# Patient Record
Sex: FEMALE | Race: WHITE | NOT HISPANIC OR LATINO | Employment: UNEMPLOYED | URBAN - METROPOLITAN AREA
[De-identification: names, ages, dates, MRNs, and addresses within clinical notes are randomized per-mention and may not be internally consistent; named-entity substitution may affect disease eponyms.]

---

## 2017-02-15 ENCOUNTER — HOSPITAL ENCOUNTER (EMERGENCY)
Facility: HOSPITAL | Age: 27
Discharge: HOME | End: 2017-02-15
Attending: EMERGENCY MEDICINE | Admitting: EMERGENCY MEDICINE
Payer: COMMERCIAL

## 2017-02-15 VITALS
TEMPERATURE: 98.5 F | OXYGEN SATURATION: 98 % | HEIGHT: 67 IN | RESPIRATION RATE: 18 BRPM | WEIGHT: 293 LBS | HEART RATE: 91 BPM | SYSTOLIC BLOOD PRESSURE: 130 MMHG | BODY MASS INDEX: 45.99 KG/M2 | DIASTOLIC BLOOD PRESSURE: 80 MMHG

## 2017-02-15 DIAGNOSIS — M54.50 LOW BACK PAIN: Primary | ICD-10-CM

## 2017-02-15 PROCEDURE — 99283 EMERGENCY DEPT VISIT LOW MDM: CPT

## 2017-02-15 PROCEDURE — 96372 THER/PROPH/DIAG INJ SC/IM: CPT

## 2017-02-15 RX ORDER — IBUPROFEN 600 MG/1
600 TABLET ORAL EVERY 8 HOURS PRN
Qty: 30 TABLET | Refills: 0 | Status: SHIPPED | OUTPATIENT
Start: 2017-02-15 | End: 2017-08-15

## 2017-02-15 RX ORDER — CYCLOBENZAPRINE HCL 5 MG
10 TABLET ORAL 3 TIMES DAILY PRN
Qty: 9 TABLET | Refills: 0 | Status: SHIPPED | OUTPATIENT
Start: 2017-02-15 | End: 2017-02-15

## 2017-02-15 RX ORDER — CYCLOBENZAPRINE HCL 10 MG
10 TABLET ORAL 3 TIMES DAILY PRN
Qty: 9 TABLET | Refills: 0 | Status: SHIPPED | OUTPATIENT
Start: 2017-02-15 | End: 2017-08-15

## 2017-02-15 RX ORDER — HYDROCODONE BITARTRATE AND ACETAMINOPHEN 5; 325 MG/1; MG/1
1 TABLET ORAL ONCE
Status: COMPLETED | OUTPATIENT
Start: 2017-02-15 | End: 2017-02-15

## 2017-02-15 RX ORDER — CYCLOBENZAPRINE HCL 10 MG
10 TABLET ORAL ONCE
Status: COMPLETED | OUTPATIENT
Start: 2017-02-15 | End: 2017-02-15

## 2017-02-15 RX ORDER — HYDROCODONE BITARTRATE AND ACETAMINOPHEN 5; 325 MG/1; MG/1
1 TABLET ORAL EVERY 6 HOURS PRN
Qty: 12 TABLET | Refills: 0 | Status: SHIPPED | OUTPATIENT
Start: 2017-02-15 | End: 2017-02-18

## 2017-02-15 RX ORDER — KETOROLAC TROMETHAMINE 30 MG/ML
30 INJECTION, SOLUTION INTRAMUSCULAR; INTRAVENOUS ONCE
Status: COMPLETED | OUTPATIENT
Start: 2017-02-15 | End: 2017-02-15

## 2017-02-15 RX ADMIN — CYCLOBENZAPRINE HYDROCHLORIDE 10 MG: 10 TABLET, FILM COATED ORAL at 20:03

## 2017-02-15 RX ADMIN — HYDROCODONE BITARTRATE AND ACETAMINOPHEN 1 TABLET: 5; 325 TABLET ORAL at 20:03

## 2017-02-15 RX ADMIN — KETOROLAC TROMETHAMINE 30 MG: 30 INJECTION, SOLUTION INTRAMUSCULAR at 20:04

## 2017-02-22 ENCOUNTER — ALLSCRIPTS OFFICE VISIT (OUTPATIENT)
Dept: OTHER | Facility: OTHER | Age: 27
End: 2017-02-22

## 2017-04-16 ENCOUNTER — HOSPITAL ENCOUNTER (EMERGENCY)
Facility: HOSPITAL | Age: 27
Discharge: HOME | End: 2017-04-16
Attending: EMERGENCY MEDICINE
Payer: COMMERCIAL

## 2017-04-16 VITALS
HEART RATE: 98 BPM | SYSTOLIC BLOOD PRESSURE: 166 MMHG | OXYGEN SATURATION: 98 % | TEMPERATURE: 98.6 F | RESPIRATION RATE: 18 BRPM | HEIGHT: 66 IN | WEIGHT: 293 LBS | DIASTOLIC BLOOD PRESSURE: 78 MMHG | BODY MASS INDEX: 47.09 KG/M2

## 2017-04-16 DIAGNOSIS — T22.011A: ICD-10-CM

## 2017-04-16 DIAGNOSIS — T23.001A BURN OF RIGHT HAND: Primary | ICD-10-CM

## 2017-04-16 PROCEDURE — 99283 EMERGENCY DEPT VISIT LOW MDM: CPT

## 2017-04-16 PROCEDURE — 96372 THER/PROPH/DIAG INJ SC/IM: CPT

## 2017-04-16 RX ORDER — KETOROLAC TROMETHAMINE 30 MG/ML
15 INJECTION, SOLUTION INTRAMUSCULAR; INTRAVENOUS ONCE
Status: COMPLETED | OUTPATIENT
Start: 2017-04-16 | End: 2017-04-16

## 2017-04-16 RX ORDER — DIAPER,BRIEF,INFANT-TODD,DISP
1 EACH MISCELLANEOUS 2 TIMES DAILY
Qty: 28 G | Refills: 0 | Status: SHIPPED | OUTPATIENT
Start: 2017-04-16 | End: 2017-08-15

## 2017-04-16 RX ORDER — BACITRACIN, NEOMYCIN, POLYMYXIN B 400; 3.5; 5 [USP'U]/G; MG/G; [USP'U]/G
1 OINTMENT TOPICAL ONCE
Status: COMPLETED | OUTPATIENT
Start: 2017-04-16 | End: 2017-04-16

## 2017-04-16 RX ORDER — ACETAMINOPHEN 325 MG/1
650 TABLET ORAL ONCE
Status: COMPLETED | OUTPATIENT
Start: 2017-04-16 | End: 2017-04-16

## 2017-04-16 RX ADMIN — ACETAMINOPHEN 650 MG: 325 TABLET, FILM COATED ORAL at 17:33

## 2017-04-16 RX ADMIN — BACITRACIN, NEOMYCIN, POLYMYXIN B 1 SMALL APPLICATION: 400; 3.5; 5 OINTMENT TOPICAL at 17:33

## 2017-04-16 RX ADMIN — KETOROLAC TROMETHAMINE 15 MG: 30 INJECTION, SOLUTION INTRAMUSCULAR at 17:33

## 2017-08-09 ENCOUNTER — HOSPITAL ENCOUNTER (EMERGENCY)
Facility: HOSPITAL | Age: 27
Discharge: HOME | End: 2017-08-10
Attending: EMERGENCY MEDICINE | Admitting: EMERGENCY MEDICINE
Payer: COMMERCIAL

## 2017-08-09 VITALS
SYSTOLIC BLOOD PRESSURE: 165 MMHG | BODY MASS INDEX: 51.65 KG/M2 | RESPIRATION RATE: 18 BRPM | HEART RATE: 98 BPM | DIASTOLIC BLOOD PRESSURE: 70 MMHG | OXYGEN SATURATION: 99 % | WEIGHT: 293 LBS | TEMPERATURE: 98.4 F

## 2017-08-09 DIAGNOSIS — L50.8 URTICARIA, ACUTE: Primary | ICD-10-CM

## 2017-08-09 RX ORDER — FAMOTIDINE 20 MG/1
20 TABLET, FILM COATED ORAL ONCE
Status: COMPLETED | OUTPATIENT
Start: 2017-08-09 | End: 2017-08-09

## 2017-08-09 RX ORDER — DIPHENHYDRAMINE HCL 25 MG
50 TABLET ORAL ONCE
Status: COMPLETED | OUTPATIENT
Start: 2017-08-09 | End: 2017-08-09

## 2017-08-09 RX ORDER — PREDNISONE 50 MG/1
50 TABLET ORAL DAILY
Qty: 4 TABLET | Refills: 0 | Status: SHIPPED | OUTPATIENT
Start: 2017-08-09 | End: 2017-08-13

## 2017-08-09 RX ORDER — FAMOTIDINE 20 MG/1
20 TABLET, FILM COATED ORAL 2 TIMES DAILY
Qty: 14 TABLET | Refills: 0 | Status: SHIPPED | OUTPATIENT
Start: 2017-08-09 | End: 2017-08-15

## 2017-08-09 RX ADMIN — DIPHENHYDRAMINE HCL 50 MG: 25 TABLET ORAL at 22:52

## 2017-08-09 RX ADMIN — PREDNISONE 50 MG: 10 TABLET ORAL at 22:53

## 2017-08-09 RX ADMIN — FAMOTIDINE 20 MG: 20 TABLET ORAL at 22:53

## 2017-08-10 PROCEDURE — 99283 EMERGENCY DEPT VISIT LOW MDM: CPT

## 2017-08-15 ENCOUNTER — HOSPITAL ENCOUNTER (EMERGENCY)
Facility: HOSPITAL | Age: 27
Discharge: HOME | End: 2017-08-15
Attending: EMERGENCY MEDICINE | Admitting: EMERGENCY MEDICINE
Payer: COMMERCIAL

## 2017-08-15 VITALS
RESPIRATION RATE: 16 BRPM | WEIGHT: 293 LBS | OXYGEN SATURATION: 98 % | BODY MASS INDEX: 51.65 KG/M2 | DIASTOLIC BLOOD PRESSURE: 77 MMHG | SYSTOLIC BLOOD PRESSURE: 122 MMHG | TEMPERATURE: 97.8 F | HEART RATE: 100 BPM

## 2017-08-15 DIAGNOSIS — J20.9 ACUTE BRONCHITIS: Primary | ICD-10-CM

## 2017-08-15 PROCEDURE — 99283 EMERGENCY DEPT VISIT LOW MDM: CPT

## 2017-08-15 PROCEDURE — 94640 AIRWAY INHALATION TREATMENT: CPT

## 2017-08-15 RX ORDER — PREDNISONE 20 MG/1
60 TABLET ORAL ONCE
Status: COMPLETED | OUTPATIENT
Start: 2017-08-15 | End: 2017-08-15

## 2017-08-15 RX ORDER — ALBUTEROL SULFATE 2.5 MG/3ML
5 SOLUTION RESPIRATORY (INHALATION) ONCE
Status: COMPLETED | OUTPATIENT
Start: 2017-08-15 | End: 2017-08-15

## 2017-08-15 RX ORDER — PREDNISONE 20 MG/1
40 TABLET ORAL DAILY
Qty: 10 TABLET | Refills: 0 | Status: SHIPPED | OUTPATIENT
Start: 2017-08-15 | End: 2017-08-20

## 2017-08-15 RX ORDER — ALBUTEROL SULFATE 90 UG/1
2 AEROSOL, METERED RESPIRATORY (INHALATION) EVERY 4 HOURS PRN
Qty: 1 INHALER | Refills: 0 | Status: SHIPPED | OUTPATIENT
Start: 2017-08-15 | End: 2018-09-11

## 2017-08-15 RX ORDER — BENZONATATE 100 MG/1
100 CAPSULE ORAL EVERY 8 HOURS
Qty: 21 CAPSULE | Refills: 0 | Status: SHIPPED | OUTPATIENT
Start: 2017-08-15 | End: 2017-11-14

## 2017-08-15 RX ADMIN — PREDNISONE 60 MG: 20 TABLET ORAL at 12:02

## 2017-08-15 RX ADMIN — ALBUTEROL SULFATE 5 MG: 2.5 SOLUTION RESPIRATORY (INHALATION) at 12:05

## 2017-11-14 ENCOUNTER — HOSPITAL ENCOUNTER (EMERGENCY)
Facility: HOSPITAL | Age: 27
Discharge: HOME | End: 2017-11-14
Attending: EMERGENCY MEDICINE
Payer: COMMERCIAL

## 2017-11-14 VITALS
RESPIRATION RATE: 18 BRPM | TEMPERATURE: 98.8 F | DIASTOLIC BLOOD PRESSURE: 65 MMHG | OXYGEN SATURATION: 98 % | HEART RATE: 87 BPM | WEIGHT: 293 LBS | SYSTOLIC BLOOD PRESSURE: 131 MMHG | BODY MASS INDEX: 52.46 KG/M2

## 2017-11-14 DIAGNOSIS — G89.29 CHRONIC DENTAL PAIN: Primary | ICD-10-CM

## 2017-11-14 DIAGNOSIS — K08.9 CHRONIC DENTAL PAIN: Primary | ICD-10-CM

## 2017-11-14 DIAGNOSIS — K04.7 DENTAL INFECTION: ICD-10-CM

## 2017-11-14 PROCEDURE — 99283 EMERGENCY DEPT VISIT LOW MDM: CPT

## 2017-11-14 RX ORDER — CLINDAMYCIN HYDROCHLORIDE 300 MG/1
300 CAPSULE ORAL 4 TIMES DAILY
Qty: 40 CAPSULE | Refills: 0 | Status: SHIPPED | OUTPATIENT
Start: 2017-11-14 | End: 2017-11-24

## 2017-11-14 RX ORDER — DEXAMETHASONE 4 MG/1
10 TABLET ORAL ONCE
Status: COMPLETED | OUTPATIENT
Start: 2017-11-14 | End: 2017-11-14

## 2017-11-14 RX ORDER — NAPROXEN 500 MG/1
500 TABLET ORAL 2 TIMES DAILY WITH MEALS
Qty: 20 TABLET | Refills: 0 | Status: SHIPPED | OUTPATIENT
Start: 2017-11-14 | End: 2018-03-11

## 2017-11-14 RX ORDER — ONDANSETRON 4 MG/1
4 TABLET, ORALLY DISINTEGRATING ORAL EVERY 6 HOURS PRN
Qty: 20 TABLET | Refills: 0 | Status: SHIPPED | OUTPATIENT
Start: 2017-11-14 | End: 2018-08-16 | Stop reason: ALTCHOICE

## 2017-11-14 RX ORDER — CLINDAMYCIN HYDROCHLORIDE 150 MG/1
300 CAPSULE ORAL ONCE
Status: COMPLETED | OUTPATIENT
Start: 2017-11-14 | End: 2017-11-14

## 2017-11-14 RX ADMIN — DEXAMETHASONE 10 MG: 4 TABLET ORAL at 09:56

## 2017-11-14 RX ADMIN — CLINDAMYCIN HYDROCHLORIDE 300 MG: 150 CAPSULE ORAL at 09:56

## 2017-11-14 NOTE — DISCHARGE INSTRUCTIONS
Return to the ER if you develop fevers greater than 100 4 after 48 hours of being on the antibiotics, or if your symptoms continue to worsen  Follow up with the dentist as scheduled for definitive treatment  Dental Abscess   WHAT YOU NEED TO KNOW:   A dental abscess is a collection of pus in or around a tooth  A dental abscess is caused by bacteria  The bacteria usually enter the tooth when the enamel (outer part of the tooth) is damaged by tooth decay  Bacteria may also enter the tooth through a break or chip in the tooth, or a cut in the gum  Food particles that are stuck between the teeth for a long time may also lead to an abscess  DISCHARGE INSTRUCTIONS:   Return to the emergency department if:   · You have severe pain  · You have trouble breathing because of pain or swelling  Contact your healthcare provider if:   · Your symptoms get worse, even after treatment  · Your mouth is bleeding  · You cannot eat or drink because of pain or swelling  · Your abscess returns  · You have an injury that causes a crack in your tooth  · You have questions or concerns about your condition or care  Medicines: You may  need any of the following:  · Antibiotics  help treat a bacterial infection  · NSAIDs , such as ibuprofen, help decrease swelling, pain, and fever  This medicine is available with or without a doctor's order  NSAIDs can cause stomach bleeding or kidney problems in certain people  If you take blood thinner medicine, always ask your healthcare provider if NSAIDs are safe for you  Always read the medicine label and follow directions  · Acetaminophen  decreases pain and fever  It is available without a doctor's order  Ask how much to take and how often to take it  Follow directions  Read the labels of all other medicines you are using to see if they also contain acetaminophen, or ask your doctor or pharmacist  Acetaminophen can cause liver damage if not taken correctly   Do not use more than 4 grams (4,000 milligrams) total of acetaminophen in one day  · Prescription pain medicine  may be given  Ask your healthcare provider how to take this medicine safely  Some prescription pain medicines contain acetaminophen  Do not take other medicines that contain acetaminophen without talking to your healthcare provider  Too much acetaminophen may cause liver damage  Prescription pain medicine may cause constipation  Ask your healthcare provider how to prevent or treat constipation  · Take your medicine as directed  Contact your healthcare provider if you think your medicine is not helping or if you have side effects  Tell him of her if you are allergic to any medicine  Keep a list of the medicines, vitamins, and herbs you take  Include the amounts, and when and why you take them  Bring the list or the pill bottles to follow-up visits  Carry your medicine list with you in case of an emergency  Self-care:   · Rinse your mouth every 2 hours with salt water  This will help keep the area clean  · Gently brush your teeth twice a day with a soft tooth brush  This will help keep the area clean  · Eat soft foods as directed  Soft foods may cause less pain  Examples include applesauce, yogurt, and cooked pasta  Ask your healthcare provider how long to follow this instruction  · Apply a warm compress to your tooth or gum  Use a cotton ball or gauze soaked in warm water  Remove the compress in 10 minutes or when it becomes cool  Repeat 3 times a day  Prevent another abscess:   · Brush your teeth at least 2 times a day with fluoride toothpaste  · Use dental floss to clean between your teeth at least once a day  · Rinse your mouth with water or mouthwash after meals and snacks  · Chew sugarless gum after meals and snacks  · Limit foods that are sticky and high in sugar such as raisons  Also limit drinks high in sugar, such as soda       · See your dentist every 6 months for dental cleanings and oral exams  Follow up with your healthcare provider in 24 hours: Your healthcare provider will need to check your teeth and gums  Write down your questions so you remember to ask them during your visits  © 2017 [employer] Information is for End User's use only and may not be sold, redistributed or otherwise used for commercial purposes  All illustrations and images included in CareNotes® are the copyrighted property of [employer]  or [employer]  The above information is an  only  It is not intended as medical advice for individual conditions or treatments  Talk to your doctor, nurse or pharmacist before following any medical regimen to see if it is safe and effective for you

## 2017-11-14 NOTE — ED PROVIDER NOTES
History  Chief Complaint   Patient presents with    Facial Swelling     pt had toothache and was placed on amoxicillin, states was okay and then sx got worse   has dentist appt for noon today but woke up with significant right sided facial swelling  reports can hardly open mouth and weird feeling when swallowing      31 YO female presents with Right sided dental pain and facial swelling for the last 2 days  Pt states she has actually had the discomfort for ~1 month but it was waxing and waning, she was treated for a dental infection 1 month prior with amoxicillin but does not feel this was helpful  Pt states she was told by her dentist to discontinue the amoxicillin prior to finishing the course as it caused her to have a urinary tract infection  Pt states the pain worsened yesterday and has been constant, she has noticed increasing swelling and discomfort in the Right cheek  Pt states the pain and swelling seems to come from the Right maxillary jaw  Pt called her dentist and ha an appointment in 2 hours but was told to come to the ED for Abx  Pt denies CP/SOB/F/C/N/V/D/C, no dysuria, burning on urination or blood in urine  History provided by:  Patient   used: No    Dental Pain   Location:  Upper  Upper teeth location:  2/RU 2nd molar and 3/RU 1st molar  Quality:  Aching  Severity:  Moderate  Onset quality:  Gradual  Duration:  2 days  Timing:  Constant  Progression:  Worsening  Chronicity:  Recurrent  Context: dental caries    Previous work-up:  Dental exam  Relieved by:  Nothing  Worsened by:  Jaw movement  Ineffective treatments:  NSAIDs  Associated symptoms: facial swelling    Associated symptoms: no difficulty swallowing, no drooling, no fever, no gum swelling, no neck swelling, no oral bleeding and no trismus        Prior to Admission Medications   Prescriptions Last Dose Informant Patient Reported? Taking?    albuterol (PROVENTIL HFA,VENTOLIN HFA) 90 mcg/act inhaler   No Yes Sig: Inhale 2 puffs every 4 (four) hours as needed for wheezing      Facility-Administered Medications: None       Past Medical History:   Diagnosis Date    Back pain     MRSA carrier        Past Surgical History:   Procedure Laterality Date    TONSILLECTOMY         History reviewed  No pertinent family history  I have reviewed and agree with the history as documented  Social History   Substance Use Topics    Smoking status: Never Smoker    Smokeless tobacco: Never Used    Alcohol use No        Review of Systems   Constitutional: Negative for chills, fatigue and fever  HENT: Positive for facial swelling  Negative for dental problem and drooling  Eyes: Negative for visual disturbance  Respiratory: Negative for shortness of breath  Cardiovascular: Negative for chest pain  Gastrointestinal: Negative for abdominal pain, diarrhea and vomiting  Genitourinary: Negative for dysuria and frequency  Musculoskeletal: Negative for arthralgias  Skin: Negative for rash  Neurological: Negative for dizziness, weakness and light-headedness  Psychiatric/Behavioral: Negative for agitation, behavioral problems and confusion  All other systems reviewed and are negative  Physical Exam  ED Triage Vitals [11/14/17 0914]   Temperature Pulse Respirations Blood Pressure SpO2   98 8 °F (37 1 °C) 87 18 131/65 98 %      Temp Source Heart Rate Source Patient Position - Orthostatic VS BP Location FiO2 (%)   Oral -- -- -- --      Pain Score       Worst Possible Pain           Orthostatic Vital Signs  Vitals:    11/14/17 0914   BP: 131/65   Pulse: 87       Physical Exam   Constitutional: She is oriented to person, place, and time  She appears well-developed and well-nourished  HENT:   Head: Normocephalic and atraumatic  Poor dentition throughout, no pointing abscess on Right, no dental fractures, multiple fillings, swelling to the Right buccal cheek and mucosa without abscess, induration is present  Eyes: EOM are normal  Pupils are equal, round, and reactive to light  Neck: Normal range of motion  Cardiovascular: Normal rate, regular rhythm and normal heart sounds  Pulmonary/Chest: Effort normal and breath sounds normal    Abdominal: Soft  Musculoskeletal: Normal range of motion  Neurological: She is alert and oriented to person, place, and time  Skin: Skin is warm and dry  Psychiatric: She has a normal mood and affect  Her behavior is normal  Thought content normal    Nursing note and vitals reviewed  ED Medications  Medications   dexamethasone (DECADRON) tablet 10 mg (10 mg Oral Given 11/14/17 0956)   clindamycin (CLEOCIN) capsule 300 mg (300 mg Oral Given 11/14/17 0956)       Diagnostic Studies  Results Reviewed     None                 No orders to display              Procedures  Procedures       Phone Contacts  ED Phone Contact    ED Course  ED Course                                MDM  Number of Diagnoses or Management Options  Chronic dental pain: new and requires workup  Dental infection: new and requires workup  Diagnosis management comments: 1  Dental infection - Pt with swelling in the buccal area on Right, induration without fluctuance, pt is not currently on Abx  Will give dose of steroids and treat with clindamycin, pt is to follow back up with her dentist for definitive Tx         Amount and/or Complexity of Data Reviewed  Review and summarize past medical records: yes    Patient Progress  Patient progress: stable    CritCare Time    Disposition  Final diagnoses:   Chronic dental pain   Dental infection     Time reflects when diagnosis was documented in both MDM as applicable and the Disposition within this note     Time User Action Codes Description Comment    11/14/2017  9:44 AM Romeo De La Fuente [K08 9,  G89 29] Chronic dental pain     11/14/2017  9:44 AM Romeo De La Fuente [K04 7] Dental infection       ED Disposition     ED Disposition Condition Comment    Discharge

## 2018-01-13 VITALS
WEIGHT: 293 LBS | BODY MASS INDEX: 45.99 KG/M2 | HEIGHT: 67 IN | SYSTOLIC BLOOD PRESSURE: 130 MMHG | DIASTOLIC BLOOD PRESSURE: 72 MMHG | TEMPERATURE: 97.7 F

## 2018-03-11 ENCOUNTER — HOSPITAL ENCOUNTER (EMERGENCY)
Facility: HOSPITAL | Age: 28
Discharge: HOME | End: 2018-03-11
Attending: EMERGENCY MEDICINE | Admitting: EMERGENCY MEDICINE
Payer: COMMERCIAL

## 2018-03-11 VITALS
DIASTOLIC BLOOD PRESSURE: 96 MMHG | WEIGHT: 293 LBS | BODY MASS INDEX: 53.26 KG/M2 | HEART RATE: 81 BPM | SYSTOLIC BLOOD PRESSURE: 166 MMHG | RESPIRATION RATE: 20 BRPM | TEMPERATURE: 98.1 F | OXYGEN SATURATION: 99 %

## 2018-03-11 DIAGNOSIS — G89.29 CHRONIC PAIN OF LEFT KNEE: Primary | ICD-10-CM

## 2018-03-11 DIAGNOSIS — M25.562 CHRONIC PAIN OF LEFT KNEE: Primary | ICD-10-CM

## 2018-03-11 PROCEDURE — 99283 EMERGENCY DEPT VISIT LOW MDM: CPT

## 2018-03-11 RX ORDER — NAPROXEN 500 MG/1
500 TABLET ORAL 2 TIMES DAILY WITH MEALS
Qty: 20 TABLET | Refills: 0 | Status: SHIPPED | OUTPATIENT
Start: 2018-03-11 | End: 2018-08-16 | Stop reason: ALTCHOICE

## 2018-03-11 NOTE — ED ATTENDING ATTESTATION
Elsie Chirinos MD, saw and evaluated the patient  I have discussed the patient with the resident/non-physician practitioner and agree with the resident's/non-physician practitioner's findings, Plan of Care, and MDM as documented in the resident's/non-physician practitioner's note, except where noted  All available labs and Radiology studies were reviewed  At this point I agree with the current assessment done in the Emergency Department    I have conducted an independent evaluation of this patient a history and physical is as follows:   chronic left knee pain no new injury patient has been seen by Orthopedics physical therapy and has had an MRI of the knee she states she was told that she may have meniscal tear   no fever   exam: Patient   Has an elevated BMI knee is stable no effusion noted neurovascular status intact   impression knee pain left    Critical Care Time  CritCare Time    Procedures

## 2018-03-11 NOTE — DISCHARGE INSTRUCTIONS
Arthralgia   WHAT YOU NEED TO KNOW:   Arthralgia is pain in one or more joints, with no inflammation  It may be short-term and get better within 6 to 8 weeks  Arthralgia can be an early sign of arthritis  Arthralgia may be caused by a medical condition, such as a hormone disorder or a tumor  It may also be caused by an infection or injury  DISCHARGE INSTRUCTIONS:   Medicines: The following medicines may  be ordered for you:  · Acetaminophen  decreases pain  Ask how much to take and how often to take it  Follow directions  Acetaminophen can cause liver damage if not taken correctly  · NSAIDs  decrease pain and prevent swelling  Ask your healthcare provider which medicine is right for you  Ask how much to take and when to take it  Take as directed  NSAIDs can cause stomach bleeding and kidney problems if not taken correctly  · Pain relief cream  decreases pain  Use this cream as directed  · Take your medicine as directed  Contact your healthcare provider if you think your medicine is not helping or if you have side effects  Tell him of her if you are allergic to any medicine  Keep a list of the medicines, vitamins, and herbs you take  Include the amounts, and when and why you take them  Bring the list or the pill bottles to follow-up visits  Carry your medicine list with you in case of an emergency  Follow up with your healthcare provider or specialist as directed:  Write down your questions so you remember to ask them during your visits  Self-care:   · Apply heat  to help decrease pain  Use a heating pad or heat wrap  Apply heat for 20 to 30 minutes every 2 hours for as many days as directed  · Rest  as much as possible  Avoid activities that cause joint pain  · Apply ice  to help decrease swelling and pain  Ice may also help prevent tissue damage  Use an ice pack, or put crushed ice in a plastic bag   Cover it with a towel and place it on your painful joint for 15 to 20 minutes every hour or as directed  · Support  the joint with a brace or elastic wrap as directed  · Elevate  your joint above the level of your heart as often as you can to help decrease swelling and pain  Prop your painful joint on pillows or blankets to keep it elevated comfortably  · Lose weight  if you are overweight  Extra weight can put pressure on your joints and cause more pain  Ask your healthcare provider how much you should weigh  Ask him to help you create a weight loss plan  · Exercise  regularly to help improve joint movement and to decrease pain  Ask about the best exercise plan for you  Low-impact exercises can help take the pressure off your joints  Examples are walking, swimming, and water aerobics  Physical therapy:  A physical therapist teaches you exercises to help improve movement and strength, and to decrease pain  Ask your healthcare provider if physical therapy is right for you  Contact your healthcare provider or specialist if:   · You have a fever  · You continue to have joint pain that cannot be relieved with heat, ice, or medicine  · You have pain and inflammation around your joint  · You have questions or concerns about your condition or care  Return to the emergency department if:   · You have sudden, severe pain when you move your joint  · You have a fever and shaking chills  · You cannot move your joint  · You lose feeling on the side of your body where you have the painful joint  © 2017 [employer]  Information is for End User's use only and may not be sold, redistributed or otherwise used for commercial purposes  All illustrations and images included in CareNotes® are the copyrighted property of [employer]  or [facility]  The above information is an  only  It is not intended as medical advice for individual conditions or treatments   Talk to your doctor, nurse or pharmacist before following any medical regimen to see if it is safe and effective for you

## 2018-03-11 NOTE — ED PROVIDER NOTES
History  Chief Complaint   Patient presents with    Knee Pain     Pt has c/o b/l knee pain for over a month  Pt states that the L one hurts more than the R  Pt has been to therapy for it in the past     68-year-old w/ history of morbid obesity and chronic knee pain presents for evaluation of bilateral knee pain  Patient reports that she has had persistent pain over a year now that has worsened just over last month  Pain is worse in her left knee and the right  Pain is worse whenever she ambulates  Reports that she is on her feet at work persistently  Denies any recent injury or trauma  Prior to Admission Medications   Prescriptions Last Dose Informant Patient Reported? Taking? albuterol (PROVENTIL HFA,VENTOLIN HFA) 90 mcg/act inhaler   No No   Sig: Inhale 2 puffs every 4 (four) hours as needed for wheezing   naproxen (NAPROSYN) 500 mg tablet   No No   Sig: Take 1 tablet by mouth 2 (two) times a day with meals for 10 days   ondansetron (ZOFRAN-ODT) 4 mg disintegrating tablet   No No   Sig: Take 1 tablet by mouth every 6 (six) hours as needed for nausea or vomiting      Facility-Administered Medications: None       Past Medical History:   Diagnosis Date    Back pain     Knee pain     MRSA carrier        Past Surgical History:   Procedure Laterality Date    TONSILLECTOMY         History reviewed  No pertinent family history  I have reviewed and agree with the history as documented  Social History   Substance Use Topics    Smoking status: Never Smoker    Smokeless tobacco: Never Used    Alcohol use Yes      Comment: social        Review of Systems   Constitutional: Negative for chills and fever  HENT: Negative for congestion and sore throat  Eyes: Negative for pain and redness  Respiratory: Negative for shortness of breath and wheezing  Cardiovascular: Negative for chest pain and palpitations  Gastrointestinal: Negative for abdominal pain, diarrhea and vomiting     Endocrine: Negative for polydipsia and polyphagia  Genitourinary: Negative for dysuria and flank pain  Musculoskeletal: Positive for arthralgias  Negative for back pain  Skin: Negative for rash and wound  Neurological: Negative for seizures and headaches  Psychiatric/Behavioral: Negative for agitation and behavioral problems  All other systems reviewed and are negative  Physical Exam  ED Triage Vitals [03/11/18 1751]   Temperature Pulse Respirations Blood Pressure SpO2   98 1 °F (36 7 °C) 81 20 166/96 99 %      Temp Source Heart Rate Source Patient Position - Orthostatic VS BP Location FiO2 (%)   Tympanic Monitor Sitting Left arm --      Pain Score       Worst Possible Pain           Orthostatic Vital Signs  Vitals:    03/11/18 1751   BP: 166/96   Pulse: 81   Patient Position - Orthostatic VS: Sitting       Physical Exam   Constitutional: She is oriented to person, place, and time  She appears well-developed and well-nourished  HENT:   Head: Normocephalic and atraumatic  Right Ear: External ear normal    Left Ear: External ear normal    Mouth/Throat: Oropharynx is clear and moist    Eyes: EOM are normal  Pupils are equal, round, and reactive to light  Neck: Normal range of motion  Cardiovascular: Normal rate, regular rhythm and normal heart sounds  Exam reveals no friction rub  No murmur heard  Pulmonary/Chest: Effort normal  No respiratory distress  She has no wheezes  Abdominal: Soft  Bowel sounds are normal  She exhibits no distension  There is no tenderness  There is no rebound and no guarding  Musculoskeletal: Normal range of motion  She exhibits no edema  Normal range of motion of bilateral knees  Negative anterior and posterior drawer test   Normal sensation  Normal strength  No crepitus  Neurological: She is alert and oriented to person, place, and time  No cranial nerve deficit  Coordination normal    Skin: Skin is warm  No erythema     Psychiatric: She has a normal mood and affect  Her behavior is normal    Nursing note and vitals reviewed  ED Medications  Medications - No data to display    Diagnostic Studies  Results Reviewed     None                 No orders to display         Procedures  Procedures      Phone Consults  ED Phone Contact    ED Course  ED Course                                MDM  Number of Diagnoses or Management Options  Chronic pain of left knee:   Diagnosis management comments: Impression:  Morbid obesity, chronic knee pain  Plan:  Treatment with NSAIDs, recommended weight loss, follow up with family doctor    CritCare Time    Disposition  Final diagnoses:   Chronic pain of left knee     Time reflects when diagnosis was documented in both MDM as applicable and the Disposition within this note     Time User Action Codes Description Comment    3/11/2018  7:34 PM Samanta Hankins Add [Y87 [id number],  G89 29] Chronic pain of left knee       ED Disposition     ED Disposition Condition Comment    Discharge  Ane Casanova discharge to home/self care      Condition at discharge: Good        Follow-up Information     Follow up With Specialties Details Why Contact Info Additional Information    MAURI Bach Nurse Practitioner Schedule an appointment as soon as possible for a visit  [address] [facility] Emergency Department Emergency Medicine  As needed [address]  [phone]  ED, [address] [facility] [address], [place], [address] [place] [address], [id number]        Discharge Medication List as of 3/11/2018  7:37 PM      CONTINUE these medications which have CHANGED    Details   naproxen (NAPROSYN) 500 mg tablet Take 1 tablet (500 mg total) by mouth 2 (two) times a day with meals for 10 days, Starting Sun 3/11/2018, Until Wed 3/21/2018, Print         CONTINUE these medications which have NOT CHANGED    Details   albuterol (PROVENTIL HFA,VENTOLIN HFA) 90 mcg/act inhaler Inhale 2 puffs every 4 (four) hours as needed for wheezing, Starting Tue 8/15/2017, Print      ondansetron (ZOFRAN-ODT) 4 mg disintegrating tablet Take 1 tablet by mouth every 6 (six) hours as needed for nausea or vomiting, Starting Tue 11/14/2017, Print           No discharge procedures on file  ED Provider  Attending physically available and evaluated Twin Hairston I managed the patient along with the ED Attending      Electronically Signed by         Tawana Jones MD  03/11/18 9743

## 2018-05-01 ENCOUNTER — TELEPHONE (OUTPATIENT)
Dept: OBGYN CLINIC | Facility: HOSPITAL | Age: 28
End: 2018-05-01

## 2018-08-16 ENCOUNTER — HOSPITAL ENCOUNTER (EMERGENCY)
Facility: HOSPITAL | Age: 28
Discharge: HOME | End: 2018-08-16
Attending: EMERGENCY MEDICINE
Payer: COMMERCIAL

## 2018-08-16 VITALS
RESPIRATION RATE: 20 BRPM | BODY MASS INDEX: 51.65 KG/M2 | DIASTOLIC BLOOD PRESSURE: 72 MMHG | HEART RATE: 116 BPM | TEMPERATURE: 99.5 F | SYSTOLIC BLOOD PRESSURE: 132 MMHG | OXYGEN SATURATION: 95 % | WEIGHT: 293 LBS

## 2018-08-16 DIAGNOSIS — L02.91 ABSCESS: Primary | ICD-10-CM

## 2018-08-16 LAB
ALBUMIN SERPL BCP-MCNC: 4.1 G/DL (ref 3–5.2)
ALP SERPL-CCNC: 50 U/L (ref 43–122)
ALT SERPL W P-5'-P-CCNC: 33 U/L (ref 9–52)
ANION GAP SERPL CALCULATED.3IONS-SCNC: 9 MMOL/L (ref 5–14)
AST SERPL W P-5'-P-CCNC: 28 U/L (ref 14–36)
BASOPHILS # BLD AUTO: 0.1 THOUSANDS/ΜL (ref 0–0.1)
BASOPHILS NFR BLD AUTO: 1 % (ref 0–1)
BILIRUB SERPL-MCNC: 0.6 MG/DL
BUN SERPL-MCNC: 15 MG/DL (ref 5–25)
CALCIUM SERPL-MCNC: 8.8 MG/DL (ref 8.4–10.2)
CHLORIDE SERPL-SCNC: 106 MMOL/L (ref 97–108)
CO2 SERPL-SCNC: 24 MMOL/L (ref 22–30)
CREAT SERPL-MCNC: 0.67 MG/DL (ref 0.6–1.2)
EOSINOPHIL # BLD AUTO: 0.2 THOUSAND/ΜL (ref 0–0.4)
EOSINOPHIL NFR BLD AUTO: 2 % (ref 0–6)
ERYTHROCYTE [DISTWIDTH] IN BLOOD BY AUTOMATED COUNT: 15 %
GFR SERPL CREATININE-BSD FRML MDRD: 118 ML/MIN/1.73SQ M
GLUCOSE SERPL-MCNC: 108 MG/DL (ref 70–99)
HCT VFR BLD AUTO: 37.5 % (ref 36–46)
HGB BLD-MCNC: 12.7 G/DL (ref 12–16)
LYMPHOCYTES # BLD AUTO: 3.2 THOUSANDS/ΜL (ref 0.5–4)
LYMPHOCYTES NFR BLD AUTO: 23 % (ref 20–50)
MCH RBC QN AUTO: 28 PG (ref 26–34)
MCHC RBC AUTO-ENTMCNC: 33.8 G/DL (ref 31–36)
MCV RBC AUTO: 83 FL (ref 80–100)
MONOCYTES # BLD AUTO: 1.1 THOUSAND/ΜL (ref 0.2–0.9)
MONOCYTES NFR BLD AUTO: 8 % (ref 1–10)
NEUTROPHILS # BLD AUTO: 9.6 THOUSANDS/ΜL (ref 1.8–7.8)
NEUTS SEG NFR BLD AUTO: 68 % (ref 45–65)
PLATELET # BLD AUTO: 258 THOUSANDS/UL (ref 150–450)
PMV BLD AUTO: 8.9 FL (ref 8.9–12.7)
POTASSIUM SERPL-SCNC: 4.3 MMOL/L (ref 3.6–5)
PROT SERPL-MCNC: 7.6 G/DL (ref 5.9–8.4)
RBC # BLD AUTO: 4.53 MILLION/UL (ref 4–5.2)
SODIUM SERPL-SCNC: 139 MMOL/L (ref 137–147)
WBC # BLD AUTO: 14.2 THOUSAND/UL (ref 4.5–11)

## 2018-08-16 PROCEDURE — 96365 THER/PROPH/DIAG IV INF INIT: CPT

## 2018-08-16 PROCEDURE — 85025 COMPLETE CBC W/AUTO DIFF WBC: CPT | Performed by: EMERGENCY MEDICINE

## 2018-08-16 PROCEDURE — 36415 COLL VENOUS BLD VENIPUNCTURE: CPT | Performed by: EMERGENCY MEDICINE

## 2018-08-16 PROCEDURE — 99283 EMERGENCY DEPT VISIT LOW MDM: CPT

## 2018-08-16 PROCEDURE — 80053 COMPREHEN METABOLIC PANEL: CPT | Performed by: EMERGENCY MEDICINE

## 2018-08-16 PROCEDURE — 96375 TX/PRO/DX INJ NEW DRUG ADDON: CPT

## 2018-08-16 PROCEDURE — 87040 BLOOD CULTURE FOR BACTERIA: CPT | Performed by: EMERGENCY MEDICINE

## 2018-08-16 RX ORDER — IBUPROFEN 600 MG/1
600 TABLET ORAL EVERY 6 HOURS PRN
Qty: 30 TABLET | Refills: 0 | Status: SHIPPED | OUTPATIENT
Start: 2018-08-16 | End: 2018-09-11

## 2018-08-16 RX ORDER — CLINDAMYCIN PHOSPHATE 600 MG/50ML
600 INJECTION INTRAVENOUS ONCE
Status: COMPLETED | OUTPATIENT
Start: 2018-08-16 | End: 2018-08-16

## 2018-08-16 RX ORDER — KETOROLAC TROMETHAMINE 30 MG/ML
INJECTION, SOLUTION INTRAMUSCULAR; INTRAVENOUS
Status: COMPLETED
Start: 2018-08-16 | End: 2018-08-16

## 2018-08-16 RX ORDER — KETOROLAC TROMETHAMINE 30 MG/ML
30 INJECTION, SOLUTION INTRAMUSCULAR; INTRAVENOUS ONCE
Status: COMPLETED | OUTPATIENT
Start: 2018-08-16 | End: 2018-08-16

## 2018-08-16 RX ORDER — CLINDAMYCIN PHOSPHATE 600 MG/50ML
INJECTION INTRAVENOUS
Status: COMPLETED
Start: 2018-08-16 | End: 2018-08-16

## 2018-08-16 RX ORDER — LIDOCAINE HYDROCHLORIDE 10 MG/ML
INJECTION, SOLUTION EPIDURAL; INFILTRATION; INTRACAUDAL; PERINEURAL
Status: DISCONTINUED
Start: 2018-08-16 | End: 2018-08-16 | Stop reason: HOSPADM

## 2018-08-16 RX ORDER — SULFAMETHOXAZOLE AND TRIMETHOPRIM 800; 160 MG/1; MG/1
1 TABLET ORAL 2 TIMES DAILY
Qty: 14 TABLET | Refills: 0 | Status: SHIPPED | OUTPATIENT
Start: 2018-08-16 | End: 2018-08-23

## 2018-08-16 RX ORDER — CEPHALEXIN 500 MG/1
500 CAPSULE ORAL EVERY 8 HOURS SCHEDULED
Qty: 30 CAPSULE | Refills: 0 | Status: SHIPPED | OUTPATIENT
Start: 2018-08-16 | End: 2018-08-26

## 2018-08-16 RX ADMIN — KETOROLAC TROMETHAMINE 30 MG: 30 INJECTION, SOLUTION INTRAMUSCULAR; INTRAVENOUS at 20:33

## 2018-08-16 RX ADMIN — CLINDAMYCIN PHOSPHATE 600 MG: 600 INJECTION INTRAVENOUS at 20:33

## 2018-08-16 RX ADMIN — CLINDAMYCIN PHOSPHATE 600 MG: 600 INJECTION, SOLUTION INTRAVENOUS at 20:33

## 2018-08-16 NOTE — ED TRIAGE NOTES
Pt states that 3 days ago on her right side she has 2 lumps  Pt states that she is unsure how it started  Pt states that she has had them cover and has been putting antibiotic cream OTC  Pt states that there is a small amount of drainage   Pt states that she did Have MRSA in the past in a wound

## 2018-08-17 NOTE — DISCHARGE INSTRUCTIONS
Abscess   WHAT YOU NEED TO KNOW:   A warm compress may help your abscess drain  Your healthcare provider may make a cut in the abscess so it can drain  You may need surgery to remove an abscess that is on your hands or buttocks  DISCHARGE INSTRUCTIONS:   Return to the emergency department if:   · The area around your abscess becomes very painful, warm, or has red streaks  · You have a fever and chills  · Your heart is beating faster than usual      · You feel faint or confused  Contact your healthcare provider if:   · Your abscess gets bigger or does not get better  · Your abscess returns  · You have questions or concerns about your condition or care  Medicines: You may  need any of the following:  · Antibiotics  help treat a bacterial infection  · Acetaminophen  decreases pain and fever  It is available without a doctor's order  Ask how much to take and how often to take it  Follow directions  Acetaminophen can cause liver damage if not taken correctly  · NSAIDs , such as ibuprofen, help decrease swelling, pain, and fever  This medicine is available with or without a doctor's order  NSAIDs can cause stomach bleeding or kidney problems in certain people  If you take blood thinner medicine, always ask your healthcare provider if NSAIDs are safe for you  Always read the medicine label and follow directions  · Take your medicine as directed  Contact your healthcare provider if you think your medicine is not helping or if you have side effects  Tell him or her if you are allergic to any medicine  Keep a list of the medicines, vitamins, and herbs you take  Include the amounts, and when and why you take them  Bring the list or the pill bottles to follow-up visits  Carry your medicine list with you in case of an emergency  Self-care:   · Apply a warm compress to your abscess  This will help it open and drain  Wet a washcloth in warm, but not hot, water  Apply the compress for 10 minutes  Repeat this 4 times each day  Do not  press on an abscess or try to open it with a needle  You may push the bacteria deeper or into your blood  · Do not share your clothes, towels, or sheets with anyone  This can spread the infection to others  · Wash your hands often  This can help prevent the spread of germs  Use soap and water or an alcohol-based hand rub  Care for your wound after it is drained:   · Care for your wound as directed  If your healthcare provider says it is okay, carefully remove the bandage and gauze packing  You may need to soak the gauze to get it out of your wound  Clean your wound and the area around it as directed  Dry the area and put on new, clean bandages  Change your bandages when they get wet or dirty  · Ask your healthcare provider how to change the gauze in your wound  Keep track of how many pieces of gauze are placed inside the wound  Do not put too much packing in the wound  Do not pack the gauze too tightly in your wound  Follow up with your healthcare provider in 1 to 3 days: You may need to have your packing removed or your bandage changed  Write down your questions so you remember to ask them during your visits  © 2017 [address]  Information is for End User's use only and may not be sold, redistributed or otherwise used for commercial purposes  All illustrations and images included in CareNotes® are the copyrighted property of [employer] , [employer]  or [employer] La [employer]  The above information is an  only  It is not intended as medical advice for individual conditions or treatments  Talk to your doctor, nurse or pharmacist before following any medical regimen to see if it is safe and effective for you

## 2018-08-17 NOTE — ED PROVIDER NOTES
History  Chief Complaint   Patient presents with    Abscess       History provided by:  Patient   used: No    Medical Problem   Location:  Pt with abscess to right abdomen x 3 days   Severity:  Moderate  Onset quality:  Gradual  Duration:  3 days  Timing:  Constant  Progression:  Unchanged  Chronicity:  New  Associated symptoms: no abdominal pain, no chest pain, no congestion, no cough, no diarrhea, no ear pain, no fatigue, no fever, no headaches, no loss of consciousness, no myalgias, no nausea, no rash, no rhinorrhea, no shortness of breath, no sore throat, no vomiting and no wheezing        Prior to Admission Medications   Prescriptions Last Dose Informant Patient Reported? Taking? albuterol (PROVENTIL HFA,VENTOLIN HFA) 90 mcg/act inhaler Unknown at Unknown time  No No   Sig: Inhale 2 puffs every 4 (four) hours as needed for wheezing      Facility-Administered Medications: None       Past Medical History:   Diagnosis Date    Back pain     Knee pain     MRSA carrier        Past Surgical History:   Procedure Laterality Date    TONSILLECTOMY         History reviewed  No pertinent family history  I have reviewed and agree with the history as documented  Social History   Substance Use Topics    Smoking status: Never Smoker    Smokeless tobacco: Never Used    Alcohol use Yes      Comment: social        Review of Systems   Constitutional: Negative  Negative for fatigue and fever  HENT: Negative  Negative for congestion, ear pain, rhinorrhea and sore throat  Eyes: Negative  Respiratory: Negative  Negative for cough, shortness of breath and wheezing  Cardiovascular: Negative  Negative for chest pain  Gastrointestinal: Negative  Negative for abdominal pain, diarrhea, nausea and vomiting  Endocrine: Negative  Genitourinary: Negative  Musculoskeletal: Negative  Negative for myalgias  Skin: Negative for rash          Right abdomen abscess x 2 Allergic/Immunologic: Negative  Neurological: Negative  Negative for loss of consciousness and headaches  Hematological: Negative  Psychiatric/Behavioral: Negative  All other systems reviewed and are negative  Physical Exam  Physical Exam   Constitutional: She appears well-developed and well-nourished  HENT:   Head: Normocephalic and atraumatic  Right Ear: External ear normal    Left Ear: External ear normal    Nose: Nose normal    Mouth/Throat: Oropharynx is clear and moist    Eyes: Conjunctivae and EOM are normal  Pupils are equal, round, and reactive to light  Neck: Normal range of motion  Neck supple  Cardiovascular: Normal rate, regular rhythm and normal heart sounds  Pulmonary/Chest: Effort normal and breath sounds normal    Abdominal: Soft  Bowel sounds are normal    Skin:   Right uq abdomen  2cm fluctuant   and rlq abdomen abscess  3cm fluctuant   Psychiatric: She has a normal mood and affect  Her behavior is normal  Judgment and thought content normal    Nursing note and vitals reviewed        Vital Signs  ED Triage Vitals [08/16/18 1909]   Temperature Pulse Respirations Blood Pressure SpO2   99 5 °F (37 5 °C) (!) 116 20 132/72 95 %      Temp Source Heart Rate Source Patient Position - Orthostatic VS BP Location FiO2 (%)   Temporal Monitor Sitting Left arm --      Pain Score       --           Vitals:    08/16/18 1909   BP: 132/72   Pulse: (!) 116   Patient Position - Orthostatic VS: Sitting       Visual Acuity      ED Medications  Medications   clindamycin (CLEOCIN) IVPB (premix) 600 mg (0 mg Intravenous Stopped 8/16/18 2110)   ketorolac (TORADOL) injection 30 mg (30 mg Intravenous Given 8/16/18 2033)       Diagnostic Studies  Results Reviewed     Procedure Component Value Units Date/Time    Comprehensive metabolic panel [[id number]]  (Abnormal) Collected:  08/16/18 1930    Lab Status:  Final result Specimen:  Blood from Hand, Left Updated:  08/16/18 2002     Sodium 139 mmol/L Potassium 4 3 mmol/L      Chloride 106 mmol/L      CO2 24 mmol/L      Anion Gap 9 mmol/L      BUN 15 mg/dL      Creatinine 0 67 mg/dL      Glucose 108 (H) mg/dL      Calcium 8 8 mg/dL      AST 28 U/L      ALT 33 U/L      Alkaline Phosphatase 50 U/L      Total Protein 7 6 g/dL      Albumin 4 1 g/dL      Total Bilirubin 0 60 mg/dL      eGFR 118 ml/min/1 73sq m     Narrative:       Hemolysis  National Kidney Disease Education Program recommendations are as follows:  GFR calculation is accurate only with a steady state creatinine  Chronic Kidney disease less than 60 ml/min/1 73 sq  meters  Kidney failure less than 15 ml/min/1 73 sq  meters  CBC and differential [[id number]]  (Abnormal) Collected:  08/16/18 1930    Lab Status:  Final result Specimen:  Blood from Hand, Left Updated:  08/16/18 1953     WBC 14 20 (H) Thousand/uL      RBC 4 53 Million/uL      Hemoglobin 12 7 g/dL      Hematocrit 37 5 %      MCV 83 fL      MCH 28 0 pg      MCHC 33 8 g/dL      RDW 15 0 %      MPV 8 9 fL      Platelets 848 Thousands/uL      Neutrophils Relative 68 (H) %      Lymphocytes Relative 23 %      Monocytes Relative 8 %      Eosinophils Relative 2 %      Basophils Relative 1 %      Neutrophils Absolute 9 60 (H) Thousands/µL      Lymphocytes Absolute 3 20 Thousands/µL      Monocytes Absolute 1 10 (H) Thousand/µL      Eosinophils Absolute 0 20 Thousand/µL      Basophils Absolute 0 10 Thousands/µL     Blood culture #1 [[id number]] Collected:  08/16/18 1930    Lab Status: In process Specimen:  Blood from Hand, Left Updated:  08/16/18 1941    Blood culture #2 [[id number]] Collected:  08/16/18 1934    Lab Status:   In process Specimen:  Blood from Hand, Right Updated:  08/16/18 1941                 No orders to display              Procedures  Incision/Drainage  Date/Time: 8/16/2018 8:39 PM  Performed by: NADEGE Hill  Authorized by: NADEGE Hill     Patient location:  ED  Other Assisting Provider: No    Consent:     Consent obtained:  Verbal    Consent given by:  Patient    Risks discussed:  Bleeding, incomplete drainage and pain    Alternatives discussed:  No treatment  Universal protocol:     Procedure explained and questions answered to patient or proxy's satisfaction: yes      Site/side marked: yes      Immediately prior to procedure a time out was called: yes      Patient identity confirmed:  Verbally with patient and arm band  Location:     Location:  Trunk    Trunk location:  Abdomen  Pre-procedure details:     Skin preparation:  Betadine  Anesthesia (see MAR for exact dosages): Anesthesia method:  Local infiltration    Local anesthetic:  Lidocaine 1% w/o epi  Procedure details:     Complexity:  Simple    Needle aspiration: no      Incision types:  Stab incision    Scalpel blade:  11    Approach:  Open    Incision depth:  Subcutaneous    Wound management:  Probed and deloculated and irrigated with saline    Drainage:  Purulent    Drainage amount: Moderate    Wound treatment:  Packing placed    Packing materials:  1/4 in iodoform gauze  Post-procedure details:     Patient tolerance of procedure: Tolerated well, no immediate complications  Comments: Both abscess opened            Phone Contacts  ED Phone Contact    ED Course                               MDM  CritCare Time    Disposition  Final diagnoses:   Abscess     Time reflects when diagnosis was documented in both MDM as applicable and the Disposition within this note     Time User Action Codes Description Comment    8/16/2018  9:02 PM [address] [L[address] 91] Abscess       ED Disposition     ED Disposition Condition Comment    Discharge  [place] discharge to home/self care      Condition at discharge: Good        Follow-up Information     Follow up With Specialties Details Why 9 [facility] Emergency Department Emergency Medicine In 2 days saturday morning  [address] [address]  [phone]          Discharge Medication List as of 8/16/2018  9:03 PM      START taking these medications    Details   cephalexin (KEFLEX) 500 mg capsule Take 1 capsule (500 mg total) by mouth every 8 (eight) hours for 10 days, Starting Thu 8/16/2018, Until Sun 8/26/2018, Print      ibuprofen (MOTRIN) 600 mg tablet Take 1 tablet (600 mg total) by mouth every 6 (six) hours as needed (pain), Starting Thu 8/16/2018, Print      sulfamethoxazole-trimethoprim (BACTRIM DS) 800-160 mg per tablet Take 1 tablet by mouth 2 (two) times a day for 7 days smx-tmp DS (BACTRIM) 800-160 mg tabs (1tab q12 D10), Starting Thu 8/16/2018, Until Thu 8/23/2018, Print         CONTINUE these medications which have NOT CHANGED    Details   albuterol (PROVENTIL HFA,VENTOLIN HFA) 90 mcg/act inhaler Inhale 2 puffs every 4 (four) hours as needed for wheezing, Starting Tue 8/15/2017, Print      naproxen (NAPROSYN) 500 mg tablet Take 1 tablet (500 mg total) by mouth 2 (two) times a day with meals for 10 days, Starting Sun 3/11/2018, Until Wed 3/21/2018, Print      ondansetron (ZOFRAN-ODT) 4 mg disintegrating tablet Take 1 tablet by mouth every 6 (six) hours as needed for nausea or vomiting, Starting Tue 11/14/2017, Print           No discharge procedures on file      ED Provider  Electronically Signed by           Preeti Valerio PA-C  08/17/18 9082

## 2018-08-17 NOTE — ED NOTES
Abscess I&D by PA, wound packed by PA, wound dressed w/ dry dressing by this RN     Dayron Gonzalez RN  08/16/18 0546

## 2018-08-18 ENCOUNTER — HOSPITAL ENCOUNTER (EMERGENCY)
Facility: HOSPITAL | Age: 28
Discharge: HOME | End: 2018-08-18
Attending: EMERGENCY MEDICINE
Payer: COMMERCIAL

## 2018-08-18 VITALS
SYSTOLIC BLOOD PRESSURE: 124 MMHG | DIASTOLIC BLOOD PRESSURE: 74 MMHG | TEMPERATURE: 97.5 F | WEIGHT: 293 LBS | RESPIRATION RATE: 16 BRPM | BODY MASS INDEX: 53.26 KG/M2 | HEART RATE: 85 BPM | OXYGEN SATURATION: 99 %

## 2018-08-18 DIAGNOSIS — Z51.89 WOUND CHECK, ABSCESS: Primary | ICD-10-CM

## 2018-08-18 PROCEDURE — 99282 EMERGENCY DEPT VISIT SF MDM: CPT

## 2018-08-18 NOTE — DISCHARGE INSTRUCTIONS
Abscess   AMBULATORY CARE:   An abscess  is an area under the skin where pus (infected fluid) collects  An abscess is often caused by bacteria, fungi or other germs that get into an open wound  You can get an abscess anywhere on your body  Common signs and symptoms of an abscess: You may have a swollen mass that is red and painful  Pus may leak out of the mass  The pus will be white or yellow and may smell bad  You may have redness and pain days before the mass appears  You may have a fever and chills if the infection spreads  Seek immediate care if:   · The area around your abscess becomes very painful, warm, or has red streaks  · You have a fever and chills  · Your heart is beating faster than usual      · You feel faint or confused  Contact your healthcare provider if:   · Your abscess gets bigger or does not get better  · Your abscess returns  · You have questions or concerns about your condition or care  Treatment for an abscess: Your healthcare provider may need to make a cut in the abscess to allow the pus to drain  You may need surgery to remove your abscess  You may  need any of the following:  · Antibiotics  help treat a bacterial infection  · Acetaminophen  decreases pain and fever  It is available without a doctor's order  Ask how much to take and how often to take it  Follow directions  Acetaminophen can cause liver damage if not taken correctly  · NSAIDs , such as ibuprofen, help decrease swelling, pain, and fever  This medicine is available with or without a doctor's order  NSAIDs can cause stomach bleeding or kidney problems in certain people  If you take blood thinner medicine, always ask your healthcare provider if NSAIDs are safe for you  Always read the medicine label and follow directions  · Take your medicine as directed  Contact your healthcare provider if you think your medicine is not helping or if you have side effects   Tell him or her if you are allergic to any medicine  Keep a list of the medicines, vitamins, and herbs you take  Include the amounts, and when and why you take them  Bring the list or the pill bottles to follow-up visits  Carry your medicine list with you in case of an emergency  Self-care:   · Apply a warm compress to your abscess  This will help it open and drain  Wet a washcloth in warm, but not hot, water  Apply the compress for 10 minutes  Repeat this 4 times each day  Do not  press on an abscess or try to open it with a needle  You may push the bacteria deeper or into your blood  · Do not share your clothes, towels, or sheets with anyone  This can spread the infection to others  · Wash your hands often  This can help prevent the spread of germs  Use soap and water or an alcohol-based hand rub  Care for your wound after it is drained:   · Care for your wound as directed  If your healthcare provider says it is okay, carefully remove the bandage and gauze packing  You may need to soak the gauze to get it out of your wound  Clean your wound and the area around it as directed  Dry the area and put on new, clean bandages  Change your bandages when they get wet or dirty  · Ask your healthcare provider how to change the gauze in your wound  Keep track of how many pieces of gauze are placed inside the wound  Do not put too much packing in the wound  Do not pack the gauze too tightly in your wound  Follow up with your healthcare provider in 1 to 3 days: You may need to have your packing removed or your bandage changed  Write down your questions so you remember to ask them during your visits  © 2017 [employer] Information is for End User's use only and may not be sold, redistributed or otherwise used for commercial purposes  All illustrations and images included in CareNotes® are the copyrighted property of [employer]  or [employer]  The above information is an  only   It is not intended as medical advice for individual conditions or treatments  Talk to your doctor, nurse or pharmacist before following any medical regimen to see if it is safe and effective for you

## 2018-08-21 LAB
BACTERIA BLD CULT: NORMAL
BACTERIA BLD CULT: NORMAL

## 2018-09-11 ENCOUNTER — OFFICE VISIT (OUTPATIENT)
Dept: URGENT CARE | Facility: MEDICAL CENTER | Age: 28
End: 2018-09-11
Payer: COMMERCIAL

## 2018-09-11 VITALS
TEMPERATURE: 98.2 F | RESPIRATION RATE: 18 BRPM | HEART RATE: 84 BPM | BODY MASS INDEX: 45.99 KG/M2 | WEIGHT: 293 LBS | HEIGHT: 67 IN | SYSTOLIC BLOOD PRESSURE: 142 MMHG | DIASTOLIC BLOOD PRESSURE: 71 MMHG | OXYGEN SATURATION: 99 %

## 2018-09-11 DIAGNOSIS — Z02.1 PHYSICAL EXAM, PRE-EMPLOYMENT: Primary | ICD-10-CM

## 2018-09-11 PROCEDURE — 86580 TB INTRADERMAL TEST: CPT | Performed by: PHYSICIAN ASSISTANT

## 2018-09-11 NOTE — PROGRESS NOTES
[facility]      NAME: Yulia Michaels is a 25 y o  female  : 1990    MRN: [id number]  DATE: 2018  TIME: 3:20 PM    Assessment and Plan   Physical exam, pre-employment [Z02 1]  1  Physical exam, pre-employment         Patient Instructions     Follow up with PCP in 3-5 days  Proceed to  ER if symptoms worsen  Paperwork signed, copied, original given back to patient    Chief Complaint     Chief Complaint   Patient presents with    Annual Exam     Patient is here for pre-employment physical to work at   History of Present Illness   Yulia Michaels presents to the clinic c/o      27-year-old female presents for pre-employment physical   No acute complaints or concerns at this time  Review of Systems   Review of Systems   Constitutional: Negative for chills and fever  HENT: Negative for rhinorrhea and sore throat  Eyes: Negative for visual disturbance  Respiratory: Negative for cough and shortness of breath  Cardiovascular: Negative for chest pain and leg swelling  Gastrointestinal: Negative for abdominal pain, diarrhea, nausea and vomiting  Genitourinary: Negative for dysuria  Musculoskeletal: Negative for back pain and myalgias  Skin: Negative for rash  Neurological: Negative for dizziness and headaches  Psychiatric/Behavioral: Negative for confusion  All other systems reviewed and are negative  Current Medications     No long-term prescriptions on file         Current Allergies     Allergies as of 2018 - Reviewed 2018   Allergen Reaction Noted    Cat hair extract  2018    Zyrtec [cetirizine] Hives 02/15/2017            The following portions of the patient's history were reviewed and updated as appropriate: allergies, current medications, past family history, past medical history, past social history, past surgical history and problem list     HISTORICAL INFO:  Past Medical History:   Diagnosis Date    Back pain  Knee pain     MRSA carrier      Past Surgical History:   Procedure Laterality Date    TONSILLECTOMY      TOOTH EXTRACTION  10/01/2014       Objective   /71   Pulse 84   Temp 98 2 °F (36 8 °C) (Tympanic)   Resp 18   Ht 5' 7" (1 702 m)   Wt (!) 156 kg (345 lb)   LMP 08/28/2018   SpO2 99%   BMI 54 03 kg/m²        Physical Exam     Physical Exam   Constitutional: She is oriented to person, place, and time  She appears well-developed and well-nourished  HENT:   Head: Normocephalic and atraumatic  Right Ear: External ear normal    Left Ear: External ear normal    Nose: Nose normal    Mouth/Throat: Oropharynx is clear and moist    Eyes: Conjunctivae and EOM are normal  Pupils are equal, round, and reactive to light  Right eye exhibits no discharge  Left eye exhibits no discharge  Neck: Normal range of motion  Neck supple  No tracheal deviation present  Cardiovascular: Normal rate, regular rhythm and normal heart sounds  Exam reveals no gallop and no friction rub  No murmur heard  Pulmonary/Chest: Effort normal and breath sounds normal  No stridor  No respiratory distress  She has no wheezes  She has no rales  She exhibits no tenderness  Abdominal: Soft  Bowel sounds are normal  There is no rebound and no guarding  Musculoskeletal: Normal range of motion  Lymphadenopathy:     She has no cervical adenopathy  Neurological: She is alert and oriented to person, place, and time  Skin: Skin is warm and dry  Psychiatric: She has a normal mood and affect  Her behavior is normal    Nursing note and vitals reviewed  M*Modal software was used to dictate this note  It may contain errors with dictating incorrect words/spelling  Please contact provider directly for any questions

## 2018-12-12 ENCOUNTER — OFFICE VISIT (OUTPATIENT)
Dept: FAMILY MEDICINE CLINIC | Facility: CLINIC | Age: 28
End: 2018-12-12
Payer: COMMERCIAL

## 2018-12-12 VITALS
TEMPERATURE: 98.8 F | HEIGHT: 67 IN | WEIGHT: 293 LBS | BODY MASS INDEX: 45.99 KG/M2 | SYSTOLIC BLOOD PRESSURE: 130 MMHG | RESPIRATION RATE: 16 BRPM | HEART RATE: 85 BPM | DIASTOLIC BLOOD PRESSURE: 90 MMHG | OXYGEN SATURATION: 99 %

## 2018-12-12 DIAGNOSIS — B37.3 VAGINAL CANDIDA: ICD-10-CM

## 2018-12-12 DIAGNOSIS — J01.00 ACUTE NON-RECURRENT MAXILLARY SINUSITIS: Primary | ICD-10-CM

## 2018-12-12 PROBLEM — B37.31 VAGINAL CANDIDA: Status: ACTIVE | Noted: 2018-12-12

## 2018-12-12 PROCEDURE — 99213 OFFICE O/P EST LOW 20 MIN: CPT | Performed by: NURSE PRACTITIONER

## 2018-12-12 PROCEDURE — 3008F BODY MASS INDEX DOCD: CPT | Performed by: NURSE PRACTITIONER

## 2018-12-12 PROCEDURE — 1036F TOBACCO NON-USER: CPT | Performed by: NURSE PRACTITIONER

## 2018-12-12 RX ORDER — FLUCONAZOLE 150 MG/1
150 TABLET ORAL ONCE
Qty: 1 TABLET | Refills: 0 | Status: SHIPPED | OUTPATIENT
Start: 2018-12-12 | End: 2018-12-12

## 2018-12-12 RX ORDER — AMOXICILLIN 500 MG/1
500 CAPSULE ORAL EVERY 12 HOURS SCHEDULED
Qty: 20 CAPSULE | Refills: 0 | Status: SHIPPED | OUTPATIENT
Start: 2018-12-12 | End: 2018-12-22

## 2018-12-12 RX ORDER — NEOMYCIN SULFATE, POLYMYXIN B SULFATE, HYDROCORTISONE 3.5; 10000; 1 MG/ML; [USP'U]/ML; MG/ML
SOLUTION/ DROPS AURICULAR (OTIC)
Refills: 0 | COMMUNITY
Start: 2018-11-09 | End: 2018-12-12

## 2018-12-12 NOTE — LETTER
December 12, 2018     Patient: Carlos Hannah   YOB: 1990   Date of Visit: 12/12/2018       To Whom it May Concern:    Carlos Hannah is under my professional care  She was seen in my office on 12/12/2018  She may return to work on 12/13/2018  If you have any questions or concerns, please don't hesitate to call           Sincerely,          MAURI Arshad

## 2018-12-12 NOTE — PROGRESS NOTES
Assessment/Plan:      Diagnoses and all orders for this visit:    Acute non-recurrent maxillary sinusitis  Comments:  Severe congestion noted with lymph adenopathy  Will treat with amoxicillin for 10 days  Instructed to use Afrin nasal spray for 5 days twice a day  Return prn  Orders:  -     amoxicillin (AMOXIL) 500 mg capsule; Take 1 capsule (500 mg total) by mouth every 12 (twelve) hours for 10 days    Vaginal candida  Comments:  Fluconazole 150 mg given prophylactically for vaginal infection  Orders:  -     fluconazole (DIFLUCAN) 150 mg tablet; Take 1 tablet (150 mg total) by mouth once for 1 dose    Other orders  -     Discontinue: neomycin-polymyxin-hydrocortisone (CORTISPORIN) 1 % SOLN; ADMINISTER 3 DROPS TO THE RIGHT EAR 3 (THREE) TIMES A DAY FOR 10 DAYS  Subjective:     Patient ID: Adam Pillai is a 25 y o  female here for congestion    HPI  Congestion, chills and achy, poor appetite, headache, fatigue  Review of Systems   Constitutional: Positive for appetite change, chills, fatigue and fever  HENT: Positive for congestion, postnasal drip, rhinorrhea (yellow/green drainage), sinus pain, sinus pressure and sore throat  Respiratory: Negative  Cardiovascular: Negative  Gastrointestinal: Positive for nausea  Musculoskeletal: Positive for myalgias  Skin: Negative  Allergic/Immunologic: Positive for environmental allergies  Neurological: Positive for headaches  Hematological: Positive for adenopathy  Psychiatric/Behavioral: Negative  Objective:     Physical Exam   Constitutional: She is oriented to person, place, and time  She appears well-developed and well-nourished  HENT:   Head: Normocephalic  Right Ear: Tympanic membrane is bulging  Left Ear: Tympanic membrane is bulging  Nose: Mucosal edema, rhinorrhea and sinus tenderness present  Right sinus exhibits maxillary sinus tenderness and frontal sinus tenderness   Left sinus exhibits maxillary sinus tenderness and frontal sinus tenderness  Mouth/Throat: Oropharyngeal exudate and posterior oropharyngeal erythema present  Eyes: Conjunctivae and EOM are normal    Neck: Normal range of motion  Neck supple  Cardiovascular: Normal rate, regular rhythm and normal heart sounds  Pulmonary/Chest: Effort normal and breath sounds normal    Lymphadenopathy:        Head (right side): Submental and submandibular adenopathy present  Head (left side): Submental and submandibular adenopathy present  Neurological: She is alert and oriented to person, place, and time  Skin: Skin is warm and dry  Psychiatric: She has a normal mood and affect   Her behavior is normal

## 2018-12-24 ENCOUNTER — OFFICE VISIT (OUTPATIENT)
Dept: FAMILY MEDICINE CLINIC | Facility: CLINIC | Age: 28
End: 2018-12-24
Payer: COMMERCIAL

## 2018-12-24 VITALS
WEIGHT: 222.6 LBS | OXYGEN SATURATION: 98 % | BODY MASS INDEX: 34.94 KG/M2 | SYSTOLIC BLOOD PRESSURE: 126 MMHG | HEART RATE: 85 BPM | DIASTOLIC BLOOD PRESSURE: 86 MMHG | RESPIRATION RATE: 16 BRPM | TEMPERATURE: 97.9 F | HEIGHT: 67 IN

## 2018-12-24 DIAGNOSIS — J11.1 INFLUENZA: Primary | ICD-10-CM

## 2018-12-24 LAB
SL AMB POCT RAPID FLU A: ABNORMAL
SL AMB POCT RAPID FLU B: ABNORMAL

## 2018-12-24 PROCEDURE — 99213 OFFICE O/P EST LOW 20 MIN: CPT | Performed by: FAMILY MEDICINE

## 2018-12-24 PROCEDURE — 1036F TOBACCO NON-USER: CPT | Performed by: FAMILY MEDICINE

## 2018-12-24 PROCEDURE — 87804 INFLUENZA ASSAY W/OPTIC: CPT | Performed by: FAMILY MEDICINE

## 2018-12-24 RX ORDER — AMOXICILLIN 500 MG/1
500 CAPSULE ORAL EVERY 8 HOURS SCHEDULED
COMMUNITY
End: 2019-03-15

## 2018-12-24 RX ORDER — OSELTAMIVIR PHOSPHATE 75 MG/1
75 CAPSULE ORAL EVERY 12 HOURS SCHEDULED
Qty: 10 CAPSULE | Refills: 0 | Status: SHIPPED | OUTPATIENT
Start: 2018-12-24 | End: 2018-12-29

## 2018-12-24 NOTE — PROGRESS NOTES
Assessment/Plan:     Diagnoses and all orders for this visit:    Influenza  Comments:  Rapid flu came back positive  I am going to start her on Tamiflu twice a day for 5 days  She was told to take Tylenol or ibuprofen and encourage oral hydratio  Orders:  -     oseltamivir (TAMIFLU) 75 mg capsule; Take 1 capsule (75 mg total) by mouth every 12 (twelve) hours for 5 days  -     POCT rapid flu A and B    Other orders  -     amoxicillin (AMOXIL) 500 mg capsule; Take 500 mg by mouth every 8 (eight) hours          There are no Patient Instructions on file for this visit  No Follow-up on file  Subjective:      Patient ID: Carlos Hannah is a 25 y o  female  Chief Complaint   Patient presents with    Generalized Body Aches       She is here today with complaint of upper respiratory symptoms  She complains of sore throat, postnasal drip and sore throat  She complains of body aches and fever  Her daughter was at the emergency room was diagnosed with influenza  The following portions of the patient's history were reviewed and updated as appropriate: allergies, current medications, past family history, past medical history, past social history, past surgical history and problem list     Review of Systems   Constitutional: Positive for chills and fever  HENT: Positive for congestion, postnasal drip and sore throat  Negative for trouble swallowing  Eyes: Negative for visual disturbance  Respiratory: Positive for cough  Negative for shortness of breath  Cardiovascular: Negative for chest pain, palpitations and leg swelling  Gastrointestinal: Negative for abdominal pain, constipation and diarrhea  Endocrine: Negative for cold intolerance and heat intolerance  Genitourinary: Negative for difficulty urinating and dysuria  Musculoskeletal: Negative for gait problem  Skin: Negative for rash  Neurological: Negative for dizziness, tremors, seizures and headaches     Hematological: Negative for adenopathy  Psychiatric/Behavioral: Negative for behavioral problems  Current Outpatient Prescriptions   Medication Sig Dispense Refill    amoxicillin (AMOXIL) 500 mg capsule Take 500 mg by mouth every 8 (eight) hours      oseltamivir (TAMIFLU) 75 mg capsule Take 1 capsule (75 mg total) by mouth every 12 (twelve) hours for 5 days 10 capsule 0     No current facility-administered medications for this visit  Objective:    /86   Pulse 85   Temp 97 9 °F (36 6 °C) (Tympanic)   Resp 16   Ht 5' 7" (1 702 m)   Wt 101 kg (222 lb 9 6 oz)   SpO2 98%   BMI 34 86 kg/m²        Physical Exam   Constitutional: She is oriented to person, place, and time  She appears well-developed and well-nourished  HENT:   Head: Normocephalic and atraumatic  Right Ear: A middle ear effusion is present  Left Ear: A middle ear effusion is present  Mouth/Throat: Posterior oropharyngeal erythema present  Eyes: Pupils are equal, round, and reactive to light  EOM are normal    Neck: Normal range of motion  Neck supple  Cardiovascular: Normal rate, regular rhythm and normal heart sounds  Pulmonary/Chest: Effort normal and breath sounds normal    Abdominal: Soft  Bowel sounds are normal    Musculoskeletal: Normal range of motion  She exhibits no edema  Lymphadenopathy:     She has no cervical adenopathy  Neurological: She is alert and oriented to person, place, and time  No cranial nerve deficit  Skin: Skin is warm  Psychiatric: She has a normal mood and affect  Nursing note and vitals reviewed               Leandro Ortez MD

## 2019-03-15 ENCOUNTER — OFFICE VISIT (OUTPATIENT)
Dept: FAMILY MEDICINE CLINIC | Facility: CLINIC | Age: 29
End: 2019-03-15
Payer: COMMERCIAL

## 2019-03-15 ENCOUNTER — TELEPHONE (OUTPATIENT)
Dept: OTHER | Facility: OTHER | Age: 29
End: 2019-03-15

## 2019-03-15 VITALS
HEIGHT: 67 IN | HEART RATE: 104 BPM | WEIGHT: 293 LBS | TEMPERATURE: 96.7 F | BODY MASS INDEX: 45.99 KG/M2 | SYSTOLIC BLOOD PRESSURE: 128 MMHG | OXYGEN SATURATION: 98 % | DIASTOLIC BLOOD PRESSURE: 90 MMHG | RESPIRATION RATE: 16 BRPM

## 2019-03-15 DIAGNOSIS — R03.0 ELEVATED BLOOD PRESSURE READING: ICD-10-CM

## 2019-03-15 DIAGNOSIS — J06.9 ACUTE UPPER RESPIRATORY INFECTION: ICD-10-CM

## 2019-03-15 DIAGNOSIS — Z23 IMMUNIZATION DUE: Primary | ICD-10-CM

## 2019-03-15 PROBLEM — J11.1 INFLUENZA: Status: RESOLVED | Noted: 2018-12-24 | Resolved: 2019-03-15

## 2019-03-15 PROBLEM — J01.00 ACUTE NON-RECURRENT MAXILLARY SINUSITIS: Status: RESOLVED | Noted: 2018-12-12 | Resolved: 2019-03-15

## 2019-03-15 PROBLEM — M25.569 KNEE PAIN: Status: ACTIVE | Noted: 2019-03-15

## 2019-03-15 PROBLEM — M94.20 CHONDROMALACIA: Status: ACTIVE | Noted: 2019-03-15

## 2019-03-15 PROBLEM — M54.16 LUMBAR RADICULOPATHY: Status: ACTIVE | Noted: 2017-02-22

## 2019-03-15 PROCEDURE — 90471 IMMUNIZATION ADMIN: CPT

## 2019-03-15 PROCEDURE — 3725F SCREEN DEPRESSION PERFORMED: CPT

## 2019-03-15 PROCEDURE — 1036F TOBACCO NON-USER: CPT | Performed by: PHYSICIAN ASSISTANT

## 2019-03-15 PROCEDURE — 99214 OFFICE O/P EST MOD 30 MIN: CPT | Performed by: PHYSICIAN ASSISTANT

## 2019-03-15 PROCEDURE — 90686 IIV4 VACC NO PRSV 0.5 ML IM: CPT

## 2019-03-15 PROCEDURE — 3008F BODY MASS INDEX DOCD: CPT | Performed by: PHYSICIAN ASSISTANT

## 2019-03-15 NOTE — PROGRESS NOTES
Assessment/Plan:    Patient Instructions   Utilize over-the-counter Aleve D as needed as package directs  Nasal decongestant spray for 3-5 days maximum  Increase clear liquids  Flu vaccine today  Advised to follow-up if any symptoms increase or there is no improvement over the next 3-5 days  Also advised to follow up with Maye Wray when she is feeling better to reassess blood pressure which has been elevated on 3 separate occasions    M*Modal software was used to dictate this note  It may contain errors with dictating incorrect words/spelling  Please contact provider directly for any questions  Diagnoses and all orders for this visit:    Acute upper respiratory infection    Elevated blood pressure reading          Subjective:      Patient ID: Mya Alicia is a 25 y o  female  Patient presents today for evaluation of nasal congestion, cough, loss of voice over the last 3 days  Denies any fever or chills  She has been utilizing a nasal decongestant spray over-the-counter as needed  Denies increased cough at bedtime  She has had yellow and green intermittent nasal discharge  She did have a flu several months ago  Her daughter also had the flu at that time  Her daughter recently got the flu vaccine at the pediatrician office and was wondering if would still be recommended for herself  The following portions of the patient's history were reviewed and updated as appropriate:   She  has a past medical history of Back pain, Knee pain, and MRSA carrier  She   Patient Active Problem List    Diagnosis Date Noted    Chondromalacia 03/15/2019    Knee pain 03/15/2019    Acute upper respiratory infection 03/15/2019    Elevated blood pressure reading 03/15/2019    Vaginal candida 12/12/2018    Lumbar radiculopathy 02/22/2017     She  has a past surgical history that includes Tonsillectomy and Tooth extraction (10/01/2014)    Her family history includes Brain cancer in her mother; Cancer in her mother; Diabetes type II in her family; Hypertension in her father; Stroke in her family  She  reports that she has never smoked  She has never used smokeless tobacco  She reports that she drinks alcohol  She reports that she does not use drugs  No current outpatient medications on file  No current facility-administered medications for this visit  Current Outpatient Medications on File Prior to Visit   Medication Sig    [DISCONTINUED] amoxicillin (AMOXIL) 500 mg capsule Take 500 mg by mouth every 8 (eight) hours     No current facility-administered medications on file prior to visit  She is allergic to cat hair extract and zyrtec [cetirizine]       Review of Systems   Constitutional: Negative for chills and fever  HENT: Positive for congestion and postnasal drip  Negative for ear pain and sore throat  Respiratory: Positive for cough  Objective:      /90 (BP Location: Left arm, Patient Position: Sitting, Cuff Size: Large)   Pulse 104   Temp (!) 96 7 °F (35 9 °C) (Tympanic)   Resp 16   Ht 5' 7" (1 702 m)   Wt (!) 153 kg (338 lb 3 2 oz)   SpO2 98%   BMI 52 97 kg/m²          Physical Exam   Constitutional: She appears well-developed and well-nourished  No distress  Patient's voice is hoarse   HENT:   Head: Normocephalic and atraumatic  Mouth/Throat: No oropharyngeal exudate  Neck: Neck supple  Cardiovascular: Normal rate, regular rhythm and normal heart sounds  No murmur heard  Pulmonary/Chest: Effort normal and breath sounds normal  No respiratory distress  She has no wheezes  She has no rales  Lymphadenopathy:     She has no cervical adenopathy

## 2019-03-15 NOTE — PATIENT INSTRUCTIONS
Utilize over-the-counter Aleve D as needed as package directs  Nasal decongestant spray for 3-5 days maximum  Increase clear liquids  Flu vaccine today  Advised to follow-up if any symptoms increase or there is no improvement over the next 3-5 days  Also advised to follow up with Maureen Locke when she is feeling better to reassess blood pressure which has been elevated on 3 separate occasions

## 2019-07-08 ENCOUNTER — TELEPHONE (OUTPATIENT)
Dept: OTHER | Facility: OTHER | Age: 29
End: 2019-07-08

## 2019-07-08 ENCOUNTER — OFFICE VISIT (OUTPATIENT)
Dept: FAMILY MEDICINE CLINIC | Facility: CLINIC | Age: 29
End: 2019-07-08
Payer: COMMERCIAL

## 2019-07-08 VITALS
WEIGHT: 293 LBS | BODY MASS INDEX: 45.99 KG/M2 | SYSTOLIC BLOOD PRESSURE: 126 MMHG | HEIGHT: 67 IN | TEMPERATURE: 98.7 F | OXYGEN SATURATION: 98 % | DIASTOLIC BLOOD PRESSURE: 94 MMHG | RESPIRATION RATE: 16 BRPM | HEART RATE: 87 BPM

## 2019-07-08 DIAGNOSIS — E66.01 CLASS 3 SEVERE OBESITY DUE TO EXCESS CALORIES WITHOUT SERIOUS COMORBIDITY WITH BODY MASS INDEX (BMI) OF 50.0 TO 59.9 IN ADULT (HCC): Primary | ICD-10-CM

## 2019-07-08 DIAGNOSIS — F41.9 ANXIETY: ICD-10-CM

## 2019-07-08 PROBLEM — E66.813 CLASS 3 SEVERE OBESITY DUE TO EXCESS CALORIES WITHOUT SERIOUS COMORBIDITY WITH BODY MASS INDEX (BMI) OF 50.0 TO 59.9 IN ADULT (HCC): Status: ACTIVE | Noted: 2019-07-08

## 2019-07-08 PROCEDURE — 99214 OFFICE O/P EST MOD 30 MIN: CPT | Performed by: NURSE PRACTITIONER

## 2019-07-08 PROCEDURE — 3725F SCREEN DEPRESSION PERFORMED: CPT | Performed by: NURSE PRACTITIONER

## 2019-07-08 PROCEDURE — 3008F BODY MASS INDEX DOCD: CPT | Performed by: NURSE PRACTITIONER

## 2019-07-08 PROCEDURE — 1036F TOBACCO NON-USER: CPT | Performed by: NURSE PRACTITIONER

## 2019-07-08 NOTE — TELEPHONE ENCOUNTER
Deja Roa 1990  CONFIDENTIALTY NOTICE: This fax transmission is intended only for the addressee  It contains information that is legally privileged,  confidential or otherwise protected from use or disclosure  If you are not the intended recipient, you are strictly prohibited from reviewing,  disclosing, copying using or disseminating any of this information or taking any action in reliance on or regarding this information  If you have  received this fax in error, please notify us immediately by telephone so that we can arrange for its return to us  Page:   Call Id: [id number]  Health Call  Standard Call Report  Health Call  Patient Name: Deja Roa  Gender: Female  : 1990  Age: 30 Y 3 M 16 D  Return Phone  Number: [phone] (Cell)  Address: [address] [place] [address]/[facility]/Zip: [address]  [facility]  Practice Charged:  Physician:  [facility] Name:  Relationship To  Patient:  Return Phone Number: [phone] (Cell)  Presenting Problem: "I just woke up and my heart is racing  and I can't sleep "  Service Type: Triage  Charged Service 1: Joanne Zimmer U  38  Name and  Number:  Nurse Assessment  Nurse: Carol Ann Osborne RN, Jyoti Griggs Date/Time: 2019 1:21:37 AM  Type of assessment required:  ---General (Adult or Child)  Duration of Current S/S  ---Currently  Location/Radiation  ---Head  Temperature (F) and route:  ---N/A  Symptom Specific Meds (Dose/Time):  ---None  Other S/S  ---Heart feels like it's racing HR is 72 and regular, feels light headed  Pain Scale on scale of 1-10, 10 being the worst:  ---Denies  Symptom progression:  ---better  Intake and Output  ---WNL/WNL  LMP/ Pregnancy:  Deja Roa 1990  CONFIDENTIALTY NOTICE: This fax transmission is intended only for the addressee  It contains information that is legally privileged,  confidential or otherwise protected from use or disclosure   If you are not the intended recipient, you are strictly prohibited from reviewing,  disclosing, copying using or disseminating any of this information or taking any action in reliance on or regarding this information  If you have  received this fax in error, please notify us immediately by telephone so that we can arrange for its return to us  Page: 2 of 2  Call Id: [id number]  Nurse Assessment  ---N/A  Breastfeeding  ---No  Last Exam/Treatment:  ---March Protocols  Protocol Title Nurse Date/Time  Dizziness - Lightheadedness BILL Verdugo, Daniella Gay 7/8/2019 1:24:00 AM  Question Caller Affirmed  Disp  Time Disposition Final User  7/8/2019 1:24:44 AM [facility] RN, Daniella Gay  7/8/2019 1:24:50 AM RN Triaged BILL Verdugo, Daniella Gay  7/8/2019 1:28:49 AM RN Triaged Yes BILL Verdugo, [facility] Advice Given Per Protocol  SEE PHYSICIAN WITHIN 24 HOURS: * IF OFFICE WILL BE OPEN: You need to be seen within the next 24 hours  Call your doctor  when the office opens, and make an appointment  FLUIDS: Drink several glasses of fruit juice, other clear fluids or water  This will  improve hydration and blood glucose  If the weather is hot, make sure the fluids are cold  REST: Lie down with feet elevated for 1 hour  This will improve circulation and increase blood flow to the brain  CALL BACK IF: * Passes out (faints) * You become worse  CARE  ADVICE given per Dizziness (Adult) guideline    Caller Understands: Yes  Caller Disagree/Comply: Comply  PreDisposition: Unsure

## 2019-07-08 NOTE — PROGRESS NOTES
Assessment/Plan:     Class 3 severe obesity due to excess calories without serious comorbidity with body mass index (BMI) of 50 0 to 59 9 in adult [facility])  Will do labs and she is to return at completion  Discussed diet and exercise, treatment options including wt management program     Anxiety  Will do labs for sudden onset of anxiety  She is to f/u after they are completed  Diagnoses and all orders for this visit:    Class 3 severe obesity due to excess calories without serious comorbidity with body mass index (BMI) of 50 0 to 59 9 in adult (HCC)  -     TSH, 3rd generation with Free T4 reflex; Future  -     Comprehensive metabolic panel; Future  -     CBC and differential; Future  -     US thyroid; Future    Anxiety  -     TSH, 3rd generation with Free T4 reflex; Future  -     US thyroid; Future          Subjective:     Patient ID: Claude Wood is a 30 y o  female  HPI  Presents today for symptoms for dizziness, hot, anxiety, heart racing in the middle of the night  It only happened once  She wants to also discuss her wt, TSH, etc   Had a baby 4 yrs ago and can't get the wt off and has gained more  Review of Systems   Constitutional: Positive for fatigue  Respiratory: Negative  Cardiovascular: Negative  Gastrointestinal: Negative  Genitourinary: Negative  Musculoskeletal: Negative  Skin: Negative  Neurological: Positive for dizziness  Hematological: Negative  Psychiatric/Behavioral: The patient is nervous/anxious  Objective:     Physical Exam   Constitutional: She is oriented to person, place, and time  She appears well-developed and well-nourished  HENT:   Head: Normocephalic and atraumatic  Right Ear: External ear normal    Left Ear: External ear normal    Nose: Nose normal    Mouth/Throat: Oropharynx is clear and moist    Eyes: Conjunctivae and EOM are normal    Neck: Normal range of motion  Neck supple     Cardiovascular: Normal rate, regular rhythm, normal heart sounds and intact distal pulses  Pulmonary/Chest: Effort normal and breath sounds normal    Abdominal: Soft  Bowel sounds are normal    Musculoskeletal: Normal range of motion  Neurological: She is alert and oriented to person, place, and time  She has normal reflexes  Skin: Skin is warm and dry  Psychiatric: Her speech is normal and behavior is normal  Judgment and thought content normal  Her mood appears anxious  Her affect is labile  Cognition and memory are normal        BMI Counseling: Body mass index is 54 27 kg/m²  Discussed the patient's BMI with her  The BMI is above average  BMI counseling and education was provided to the patient  Nutrition recommendations include reducing portion sizes, decreasing overall calorie intake, 3-5 servings of fruits/vegetables daily, reducing fast food intake, consuming healthier snacks, decreasing soda and/or juice intake, moderation in carbohydrate intake, increasing intake of lean protein, reducing intake of saturated fat and trans fat and reducing intake of cholesterol  Exercise recommendations include moderate aerobic physical activity for 150 minutes/week, vigorous aerobic physical activity for 75 minutes/week and exercising 3-5 times per week  Pharmacotherapy was recommended as ordered  Referral to weight management was provided to the patient

## 2019-07-08 NOTE — TELEPHONE ENCOUNTER
Bethel Burden 1990  CONFIDENTIALTY NOTICE: This fax transmission is intended only for the addressee  It contains information that is legally privileged,  confidential or otherwise protected from use or disclosure  If you are not the intended recipient, you are strictly prohibited from reviewing,  disclosing, copying using or disseminating any of this information or taking any action in reliance on or regarding this information  If you have  received this fax in error, please notify us immediately by telephone so that we can arrange for its return to us  Page:   Call Id: [id number]  Health Call  Standard Call Report  Health Call  Patient Name: Bethel Burden  Gender: Female  : 1990  Age: 30 Y 3 M 16 D  Return Phone  Number: [phone] (Cell)  Address: [address] [place] [address]/[facility]: [place] [address]  Practice Name: [address] [place] [address]  Practice Charged:  Physician:  Julissa Pang Name:  Relationship To  Patient:  Return Phone Number: [phone] (Cell)  Presenting Problem: "I just woke up and my heart is racing  and I can't sleep "  Service Type: Triage  Charged Service 1: Joanne Zimmer U  38  Name and  Number:  Nurse Assessment  Protocols  Protocol Title Nurse Date/Time  Dizziness - Lightheadedness BILL Verdugo, Vladimir Corey 2019 1:24:00 AM  Question Caller Affirmed  Disp  Time Disposition Final User  2019 1:24:44 AM See Physician within [address] [facility], Vladimir KHAN  2019 1:24:50 AM RN Triaged Yes BILL Verdugo, [facility] Advice Given Per Protocol  SEE PHYSICIAN WITHIN 24 HOURS: * IF OFFICE WILL BE OPEN: You need to be seen within the next 24 hours  Call your doctor  when the office opens, and make an appointment  FLUIDS: Drink several glasses of fruit juice, other clear fluids or water  This will  improve hydration and blood glucose  If the weather is hot, make sure the fluids are cold  REST: Lie down with feet elevated for 1 hour  This will improve circulation and increase blood flow to the brain  CALL BACK IF: * Passes out (faints) * You become worse  CARE  ADVICE given per Dizziness (Adult) guideline    Caller Understands: Yes  Caller Disagree/Comply: Comply  PreDisposition: Unsure

## 2019-07-08 NOTE — ASSESSMENT & PLAN NOTE
Will do labs and she is to return at completion    Discussed diet and exercise, treatment options including wt management program

## 2019-07-08 NOTE — PATIENT INSTRUCTIONS
Obesity   AMBULATORY CARE:   Obesity  is when your body mass index (BMI) is greater than 30  Your healthcare provider will use your height and weight to measure your BMI  The risks of obesity include  many health problems, such as injuries or physical disability  You may need tests to check for the following:  · Diabetes     · High blood pressure or high cholesterol     · Heart disease     · Gallbladder or liver disease     · Cancer of the colon, breast, prostate, liver, or kidney     · Sleep apnea     · Arthritis or gout  Seek care immediately if:   · You have a severe headache, confusion, or difficulty speaking  · You have weakness on one side of your body  · You have chest pain, sweating, or shortness of breath  Contact your healthcare provider if:   · You have symptoms of gallbladder or liver disease, such as pain in your upper abdomen  · You have knee or hip pain and discomfort while walking  · You have symptoms of diabetes, such as intense hunger and thirst, and frequent urination  · You have symptoms of sleep apnea, such as snoring or daytime sleepiness  · You have questions or concerns about your condition or care  Treatment for obesity  focuses on helping you lose weight to improve your health  Even a small decrease in BMI can reduce the risk for many health problems  Your healthcare provider will help you set a weight-loss goal   · Lifestyle changes  are the first step in treating obesity  These include making healthy food choices and getting regular physical activity  Your healthcare provider may suggest a weight-loss program that involves coaching, education, and therapy  · Medicine  may help you lose weight when it is used with a healthy diet and physical activity  · Surgery  can help you lose weight if you are very obese and have other health problems  There are several types of weight-loss surgery  Ask your healthcare provider for more information    Be successful losing weight:   · Set small, realistic goals  An example of a small goal is to walk for 20 minutes 5 days a week  Anther goal is to lose 5% of your body weight  · Tell friends, family members, and coworkers about your goals  and ask for their support  Ask a friend to lose weight with you, or join a weight-loss support group  · Identify foods or triggers that may cause you to overeat , and find ways to avoid them  Remove tempting high-calorie foods from your home and workplace  Place a bowl of fresh fruit on your kitchen counter  If stress causes you to eat, then find other ways to cope with stress  · Keep a diary to track what you eat and drink  Also write down how many minutes of physical activity you do each day  Weigh yourself once a week and record it in your diary  Eating changes: You will need to eat 500 to 1,000 fewer calories each day than you currently eat to lose 1 to 2 pounds a week  The following changes will help you cut calories:  · Eat smaller portions  Use small plates, no larger than 9 inches in diameter  Fill your plate half full of fruits and vegetables  Measure your food using measuring cups until you know what a serving size looks like  · Eat 3 meals and 1 or 2 snacks each day  Plan your meals in advance  Patrizia Kovacs and eat at home most of the time  Eat slowly  · Eat fruits and vegetables at every meal   They are low in calories and high in fiber, which makes you feel full  Do not add butter, margarine, or cream sauce to vegetables  Use herbs to season steamed vegetables  · Eat less fat and fewer fried foods  Eat more baked or grilled chicken and fish  These protein sources are lower in calories and fat than red meat  Limit fast food  Dress your salads with olive oil and vinegar instead of bottled dressing  · Limit the amount of sugar you eat  Do not drink sugary beverages  Limit alcohol  Activity changes:  Physical activity is good for your body in many ways   It helps you burn calories and build strong muscles  It decreases stress and depression, and improves your mood  It can also help you sleep better  Talk to your healthcare provider before you begin an exercise program   · Exercise for at least 30 minutes 5 days a week  Start slowly  Set aside time each day for physical activity that you enjoy and that is convenient for you  It is best to do both weight training and an activity that increases your heart rate, such as walking, bicycling, or swimming  · Find ways to be more active  Do yard work and housecleaning  Walk up the stairs instead of using elevators  Spend your leisure time going to events that require walking, such as outdoor festivals or fairs  This extra physical activity can help you lose weight and keep it off  Follow up with your healthcare provider as directed: You may need to meet with a dietitian  Write down your questions so you remember to ask them during your visits  © 2017 [employer] Information is for End User's use only and may not be sold, redistributed or otherwise used for commercial purposes  All illustrations and images included in CareNotes® are the copyrighted property of A [employer] M [employer] Inc  or [employer]  The above information is an  only  It is not intended as medical advice for individual conditions or treatments  Talk to your doctor, nurse or pharmacist before following any medical regimen to see if it is safe and effective for you

## 2019-07-09 ENCOUNTER — APPOINTMENT (OUTPATIENT)
Dept: LAB | Facility: IMAGING CENTER | Age: 29
End: 2019-07-09
Payer: COMMERCIAL

## 2019-07-09 ENCOUNTER — TRANSCRIBE ORDERS (OUTPATIENT)
Dept: ADMINISTRATIVE | Facility: HOSPITAL | Age: 29
End: 2019-07-09

## 2019-07-09 DIAGNOSIS — F41.9 ANXIETY: ICD-10-CM

## 2019-07-09 DIAGNOSIS — E66.01 CLASS 3 SEVERE OBESITY DUE TO EXCESS CALORIES WITHOUT SERIOUS COMORBIDITY WITH BODY MASS INDEX (BMI) OF 50.0 TO 59.9 IN ADULT (HCC): ICD-10-CM

## 2019-07-09 LAB
ALBUMIN SERPL BCP-MCNC: 3.6 G/DL (ref 3.5–5)
ALP SERPL-CCNC: 68 U/L (ref 46–116)
ALT SERPL W P-5'-P-CCNC: 31 U/L (ref 12–78)
ANION GAP SERPL CALCULATED.3IONS-SCNC: 7 MMOL/L (ref 4–13)
AST SERPL W P-5'-P-CCNC: 16 U/L (ref 5–45)
BASOPHILS # BLD AUTO: 0.09 THOUSANDS/ΜL (ref 0–0.1)
BASOPHILS NFR BLD AUTO: 1 % (ref 0–1)
BILIRUB SERPL-MCNC: 0.37 MG/DL (ref 0.2–1)
BUN SERPL-MCNC: 13 MG/DL (ref 5–25)
CALCIUM SERPL-MCNC: 9.2 MG/DL (ref 8.3–10.1)
CHLORIDE SERPL-SCNC: 107 MMOL/L (ref 100–108)
CO2 SERPL-SCNC: 25 MMOL/L (ref 21–32)
CREAT SERPL-MCNC: 0.86 MG/DL (ref 0.6–1.3)
EOSINOPHIL # BLD AUTO: 0.27 THOUSAND/ΜL (ref 0–0.61)
EOSINOPHIL NFR BLD AUTO: 3 % (ref 0–6)
ERYTHROCYTE [DISTWIDTH] IN BLOOD BY AUTOMATED COUNT: 14.4 % (ref 11.6–15.1)
GFR SERPL CREATININE-BSD FRML MDRD: 90 ML/MIN/1.73SQ M
GLUCOSE P FAST SERPL-MCNC: 82 MG/DL (ref 65–99)
HCT VFR BLD AUTO: 42.3 % (ref 34.8–46.1)
HGB BLD-MCNC: 13.3 G/DL (ref 11.5–15.4)
IMM GRANULOCYTES # BLD AUTO: 0.03 THOUSAND/UL (ref 0–0.2)
IMM GRANULOCYTES NFR BLD AUTO: 0 % (ref 0–2)
LYMPHOCYTES # BLD AUTO: 3.95 THOUSANDS/ΜL (ref 0.6–4.47)
LYMPHOCYTES NFR BLD AUTO: 40 % (ref 14–44)
MCH RBC QN AUTO: 26.8 PG (ref 26.8–34.3)
MCHC RBC AUTO-ENTMCNC: 31.4 G/DL (ref 31.4–37.4)
MCV RBC AUTO: 85 FL (ref 82–98)
MONOCYTES # BLD AUTO: 0.7 THOUSAND/ΜL (ref 0.17–1.22)
MONOCYTES NFR BLD AUTO: 7 % (ref 4–12)
NEUTROPHILS # BLD AUTO: 4.88 THOUSANDS/ΜL (ref 1.85–7.62)
NEUTS SEG NFR BLD AUTO: 49 % (ref 43–75)
NRBC BLD AUTO-RTO: 0 /100 WBCS
PLATELET # BLD AUTO: 288 THOUSANDS/UL (ref 149–390)
PMV BLD AUTO: 11.3 FL (ref 8.9–12.7)
POTASSIUM SERPL-SCNC: 4.2 MMOL/L (ref 3.5–5.3)
PROT SERPL-MCNC: 7.4 G/DL (ref 6.4–8.2)
RBC # BLD AUTO: 4.97 MILLION/UL (ref 3.81–5.12)
SODIUM SERPL-SCNC: 139 MMOL/L (ref 136–145)
TSH SERPL DL<=0.05 MIU/L-ACNC: 2.37 UIU/ML (ref 0.36–3.74)
WBC # BLD AUTO: 9.92 THOUSAND/UL (ref 4.31–10.16)

## 2019-07-09 PROCEDURE — 80053 COMPREHEN METABOLIC PANEL: CPT

## 2019-07-09 PROCEDURE — 84443 ASSAY THYROID STIM HORMONE: CPT

## 2019-07-09 PROCEDURE — 85025 COMPLETE CBC W/AUTO DIFF WBC: CPT

## 2019-07-09 PROCEDURE — 36415 COLL VENOUS BLD VENIPUNCTURE: CPT

## 2019-07-10 ENCOUNTER — TELEPHONE (OUTPATIENT)
Dept: FAMILY MEDICINE CLINIC | Facility: CLINIC | Age: 29
End: 2019-07-10

## 2019-07-10 NOTE — TELEPHONE ENCOUNTER
Pt aware of lab results but is unsure why the u/s of thyroid was cancelled when she has a h/o thyroid nodules if it blood work was okay

## 2019-07-11 ENCOUNTER — HOSPITAL ENCOUNTER (OUTPATIENT)
Dept: RADIOLOGY | Facility: IMAGING CENTER | Age: 29
Discharge: HOME | End: 2019-07-11
Payer: COMMERCIAL

## 2019-07-11 DIAGNOSIS — E66.01 CLASS 3 SEVERE OBESITY DUE TO EXCESS CALORIES WITHOUT SERIOUS COMORBIDITY WITH BODY MASS INDEX (BMI) OF 50.0 TO 59.9 IN ADULT (HCC): ICD-10-CM

## 2019-07-11 DIAGNOSIS — F41.9 ANXIETY: ICD-10-CM

## 2019-07-11 PROCEDURE — 76536 US EXAM OF HEAD AND NECK: CPT

## 2019-07-12 DIAGNOSIS — E66.01 CLASS 3 SEVERE OBESITY DUE TO EXCESS CALORIES WITH SERIOUS COMORBIDITY IN ADULT, UNSPECIFIED BMI (HCC): ICD-10-CM

## 2019-07-12 DIAGNOSIS — E66.9 OBESITY (BMI 30-39.9): Primary | ICD-10-CM

## 2019-10-17 ENCOUNTER — TELEPHONE (OUTPATIENT)
Dept: OBGYN CLINIC | Facility: MEDICAL CENTER | Age: 29
End: 2019-10-17

## 2019-10-17 NOTE — TELEPHONE ENCOUNTER
Called and left patient v/isaias to confirm that she has never had prior knee surgery, specifically, a total knee replacement  This is doubtful given age, but I asked that she call me back directly to confirm       # [phone]

## 2019-10-21 ENCOUNTER — APPOINTMENT (OUTPATIENT)
Dept: RADIOLOGY | Facility: MEDICAL CENTER | Age: 29
End: 2019-10-21
Payer: COMMERCIAL

## 2019-10-21 ENCOUNTER — OFFICE VISIT (OUTPATIENT)
Dept: OBGYN CLINIC | Facility: MEDICAL CENTER | Age: 29
End: 2019-10-21
Payer: OTHER MISCELLANEOUS

## 2019-10-21 VITALS
SYSTOLIC BLOOD PRESSURE: 142 MMHG | HEIGHT: 67 IN | DIASTOLIC BLOOD PRESSURE: 82 MMHG | HEART RATE: 84 BPM | BODY MASS INDEX: 45.99 KG/M2 | WEIGHT: 293 LBS

## 2019-10-21 DIAGNOSIS — S83.242A OTHER TEAR OF MEDIAL MENISCUS, CURRENT INJURY, LEFT KNEE, INITIAL ENCOUNTER: Primary | ICD-10-CM

## 2019-10-21 DIAGNOSIS — Z01.89 ENCOUNTER FOR LOWER EXTREMITY COMPARISON IMAGING STUDY: ICD-10-CM

## 2019-10-21 DIAGNOSIS — M25.562 LEFT KNEE PAIN, UNSPECIFIED CHRONICITY: ICD-10-CM

## 2019-10-21 DIAGNOSIS — M17.12 PRIMARY OSTEOARTHRITIS OF LEFT KNEE: ICD-10-CM

## 2019-10-21 DIAGNOSIS — S83.412A SPRAIN OF MEDIAL COLLATERAL LIGAMENT OF LEFT KNEE, INITIAL ENCOUNTER: ICD-10-CM

## 2019-10-21 DIAGNOSIS — M17.12 PATELLOFEMORAL ARTHRITIS OF LEFT KNEE: ICD-10-CM

## 2019-10-21 PROCEDURE — 73560 X-RAY EXAM OF KNEE 1 OR 2: CPT

## 2019-10-21 PROCEDURE — 73564 X-RAY EXAM KNEE 4 OR MORE: CPT

## 2019-10-21 PROCEDURE — 20610 DRAIN/INJ JOINT/BURSA W/O US: CPT | Performed by: ORTHOPAEDIC SURGERY

## 2019-10-21 PROCEDURE — 99204 OFFICE O/P NEW MOD 45 MIN: CPT | Performed by: ORTHOPAEDIC SURGERY

## 2019-10-21 RX ORDER — DICLOFENAC SODIUM 75 MG/1
75 TABLET, DELAYED RELEASE ORAL 2 TIMES DAILY
Qty: 60 TABLET | Refills: 1 | Status: SHIPPED | OUTPATIENT
Start: 2019-10-21 | End: 2019-11-08

## 2019-10-21 RX ORDER — METHYLPREDNISOLONE ACETATE 40 MG/ML
2 INJECTION, SUSPENSION INTRA-ARTICULAR; INTRALESIONAL; INTRAMUSCULAR; SOFT TISSUE
Status: COMPLETED | OUTPATIENT
Start: 2019-10-21 | End: 2019-10-21

## 2019-10-21 RX ORDER — LIDOCAINE HYDROCHLORIDE 10 MG/ML
3 INJECTION, SOLUTION INFILTRATION; PERINEURAL
Status: COMPLETED | OUTPATIENT
Start: 2019-10-21 | End: 2019-10-21

## 2019-10-21 RX ADMIN — LIDOCAINE HYDROCHLORIDE 3 ML: 10 INJECTION, SOLUTION INFILTRATION; PERINEURAL at 09:58

## 2019-10-21 RX ADMIN — METHYLPREDNISOLONE ACETATE 2 ML: 40 INJECTION, SUSPENSION INTRA-ARTICULAR; INTRALESIONAL; INTRAMUSCULAR; SOFT TISSUE at 09:58

## 2019-10-21 NOTE — LETTER
October 21, 2019     Patient: Roque Cottrell   YOB: 1990   Date of Visit: 10/21/2019       To Whom it May Concern:    Susy Ware is under my professional care  She was seen in my office on 10/21/2019  She is to continue with full duty   If you have any questions or concerns, please don't hesitate to call           Sincerely,          Gera Hendrickson, DO        CC: No Recipients

## 2019-10-21 NOTE — PROGRESS NOTES
Assessment/Plan     1  Other tear of medial meniscus, current injury, left knee, initial encounter    2  Left knee pain, unspecified chronicity    3  Encounter for lower extremity comparison imaging study    4  Primary osteoarthritis of left knee    5  Sprain of medial collateral ligament of left knee, initial encounter      Orders Placed This Encounter   Procedures    Large joint arthrocentesis: L knee    XR knee 4+ vw left injury    XR knee 1 or 2 vw right     · Discussed with patient today conservative treatment:  Steroid injection, physical therapy, bracing, and maintaining a healthy weight  · Received Left knee  steroid injection today  Patient knows to ice and avoid strenuous activity for 1-2 days if needed  · Continue with physical therapy- 2 times a week and with home exercises   · Continue  working on weight loss  She is currently enrolled at [facility] for weight  Loss management   · Will be ordering short hinged knee brace through [facility]   · Diclofenac 75 prn pain, medications warnings were reviewed with patient  · May take Tylenol 1000 mg prn for pain     Return in about 4 weeks (around 11/18/2019) for Recheck Left knee   I answered all of the patient's questions during the visit and provided education of the patient's condition during the visit  The patient verbalized understanding of the information given and agrees with the plan  This note was dictated using [employer] software  It may contain errors including improperly dictated words  Please contact physician directly for any questions  History of Present Illness   Chief complaint:   Chief Complaint   Patient presents with    Left Knee - Pain       HPI: Jennie Lopez is a 30 y o  female that c/o left knee pain  24  Patient states she had a fall at work on 6/24/19  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  Her  is present in the room today    Patient states she was walking down the stairs a work and fell down the last couple of steps and fell on her  left side  Patient notes she had trouble getting up from the ground  She notes when she stood up she felt increase pain in the left knee  She went to [facility] and had x-rays of the left knee  no fracture noted  ,provided  compression brace and prescribed anti inflammatories  She also had MRI left knee done  Patient states she started physical therapy at [facility] 3 weeks ago  She notes she is working full duty  She notes she is having constant achy sharp pain over   the anterolateral and anteromedial left knee  She notes radiating pain down to the shin  She  feels her left knee hyperextends and pops when walking  Pain is worse weight bearing and increase pain  She has been using knee compression sleeve with relief  She is taking Tylenol Extra Strengthening  She has tired icing and elevation  and taking Aleve OTC with some relief  Patient has no history of having injections, or surgeries on the left knee  ROS:    See HPI for musculoskeletal review  All other systems reviewed are negative     Historical Information   Past Medical History:   Diagnosis Date    Back pain     Knee pain     MRSA carrier      Past Surgical History:   Procedure Laterality Date    TONSILLECTOMY      TOOTH EXTRACTION  10/01/2014     Social History   Social History     Substance and Sexual Activity   Alcohol Use Yes    Comment: social     Social History     Substance and Sexual Activity   Drug Use No     Social History     Tobacco Use   Smoking Status Never Smoker   Smokeless Tobacco Never Used   Tobacco Comment    no passive smoke exposure     Family History:   Family History   Problem Relation Age of Onset    Brain cancer Mother     Cancer Mother         glioblastoma    Hypertension Father     Stroke Family     Diabetes type II Family        No current outpatient medications on file prior to visit       No current facility-administered medications on file prior to visit  Allergies   Allergen Reactions    Zyrtec [Cetirizine] Hives       No current outpatient medications on file prior to visit  No current facility-administered medications on file prior to visit  Objective   Vitals: Blood pressure 142/82, pulse 84, height 5' 7" (1 702 m), weight (!) 154 kg (340 lb)  ,Body mass index is 53 25 kg/m²  PE:  AAOx 3  WDWN  Hearing intact, no drainage from eyes  Regular rate  no audible wheezing  no abdominal distension  LE compartments soft, skin intact    leftknee:    Appearance:  no swelling   No ecchymosis  no obvious joint deformity   No effusion  Palpation/Tenderness:  +TTP over medial joint line  + TTP over lateral joint line   No TTP over patella  No TTP over patellar tendon  +  TTP over pes anserine bursa  Active Range of Motion:  AROM: 5-95/ Passive ROM 0-105  Special Tests:  Medial Alberto's Test:  Positive  Lateral Alberto's Test:  Positive   Apley's compression test:  Positive   Lachman's Test:  negative  Anterior and Posterior  Drawer Test:  Negative  Patellar grind:  Positive   Valgus Stress Test:  negative  Varus Stress Test:  Negative    Exam limited due to pain    No ipsilateral hip pain with ROM    leftLE:    Sensation grossly intact L4, L5, S1   Palpable  Pedal  pulse  AT/GS/EHL intact    Imaging Studies: I have personally reviewed pertinent films in PACS  leftknee:  Moderate DJD - medial compartment and PFJ  MRI Left knee: questionable medial meniscus tear, MCL strain    Large joint arthrocentesis: L knee  Date/Time: 10/21/2019 9:58 AM  Consent given by: patient  Site marked: site marked  Timeout: Immediately prior to procedure a time out was called to verify the correct patient, procedure, equipment, support staff and site/side marked as required   Supporting Documentation  Indications: pain   Procedure Details  Location: knee - L knee  Preparation: Patient was prepped and draped in the usual sterile fashion  Needle size: 22 G  Ultrasound guidance: no  Approach: anterolateral  Medications administered: 3 mL lidocaine 1 %; 2 mL methylPREDNISolone acetate 40 mg/mL    Patient tolerance: patient tolerated the procedure well with no immediate complications  Dressing:  Sterile dressing applied          Scribe Attestation    I,:   Franca Larsen am acting as a scribe while in the presence of the attending physician :        I,:   Monet Falcon DO personally performed the services described in this documentation    as scribed in my presence :

## 2019-11-05 ENCOUNTER — TELEPHONE (OUTPATIENT)
Dept: OBGYN CLINIC | Facility: CLINIC | Age: 29
End: 2019-11-05

## 2019-11-05 NOTE — TELEPHONE ENCOUNTER
Office note 10/21/19 faxed to [facility] [phone] 11/2/19    No response required

## 2019-11-08 ENCOUNTER — OFFICE VISIT (OUTPATIENT)
Dept: FAMILY MEDICINE CLINIC | Facility: CLINIC | Age: 29
End: 2019-11-08
Payer: COMMERCIAL

## 2019-11-08 VITALS
HEART RATE: 72 BPM | DIASTOLIC BLOOD PRESSURE: 80 MMHG | BODY MASS INDEX: 45.99 KG/M2 | RESPIRATION RATE: 16 BRPM | SYSTOLIC BLOOD PRESSURE: 128 MMHG | TEMPERATURE: 97.9 F | HEIGHT: 67 IN | OXYGEN SATURATION: 98 % | WEIGHT: 293 LBS

## 2019-11-08 DIAGNOSIS — H69.83 EUSTACHIAN TUBE DYSFUNCTION, BILATERAL: ICD-10-CM

## 2019-11-08 DIAGNOSIS — J06.9 ACUTE UPPER RESPIRATORY INFECTION: Primary | ICD-10-CM

## 2019-11-08 PROBLEM — H69.93 EUSTACHIAN TUBE DYSFUNCTION, BILATERAL: Status: ACTIVE | Noted: 2019-11-08

## 2019-11-08 PROCEDURE — 99213 OFFICE O/P EST LOW 20 MIN: CPT | Performed by: PHYSICIAN ASSISTANT

## 2019-11-08 RX ORDER — CEPHALEXIN 500 MG/1
CAPSULE ORAL
Refills: 0 | COMMUNITY
Start: 2019-11-02 | End: 2020-10-12

## 2019-11-08 RX ORDER — METHOCARBAMOL 750 MG/1
750 TABLET, FILM COATED ORAL 3 TIMES DAILY PRN
COMMUNITY
Start: 2019-11-06 | End: 2020-10-12

## 2019-11-08 RX ORDER — NABUMETONE 750 MG/1
TABLET, FILM COATED ORAL
Refills: 0 | COMMUNITY
Start: 2019-10-21 | End: 2019-11-08

## 2019-11-08 RX ORDER — MELOXICAM 15 MG/1
TABLET ORAL
Refills: 0 | COMMUNITY
Start: 2019-11-02 | End: 2020-10-12
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
## 2019-11-22 ENCOUNTER — OFFICE VISIT (OUTPATIENT)
Dept: OBGYN CLINIC | Facility: MEDICAL CENTER | Age: 29
End: 2019-11-22
Payer: OTHER MISCELLANEOUS

## 2019-11-22 VITALS
SYSTOLIC BLOOD PRESSURE: 124 MMHG | BODY MASS INDEX: 45.99 KG/M2 | WEIGHT: 293 LBS | HEART RATE: 90 BPM | HEIGHT: 67 IN | DIASTOLIC BLOOD PRESSURE: 82 MMHG

## 2019-11-22 DIAGNOSIS — S83.242A OTHER TEAR OF MEDIAL MENISCUS, CURRENT INJURY, LEFT KNEE, INITIAL ENCOUNTER: ICD-10-CM

## 2019-11-22 DIAGNOSIS — M17.12 PATELLOFEMORAL ARTHRITIS OF LEFT KNEE: ICD-10-CM

## 2019-11-22 DIAGNOSIS — S83.412A SPRAIN OF MEDIAL COLLATERAL LIGAMENT OF LEFT KNEE, INITIAL ENCOUNTER: ICD-10-CM

## 2019-11-22 DIAGNOSIS — M25.562 LEFT KNEE PAIN, UNSPECIFIED CHRONICITY: ICD-10-CM

## 2019-11-22 DIAGNOSIS — M17.12 PRIMARY OSTEOARTHRITIS OF LEFT KNEE: Primary | ICD-10-CM

## 2019-11-22 PROCEDURE — 99213 OFFICE O/P EST LOW 20 MIN: CPT | Performed by: ORTHOPAEDIC SURGERY
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
## 2019-11-22 NOTE — LETTER
November 22, 2019     Patient: Valentine Matos   YOB: 1990   Date of Visit: 11/22/2019       To Whom it May Concern:    Elma Lucero is under my professional care  She was seen in my office on 11/22/2019  She may return to work full duty  If you have any questions or concerns, please don't hesitate to call           Sincerely,          Krys Reed DO        CC: No Recipients

## 2019-12-10 ENCOUNTER — TELEPHONE (OUTPATIENT)
Dept: OBGYN CLINIC | Facility: HOSPITAL | Age: 29
End: 2019-12-10

## 2019-12-10 NOTE — TELEPHONE ENCOUNTER
Rodolfo Boeck  from [facility] called for office notes from 11/22 to be faxed to [phone]

## 2019-12-11 DIAGNOSIS — M25.562 LEFT KNEE PAIN, UNSPECIFIED CHRONICITY: ICD-10-CM

## 2019-12-11 DIAGNOSIS — S83.412A SPRAIN OF MEDIAL COLLATERAL LIGAMENT OF LEFT KNEE, INITIAL ENCOUNTER: ICD-10-CM

## 2019-12-11 DIAGNOSIS — S83.242A OTHER TEAR OF MEDIAL MENISCUS, CURRENT INJURY, LEFT KNEE, INITIAL ENCOUNTER: ICD-10-CM

## 2019-12-11 DIAGNOSIS — M17.12 PRIMARY OSTEOARTHRITIS OF LEFT KNEE: ICD-10-CM

## 2019-12-11 RX ORDER — DICLOFENAC SODIUM 75 MG/1
TABLET, DELAYED RELEASE ORAL
Qty: 60 TABLET | Refills: 1 | Status: SHIPPED | OUTPATIENT
Start: 2019-12-11 | End: 2020-02-17

## 2019-12-13 ENCOUNTER — TELEPHONE (OUTPATIENT)
Dept: OBGYN CLINIC | Facility: HOSPITAL | Age: 29
End: 2019-12-13

## 2019-12-13 DIAGNOSIS — S83.412A SPRAIN OF MEDIAL COLLATERAL LIGAMENT OF LEFT KNEE, INITIAL ENCOUNTER: ICD-10-CM

## 2019-12-13 DIAGNOSIS — S83.242A OTHER TEAR OF MEDIAL MENISCUS, CURRENT INJURY, LEFT KNEE, INITIAL ENCOUNTER: Primary | ICD-10-CM

## 2019-12-13 DIAGNOSIS — M17.12 PATELLOFEMORAL ARTHRITIS OF LEFT KNEE: ICD-10-CM

## 2019-12-13 NOTE — TELEPHONE ENCOUNTER
Rob Harman from [facility] called asking that we fax over an updated script for the patient to continue physical therapy  She would like a call back at #[phone] if you have any questions  The fax# is [phone]      Thank you

## 2019-12-20 ENCOUNTER — OFFICE VISIT (OUTPATIENT)
Dept: OBGYN CLINIC | Facility: MEDICAL CENTER | Age: 29
End: 2019-12-20
Payer: OTHER MISCELLANEOUS

## 2019-12-20 VITALS
DIASTOLIC BLOOD PRESSURE: 83 MMHG | BODY MASS INDEX: 52.63 KG/M2 | HEART RATE: 79 BPM | SYSTOLIC BLOOD PRESSURE: 128 MMHG | WEIGHT: 293 LBS

## 2019-12-20 DIAGNOSIS — M25.562 LEFT KNEE PAIN, UNSPECIFIED CHRONICITY: Primary | ICD-10-CM

## 2019-12-20 DIAGNOSIS — M17.12 PATELLOFEMORAL ARTHRITIS OF LEFT KNEE: ICD-10-CM

## 2019-12-20 DIAGNOSIS — S83.412A SPRAIN OF MEDIAL COLLATERAL LIGAMENT OF LEFT KNEE, INITIAL ENCOUNTER: ICD-10-CM

## 2019-12-20 PROCEDURE — 99213 OFFICE O/P EST LOW 20 MIN: CPT | Performed by: ORTHOPAEDIC SURGERY

## 2019-12-20 NOTE — LETTER
December 20, 2019     Patient: Rosa Kapadia   YOB: 1990   Date of Visit: 12/20/2019       To Whom it May Concern:    Rosalinda Stevens is under my professional care  She was seen in my office on 12/20/2019  She may return to work on 12/20/19 full duty       If you have any questions or concerns, please don't hesitate to call           Sincerely,          eBe Gilmore DO        CC: No Recipients
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
## 2020-01-31 ENCOUNTER — OFFICE VISIT (OUTPATIENT)
Dept: OBGYN CLINIC | Facility: MEDICAL CENTER | Age: 30
End: 2020-01-31
Payer: OTHER MISCELLANEOUS

## 2020-01-31 VITALS
DIASTOLIC BLOOD PRESSURE: 80 MMHG | SYSTOLIC BLOOD PRESSURE: 113 MMHG | BODY MASS INDEX: 45.99 KG/M2 | HEART RATE: 87 BPM | HEIGHT: 67 IN | WEIGHT: 293 LBS

## 2020-01-31 DIAGNOSIS — M17.12 PATELLOFEMORAL ARTHRITIS OF LEFT KNEE: Primary | ICD-10-CM

## 2020-01-31 DIAGNOSIS — S83.412A SPRAIN OF MEDIAL COLLATERAL LIGAMENT OF LEFT KNEE, INITIAL ENCOUNTER: ICD-10-CM

## 2020-01-31 DIAGNOSIS — M25.562 LEFT KNEE PAIN, UNSPECIFIED CHRONICITY: ICD-10-CM

## 2020-01-31 PROCEDURE — 99213 OFFICE O/P EST LOW 20 MIN: CPT | Performed by: ORTHOPAEDIC SURGERY
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
## 2020-02-07 ENCOUNTER — OFFICE VISIT (OUTPATIENT)
Dept: URGENT CARE | Age: 30
End: 2020-02-07
Payer: COMMERCIAL

## 2020-02-07 VITALS
TEMPERATURE: 97.9 F | SYSTOLIC BLOOD PRESSURE: 130 MMHG | RESPIRATION RATE: 20 BRPM | HEIGHT: 67 IN | DIASTOLIC BLOOD PRESSURE: 79 MMHG | HEART RATE: 92 BPM | WEIGHT: 293 LBS | OXYGEN SATURATION: 98 % | BODY MASS INDEX: 45.99 KG/M2

## 2020-02-07 DIAGNOSIS — J00 ACUTE NASOPHARYNGITIS: Primary | ICD-10-CM

## 2020-02-07 LAB — S PYO AG THROAT QL: NEGATIVE

## 2020-02-07 PROCEDURE — 99213 OFFICE O/P EST LOW 20 MIN: CPT | Performed by: PHYSICIAN ASSISTANT

## 2020-02-07 PROCEDURE — 87880 STREP A ASSAY W/OPTIC: CPT | Performed by: PHYSICIAN ASSISTANT

## 2020-02-07 PROCEDURE — 87070 CULTURE OTHR SPECIMN AEROBIC: CPT | Performed by: PHYSICIAN ASSISTANT

## 2020-02-07 RX ORDER — BENZONATATE 100 MG/1
100 CAPSULE ORAL 3 TIMES DAILY PRN
Qty: 15 CAPSULE | Refills: 0 | Status: SHIPPED | OUTPATIENT
Start: 2020-02-07 | End: 2020-10-13 | Stop reason: ALTCHOICE

## 2020-02-07 RX ORDER — PREDNISONE 50 MG/1
50 TABLET ORAL DAILY
Qty: 5 TABLET | Refills: 0 | Status: SHIPPED | OUTPATIENT
Start: 2020-02-07 | End: 2020-02-12

## 2020-02-07 RX ORDER — ALBUTEROL SULFATE 90 UG/1
2 AEROSOL, METERED RESPIRATORY (INHALATION) EVERY 6 HOURS PRN
Qty: 1 INHALER | Refills: 0 | Status: SHIPPED | OUTPATIENT
Start: 2020-02-07 | End: 2020-10-13 | Stop reason: ALTCHOICE

## 2020-02-07 RX ORDER — FLUTICASONE PROPIONATE 50 MCG
2 SPRAY, SUSPENSION (ML) NASAL DAILY
Qty: 16 G | Refills: 0 | Status: SHIPPED | OUTPATIENT
Start: 2020-02-07 | End: 2020-10-13 | Stop reason: ALTCHOICE

## 2020-02-07 NOTE — PROGRESS NOTES
[facility]        NAME: Jennie Lopez is a 30 y o  female  : 1990    MRN: [id number]  DATE: 2020  TIME: 10:13 AM    Assessment and Plan   Acute nasopharyngitis [J00]  1  Acute nasopharyngitis  POCT rapid strepA    benzonatate (TESSALON PERLES) 100 mg capsule    fluticasone (FLONASE) 50 mcg/act nasal spray    albuterol (PROVENTIL HFA,VENTOLIN HFA) 90 mcg/act inhaler    predniSONE 50 mg tablet    Throat culture         Patient Instructions     Use medications as directed for symptomatic relief as needed  Take prednisone as directed until completed  Tylenol as needed for any fevers aches and pains  Continue using home medications as directed  Drink plenty of fluids and stay well hydrated  Follow up with PCP in 3-5 days  Proceed to  ER if symptoms worsen  Chief Complaint     Chief Complaint   Patient presents with    Cold Like Symptoms     congestion, runny nose and sore throat that started 2 days ago  History of Present Illness       47-year-old female presents with runny nose sinus congestion sore throat cough  Denies any fevers or chills  No abdominal pain nausea vomiting diarrhea  Denies any ear pain  Has been using some Mucinex DM with minimal relief  Is also having little bit of a headache  URI    This is a new problem  The current episode started in the past 7 days  The problem has been waxing and waning  There has been no fever  Associated symptoms include congestion, coughing, headaches, rhinorrhea and a sore throat  Pertinent negatives include no abdominal pain, chest pain, ear pain, nausea, neck pain, sinus pain, vomiting or wheezing  She has tried nothing for the symptoms  The treatment provided no relief  Review of Systems   Review of Systems   Constitutional: Negative for chills and fever  HENT: Positive for congestion, rhinorrhea and sore throat  Negative for ear pain and sinus pain  Eyes: Negative  Respiratory: Positive for cough  Negative for wheezing  Cardiovascular: Negative  Negative for chest pain  Gastrointestinal: Negative  Negative for abdominal pain, nausea and vomiting  Musculoskeletal: Negative  Negative for neck pain  Skin: Negative  Neurological: Positive for headaches           Current Medications       Current Outpatient Medications:     diclofenac (VOLTAREN) 75 mg EC tablet, TAKE 1 TABLET BY MOUTH TWICE A DAY AS NEEDED FOR PAIN, Disp: 60 tablet, Rfl: 1    albuterol (PROVENTIL HFA,VENTOLIN HFA) 90 mcg/act inhaler, Inhale 2 puffs every 6 (six) hours as needed for wheezing, Disp: 1 Inhaler, Rfl: 0    benzonatate (TESSALON PERLES) 100 mg capsule, Take 1 capsule (100 mg total) by mouth 3 (three) times a day as needed for cough, Disp: 15 capsule, Rfl: 0    cephalexin (KEFLEX) 500 mg capsule, TAKE 1 CAPSULE BY MOUTH TWICE A DAY FOR 7 DAYS, Disp: , Rfl: 0    fluticasone (FLONASE) 50 mcg/act nasal spray, 2 sprays into each nostril daily, Disp: 16 g, Rfl: 0    meloxicam (MOBIC) 15 mg tablet, TAKE 1 TABLET DAILY FOR 7 DAYS, Disp: , Rfl: 0    methocarbamol (ROBAXIN) 750 mg tablet, Take 750 mg by mouth Three times daily as needed, Disp: , Rfl:     predniSONE 50 mg tablet, Take 1 tablet (50 mg total) by mouth daily for 5 days, Disp: 5 tablet, Rfl: 0    Current Allergies     Allergies as of 02/07/2020 - Reviewed 02/07/2020   Allergen Reaction Noted    Zyrtec [cetirizine] Hives 02/15/2017            The following portions of the patient's history were reviewed and updated as appropriate: allergies, current medications, past family history, past medical history, past social history, past surgical history and problem list      Past Medical History:   Diagnosis Date    Back pain     Knee pain     MRSA carrier        Past Surgical History:   Procedure Laterality Date    TONSILLECTOMY      TOOTH EXTRACTION  10/01/2014       Family History   Problem Relation Age of Onset    Brain cancer Mother     Cancer Mother glioblastoma    Hypertension Father     Stroke Family     Diabetes type II Family          Medications have been verified  Objective   /79   Pulse 92   Temp 97 9 °F (36 6 °C)   Resp 20   Ht 5' 7" (1 702 m)   Wt (!) 160 kg (352 lb)   LMP 02/03/2020 (Exact Date)   SpO2 98%   BMI 55 13 kg/m²        Physical Exam     Physical Exam   Constitutional: She is oriented to person, place, and time  She appears well-developed and well-nourished  No distress  HENT:   Head: Normocephalic and atraumatic  Right Ear: Hearing, tympanic membrane, external ear and ear canal normal    Left Ear: Hearing, tympanic membrane, external ear and ear canal normal    Nose: Nose normal    Mouth/Throat: Uvula is midline and mucous membranes are normal  Posterior oropharyngeal erythema (Mild) present  No oropharyngeal exudate  Eyes: Conjunctivae and EOM are normal  Right eye exhibits no discharge  Left eye exhibits no discharge  Neck: Normal range of motion  Neck supple  Cardiovascular: Normal rate, regular rhythm, normal heart sounds and intact distal pulses  No murmur heard  Pulmonary/Chest: Effort normal and breath sounds normal  No respiratory distress  She has no wheezes  She has no rales  Abdominal: Soft  Bowel sounds are normal  There is no tenderness  Musculoskeletal: Normal range of motion  Lymphadenopathy:     She has no cervical adenopathy  Neurological: She is alert and oriented to person, place, and time  Skin: Skin is warm and dry  Psychiatric: She has a normal mood and affect  Nursing note and vitals reviewed

## 2020-02-07 NOTE — PATIENT INSTRUCTIONS
Use medications as directed for symptomatic relief as needed  Take prednisone as directed until completed  Tylenol as needed for any fevers aches and pains  Continue using home medications as directed  Drink plenty of fluids and stay well hydrated  Follow up with PCP in 3-5 days  Proceed to  ER if symptoms worsen  Cold Symptoms   AMBULATORY CARE:   Cold symptoms  include sneezing, dry throat, a stuffy nose, headache, watery eyes, and a cough  Your cough may be dry, or you may cough up mucus  You may also have muscle aches, joint pain, and tiredness  Rarely, you may have a fever  Cold symptoms occur from inflammation in your upper respiratory system caused by a virus  Most colds go away without treatment  Seek care immediately if:   · You have increased tiredness and weakness  · You are unable to eat  · Your heart is beating much faster than usual for you  · You see white spots in the back of your throat and your neck is swollen and sore to the touch  · You see pinpoint or larger reddish-purple dots on your skin  Contact your healthcare provider if:   · You have a fever higher than 102°F (38 9°C)  · You have new or worsening shortness of breath  · You have thick nasal drainage for more than 2 days  · Your symptoms do not improve or get worse within 5 days  · You have questions or concerns about your condition or care  Treatment for cold symptoms  may include NSAIDS to decrease muscle aches and fever  Cold medicines may also be given to decrease coughing, nasal stuffiness, sneezing, and a runny nose  Manage your cold symptoms: The following may help relieve cold symptoms, such as a dry throat and congestion:  · Gargle with mouthwash or warm salt water as directed  · Suck on throat lozenges or hard candy  · Use a cold or warm vaporizer or humidifier to ease your breathing  · Rest for at least 2 days and then as needed to decrease tiredness and weakness       · Use petroleum based jelly around your nostrils to decrease irritation from blowing your nose  · Drink plenty of liquids  Liquids will help thin and loosen thick mucus so you can cough it up  Liquids will also keep you hydrated  Ask your healthcare provider which liquids are best for you and how much to drink each day  Prevent the spread of germs  by washing your hands often  You can spread your cold germs to others for at least 3 days after your symptoms start  Do not share items, such as eating utensils  Cover your nose and mouth when you cough or sneeze using the crook of your elbow instead of your hands  Throw used tissues in the garbage  Do not smoke:  Smoking may worsen your symptoms and increase the length of time you feel sick  Talk with your healthcare provider if you need help to stop smoking  Follow up with your healthcare provider as directed:  Write down your questions so you remember to ask them during your visits  © 2017 [facility] Information is for End User's use only and may not be sold, redistributed or otherwise used for commercial purposes  All illustrations and images included in CareNotes® are the copyrighted property of A D A M [employer] Inc  or [employer]  The above information is an  only  It is not intended as medical advice for individual conditions or treatments  Talk to your doctor, nurse or pharmacist before following any medical regimen to see if it is safe and effective for you

## 2020-02-09 LAB — BACTERIA THROAT CULT: NORMAL

## 2020-02-11 ENCOUNTER — HOSPITAL ENCOUNTER (OUTPATIENT)
Dept: RADIOLOGY | Facility: HOSPITAL | Age: 30
Discharge: HOME | End: 2020-02-11
Attending: ORTHOPAEDIC SURGERY
Payer: COMMERCIAL

## 2020-02-11 DIAGNOSIS — M17.12 PATELLOFEMORAL ARTHRITIS OF LEFT KNEE: ICD-10-CM

## 2020-02-11 DIAGNOSIS — M25.562 LEFT KNEE PAIN, UNSPECIFIED CHRONICITY: ICD-10-CM

## 2020-02-11 DIAGNOSIS — S83.412A SPRAIN OF MEDIAL COLLATERAL LIGAMENT OF LEFT KNEE, INITIAL ENCOUNTER: ICD-10-CM

## 2020-02-11 PROCEDURE — 73721 MRI JNT OF LWR EXTRE W/O DYE: CPT

## 2020-02-14 ENCOUNTER — TELEPHONE (OUTPATIENT)
Dept: OBGYN CLINIC | Facility: HOSPITAL | Age: 30
End: 2020-02-14

## 2020-02-14 NOTE — TELEPHONE ENCOUNTER
Candy Carmona  from [place] is calling for the date of patients next follow up        Advised 2/17

## 2020-02-17 DIAGNOSIS — M25.562 LEFT KNEE PAIN, UNSPECIFIED CHRONICITY: ICD-10-CM

## 2020-02-17 DIAGNOSIS — M17.12 PRIMARY OSTEOARTHRITIS OF LEFT KNEE: ICD-10-CM

## 2020-02-17 DIAGNOSIS — S83.412A SPRAIN OF MEDIAL COLLATERAL LIGAMENT OF LEFT KNEE, INITIAL ENCOUNTER: ICD-10-CM

## 2020-02-17 DIAGNOSIS — S83.242A OTHER TEAR OF MEDIAL MENISCUS, CURRENT INJURY, LEFT KNEE, INITIAL ENCOUNTER: ICD-10-CM

## 2020-02-17 RX ORDER — DICLOFENAC SODIUM 75 MG/1
TABLET, DELAYED RELEASE ORAL
Qty: 60 TABLET | Refills: 1 | Status: SHIPPED | OUTPATIENT
Start: 2020-02-17 | End: 2020-10-12

## 2020-02-18 ENCOUNTER — TELEPHONE (OUTPATIENT)
Dept: FAMILY MEDICINE CLINIC | Facility: CLINIC | Age: 30
End: 2020-02-18

## 2020-02-27 ENCOUNTER — OFFICE VISIT (OUTPATIENT)
Dept: OBGYN CLINIC | Facility: MEDICAL CENTER | Age: 30
End: 2020-02-27
Payer: OTHER MISCELLANEOUS

## 2020-02-27 VITALS
SYSTOLIC BLOOD PRESSURE: 114 MMHG | HEART RATE: 90 BPM | WEIGHT: 293 LBS | HEIGHT: 67 IN | DIASTOLIC BLOOD PRESSURE: 79 MMHG | BODY MASS INDEX: 45.99 KG/M2

## 2020-02-27 DIAGNOSIS — M17.12 PATELLOFEMORAL ARTHRITIS OF LEFT KNEE: Primary | ICD-10-CM

## 2020-02-27 DIAGNOSIS — M25.562 LEFT KNEE PAIN, UNSPECIFIED CHRONICITY: ICD-10-CM

## 2020-02-27 PROCEDURE — 20610 DRAIN/INJ JOINT/BURSA W/O US: CPT | Performed by: ORTHOPAEDIC SURGERY

## 2020-02-27 PROCEDURE — 1036F TOBACCO NON-USER: CPT | Performed by: ORTHOPAEDIC SURGERY

## 2020-02-27 PROCEDURE — 3008F BODY MASS INDEX DOCD: CPT | Performed by: ORTHOPAEDIC SURGERY

## 2020-02-27 PROCEDURE — 99213 OFFICE O/P EST LOW 20 MIN: CPT | Performed by: ORTHOPAEDIC SURGERY

## 2020-02-27 RX ORDER — LIDOCAINE HYDROCHLORIDE 10 MG/ML
3 INJECTION, SOLUTION INFILTRATION; PERINEURAL
Status: COMPLETED | OUTPATIENT
Start: 2020-02-27 | End: 2020-02-27

## 2020-02-27 RX ORDER — METHYLPREDNISOLONE ACETATE 40 MG/ML
2 INJECTION, SUSPENSION INTRA-ARTICULAR; INTRALESIONAL; INTRAMUSCULAR; SOFT TISSUE
Status: COMPLETED | OUTPATIENT
Start: 2020-02-27 | End: 2020-02-27

## 2020-02-27 RX ADMIN — METHYLPREDNISOLONE ACETATE 2 ML: 40 INJECTION, SUSPENSION INTRA-ARTICULAR; INTRALESIONAL; INTRAMUSCULAR; SOFT TISSUE at 17:04

## 2020-02-27 RX ADMIN — LIDOCAINE HYDROCHLORIDE 3 ML: 10 INJECTION, SOLUTION INFILTRATION; PERINEURAL at 17:04

## 2020-02-27 NOTE — LETTER
February 27, 2020     Patient: Debra Daivs   YOB: 1990   Date of Visit: 2/27/2020       To Whom it May Concern:    Melita Corwinaleena is under my professional care  She was seen in my office on 2/27/2020  If you have any questions or concerns, please don't hesitate to call           Sincerely,          Chris Curiel DO        CC: Debra Davis

## 2020-02-27 NOTE — PROGRESS NOTES
Assessment/Plan:  1  Patellofemoral arthritis of left knee    2  Left knee pain, unspecified chronicity      Orders Placed This Encounter   Procedures    Ambulatory referral to Physical Therapy     · MRI left knee reviewed with patient demonstrating possible tear of anterior aspect of lateral meniscus  Explained that this is not an operative injury  Will continue with conservative management  · Patient received left knee steroid injection the office today  Tolerated the procedure well  Advised to apply ice and avoid strenuous activity for 1-2 days as needed  · Provided script for physical therapy  · Work note provided  Continue regular duty  · Patient may like to follow up for injection in the future if her pain returns  Return in about 3 months (around 5/27/2020) for left knee follow up  I answered all of the patient's questions during the visit and provided education of the patient's condition during the visit  The patient verbalized understanding of the information given and agrees with the plan  This note was dictated using Soonr software  It may contain errors including improperly dictated words  Please contact physician directly for any questions  Subjective   Chief Complaint:   Chief Complaint   Patient presents with    Left Knee - Follow-up       HPI  Tsering García is a 30 y o  female who presents for follow up for review of left knee MRI for left knee pain  She presents as  worker's comp injury  She feels her pain is mildly improved but she still has pain on the anterior aspect of the left knee  She taking diclofenac and Tylenol for pain with relief  She has not been doing physical therapy for her left knee while waiting for her MRI results  Denies new trauma or injury  Review of Systems  ROS:    See HPI for musculoskeletal review     All other systems reviewed are negative     History:  Past Medical History:   Diagnosis Date    Back pain     Knee pain     MRSA carrier      Past Surgical History:   Procedure Laterality Date    TONSILLECTOMY      TOOTH EXTRACTION  10/01/2014     Social History   Social History     Substance and Sexual Activity   Alcohol Use Yes    Comment: social     Social History     Substance and Sexual Activity   Drug Use No     Social History     Tobacco Use   Smoking Status Never Smoker   Smokeless Tobacco Never Used   Tobacco Comment    no passive smoke exposure     Family History:   Family History   Problem Relation Age of Onset    Brain cancer Mother    Michael Spinner Cancer Mother         glioblastoma    Hypertension Father     Stroke Family     Diabetes type II Family        Current Outpatient Medications on File Prior to Visit   Medication Sig Dispense Refill    albuterol (PROVENTIL HFA,VENTOLIN HFA) 90 mcg/act inhaler Inhale 2 puffs every 6 (six) hours as needed for wheezing 1 Inhaler 0    benzonatate (TESSALON PERLES) 100 mg capsule Take 1 capsule (100 mg total) by mouth 3 (three) times a day as needed for cough 15 capsule 0    cephalexin (KEFLEX) 500 mg capsule TAKE 1 CAPSULE BY MOUTH TWICE A DAY FOR 7 DAYS  0    diclofenac (VOLTAREN) 75 mg EC tablet TAKE 1 TABLET BY MOUTH TWICE A DAY AS NEEDED FOR PAIN 60 tablet 1    fluticasone (FLONASE) 50 mcg/act nasal spray 2 sprays into each nostril daily 16 g 0    meloxicam (MOBIC) 15 mg tablet TAKE 1 TABLET DAILY FOR 7 DAYS  0    methocarbamol (ROBAXIN) 750 mg tablet Take 750 mg by mouth Three times daily as needed       No current facility-administered medications on file prior to visit        Allergies   Allergen Reactions    Zyrtec [Cetirizine] Hives        Objective     /79   Pulse 90   Ht 5' 7" (1 702 m)   Wt (!) 154 kg (340 lb)   LMP 02/03/2020 (Exact Date)   BMI 53 25 kg/m²      PE:  AAOx 3  WDWN  Hearing intact, no drainage from eyes  no audible wheezing  no abdominal distension  LE compartments soft, skin intact    Ortho Exam:  left Knee:   No erythema  no swelling  no effusion  no warmth  AROM: 0- 130  Stable to varus/valgus stress    Imaging Studies: I have personally reviewed pertinent films in PACS  MRI left knee:   Possible tear the anterior aspect of the lateral meniscus, mild degenerative changes of the ACL    Large joint arthrocentesis: L knee  Date/Time: 2/27/2020 5:04 PM  Consent given by: patient  Site marked: site marked  Timeout: Immediately prior to procedure a time out was called to verify the correct patient, procedure, equipment, support staff and site/side marked as required   Supporting Documentation  Indications: pain   Procedure Details  Location: knee - L knee  Preparation: Patient was prepped and draped in the usual sterile fashion  Needle size: 22 G  Ultrasound guidance: no  Approach: anterolateral  Medications administered: 3 mL lidocaine 1 %; 2 mL methylPREDNISolone acetate 40 mg/mL    Patient tolerance: patient tolerated the procedure well with no immediate complications  Dressing:  Sterile dressing applied

## 2020-02-28 ENCOUNTER — TELEPHONE (OUTPATIENT)
Dept: OBGYN CLINIC | Facility: HOSPITAL | Age: 30
End: 2020-02-28

## 2020-03-02 NOTE — TELEPHONE ENCOUNTER
Please return this phone call and let him know that he would need an independent medical exam to determine that  Thank you

## 2020-03-04 NOTE — TELEPHONE ENCOUNTER
I did speak briefly with Jimmy Boucher and conveyed the message that the patient would need an TORRES to determine whether not this was a Work Comp case  He stated that he scheduled the appointment with the patient to come in on Monday 03/09/2020 to further discuss the topic

## 2020-03-09 ENCOUNTER — OFFICE VISIT (OUTPATIENT)
Dept: OBGYN CLINIC | Facility: MEDICAL CENTER | Age: 30
End: 2020-03-09
Payer: OTHER MISCELLANEOUS

## 2020-03-09 VITALS
WEIGHT: 293 LBS | SYSTOLIC BLOOD PRESSURE: 120 MMHG | DIASTOLIC BLOOD PRESSURE: 81 MMHG | BODY MASS INDEX: 45.99 KG/M2 | HEIGHT: 67 IN | HEART RATE: 90 BPM

## 2020-03-09 DIAGNOSIS — M25.562 LEFT KNEE PAIN, UNSPECIFIED CHRONICITY: Primary | ICD-10-CM

## 2020-03-09 PROCEDURE — 99212 OFFICE O/P EST SF 10 MIN: CPT | Performed by: ORTHOPAEDIC SURGERY

## 2020-03-09 NOTE — LETTER
March 9, 2020     Patient: Tiera Kam   YOB: 1990   Date of Visit: 3/9/2020       To Whom it May Concern:    Yisel Dennis is under my professional care  She was seen in my office on 3/9/2020  I believe her to be at maximum improvement for her left knee  She is discharged from care from a worker's compensation standpoint  She can work without restrictions in regards to her left knee  If you have any questions or concerns, please don't hesitate to call           Sincerely,          Lg Matias DO        CC: No Recipients

## 2020-03-09 NOTE — PROGRESS NOTES
Assessment/Plan:  1  Left knee pain, unspecified chronicity      No orders of the defined types were placed in this encounter  Follow up in 3 months if needed as previously scheduled  Continue with PT and transition to home exercises when ready  Recommended weight loss    I believe patient is at maximum recovery  She is discharged from a worker's compensation standpoint and can follow up on her own if needed  She is able to work without restrictions in regards to her left knee  Return for appt in 3 months if needed  I answered all of the patient's questions during the visit and provided education of the patient's condition during the visit  The patient verbalized understanding of the information given and agrees with the plan  This note was dictated using [employer] software  It may contain errors including improperly dictated words  Please contact physician directly for any questions  Subjective   Chief Complaint:   Chief Complaint   Patient presents with    Left Knee - Follow-up       HPI  oJy Montenegro is a 30 y o  female who presents for follow up for left knee pain  She received a left knee steroid injection during her last appointment was helpful  She has been going to physical therapy and doing home exercises  Most the pain is over the lateral aspect of her knee  Her knee pain is intermittent and depends on her activity level at day  Review of Systems  ROS:    See HPI for musculoskeletal review     All other systems reviewed are negative     History:  Past Medical History:   Diagnosis Date    Back pain     Knee pain     MRSA carrier      Past Surgical History:   Procedure Laterality Date    TONSILLECTOMY      TOOTH EXTRACTION  10/01/2014     Social History   Social History     Substance and Sexual Activity   Alcohol Use Yes    Comment: social     Social History     Substance and Sexual Activity   Drug Use No     Social History     Tobacco Use   Smoking Status Never Smoker Smokeless Tobacco Never Used   Tobacco Comment    no passive smoke exposure     Family History:   Family History   Problem Relation Age of Onset    Brain cancer Mother     Cancer Mother         glioblastoma    Hypertension Father     Stroke Family     Diabetes type II Family        Current Outpatient Medications on File Prior to Visit   Medication Sig Dispense Refill    albuterol (PROVENTIL HFA,VENTOLIN HFA) 90 mcg/act inhaler Inhale 2 puffs every 6 (six) hours as needed for wheezing 1 Inhaler 0    benzonatate (TESSALON PERLES) 100 mg capsule Take 1 capsule (100 mg total) by mouth 3 (three) times a day as needed for cough 15 capsule 0    cephalexin (KEFLEX) 500 mg capsule TAKE 1 CAPSULE BY MOUTH TWICE A DAY FOR 7 DAYS  0    diclofenac (VOLTAREN) 75 mg EC tablet TAKE 1 TABLET BY MOUTH TWICE A DAY AS NEEDED FOR PAIN 60 tablet 1    fluticasone (FLONASE) 50 mcg/act nasal spray 2 sprays into each nostril daily 16 g 0    meloxicam (MOBIC) 15 mg tablet TAKE 1 TABLET DAILY FOR 7 DAYS  0    methocarbamol (ROBAXIN) 750 mg tablet Take 750 mg by mouth Three times daily as needed       No current facility-administered medications on file prior to visit        Allergies   Allergen Reactions    Zyrtec [Cetirizine] Hives        Objective     /81   Pulse 90   Ht 5' 7" (1 702 m)   Wt (!) 154 kg (340 lb)   BMI 53 25 kg/m²      PE:  AAOx 3  WDWN  Hearing intact, no drainage from eyes  no audible wheezing  no abdominal distension  LE compartments soft, skin intact    Ortho Exam:  left Knee:   Ambulation without difficulty    Imaging Studies: reviewed during last appt

## 2020-07-06 ENCOUNTER — OFFICE VISIT (OUTPATIENT)
Dept: OBGYN CLINIC | Facility: MEDICAL CENTER | Age: 30
End: 2020-07-06
Payer: OTHER MISCELLANEOUS

## 2020-07-06 VITALS
DIASTOLIC BLOOD PRESSURE: 86 MMHG | TEMPERATURE: 98.7 F | HEIGHT: 67 IN | WEIGHT: 293 LBS | SYSTOLIC BLOOD PRESSURE: 129 MMHG | HEART RATE: 97 BPM | BODY MASS INDEX: 45.99 KG/M2

## 2020-07-06 DIAGNOSIS — M17.12 PATELLOFEMORAL ARTHRITIS OF LEFT KNEE: Primary | ICD-10-CM

## 2020-07-06 DIAGNOSIS — M17.12 PRIMARY OSTEOARTHRITIS OF LEFT KNEE: ICD-10-CM

## 2020-07-06 PROCEDURE — 99213 OFFICE O/P EST LOW 20 MIN: CPT | Performed by: ORTHOPAEDIC SURGERY

## 2020-07-06 PROCEDURE — 20610 DRAIN/INJ JOINT/BURSA W/O US: CPT | Performed by: ORTHOPAEDIC SURGERY

## 2020-07-06 PROCEDURE — 3008F BODY MASS INDEX DOCD: CPT | Performed by: ORTHOPAEDIC SURGERY

## 2020-07-06 PROCEDURE — 1036F TOBACCO NON-USER: CPT | Performed by: ORTHOPAEDIC SURGERY

## 2020-07-06 RX ORDER — LIDOCAINE HYDROCHLORIDE 10 MG/ML
3 INJECTION, SOLUTION INFILTRATION; PERINEURAL
Status: COMPLETED | OUTPATIENT
Start: 2020-07-06 | End: 2020-07-06

## 2020-07-06 RX ORDER — METHYLPREDNISOLONE ACETATE 40 MG/ML
2 INJECTION, SUSPENSION INTRA-ARTICULAR; INTRALESIONAL; INTRAMUSCULAR; SOFT TISSUE
Status: COMPLETED | OUTPATIENT
Start: 2020-07-06 | End: 2020-07-06

## 2020-07-06 RX ADMIN — LIDOCAINE HYDROCHLORIDE 3 ML: 10 INJECTION, SOLUTION INFILTRATION; PERINEURAL at 09:58

## 2020-07-06 RX ADMIN — METHYLPREDNISOLONE ACETATE 2 ML: 40 INJECTION, SUSPENSION INTRA-ARTICULAR; INTRALESIONAL; INTRAMUSCULAR; SOFT TISSUE at 09:58

## 2020-07-06 NOTE — PROGRESS NOTES
Assessment/Plan:  1  Patellofemoral arthritis of left knee    2  Primary osteoarthritis of left knee      Orders Placed This Encounter   Procedures    Large joint arthrocentesis: L knee     · Patient has moderate left knee OA and patellofemoral arthritis   · Received Left knee steroid injection today  Patient knows to ice and avoid strenuous activity for 1-2 days if needed  · Continue working on weight loss   · Continue taking Tylenol ES and Naproxen   · May repeat CSI every 3 months   Return if symptoms worsen or fail to improve  I answered all of the patient's questions during the visit and provided education of the patient's condition during the visit  The patient verbalized understanding of the information given and agrees with the plan  This note was dictated using SwitchNote software  It may contain errors including improperly dictated words  Please contact physician directly for any questions  Subjective   Chief Complaint:   Chief Complaint   Patient presents with    Left Knee - Follow-up       HPI  Valeria Tan is a 26 y o  female who presents for follow up for moderate left knee osteoarthritis  Patient had a left knee steroid injection on 02/27/2020 and states she had relief until 1-2 weeks ago  She states she is having achy and shooting pain that comes and goes over anterolateral left knee  She denies any instability, locking, or popping  Pain is worse with increased activity and going up hills  She is working on weight loss and has lost two pounds since the last office visit  She is taking extra-strength Tylenol and naproxen p r n  for pain  She is doing her home exercises from physical therapy daily  Denies any numbness or tingling  Review of Systems  ROS:    See HPI for musculoskeletal review     All other systems reviewed are negative     History:  Past Medical History:   Diagnosis Date    Back pain     Knee pain     MRSA carrier      Past Surgical History:   Procedure Laterality Date    TONSILLECTOMY      TOOTH EXTRACTION  10/01/2014     Social History   Social History     Substance and Sexual Activity   Alcohol Use Yes    Comment: social     Social History     Substance and Sexual Activity   Drug Use No     Social History     Tobacco Use   Smoking Status Never Smoker   Smokeless Tobacco Never Used   Tobacco Comment    no passive smoke exposure     Family History:   Family History   Problem Relation Age of Onset    Brain cancer Mother     Cancer Mother         glioblastoma    Hypertension Father     Stroke Family     Diabetes type II Family        Current Outpatient Medications on File Prior to Visit   Medication Sig Dispense Refill    albuterol (PROVENTIL HFA,VENTOLIN HFA) 90 mcg/act inhaler Inhale 2 puffs every 6 (six) hours as needed for wheezing 1 Inhaler 0    benzonatate (TESSALON PERLES) 100 mg capsule Take 1 capsule (100 mg total) by mouth 3 (three) times a day as needed for cough 15 capsule 0    cephalexin (KEFLEX) 500 mg capsule TAKE 1 CAPSULE BY MOUTH TWICE A DAY FOR 7 DAYS  0    diclofenac (VOLTAREN) 75 mg EC tablet TAKE 1 TABLET BY MOUTH TWICE A DAY AS NEEDED FOR PAIN 60 tablet 1    fluticasone (FLONASE) 50 mcg/act nasal spray 2 sprays into each nostril daily 16 g 0    meloxicam (MOBIC) 15 mg tablet TAKE 1 TABLET DAILY FOR 7 DAYS  0    methocarbamol (ROBAXIN) 750 mg tablet Take 750 mg by mouth Three times daily as needed       No current facility-administered medications on file prior to visit        Allergies   Allergen Reactions    Zyrtec [Cetirizine] Hives        Objective     /86   Pulse 97   Temp 98 7 °F (37 1 °C)   Ht 5' 7" (1 702 m)   Wt (!) 153 kg (338 lb)   BMI 52 94 kg/m²      PE:  AAOx 3  WDWN  Hearing intact, no drainage from eyes  no audible wheezing  no abdominal distension  LE compartments soft, skin intact    Ortho Exam:  left Knee:   No erythema  no swelling  no effusion  no warmth  AROM: 0-120       Large joint arthrocentesis: L knee  Date/Time: 7/6/2020 9:58 AM  Consent given by: patient  Site marked: site marked  Timeout: Immediately prior to procedure a time out was called to verify the correct patient, procedure, equipment, support staff and site/side marked as required   Supporting Documentation  Indications: pain   Procedure Details  Location: knee - L knee  Preparation: Patient was prepped and draped in the usual sterile fashion  Needle size: 22 G  Ultrasound guidance: no  Approach: anterolateral  Medications administered: 3 mL lidocaine 1 %; 2 mL methylPREDNISolone acetate 40 mg/mL          Scribe Attestation    I,:   Zaid Larsen am acting as a scribe while in the presence of the attending physician :        I,:   Tala Meyers, DO personally performed the services described in this documentation    as scribed in my presence :

## 2020-07-23 ENCOUNTER — NURSE TRIAGE (OUTPATIENT)
Dept: OTHER | Facility: OTHER | Age: 30
End: 2020-07-23

## 2020-07-23 DIAGNOSIS — Z20.822 ENCOUNTER FOR LABORATORY TESTING FOR COVID-19 VIRUS: Primary | ICD-10-CM

## 2020-07-23 NOTE — TELEPHONE ENCOUNTER
Reason for Disposition   [1] COVID-19 EXPOSURE (Close Contact) within last 14 days AND [2] needs COVID-19 lab test to return to work AND [3] NO symptoms    Answer Assessment - Initial Assessment Questions  1  CLOSE CONTACT: "Who is the person with the confirmed or suspected COVID-19 infection that you were exposed to?"      No exposure but daughter is getting tested for Covid  2  PLACE of CONTACT: "Where were you when you were exposed to COVID-19?" (e g , home, school, medical waiting room; which city?)     Lives in the same household  3  TYPE of CONTACT: "How much contact was there?" (e g , sitting next to, live in same house, work in same office, same building)      Close contact  4  DURATION of CONTACT: "How long were you in contact with the COVID-19 patient?" (e g , a few seconds, passed by person, a few minutes, live with the patient)     Lives in the same house  5  DATE of CONTACT: "When did you have contact with a COVID-19 patient?" (e g , how many days ago)     They were at a birthday party over the weekend  6  TRAVEL: "Have you traveled out of the country recently?" If so, "When and where?"      * Also ask about out-of-state travel, since the CDC has identified some high-risk cities for community spread in the [address] [place] [address] [address]  * Note: Travel becomes less relevant if there is widespread community transmission where the patient lives  No recent travel  7  COMMUNITY SPREAD: "Are there lots of cases of COVID-19 (community spread) where you live?" (See public health department website, if unsure)        [place]  8  SYMPTOMS: "Do you have any symptoms?" (e g , fever, cough, breathing difficulty)      Denies any symtoms  9  PREGNANCY OR POSTPARTUM: "Is there any chance you are pregnant?" "When was your last menstrual period?" "Did you deliver in the last 2 weeks?"     Denies PG   LMP "beginning of the month"  10  HIGH RISK: "Do you have any heart or lung problems?  Do you have a weak immune system?" (e g , CHF, COPD, asthma, HIV positive, chemotherapy, renal failure, diabetes mellitus, sickle cell anemia)       Denies any medical problems    Protocols used: CORONAVIRUS (COVID-19) EXPOSURE-ADULT-AH

## 2020-07-23 NOTE — TELEPHONE ENCOUNTER
Regarding: Coronavirus  ----- Message from Juarez Larry sent at 7/23/2020  4:44 PM EDT -----  "My daughter just got an order for a COVID test at her pediatrician's office   I would like one for myself "

## 2020-07-24 DIAGNOSIS — Z20.822 ENCOUNTER FOR LABORATORY TESTING FOR COVID-19 VIRUS: ICD-10-CM

## 2020-07-24 PROCEDURE — U0003 INFECTIOUS AGENT DETECTION BY NUCLEIC ACID (DNA OR RNA); SEVERE ACUTE RESPIRATORY SYNDROME CORONAVIRUS 2 (SARS-COV-2) (CORONAVIRUS DISEASE [COVID-19]), AMPLIFIED PROBE TECHNIQUE, MAKING USE OF HIGH THROUGHPUT TECHNOLOGIES AS DESCRIBED BY CMS-2020-01-R: HCPCS

## 2020-07-26 LAB — SARS-COV-2 RNA SPEC QL NAA+PROBE: NOT DETECTED

## 2020-07-31 ENCOUNTER — TELEPHONE (OUTPATIENT)
Dept: FAMILY MEDICINE CLINIC | Facility: CLINIC | Age: 30
End: 2020-07-31

## 2020-07-31 ENCOUNTER — TELEPHONE (OUTPATIENT)
Dept: OTHER | Facility: OTHER | Age: 30
End: 2020-07-31

## 2020-07-31 NOTE — TELEPHONE ENCOUNTER
PT had Covid 19 test and it was negative  She is requesting a note stating this be faxed to her employer  Letter faxed to [phone]  I also asked her to please schedule a physical asap

## 2020-07-31 NOTE — LETTER
August 20, 2020    Patient:  Aiden Puentes  YOB: 1990  Date of Last Encounter:  2/18/2020    To whom it may concern:    Aiden Puentes has tested negative for COVID-19 (Coronavirus) on 7/24/20  If you have any questions please contact our office at above number          Sincerely,      Christos SAEEDC

## 2020-07-31 NOTE — TELEPHONE ENCOUNTER
Your test for COVID-19, also known as novel coronavirus, came back negative  You do not have COVID-19  If you have any additional questions, we can schedule a virtual visit for you with a provider or call the [facility] [phone] Option [phone] for care advice  For additional information , please visit the Coronavirus FAQ on the [id number]  ([facility]  org)

## 2020-08-20 NOTE — TELEPHONE ENCOUNTER
I see she had testing done on 7/27/20 so I faxed a letter with negative results from that note to requested number

## 2020-08-21 ENCOUNTER — TELEPHONE (OUTPATIENT)
Dept: FAMILY MEDICINE CLINIC | Facility: CLINIC | Age: 30
End: 2020-08-21

## 2020-08-21 NOTE — TELEPHONE ENCOUNTER
----- Message from Naeem Reese sent at 8/11/2020  4:05 PM EDT -----  Regarding: schedule appt  Please call pt and schedule PE

## 2020-09-23 ENCOUNTER — TELEPHONE (OUTPATIENT)
Dept: FAMILY MEDICINE CLINIC | Facility: CLINIC | Age: 30
End: 2020-09-23

## 2020-10-09 ENCOUNTER — TELEPHONE (OUTPATIENT)
Dept: FAMILY MEDICINE CLINIC | Facility: CLINIC | Age: 30
End: 2020-10-09

## 2020-10-12 ENCOUNTER — OFFICE VISIT (OUTPATIENT)
Dept: FAMILY MEDICINE CLINIC | Facility: CLINIC | Age: 30
End: 2020-10-12
Payer: COMMERCIAL

## 2020-10-12 VITALS
HEART RATE: 96 BPM | BODY MASS INDEX: 45.99 KG/M2 | WEIGHT: 293 LBS | TEMPERATURE: 98.1 F | RESPIRATION RATE: 16 BRPM | HEIGHT: 67 IN | SYSTOLIC BLOOD PRESSURE: 130 MMHG | DIASTOLIC BLOOD PRESSURE: 84 MMHG | OXYGEN SATURATION: 98 %

## 2020-10-12 DIAGNOSIS — E04.1 THYROID NODULE: ICD-10-CM

## 2020-10-12 DIAGNOSIS — Z23 NEED FOR INFLUENZA VACCINATION: ICD-10-CM

## 2020-10-12 DIAGNOSIS — Z12.4 CERVICAL CANCER SCREENING: ICD-10-CM

## 2020-10-12 DIAGNOSIS — R63.5 WEIGHT GAIN: ICD-10-CM

## 2020-10-12 DIAGNOSIS — Z13.220 LIPID SCREENING: ICD-10-CM

## 2020-10-12 DIAGNOSIS — D49.7 PARATHYROID NEOPLASM: ICD-10-CM

## 2020-10-12 DIAGNOSIS — E66.01 CLASS 3 SEVERE OBESITY DUE TO EXCESS CALORIES WITHOUT SERIOUS COMORBIDITY WITH BODY MASS INDEX (BMI) OF 50.0 TO 59.9 IN ADULT (HCC): ICD-10-CM

## 2020-10-12 DIAGNOSIS — Z00.00 WELL ADULT EXAM: Primary | ICD-10-CM

## 2020-10-12 DIAGNOSIS — Z11.4 ENCOUNTER FOR SCREENING FOR HIV: ICD-10-CM

## 2020-10-12 PROBLEM — M23.232 DERANG OF MEDIAL MENISCUS DUE TO OLD TEAR/INJ, LEFT KNEE: Status: ACTIVE | Noted: 2020-10-12

## 2020-10-12 PROBLEM — B37.3 VAGINAL CANDIDA: Status: RESOLVED | Noted: 2018-12-12 | Resolved: 2020-10-12

## 2020-10-12 PROBLEM — B37.31 VAGINAL CANDIDA: Status: RESOLVED | Noted: 2018-12-12 | Resolved: 2020-10-12

## 2020-10-12 PROBLEM — H69.83 EUSTACHIAN TUBE DYSFUNCTION, BILATERAL: Status: RESOLVED | Noted: 2019-11-08 | Resolved: 2020-10-12

## 2020-10-12 PROBLEM — J06.9 ACUTE UPPER RESPIRATORY INFECTION: Status: RESOLVED | Noted: 2019-03-15 | Resolved: 2020-10-12

## 2020-10-12 PROBLEM — R03.0 ELEVATED BLOOD PRESSURE READING: Status: RESOLVED | Noted: 2019-03-15 | Resolved: 2020-10-12

## 2020-10-12 PROBLEM — H69.93 EUSTACHIAN TUBE DYSFUNCTION, BILATERAL: Status: RESOLVED | Noted: 2019-11-08 | Resolved: 2020-10-12

## 2020-10-12 PROCEDURE — 99395 PREV VISIT EST AGE 18-39: CPT | Performed by: PHYSICIAN ASSISTANT

## 2020-10-12 PROCEDURE — 90686 IIV4 VACC NO PRSV 0.5 ML IM: CPT

## 2020-10-12 PROCEDURE — 3725F SCREEN DEPRESSION PERFORMED: CPT | Performed by: PHYSICIAN ASSISTANT

## 2020-10-12 PROCEDURE — 90471 IMMUNIZATION ADMIN: CPT

## 2020-10-12 RX ORDER — ACETAMINOPHEN 500 MG
500 TABLET ORAL EVERY 6 HOURS PRN
COMMUNITY
End: 2021-10-06 | Stop reason: HOSPADM

## 2020-10-12 RX ORDER — ERGOCALCIFEROL 1.25 MG/1
CAPSULE ORAL
COMMUNITY
End: 2020-10-13 | Stop reason: ALTCHOICE

## 2020-10-12 RX ORDER — CYCLOBENZAPRINE HCL 10 MG
TABLET ORAL
COMMUNITY
Start: 2020-07-07 | End: 2021-10-08

## 2020-10-12 RX ORDER — NAPROXEN 500 MG/1
TABLET ORAL
COMMUNITY
Start: 2020-10-08 | End: 2021-10-06 | Stop reason: HOSPADM

## 2020-10-13 ENCOUNTER — LAB (OUTPATIENT)
Dept: LAB | Facility: IMAGING CENTER | Age: 30
End: 2020-10-13
Payer: COMMERCIAL

## 2020-10-13 ENCOUNTER — CONSULT (OUTPATIENT)
Dept: BARIATRICS | Facility: CLINIC | Age: 30
End: 2020-10-13
Payer: COMMERCIAL

## 2020-10-13 ENCOUNTER — HOSPITAL ENCOUNTER (OUTPATIENT)
Dept: RADIOLOGY | Facility: IMAGING CENTER | Age: 30
Discharge: HOME | End: 2020-10-13
Payer: COMMERCIAL

## 2020-10-13 VITALS
WEIGHT: 293 LBS | HEIGHT: 67 IN | SYSTOLIC BLOOD PRESSURE: 122 MMHG | TEMPERATURE: 97.8 F | RESPIRATION RATE: 18 BRPM | BODY MASS INDEX: 45.99 KG/M2 | DIASTOLIC BLOOD PRESSURE: 74 MMHG | HEART RATE: 91 BPM

## 2020-10-13 DIAGNOSIS — Z11.4 ENCOUNTER FOR SCREENING FOR HIV: ICD-10-CM

## 2020-10-13 DIAGNOSIS — E66.01 CLASS 3 SEVERE OBESITY DUE TO EXCESS CALORIES WITHOUT SERIOUS COMORBIDITY WITH BODY MASS INDEX (BMI) OF 50.0 TO 59.9 IN ADULT (HCC): ICD-10-CM

## 2020-10-13 DIAGNOSIS — E04.1 THYROID NODULE: ICD-10-CM

## 2020-10-13 LAB
ALBUMIN SERPL BCP-MCNC: 3.9 G/DL (ref 3.5–5)
ALP SERPL-CCNC: 69 U/L (ref 46–116)
ALT SERPL W P-5'-P-CCNC: 45 U/L (ref 12–78)
ANION GAP SERPL CALCULATED.3IONS-SCNC: 5 MMOL/L (ref 4–13)
AST SERPL W P-5'-P-CCNC: 20 U/L (ref 5–45)
BILIRUB SERPL-MCNC: 0.34 MG/DL (ref 0.2–1)
BUN SERPL-MCNC: 12 MG/DL (ref 5–25)
CALCIUM SERPL-MCNC: 8.7 MG/DL (ref 8.3–10.1)
CHLORIDE SERPL-SCNC: 108 MMOL/L (ref 100–108)
CHOLEST SERPL-MCNC: 196 MG/DL (ref 50–200)
CO2 SERPL-SCNC: 28 MMOL/L (ref 21–32)
CREAT SERPL-MCNC: 0.89 MG/DL (ref 0.6–1.3)
GFR SERPL CREATININE-BSD FRML MDRD: 86 ML/MIN/1.73SQ M
GLUCOSE P FAST SERPL-MCNC: 82 MG/DL (ref 65–99)
HDLC SERPL-MCNC: 36 MG/DL
LDLC SERPL CALC-MCNC: 107 MG/DL (ref 0–100)
NONHDLC SERPL-MCNC: 160 MG/DL
POTASSIUM SERPL-SCNC: 3.9 MMOL/L (ref 3.5–5.3)
PROT SERPL-MCNC: 7.7 G/DL (ref 6.4–8.2)
SODIUM SERPL-SCNC: 141 MMOL/L (ref 136–145)
TRIGL SERPL-MCNC: 263 MG/DL
TSH SERPL DL<=0.05 MIU/L-ACNC: 1.12 UIU/ML (ref 0.36–3.74)

## 2020-10-13 PROCEDURE — 99243 OFF/OP CNSLTJ NEW/EST LOW 30: CPT | Performed by: PHYSICIAN ASSISTANT

## 2020-10-13 PROCEDURE — 36415 COLL VENOUS BLD VENIPUNCTURE: CPT | Performed by: PHYSICIAN ASSISTANT

## 2020-10-13 PROCEDURE — 80061 LIPID PANEL: CPT | Performed by: PHYSICIAN ASSISTANT

## 2020-10-13 PROCEDURE — 84443 ASSAY THYROID STIM HORMONE: CPT | Performed by: PHYSICIAN ASSISTANT

## 2020-10-13 PROCEDURE — 76536 US EXAM OF HEAD AND NECK: CPT

## 2020-10-13 PROCEDURE — 80053 COMPREHEN METABOLIC PANEL: CPT | Performed by: PHYSICIAN ASSISTANT

## 2020-10-13 PROCEDURE — 87389 HIV-1 AG W/HIV-1&-2 AB AG IA: CPT

## 2020-10-13 PROCEDURE — 83970 ASSAY OF PARATHORMONE: CPT | Performed by: PHYSICIAN ASSISTANT

## 2020-10-14 ENCOUNTER — TELEPHONE (OUTPATIENT)
Dept: FAMILY MEDICINE CLINIC | Facility: CLINIC | Age: 30
End: 2020-10-14

## 2020-10-14 LAB — HIV 1+2 AB+HIV1 P24 AG SERPL QL IA: NORMAL

## 2020-10-15 ENCOUNTER — TELEPHONE (OUTPATIENT)
Dept: FAMILY MEDICINE CLINIC | Facility: CLINIC | Age: 30
End: 2020-10-15

## 2020-10-15 DIAGNOSIS — D49.7 PARATHYROID NEOPLASM: Primary | ICD-10-CM

## 2020-10-15 LAB — PTH-INTACT SERPL-MCNC: 72.6 PG/ML (ref 18.4–80.1)

## 2020-10-16 ENCOUNTER — TELEPHONE (OUTPATIENT)
Dept: FAMILY MEDICINE CLINIC | Facility: CLINIC | Age: 30
End: 2020-10-16

## 2020-10-16 DIAGNOSIS — D49.7 PARATHYROID NEOPLASM: Primary | ICD-10-CM

## 2020-10-20 ENCOUNTER — OFFICE VISIT (OUTPATIENT)
Dept: OBGYN CLINIC | Facility: MEDICAL CENTER | Age: 30
End: 2020-10-20
Payer: COMMERCIAL

## 2020-10-20 VITALS
WEIGHT: 293 LBS | HEIGHT: 67 IN | DIASTOLIC BLOOD PRESSURE: 84 MMHG | HEART RATE: 91 BPM | TEMPERATURE: 98.3 F | BODY MASS INDEX: 45.99 KG/M2 | SYSTOLIC BLOOD PRESSURE: 125 MMHG

## 2020-10-20 DIAGNOSIS — M17.12 PRIMARY OSTEOARTHRITIS OF LEFT KNEE: Primary | ICD-10-CM

## 2020-10-20 DIAGNOSIS — M25.562 CHRONIC PAIN OF LEFT KNEE: ICD-10-CM

## 2020-10-20 DIAGNOSIS — G89.29 CHRONIC PAIN OF LEFT KNEE: ICD-10-CM

## 2020-10-20 PROCEDURE — 20610 DRAIN/INJ JOINT/BURSA W/O US: CPT | Performed by: ORTHOPAEDIC SURGERY

## 2020-10-20 PROCEDURE — 99213 OFFICE O/P EST LOW 20 MIN: CPT | Performed by: ORTHOPAEDIC SURGERY

## 2020-10-20 PROCEDURE — 1036F TOBACCO NON-USER: CPT | Performed by: ORTHOPAEDIC SURGERY

## 2020-10-20 RX ORDER — LIDOCAINE HYDROCHLORIDE 10 MG/ML
3 INJECTION, SOLUTION INFILTRATION; PERINEURAL
Status: COMPLETED | OUTPATIENT
Start: 2020-10-20 | End: 2020-10-20

## 2020-10-20 RX ORDER — BUPIVACAINE HYDROCHLORIDE 2.5 MG/ML
4 INJECTION, SOLUTION INFILTRATION; PERINEURAL
Status: COMPLETED | OUTPATIENT
Start: 2020-10-20 | End: 2020-10-20

## 2020-10-20 RX ORDER — METHYLPREDNISOLONE ACETATE 40 MG/ML
1 INJECTION, SUSPENSION INTRA-ARTICULAR; INTRALESIONAL; INTRAMUSCULAR; SOFT TISSUE
Status: COMPLETED | OUTPATIENT
Start: 2020-10-20 | End: 2020-10-20

## 2020-10-20 RX ADMIN — LIDOCAINE HYDROCHLORIDE 3 ML: 10 INJECTION, SOLUTION INFILTRATION; PERINEURAL at 12:57

## 2020-10-20 RX ADMIN — BUPIVACAINE HYDROCHLORIDE 4 ML: 2.5 INJECTION, SOLUTION INFILTRATION; PERINEURAL at 12:57

## 2020-10-20 RX ADMIN — METHYLPREDNISOLONE ACETATE 1 ML: 40 INJECTION, SUSPENSION INTRA-ARTICULAR; INTRALESIONAL; INTRAMUSCULAR; SOFT TISSUE at 12:57

## 2020-10-26 ENCOUNTER — CONSULT (OUTPATIENT)
Dept: SURGERY | Facility: CLINIC | Age: 30
End: 2020-10-26
Payer: COMMERCIAL

## 2020-10-26 VITALS
WEIGHT: 293 LBS | SYSTOLIC BLOOD PRESSURE: 118 MMHG | TEMPERATURE: 97.2 F | HEIGHT: 67 IN | HEART RATE: 103 BPM | BODY MASS INDEX: 45.99 KG/M2 | DIASTOLIC BLOOD PRESSURE: 72 MMHG

## 2020-10-26 DIAGNOSIS — D49.7 PARATHYROID NEOPLASM: ICD-10-CM

## 2020-10-26 DIAGNOSIS — E04.1 THYROID NODULE: Primary | ICD-10-CM

## 2020-10-26 PROCEDURE — 3008F BODY MASS INDEX DOCD: CPT | Performed by: SURGERY

## 2020-10-26 PROCEDURE — 99241 PR OFFICE CONSULTATION NEW/ESTAB PATIENT 15 MIN: CPT | Performed by: SURGERY

## 2020-11-13 ENCOUNTER — TELEPHONE (OUTPATIENT)
Dept: FAMILY MEDICINE CLINIC | Facility: CLINIC | Age: 30
End: 2020-11-13

## 2020-12-14 ENCOUNTER — CLINICAL SUPPORT (OUTPATIENT)
Dept: BARIATRICS | Facility: CLINIC | Age: 30
End: 2020-12-14

## 2020-12-14 VITALS
BODY MASS INDEX: 45.99 KG/M2 | TEMPERATURE: 97.1 F | HEART RATE: 90 BPM | WEIGHT: 293 LBS | DIASTOLIC BLOOD PRESSURE: 90 MMHG | SYSTOLIC BLOOD PRESSURE: 122 MMHG | HEIGHT: 67 IN

## 2020-12-14 DIAGNOSIS — E66.01 CLASS 3 SEVERE OBESITY DUE TO EXCESS CALORIES WITHOUT SERIOUS COMORBIDITY WITH BODY MASS INDEX (BMI) OF 50.0 TO 59.9 IN ADULT (HCC): Primary | ICD-10-CM

## 2020-12-14 DIAGNOSIS — E66.01 MORBID OBESITY (HCC): Primary | ICD-10-CM

## 2020-12-14 PROCEDURE — 3008F BODY MASS INDEX DOCD: CPT | Performed by: SURGERY

## 2020-12-14 PROCEDURE — RECHECK: Performed by: DIETITIAN, REGISTERED

## 2021-01-08 ENCOUNTER — OFFICE VISIT (OUTPATIENT)
Dept: BARIATRICS | Facility: CLINIC | Age: 31
End: 2021-01-08
Payer: COMMERCIAL

## 2021-01-08 VITALS
HEIGHT: 67 IN | WEIGHT: 293 LBS | HEART RATE: 83 BPM | RESPIRATION RATE: 20 BRPM | TEMPERATURE: 97.9 F | SYSTOLIC BLOOD PRESSURE: 132 MMHG | DIASTOLIC BLOOD PRESSURE: 98 MMHG | BODY MASS INDEX: 45.99 KG/M2

## 2021-01-08 DIAGNOSIS — F41.9 ANXIETY: ICD-10-CM

## 2021-01-08 DIAGNOSIS — E66.01 CLASS 3 SEVERE OBESITY DUE TO EXCESS CALORIES WITHOUT SERIOUS COMORBIDITY WITH BODY MASS INDEX (BMI) OF 50.0 TO 59.9 IN ADULT (HCC): Primary | ICD-10-CM

## 2021-01-08 DIAGNOSIS — M54.16 RIGHT LUMBAR RADICULOPATHY: ICD-10-CM

## 2021-01-08 PROCEDURE — 99214 OFFICE O/P EST MOD 30 MIN: CPT | Performed by: SURGERY

## 2021-01-08 NOTE — PROGRESS NOTES
BARIATRIC INITIAL CONSULT - BARIATRIC SURGERY    Shruthi Yap 26 y o  female MRN: [id number]  Unit/Bed#:  Encounter: [id number]      HPI:  Shruthi Yap is a 26 y o  female who presents with a longstanding history of morbid obesity and inability to sustain a meaningful weight loss  Here today to discuss bariatric options  Visit type: initial visit    Symptoms: excess weight and inability to loss weight    Associated Symptoms: depressed mood and anxiety    Associated Conditions: abdominal obesity  Disease Complications: Back pain  Weight Loss Interest: high  Previous Diet Trials: low calorie     Exercise Frequency:infrequency  Types of Exercise: walking        Review of Systems   All other systems reviewed and are negative  Historical Information   Past Medical History:   Diagnosis Date    Back pain     Knee pain     MRSA carrier     Thyroid disease     benign nodule     Past Surgical History:   Procedure Laterality Date    TONSILLECTOMY      TOOTH EXTRACTION  10/01/2014     Social History   Social History     Substance and Sexual Activity   Alcohol Use Yes    Comment: social     Social History     Substance and Sexual Activity   Drug Use No     Social History     Tobacco Use   Smoking Status Never Smoker   Smokeless Tobacco Never Used   Tobacco Comment    no passive smoke exposure     Family History: non-contributory    Meds/Allergies   all medications and allergies reviewed  Allergies   Allergen Reactions    Zyrtec [Cetirizine] Hives       Objective       Current Vitals:   Blood Pressure: 132/98 (01/08/21 1122)  Pulse: 83 (01/08/21 1122)  Temperature: 97 9 °F (36 6 °C) (01/08/21 1122)  Temp Source: Tympanic (01/08/21 1122)  Respirations: 20 (01/08/21 1122)  Height: 5' 6 5" (168 9 cm) (01/08/21 1122)  Weight - Scale: (!) 163 kg (359 lb) (01/08/21 1122)        Invasive Devices     None                 Physical Exam  Vitals signs reviewed     Constitutional:       General: She is not in acute distress  Appearance: She is well-developed  She is not diaphoretic  HENT:      Head: Normocephalic and atraumatic  Right Ear: External ear normal       Left Ear: External ear normal       Nose: Nose normal    Eyes:      General: No scleral icterus  Right eye: No discharge  Left eye: No discharge  Conjunctiva/sclera: Conjunctivae normal    Neurological:      Mental Status: She is alert and oriented to person, place, and time  Psychiatric:         Behavior: Behavior normal          Thought Content: Thought content normal          Judgment: Judgment normal          Lab Results: I have personally reviewed pertinent lab results  Imaging: I have personally reviewed pertinent reports  EKG, Pathology, and Other Studies: I have personally reviewed pertinent reports  Assessment/PLAN:    26 y o  yo female with a long standing h/o of obesity and inability to sustain any meaningful weight loss on her own despite several attempts  She is interested in the Laparoscopic Fani-en-Y gastric bypass possible sleeve gastrectomy  As a part of her pre op evaluation, she will be referred to a cardiologist and for a sleep evaluation and consult  She needs an EGD to evaluate the anatomy of her GI tract prior to the operation  I have spent over 45 minutes with her face to face in the office today discussing her options and details of the surgery  We have seen an animation of the surgery on the computer that illustrates how the operation is done and how the anatomy will be altered with the procedure  Over 50% of this was coordinating care  She was given the opportunity to ask questions and I have answered all of them  I have discussed and educated the patient with regards to the components of our multidisciplinary program and the importance of compliance and follow up in the post operative period   The patient was also instructed with regards to the importance of behavior modification, nutritional counseling, support meeting attendance and lifestyle changes that are important to ensure success  Although there is a great statistical chance of improvement or even resolution of most of her associated comorbidities, the results vary from patient to patient and they largely depend on her commitment and compliance  She needs to lose 36 lbs prior to the operation        Antonia Swan MD  1/8/2021  11:34 AM

## 2021-01-11 PROBLEM — M25.531 RIGHT WRIST PAIN: Status: ACTIVE | Noted: 2020-08-28

## 2021-01-11 PROBLEM — M65.4 RADIAL STYLOID TENOSYNOVITIS OF RIGHT HAND: Status: ACTIVE | Noted: 2020-08-28

## 2021-01-14 ENCOUNTER — OFFICE VISIT (OUTPATIENT)
Dept: FAMILY MEDICINE CLINIC | Facility: CLINIC | Age: 31
End: 2021-01-14
Payer: COMMERCIAL

## 2021-01-14 VITALS
OXYGEN SATURATION: 97 % | HEIGHT: 67 IN | TEMPERATURE: 97.3 F | WEIGHT: 293 LBS | DIASTOLIC BLOOD PRESSURE: 62 MMHG | BODY MASS INDEX: 45.99 KG/M2 | RESPIRATION RATE: 16 BRPM | HEART RATE: 107 BPM | SYSTOLIC BLOOD PRESSURE: 120 MMHG

## 2021-01-14 DIAGNOSIS — E66.01 CLASS 3 SEVERE OBESITY DUE TO EXCESS CALORIES WITHOUT SERIOUS COMORBIDITY WITH BODY MASS INDEX (BMI) OF 50.0 TO 59.9 IN ADULT (HCC): Primary | ICD-10-CM

## 2021-01-14 DIAGNOSIS — R51.9 INTRACTABLE HEADACHE, UNSPECIFIED CHRONICITY PATTERN, UNSPECIFIED HEADACHE TYPE: ICD-10-CM

## 2021-01-14 PROCEDURE — 3008F BODY MASS INDEX DOCD: CPT | Performed by: PHYSICIAN ASSISTANT

## 2021-01-14 PROCEDURE — 99213 OFFICE O/P EST LOW 20 MIN: CPT | Performed by: PHYSICIAN ASSISTANT

## 2021-01-14 PROCEDURE — 1036F TOBACCO NON-USER: CPT | Performed by: PHYSICIAN ASSISTANT

## 2021-01-14 RX ORDER — MULTIVITAMIN
1 TABLET ORAL DAILY
COMMUNITY
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
## 2021-01-19 ENCOUNTER — EVALUATION (OUTPATIENT)
Dept: PHYSICAL THERAPY | Facility: REHABILITATION | Age: 31
End: 2021-01-19
Payer: COMMERCIAL

## 2021-01-19 DIAGNOSIS — R51.9 INTRACTABLE HEADACHE, UNSPECIFIED CHRONICITY PATTERN, UNSPECIFIED HEADACHE TYPE: Primary | ICD-10-CM

## 2021-01-19 PROCEDURE — 97162 PT EVAL MOD COMPLEX 30 MIN: CPT | Performed by: PHYSICAL THERAPIST
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
## 2021-01-26 ENCOUNTER — OFFICE VISIT (OUTPATIENT)
Dept: PHYSICAL THERAPY | Facility: REHABILITATION | Age: 31
End: 2021-01-26
Payer: COMMERCIAL

## 2021-01-26 DIAGNOSIS — R51.9 INTRACTABLE HEADACHE, UNSPECIFIED CHRONICITY PATTERN, UNSPECIFIED HEADACHE TYPE: Primary | ICD-10-CM

## 2021-01-26 PROCEDURE — 97112 NEUROMUSCULAR REEDUCATION: CPT | Performed by: PHYSICAL THERAPIST

## 2021-01-26 PROCEDURE — 97110 THERAPEUTIC EXERCISES: CPT | Performed by: PHYSICAL THERAPIST

## 2021-01-26 PROCEDURE — 97140 MANUAL THERAPY 1/> REGIONS: CPT | Performed by: PHYSICAL THERAPIST

## 2021-01-26 NOTE — PROGRESS NOTES
Daily Note     Today's date: 2021  Patient name: Rm Chan  : 1990  MRN: [id number]  Referring provider: Anival Neal PA-C  Dx:   Encounter Diagnosis     ICD-10-CM    1  Intractable headache, unspecified chronicity pattern, unspecified headache type  R51 9                   Subjective: Pt comes to therapy reporting she finds the home exercises to be helpful  Objective: See treatment diary below      Assessment: Tolerated treatment well  Cues for set up and dosage  Also educated patient in relaxation techniques, including breathing techniques, for when she finds her self becoming anxious or stressed  Patient exhibited good technique with therapeutic exercises and would benefit from continued PT      Plan: Progress treatment as tolerated         Precautions: n/a    Daily Treatment Diary    Date            FOTO IE            Re-Eval IE               Manuals    SOR  KEVIN           SCM str  KEVIN           Headache SNAGS  nv                        Neuro Re-Ed     CS retract  nv           Supine chin nod  5"x15                                                               Ther Ex    UT stretch - R  30"x3           Levator str - R  30"x3           SCM stretch - R  30"x3           TS chair ext  5"x10                                                               Ther Activity    Pulleys  5'                        Gait Training                              Modalities

## 2021-02-02 ENCOUNTER — APPOINTMENT (OUTPATIENT)
Dept: PHYSICAL THERAPY | Facility: REHABILITATION | Age: 31
End: 2021-02-02
Payer: COMMERCIAL

## 2021-02-03 ENCOUNTER — OFFICE VISIT (OUTPATIENT)
Dept: PHYSICAL THERAPY | Facility: REHABILITATION | Age: 31
End: 2021-02-03
Payer: COMMERCIAL

## 2021-02-03 DIAGNOSIS — R51.9 INTRACTABLE HEADACHE, UNSPECIFIED CHRONICITY PATTERN, UNSPECIFIED HEADACHE TYPE: Primary | ICD-10-CM

## 2021-02-03 PROCEDURE — 97110 THERAPEUTIC EXERCISES: CPT | Performed by: PHYSICAL THERAPIST

## 2021-02-03 PROCEDURE — 97140 MANUAL THERAPY 1/> REGIONS: CPT | Performed by: PHYSICAL THERAPIST

## 2021-02-03 NOTE — PROGRESS NOTES
Daily Note     Today's date: 2/3/2021  Patient name: Lexie Teague  : 1990  MRN: [id number]  Referring provider: Ramez Cruz PA-C  Dx:   Encounter Diagnosis     ICD-10-CM    1  Intractable headache, unspecified chronicity pattern, unspecified headache type  R51 9                   Subjective: Pt reports she has been compliant with home exercises and has been noticing improvements in headaches  States she feels her neck pain and headaches are related to her stress levels  Objective: See treatment diary below      Assessment: Tolerated treatment well  Patient demonstrated fatigue post treatment, exhibited good technique with therapeutic exercises and would benefit from continued PT      Plan: Progress treatment as tolerated         Precautions: n/a    Daily Treatment Diary    Date 1/19 1/26 2/3          FOTO IE            Re-Eval IE               Manuals    SOR  Harjinder Held          SCM str  KEVIN KEVIN          Headache SNAGS  nv                        Neuro Re-Ed     CS retract  nv 5"x10          Supine chin nod  5"x15 5"x15                                                              Ther Ex    UT stretch - R  30"x3 30"x3          Levator str - R  30"x3           SCM stretch - R  30"x3 30"x3          TS chair ext  5"x10 5"x10          Upper CS 3-finger rot   5"x5 ea                                                 Ther Activity    Pulleys  5' 5'                       Gait Training                              Modalities

## 2021-02-04 ENCOUNTER — OFFICE VISIT (OUTPATIENT)
Dept: PHYSICAL THERAPY | Facility: REHABILITATION | Age: 31
End: 2021-02-04
Payer: COMMERCIAL

## 2021-02-04 DIAGNOSIS — R51.9 INTRACTABLE HEADACHE, UNSPECIFIED CHRONICITY PATTERN, UNSPECIFIED HEADACHE TYPE: Primary | ICD-10-CM

## 2021-02-04 PROCEDURE — 97140 MANUAL THERAPY 1/> REGIONS: CPT | Performed by: PHYSICAL THERAPIST

## 2021-02-04 PROCEDURE — 97110 THERAPEUTIC EXERCISES: CPT | Performed by: PHYSICAL THERAPIST

## 2021-02-04 NOTE — PROGRESS NOTES
Daily Note     Today's date: 2021  Patient name: Roque Cottrell  : 1990  MRN: [id number]  Referring provider: Nazario Tony PA-C  Dx:   Encounter Diagnosis     ICD-10-CM    1  Intractable headache, unspecified chronicity pattern, unspecified headache type  R51 9                   Subjective: Pt comes to therapy denying changes since session yesterday  States she had two therapy appointments already today, for her back and hand earlier today  Objective: See treatment diary below      Assessment: Tolerated treatment well  States she feels she had soreness in suboccipital region last session with SOR, therefore, held this today, which patient seemed to respond to better  Patient exhibited good technique with therapeutic exercises and would benefit from continued PT      Plan: Progress treatment as tolerated         Precautions: n/a    Daily Treatment Diary    Date 1/19 1/26 2/3 2/4         FOTO IE            Re-Eval IE               Manuals    SOR  [id number]         SCM str  KEVIN KEVIN          Headache SNAGS  nv                        Neuro Re-Ed     CS retract  nv 5"x10 5"x10         Supine chin nod  5"x15 5"x15 5"x20                                                             Ther Ex    UT stretch - R  30"x3 30"x3 30"x3         Levator str - R  30"x3           SCM stretch - R  30"x3 30"x3 30"x3         TS chair ext  5"x10 5"x10 5"x10         Upper CS 3-finger rot   5"x5 ea  5"x10 ea                                                Ther Activity    Pulleys  5' 5' 5'                      Gait Training                              Modalities

## 2021-02-09 ENCOUNTER — OFFICE VISIT (OUTPATIENT)
Dept: PHYSICAL THERAPY | Facility: REHABILITATION | Age: 31
End: 2021-02-09
Payer: COMMERCIAL

## 2021-02-09 DIAGNOSIS — R51.9 INTRACTABLE HEADACHE, UNSPECIFIED CHRONICITY PATTERN, UNSPECIFIED HEADACHE TYPE: Primary | ICD-10-CM

## 2021-02-09 PROCEDURE — 97110 THERAPEUTIC EXERCISES: CPT | Performed by: PHYSICAL THERAPIST

## 2021-02-09 PROCEDURE — 97140 MANUAL THERAPY 1/> REGIONS: CPT | Performed by: PHYSICAL THERAPIST

## 2021-02-09 NOTE — PROGRESS NOTES
Daily Note     Today's date: 2021  Patient name: Marcie Estrella  : 1990  MRN: [id number]  Referring provider: David Griggs PA-C  Dx:   Encounter Diagnosis     ICD-10-CM    1  Intractable headache, unspecified chronicity pattern, unspecified headache type  R51 9                   Subjective: Pt comes to therapy stating she has not had much cervical spine pain lately due to lack of activities which would exacerbate symptoms  Reports she continues to have intermittent headaches  Objective: See treatment diary below      Assessment: Tolerated treatment well  Patient demonstrated fatigue post treatment, exhibited good technique with therapeutic exercises and would benefit from continued PT      Plan: Progress treatment as tolerated         Precautions: n/a    Daily Treatment Diary    Date 1/19 1/26 2/3 2/4 2/9        FOTO IE    nv        Re-Eval IE               Manuals    SOR  KEVIN KEVIN KEVIN         SCM str  KEVIN KEVIN  KEVIN        Headache SNAGS  nv   KEVIN Gr IV                     Neuro Re-Ed     CS retract  nv 5"x10 5"x10 5"x20        Supine chin nod  5"x15 5"x15 5"x20 np                                                            Ther Ex    UT stretch - R  30"x3 30"x3 30"x3 30"x5        Levator str - R  30"x3           SCM stretch - R  30"x3 30"x3 30"x3 30"x5        TS chair ext  5"x10 5"x10 5"x10 5"x10        Upper CS 3-finger rot   5"x5 ea  5"x10 ea 5"x10 ea        Doorway pec str     30"x3                                  Ther Activity    Pulleys  5' 5' 5' 5'                     Gait Training                              Modalities

## 2021-02-10 ENCOUNTER — OFFICE VISIT (OUTPATIENT)
Dept: BARIATRICS | Facility: CLINIC | Age: 31
End: 2021-02-10

## 2021-02-10 VITALS — WEIGHT: 293 LBS | BODY MASS INDEX: 57.3 KG/M2

## 2021-02-10 DIAGNOSIS — E66.01 MORBID (SEVERE) OBESITY DUE TO EXCESS CALORIES (HCC): Primary | ICD-10-CM

## 2021-02-10 PROCEDURE — RECHECK

## 2021-02-10 NOTE — PROGRESS NOTES
2/6 WT CHK  (Seeing patient for Reggie Sierra today )  Patient maintained her weight this month  Patient expressed frustration as she feels she doing "everything" and not making progress  Patient not eating past 7 pm, drinking more water, 2-3 bottles, eating protein first and making making healthier choices  Patient did not know her recommended daily calories and protein  Provided information to patient emphasizing importance of label reading, and meal planning using these guidelines  Patient reports eating salads for lunch; suggested she add protein to it, chicken, fish  Patient provided 3 protein drinks that can be used as a meal supplement or snack  Reviewed with patient in manual, lists of protein sources, measuring and serving sizes  Patient encouraged to start food tracking using the [employer]  Patient provided card to download [employer]inton with code  Explained patient may track food, activity, water, and information provided along with recipes  Patient also got an [employer] and using that more to prepare meals  Reviewed activity recommendations of 30-60 minutes a day  Patient states she is active all day long, running errands; household chores and medical/PT appts  Reviewed with patient healthier choices for fluids; water, diet ice tea, encouraged to stay away from soda and juice  Emphasized importance of label reading for beverages for sugar content  Answeerd patients questions about healthy fats and showed her information in manual  Patient enccouraged to bring manual for every appt  Patient to focus specifically on these goals  Reviewed small changes plus consistency gets weight loss results  Goals for this month: 1  Us protein drink for meal supplement or snack  2  Start food tracking using [employer]  3  30-60 minutes of activity a day  4  Drink 4 bottles of water a day  Patient scheduled for next two months

## 2021-02-11 ENCOUNTER — APPOINTMENT (OUTPATIENT)
Dept: PHYSICAL THERAPY | Facility: REHABILITATION | Age: 31
End: 2021-02-11
Payer: COMMERCIAL

## 2021-02-12 ENCOUNTER — APPOINTMENT (OUTPATIENT)
Dept: PHYSICAL THERAPY | Facility: REHABILITATION | Age: 31
End: 2021-02-12
Payer: COMMERCIAL

## 2021-02-12 ENCOUNTER — OFFICE VISIT (OUTPATIENT)
Dept: PHYSICAL THERAPY | Facility: REHABILITATION | Age: 31
End: 2021-02-12
Payer: COMMERCIAL

## 2021-02-12 DIAGNOSIS — R51.9 INTRACTABLE HEADACHE, UNSPECIFIED CHRONICITY PATTERN, UNSPECIFIED HEADACHE TYPE: Primary | ICD-10-CM

## 2021-02-12 PROCEDURE — 97112 NEUROMUSCULAR REEDUCATION: CPT | Performed by: PHYSICAL THERAPIST

## 2021-02-12 PROCEDURE — 97140 MANUAL THERAPY 1/> REGIONS: CPT | Performed by: PHYSICAL THERAPIST

## 2021-02-12 PROCEDURE — 97110 THERAPEUTIC EXERCISES: CPT | Performed by: PHYSICAL THERAPIST

## 2021-02-12 NOTE — PROGRESS NOTES
Daily Note     Today's date: 2021  Patient name: Chris Whitten  : 1990  MRN: [id number]  Referring provider: Silver Joel PA-C  Dx:   Encounter Diagnosis     ICD-10-CM    1  Intractable headache, unspecified chronicity pattern, unspecified headache type  R51 9                   Subjective: Pt comes to therapy denying notable pain today in cervical spine or head  Objective: See treatment diary below      Assessment: Tolerated treatment well  Responded favorably to manuals/IASTM  Consider MFD nv  Patient exhibited good technique with therapeutic exercises and would benefit from continued PT      Plan: Progress treatment as tolerated         Precautions: n/a    Daily Treatment Diary    Date  2/3 2/4 2/9 2/11       FOTO IE    nv nv       Re-Eval IE               Manuals    SOR  KEVIN KEVIN KEVIN         SCM str  KEVIN KEVIN  KEVIN        Headache SNAGS  nv   KEVIN Gr IV        IASTM b/l UT      KEVIN                    Neuro Re-Ed     CS retract  nv 5"x10 5"x10 5"x20 5"x20       Supine chin nod  5"x15 5"x15 5"x20 np                                                            Ther Ex    UT stretch - R  30"x3 30"x3 30"x3 30"x5 30"x5       Levator str - R  30"x3           SCM stretch - R  30"x3 30"x3 30"x3 30"x5 30"x5       TS chair ext  5"x10 5"x10 5"x10 5"x10 5"x10       Upper CS 3-finger rot   5"x5 ea  5"x10 ea 5"x10 ea 5"x10 ea       Doorway pec str     30"x3 30"x3                                 Ther Activity    Pulleys  5' 5' 5' 5' 5'                    Gait Training                              Modalities

## 2021-02-17 ENCOUNTER — OFFICE VISIT (OUTPATIENT)
Dept: PHYSICAL THERAPY | Facility: REHABILITATION | Age: 31
End: 2021-02-17
Payer: COMMERCIAL

## 2021-02-17 DIAGNOSIS — R51.9 INTRACTABLE HEADACHE, UNSPECIFIED CHRONICITY PATTERN, UNSPECIFIED HEADACHE TYPE: Primary | ICD-10-CM

## 2021-02-17 PROCEDURE — 97112 NEUROMUSCULAR REEDUCATION: CPT | Performed by: PHYSICAL THERAPIST

## 2021-02-17 PROCEDURE — 97140 MANUAL THERAPY 1/> REGIONS: CPT | Performed by: PHYSICAL THERAPIST

## 2021-02-17 PROCEDURE — 97110 THERAPEUTIC EXERCISES: CPT | Performed by: PHYSICAL THERAPIST

## 2021-02-17 NOTE — PROGRESS NOTES
Daily Note     Today's date: 2021  Patient name: Krystal Jacobs  : 1990  MRN: [id number]  Referring provider: Isaias Manriquez PA-C  Dx:   Encounter Diagnosis     ICD-10-CM    1  Intractable headache, unspecified chronicity pattern, unspecified headache type  R51 9                   Subjective: Pt comes to therapy denying pain or headaches, however, notes she had a headache yesterday  Objective: See treatment diary below      Assessment: Tolerated treatment well  Responded favorably to IASTM to R UT  Restrictions palpable in mid-muscle belly  Patient exhibited good technique with therapeutic exercises and would benefit from continued PT      Plan: Progress treatment as tolerated         Precautions: n/a    Daily Treatment Diary    Date 1/19 1/26 2/3 2/4 2/9 2/11 2/17      FOTO IE    nv nv perf      Re-Eval IE               Manuals    SOR  KEVIN KEVIN KEVIN         SCM str  KEVIN KEVIN  KEVIN        Headache SNAGS  nv   KEVIN Gr IV        IASTM b/l UT      KEVIN KEVIN                   Neuro Re-Ed     CS retract  nv 5"x10 5"x10 5"x20 5"x20       Supine chin nod  5"x15 5"x15 5"x20 np                                                            Ther Ex    UT stretch - R  30"x3 30"x3 30"x3 30"x5 30"x5 30"x5      Levator str - R  30"x3           SCM stretch - R  30"x3 30"x3 30"x3 30"x5 30"x5 30"x5      TS chair ext  5"x10 5"x10 5"x10 5"x10 5"x10 5"x10      Upper CS 3-finger rot   5"x5 ea  5"x10 ea 5"x10 ea 5"x10 ea 5"x10 ea      Doorway pec str     30"x3 30"x3 30"x3                                Ther Activity    Pulleys  5' 5' 5' 5' 5' 5'                   Gait Training                              Modalities

## 2021-02-19 ENCOUNTER — OFFICE VISIT (OUTPATIENT)
Dept: PHYSICAL THERAPY | Facility: REHABILITATION | Age: 31
End: 2021-02-19
Payer: COMMERCIAL

## 2021-02-19 DIAGNOSIS — R51.9 INTRACTABLE HEADACHE, UNSPECIFIED CHRONICITY PATTERN, UNSPECIFIED HEADACHE TYPE: Primary | ICD-10-CM

## 2021-02-19 PROCEDURE — 97110 THERAPEUTIC EXERCISES: CPT | Performed by: PHYSICAL THERAPIST

## 2021-02-19 PROCEDURE — 97140 MANUAL THERAPY 1/> REGIONS: CPT | Performed by: PHYSICAL THERAPIST

## 2021-02-19 PROCEDURE — 97112 NEUROMUSCULAR REEDUCATION: CPT | Performed by: PHYSICAL THERAPIST

## 2021-02-19 NOTE — PROGRESS NOTES
Daily Note     Today's date: 2021  Patient name: Disha Ortiz  : 1990  MRN: [id number]  Referring provider: Lamont Espino PA-C  Dx:   Encounter Diagnosis     ICD-10-CM    1  Intractable headache, unspecified chronicity pattern, unspecified headache type  R51 9                   Subjective: Pt comes to therapy stating she has been having tenderness in R & L UT regions  Objective: See treatment diary below      Assessment: Tolerated treatment well  Focused on desensitization and relaxation techniques today  Patient exhibited good technique with therapeutic exercises and would benefit from continued PT      Plan: Progress treatment as tolerated         Precautions: n/a    Daily Treatment Diary    Date  2/3 2     FOTO IE    nv nv perf      Re-Eval IE               Manuals    SOR  KEVIN KEVIN KEVIN         SCM str  Reji Kane  KEVIN        Headache SNAGS  nv   KEVIN Gr IV        IASTM b/l UT      Cristal Varma for desens                  Neuro Re-Ed     CS retract  nv 5"x10 5"x10 5"x20 5"x20       Supine chin nod  5"x15 5"x15 5"x20 np   5"x20                                                         Ther Ex    UT stretch - R  30"x3 30"x3 30"x3 30"x5 30"x5 30"x5 30"x5     Levator str - R  30"x3           SCM stretch - R  30"x3 30"x3 30"x3 30"x5 30"x5 30"x5 30"x5     TS chair ext  5"x10 5"x10 5"x10 5"x10 5"x10 5"x10      Upper CS 3-finger rot   5"x5 ea  5"x10 ea 5"x10 ea 5"x10 ea 5"x10 ea 5"x10 ea     Doorway pec str     30"x3 30"x3 30"x3 30"x3                               Ther Activity    Pulleys  5' 5' 5' 5' 5' 5' 5'                  Gait Training                              Modalities

## 2021-02-23 ENCOUNTER — OFFICE VISIT (OUTPATIENT)
Dept: PHYSICAL THERAPY | Facility: REHABILITATION | Age: 31
End: 2021-02-23
Payer: COMMERCIAL

## 2021-02-23 DIAGNOSIS — R51.9 INTRACTABLE HEADACHE, UNSPECIFIED CHRONICITY PATTERN, UNSPECIFIED HEADACHE TYPE: Primary | ICD-10-CM

## 2021-02-23 PROCEDURE — 97140 MANUAL THERAPY 1/> REGIONS: CPT

## 2021-02-23 PROCEDURE — 97110 THERAPEUTIC EXERCISES: CPT

## 2021-02-23 NOTE — PROGRESS NOTES
Daily Note     Today's date: 2021  Patient name: Clint Neville  : 1990  MRN: [id number]  Referring provider: Annamaria Flores PA-C  Dx:   Encounter Diagnosis     ICD-10-CM    1  Intractable headache, unspecified chronicity pattern, unspecified headache type  R51 9                   Subjective: Pt reports no headache pre-tx  She noted experiencing headache over the weekend which she attributes to stress  She noted sx's consistenlty on right side of her head  She noted feeling looser following each therapy session  Objective: See treatment diary below      Assessment: Tolerated treatment well  Moderate soft tissue restrictions palpated with IASTM, but reported relief doing so  Patient demonstrated fatigue post treatment, exhibited good technique with therapeutic exercises and would benefit from continued PT      Plan: Continue per plan of care  Progress treatment as tolerated         Precautions: n/a    Daily Treatment Diary    Date  2/3 2    FOTO IE    nv nv perf      Re-Eval IE               Manuals    SOR  KEVIN KEVIN KEVIN         SCM str  KEVIN KEVIN  KEVIN        Headache SNAGS  nv   KEVIN Gr IV        IASTM b/l UT      KEVIN KEVIN KEVIN for desens TE                 Neuro Re-Ed     CS retract  nv 5"x10 5"x10 5"x20 5"x20       Supine chin nod  5"x15 5"x15 5"x20 np   5"x20 5"x10                                                        Ther Ex    UT stretch - R  30"x3 30"x3 30"x3 30"x5 30"x5 30"x5 30"x5 30"x5    Levator str - R  30"x3       30"x5    SCM stretch - R  30"x3 30"x3 30"x3 30"x5 30"x5 30"x5 30"x5 30"x5    TS chair ext  5"x10 5"x10 5"x10 5"x10 5"x10 5"x10      Upper CS 3-finger rot   5"x5 ea  5"x10 ea 5"x10 ea 5"x10 ea 5"x10 ea 5"x10 ea 5"x10 ea    Doorway pec str     30"x3 30"x3 30"x3 30"x3 nv                              Ther Activity    Pulleys  5' 5' 5' 5' 5' 5' 5' 5'                 Gait Training                              Modalities

## 2021-02-25 ENCOUNTER — OFFICE VISIT (OUTPATIENT)
Dept: PHYSICAL THERAPY | Facility: REHABILITATION | Age: 31
End: 2021-02-25
Payer: COMMERCIAL

## 2021-02-25 DIAGNOSIS — R51.9 INTRACTABLE HEADACHE, UNSPECIFIED CHRONICITY PATTERN, UNSPECIFIED HEADACHE TYPE: Primary | ICD-10-CM

## 2021-02-25 PROCEDURE — 97140 MANUAL THERAPY 1/> REGIONS: CPT | Performed by: PHYSICAL THERAPIST

## 2021-02-25 PROCEDURE — 97112 NEUROMUSCULAR REEDUCATION: CPT | Performed by: PHYSICAL THERAPIST

## 2021-02-25 PROCEDURE — 97110 THERAPEUTIC EXERCISES: CPT | Performed by: PHYSICAL THERAPIST

## 2021-02-25 NOTE — PROGRESS NOTES
Daily Note     Today's date: 2021  Patient name: Seun Miguel  : 1990  MRN: [id number]  Referring provider: Daniella Sams PA-C  Dx:   Encounter Diagnosis     ICD-10-CM    1  Intractable headache, unspecified chronicity pattern, unspecified headache type  R51 9                   Subjective: Pt comes to therapy reporting minimal discomfort/soreness in shoulder/neck  States she has not had many headaches lately  Objective: See treatment diary below      Assessment: Tolerated treatment well  Patient exhibited good technique with therapeutic exercises and would benefit from continued PT      Plan: Progress treatment as tolerated         Precautions: n/a    Daily Treatment Diary    Date  2/3 2/4 2/9 2/11 2/17 2/19 2/23 2/25   FOTO IE    nv nv perf   nv   Re-Eval IE         nv      Manuals    SOR  KEVIN KEVIN KEVIN         SCM str  KEVIN KEVIN  KEVIN        Headache SNAGS  nv   KEVIN Gr IV        IASTM b/l UT      Alvino Salter for desens TE KEVIN   MFD b/l UT          KEVIN                Neuro Re-Ed     CS retract  nv 5"x10 5"x10 5"x20 5"x20       Supine chin nod  5"x15 5"x15 5"x20 np   5"x20 5"x10    scap depress           5"x10   B/l shoulder ER          otb 5"x10                             Ther Ex    UT stretch - R  30"x3 30"x3 30"x3 30"x5 30"x5 30"x5 30"x5 30"x5 30"x5   Levator str - R  30"x3       30"x5 30"x5   SCM stretch - R  30"x3 30"x3 30"x3 30"x5 30"x5 30"x5 30"x5 30"x5 30"x5   TS chair ext  5"x10 5"x10 5"x10 5"x10 5"x10 5"x10      Upper CS 3-finger rot   5"x5 ea  5"x10 ea 5"x10 ea 5"x10 ea 5"x10 ea 5"x10 ea 5"x10 ea    Doorway pec str     30"x3 30"x3 30"x3 30"x3 nv 30"x3                             Ther Activity    Pulleys  5' 5' 5' 5' 5' 5' 5' 5'    UBE          3' ea                Gait Training                              Modalities

## 2021-03-02 ENCOUNTER — OFFICE VISIT (OUTPATIENT)
Dept: PHYSICAL THERAPY | Facility: REHABILITATION | Age: 31
End: 2021-03-02
Payer: COMMERCIAL

## 2021-03-02 DIAGNOSIS — R51.9 INTRACTABLE HEADACHE, UNSPECIFIED CHRONICITY PATTERN, UNSPECIFIED HEADACHE TYPE: Primary | ICD-10-CM

## 2021-03-02 PROCEDURE — 97530 THERAPEUTIC ACTIVITIES: CPT | Performed by: PHYSICAL THERAPIST

## 2021-03-02 PROCEDURE — 97112 NEUROMUSCULAR REEDUCATION: CPT | Performed by: PHYSICAL THERAPIST

## 2021-03-02 PROCEDURE — 97110 THERAPEUTIC EXERCISES: CPT | Performed by: PHYSICAL THERAPIST

## 2021-03-02 PROCEDURE — 97140 MANUAL THERAPY 1/> REGIONS: CPT | Performed by: PHYSICAL THERAPIST
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
## 2021-03-04 ENCOUNTER — TELEPHONE (OUTPATIENT)
Dept: FAMILY MEDICINE CLINIC | Facility: CLINIC | Age: 31
End: 2021-03-04

## 2021-03-04 ENCOUNTER — OFFICE VISIT (OUTPATIENT)
Dept: PHYSICAL THERAPY | Facility: REHABILITATION | Age: 31
End: 2021-03-04
Payer: COMMERCIAL

## 2021-03-04 DIAGNOSIS — R51.9 INTRACTABLE HEADACHE, UNSPECIFIED CHRONICITY PATTERN, UNSPECIFIED HEADACHE TYPE: Primary | ICD-10-CM

## 2021-03-04 PROCEDURE — 97112 NEUROMUSCULAR REEDUCATION: CPT

## 2021-03-04 PROCEDURE — 97530 THERAPEUTIC ACTIVITIES: CPT

## 2021-03-04 PROCEDURE — 97110 THERAPEUTIC EXERCISES: CPT

## 2021-03-04 PROCEDURE — 97140 MANUAL THERAPY 1/> REGIONS: CPT

## 2021-03-04 NOTE — PROGRESS NOTES
Daily Note     Today's date: 3/4/2021  Patient name: Nahomi Magdaleno  : 1990  MRN: [id number]  Referring provider: Cornelius Perera PA-C  Dx:   Encounter Diagnosis     ICD-10-CM    1  Intractable headache, unspecified chronicity pattern, unspecified headache type  R51 9                   Subjective: Pt reports that she pulled a muscle in her neck earlier today and is pretty sore  Notes pain currently 7/10 on the R side of her neck  Pt has been using ice at home  Objective: See treatment diary below      Assessment: Tolerated treatment well  Added back in MFD this session with relief felt  Increased tightness and tenderness present in R levator today, therefore gentle IASTM was performed to tolerance  Patient demonstrated fatigue post treatment, exhibited good technique with therapeutic exercises and would benefit from continued PT  Ended with heat today to end  Plan: Continue per plan of care        Precautions: n/a    Daily Treatment Diary    Date 3/2 3/4  2/4 2/9 2/11 2/17 2/19 2/23 2/25   FOTO perf    nv nv perf   nv   Re-Eval KEVIN         nv      Manuals    SOR    KEVIN         SCM str     KEVIN        Headache SNAGS     KEVIN Gr IV        IASTM b/l UT KEVIN MM    Terrial Colace for desens TE KEVIN   MFD b/l UT  MM        KEVIN                Neuro Re-Ed     CS retract    5"x10 5"x20 5"x20       Supine chin nod    5"x20 np   5"x20 5"x10    scap depress  nv 5"x10        5"x10   B/l shoulder ER nv otb 5" 2x10        otb 5"x10                             Ther Ex    UT stretch - R 30"x5 30"x5  30"x3 30"x5 30"x5 30"x5 30"x5 30"x5 30"x5   Levator str - R 30"x5 30"x5       30"x5 30"x5   SCM stretch - R 30"x5 30"x5  30"x3 30"x5 30"x5 30"x5 30"x5 30"x5 30"x5   TS chair ext    5"x10 5"x10 5"x10 5"x10      Upper CS 3-finger rot     5"x10 ea 5"x10 ea 5"x10 ea 5"x10 ea 5"x10 ea 5"x10 ea    Doorway pec str np np   30"x3 30"x3 30"x3 30"x3 nv 30"x3   TS ext c towel 10"x10 10"x10           Thread needle 5"x5 ea 5"x5 ea           Open books 10"x5 ea 10"x5 ea                                     Ther Activity    Pulleys    5' 5' 5' 5' 5' 5'    UBE 3' ea 3' ea        3' ea                Gait Training                              Modalities    [facility]  10'

## 2021-03-05 NOTE — TELEPHONE ENCOUNTER
Dannie Koch,   thanks for putting the form on my desk this morning  I have completed my portion  Please complete the portion on the bottom and notify the patient at the number on the sticky note [phone]    Thanks

## 2021-03-09 ENCOUNTER — TELEPHONE (OUTPATIENT)
Dept: BARIATRICS | Facility: CLINIC | Age: 31
End: 2021-03-09

## 2021-03-09 ENCOUNTER — OFFICE VISIT (OUTPATIENT)
Dept: PHYSICAL THERAPY | Facility: REHABILITATION | Age: 31
End: 2021-03-09
Payer: COMMERCIAL

## 2021-03-09 DIAGNOSIS — R51.9 INTRACTABLE HEADACHE, UNSPECIFIED CHRONICITY PATTERN, UNSPECIFIED HEADACHE TYPE: Primary | ICD-10-CM

## 2021-03-09 PROCEDURE — 97530 THERAPEUTIC ACTIVITIES: CPT

## 2021-03-09 PROCEDURE — 97140 MANUAL THERAPY 1/> REGIONS: CPT

## 2021-03-09 PROCEDURE — 97110 THERAPEUTIC EXERCISES: CPT

## 2021-03-09 PROCEDURE — 97112 NEUROMUSCULAR REEDUCATION: CPT

## 2021-03-09 NOTE — PROGRESS NOTES
Daily Note     Today's date: 3/9/2021  Patient name: Gideon Hutchins  : 1990  MRN: [id number]  Referring provider: Mia Carey PA-C  Dx:   Encounter Diagnosis     ICD-10-CM    1  Intractable headache, unspecified chronicity pattern, unspecified headache type  R51 9                   Subjective: patient stated that her neck is still a bit stiff on the R side, but she doesn't have a HA  Objective: See treatment diary below      Assessment: Tolerated treatment well  Patient continues to respond well to IASTM vs MFD  B/L UT tenderness still present  She is experiencing the most relief with self stretch  Patient demonstrated improved mobility post session  Patient demonstrated fatigue post session and would benefit from continued PT  Plan: Continue per plan of care         Precautions: n/a    Daily Treatment Diary    Date 3/2 3/4 3/9 2/4 2/9 2/11 2/17 2/19 2/23 2/25   FOTO perf    nv nv perf   nv   Re-Eval KEVIN         nv      Manuals    SOR    KEVIN         SCM str     KEVIN        Headache SNAGS     KEVIN Gr IV        IASTM b/l UT KEVIN MM MB   KEVIN KEVNI KEVIN for desens TE KEVIN   MFD b/l UT  MM Defered       KEVIN                Neuro Re-Ed     CS retract    5"x10 5"x20 5"x20       Supine chin nod    5"x20 np   5"x20 5"x10    scap depress  nv 5"x10 5"x10       5"x10   B/l shoulder ER nv otb 5" 2x10 otb 5" 2x10       otb 5"x10                             Ther Ex    UT stretch - R 30"x5 30"x5 30"x5 30"x3 30"x5 30"x5 30"x5 30"x5 30"x5 30"x5   Levator str - R 30"x5 30"x5 30"x5      30"x5 30"x5   SCM stretch - R 30"x5 30"x5 30"x5 30"x3 30"x5 30"x5 30"x5 30"x5 30"x5 30"x5   TS chair ext    5"x10 5"x10 5"x10 5"x10      Upper CS 3-finger rot     5"x10 ea 5"x10 ea 5"x10 ea 5"x10 ea 5"x10 ea 5"x10 ea    Doorway pec str np np   30"x3 30"x3 30"x3 30"x3 nv 30"x3   TS ext c towel 10"x10 10"x10 10x10"          Thread needle 5"x5 ea 5"x5 ea 5x5" ea          Open books 10"x5 ea 10"x5 ea 10"x5 ea                                    Ther Activity    Pulleys    5' 5' 5' 5' 5' 5'    UBE 3' ea 3' ea 3'/3'       3' ea                Gait Training                              Modalities    P  10' 10' post subine

## 2021-03-11 ENCOUNTER — EVALUATION (OUTPATIENT)
Dept: PHYSICAL THERAPY | Facility: REHABILITATION | Age: 31
End: 2021-03-11
Payer: COMMERCIAL

## 2021-03-11 DIAGNOSIS — R51.9 INTRACTABLE HEADACHE, UNSPECIFIED CHRONICITY PATTERN, UNSPECIFIED HEADACHE TYPE: Primary | ICD-10-CM

## 2021-03-11 PROCEDURE — 97112 NEUROMUSCULAR REEDUCATION: CPT | Performed by: PHYSICAL THERAPIST

## 2021-03-11 PROCEDURE — 97140 MANUAL THERAPY 1/> REGIONS: CPT | Performed by: PHYSICAL THERAPIST

## 2021-03-11 PROCEDURE — 97110 THERAPEUTIC EXERCISES: CPT | Performed by: PHYSICAL THERAPIST

## 2021-03-11 PROCEDURE — 97010 HOT OR COLD PACKS THERAPY: CPT | Performed by: PHYSICAL THERAPIST

## 2021-03-11 NOTE — PROGRESS NOTES
Daily Note     Today's date: 3/11/2021  Patient name: Tiera Kam  : 1990  MRN: [id number]  Referring provider: Thalia Pallas, PA-C  Dx:   Encounter Diagnosis     ICD-10-CM    1  Intractable headache, unspecified chronicity pattern, unspecified headache type  R51 9                   Subjective: Pt comes to therapy denying headaches or discomfort, however, reports a persistent aching in area of proximal R upper trap  Objective: See treatment diary below      Assessment: Tolerated treatment well  Patient demonstrated fatigue post treatment, exhibited good technique with therapeutic exercises and would benefit from continued PT      Plan: Progress treatment as tolerated         Precautions: n/a    Daily Treatment Diary    Date 3/2 3/4 3/9 3/11 2/9 2/11 2/17 2/19 2/23 2/25   FOTO perf    nv nv perf   nv   Re-Eval KEVIN         nv      Manuals    SOR             SCM str     KEVIN        Headache SNAGS     KEVIN Gr IV        IASTM b/l UT KEVIN MM MB KEVIN  KEVIN KEVIN KEVIN for desens TE KEVIN   MFD b/l UT  MM Defered       KEVIN   K-tape - R UT    KEVIN perf                      Neuro Re-Ed     CS retract     5"x20 5"x20       Supine chin nod     np   5"x20 5"x10    scap depress  nv 5"x10 5"x10 5"x20      5"x10   B/l shoulder ER nv otb 5" 2x10 otb 5" 2x10 otb 5" 2x10      otb 5"x10                             Ther Ex    UT stretch - R 30"x5 30"x5 30"x5 30"x3 30"x5 30"x5 30"x5 30"x5 30"x5 30"x5   Levator str - R 30"x5 30"x5 30"x5 D/C     30"x5 30"x5   SCM stretch - R 30"x5 30"x5 30"x5 D/C 30"x5 30"x5 30"x5 30"x5 30"x5 30"x5   TS chair ext     5"x10 5"x10 5"x10      Upper CS 3-finger rot     5"x10 ea 5"x10 ea 5"x10 ea 5"x10 ea 5"x10 ea    Doorway pec str np np   30"x3 30"x3 30"x3 30"x3 nv 30"x3   TS ext c towel 10"x10 10"x10 10x10" 10"x10         Thread needle 5"x5 ea 5"x5 ea 5x5" ea          Open books 10"x5 ea 10"x5 ea 10"x5 ea 10"x5 ea                                   Ther Activity    Pulleys     5' 5' 5' 5' 5'    UBE 3' ea 3' ea 3'/3' 3'/3'      3' ea                Gait Training                              Modalities    MHP  10' 10' post supine 10' post supine

## 2021-03-15 NOTE — TELEPHONE ENCOUNTER
Pt was told to drop off her [employer]ician Approval Form, please call when completed for pickup   Placed on clipboard Patient seen at bedside. Discussed at length the severity of the condition and need for incision and drainage of infection and bone.  Patient states he wants to try antibiotics.  Discussed with patient he is putting more of his foot/limb/life at risk by delaying the surgery. Explained that if the infection spreads from the toe, it can infect more of the foot and well as get into his blood stream.  Patient politely states that he understands the risks and wishes to continue with antibiotics.  Patient verbalizes his understanding that antibiotics alone may not be enough to cure current state.  He appears in the right mind and capable of making his own decisions.      Returned to patient after 15 minutes to give him time to think and he still has the same mindset to delay and current surgical intervention. Will closely follow. Seen at bedside. Surgical site flushed and packed. MRI pending to assess for 4th toe OM.  Pod plan based on MRI, may need further amputation. If MRI is grossly negative, may be able to apply wound vac rather than return to OR.

## 2021-03-16 ENCOUNTER — OFFICE VISIT (OUTPATIENT)
Dept: PHYSICAL THERAPY | Facility: REHABILITATION | Age: 31
End: 2021-03-16
Payer: COMMERCIAL

## 2021-03-16 DIAGNOSIS — R51.9 INTRACTABLE HEADACHE, UNSPECIFIED CHRONICITY PATTERN, UNSPECIFIED HEADACHE TYPE: Primary | ICD-10-CM

## 2021-03-16 PROCEDURE — 97140 MANUAL THERAPY 1/> REGIONS: CPT | Performed by: PHYSICAL THERAPIST
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
## 2021-03-18 ENCOUNTER — APPOINTMENT (OUTPATIENT)
Dept: PHYSICAL THERAPY | Facility: REHABILITATION | Age: 31
End: 2021-03-18
Payer: COMMERCIAL

## 2021-03-18 ENCOUNTER — OFFICE VISIT (OUTPATIENT)
Dept: BARIATRICS | Facility: CLINIC | Age: 31
End: 2021-03-18

## 2021-03-18 VITALS — BODY MASS INDEX: 56.71 KG/M2 | WEIGHT: 293 LBS

## 2021-03-18 DIAGNOSIS — R63.5 WEIGHT GAIN: ICD-10-CM

## 2021-03-18 DIAGNOSIS — Z01.812 BLOOD TESTS PRIOR TO TREATMENT OR PROCEDURE: ICD-10-CM

## 2021-03-18 DIAGNOSIS — E66.01 CLASS 3 SEVERE OBESITY DUE TO EXCESS CALORIES WITHOUT SERIOUS COMORBIDITY WITH BODY MASS INDEX (BMI) OF 50.0 TO 59.9 IN ADULT (HCC): Primary | ICD-10-CM

## 2021-03-18 PROCEDURE — RECHECK: Performed by: DIETITIAN, REGISTERED
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
## 2021-03-23 ENCOUNTER — APPOINTMENT (OUTPATIENT)
Dept: PHYSICAL THERAPY | Facility: REHABILITATION | Age: 31
End: 2021-03-23
Payer: COMMERCIAL

## 2021-03-25 ENCOUNTER — APPOINTMENT (OUTPATIENT)
Dept: PHYSICAL THERAPY | Facility: REHABILITATION | Age: 31
End: 2021-03-25
Payer: COMMERCIAL

## 2021-03-30 ENCOUNTER — APPOINTMENT (OUTPATIENT)
Dept: PHYSICAL THERAPY | Facility: REHABILITATION | Age: 31
End: 2021-03-30
Payer: COMMERCIAL

## 2021-04-15 ENCOUNTER — OFFICE VISIT (OUTPATIENT)
Dept: BARIATRICS | Facility: CLINIC | Age: 31
End: 2021-04-15

## 2021-04-15 VITALS — WEIGHT: 293 LBS | BODY MASS INDEX: 55.65 KG/M2

## 2021-04-15 DIAGNOSIS — E66.01 CLASS 3 SEVERE OBESITY DUE TO EXCESS CALORIES WITHOUT SERIOUS COMORBIDITY WITH BODY MASS INDEX (BMI) OF 50.0 TO 59.9 IN ADULT (HCC): Primary | ICD-10-CM

## 2021-04-15 PROCEDURE — RECHECK

## 2021-04-15 NOTE — PROGRESS NOTES
Behavioral Health Follow Up Note:    Weight check:  4/6     Activity level continues  Did stop going to the pool, but now does three days at the gym instead of two  While at the gym she has a set work out schedule for cardio and muscle training  Going for walks and staying active  Drinking water for her hydration  Is drinking protein shakes daily  One or two a day as a meal replacement or as a small snack between meals  Eating three meals a day (most of the time)  Trying to practice mindfulness around her food choices  Trying to track her food, but not really liking the baritastic ap: Told her to try to find one that she likes and will use  She may start using pen and paper  Discussed the importance of tracking her protein post surgery  Working on 30/60  May 25th is scheduled to have surgery on her wrist   Sixth month in bariatric program is month of Adriana  Had her manual and folder of paperwork with her at appointment  Very organized  Has all her appointments scheduled and organized  Asked appropriate questions  Reviewed work flow and her weight loss goals leading to surgery

## 2021-04-16 ENCOUNTER — APPOINTMENT (OUTPATIENT)
Dept: LAB | Facility: IMAGING CENTER | Age: 31
End: 2021-04-16
Payer: COMMERCIAL

## 2021-04-16 DIAGNOSIS — Z01.812 BLOOD TESTS PRIOR TO TREATMENT OR PROCEDURE: ICD-10-CM

## 2021-04-16 DIAGNOSIS — R63.5 WEIGHT GAIN: ICD-10-CM

## 2021-04-16 DIAGNOSIS — E66.01 CLASS 3 SEVERE OBESITY DUE TO EXCESS CALORIES WITHOUT SERIOUS COMORBIDITY WITH BODY MASS INDEX (BMI) OF 50.0 TO 59.9 IN ADULT (HCC): ICD-10-CM

## 2021-04-16 LAB
BASOPHILS # BLD AUTO: 0.08 THOUSANDS/ΜL (ref 0–0.1)
BASOPHILS NFR BLD AUTO: 1 % (ref 0–1)
EOSINOPHIL # BLD AUTO: 0.18 THOUSAND/ΜL (ref 0–0.61)
EOSINOPHIL NFR BLD AUTO: 2 % (ref 0–6)
ERYTHROCYTE [DISTWIDTH] IN BLOOD BY AUTOMATED COUNT: 14.3 % (ref 11.6–15.1)
HCT VFR BLD AUTO: 41.2 % (ref 34.8–46.1)
HGB BLD-MCNC: 12.9 G/DL (ref 11.5–15.4)
IMM GRANULOCYTES # BLD AUTO: 0.02 THOUSAND/UL (ref 0–0.2)
IMM GRANULOCYTES NFR BLD AUTO: 0 % (ref 0–2)
LYMPHOCYTES # BLD AUTO: 2.77 THOUSANDS/ΜL (ref 0.6–4.47)
LYMPHOCYTES NFR BLD AUTO: 34 % (ref 14–44)
MCH RBC QN AUTO: 27.2 PG (ref 26.8–34.3)
MCHC RBC AUTO-ENTMCNC: 31.3 G/DL (ref 31.4–37.4)
MCV RBC AUTO: 87 FL (ref 82–98)
MONOCYTES # BLD AUTO: 0.51 THOUSAND/ΜL (ref 0.17–1.22)
MONOCYTES NFR BLD AUTO: 6 % (ref 4–12)
NEUTROPHILS # BLD AUTO: 4.51 THOUSANDS/ΜL (ref 1.85–7.62)
NEUTS SEG NFR BLD AUTO: 57 % (ref 43–75)
NRBC BLD AUTO-RTO: 0 /100 WBCS
PLATELET # BLD AUTO: 310 THOUSANDS/UL (ref 149–390)
PMV BLD AUTO: 11.3 FL (ref 8.9–12.7)
RBC # BLD AUTO: 4.74 MILLION/UL (ref 3.81–5.12)
WBC # BLD AUTO: 8.07 THOUSAND/UL (ref 4.31–10.16)

## 2021-04-16 PROCEDURE — 36415 COLL VENOUS BLD VENIPUNCTURE: CPT

## 2021-04-16 PROCEDURE — 85025 COMPLETE CBC W/AUTO DIFF WBC: CPT

## 2021-04-19 DIAGNOSIS — E66.01 CLASS 3 SEVERE OBESITY DUE TO EXCESS CALORIES WITHOUT SERIOUS COMORBIDITY WITH BODY MASS INDEX (BMI) OF 50.0 TO 59.9 IN ADULT (HCC): ICD-10-CM

## 2021-04-19 DIAGNOSIS — R63.5 WEIGHT GAIN: ICD-10-CM

## 2021-04-19 DIAGNOSIS — F41.9 ANXIETY: ICD-10-CM

## 2021-04-19 DIAGNOSIS — D49.7 PARATHYROID NEOPLASM: ICD-10-CM

## 2021-04-19 DIAGNOSIS — Z01.812 BLOOD TESTS PRIOR TO TREATMENT OR PROCEDURE: ICD-10-CM

## 2021-04-19 DIAGNOSIS — E04.1 THYROID NODULE: ICD-10-CM

## 2021-04-19 DIAGNOSIS — Z01.818 PRE-OPERATIVE CLEARANCE: Primary | ICD-10-CM

## 2021-04-22 ENCOUNTER — CONSULT (OUTPATIENT)
Dept: CARDIOLOGY CLINIC | Facility: CLINIC | Age: 31
End: 2021-04-22
Payer: COMMERCIAL

## 2021-04-22 VITALS
DIASTOLIC BLOOD PRESSURE: 60 MMHG | HEART RATE: 80 BPM | BODY MASS INDEX: 55.96 KG/M2 | WEIGHT: 293 LBS | SYSTOLIC BLOOD PRESSURE: 120 MMHG

## 2021-04-22 DIAGNOSIS — Z01.810 PREOP CARDIOVASCULAR EXAM: ICD-10-CM

## 2021-04-22 DIAGNOSIS — M54.16 RIGHT LUMBAR RADICULOPATHY: ICD-10-CM

## 2021-04-22 DIAGNOSIS — E66.01 MORBID OBESITY (HCC): ICD-10-CM

## 2021-04-22 DIAGNOSIS — E66.01 CLASS 3 SEVERE OBESITY DUE TO EXCESS CALORIES WITHOUT SERIOUS COMORBIDITY WITH BODY MASS INDEX (BMI) OF 50.0 TO 59.9 IN ADULT (HCC): Primary | ICD-10-CM

## 2021-04-22 PROCEDURE — 99244 OFF/OP CNSLTJ NEW/EST MOD 40: CPT | Performed by: INTERNAL MEDICINE

## 2021-04-22 PROCEDURE — 93000 ELECTROCARDIOGRAM COMPLETE: CPT | Performed by: INTERNAL MEDICINE

## 2021-04-22 NOTE — PROGRESS NOTES
Cardiology Office Visit Note  Denia Baeza 33 y o  female MRN: [id number]  [facility] at [address]    HPI  30yo obese woman (BMI 55 6) woman who presents for pre-op risk stratification prior to bariatric surgery  She has no prior diagnoses of HTN or HLD  Has no known FH of heart disease  Non smoker  Exercises a few days per week  Doesn't have date of surgery planned and plans to discuss types of surgeries with bariatric surgery  Otherwise no concerning cardiac symptoms of chest pain, SOB, palpitations, or SOB  A&P  1  Bariatric surgery pre-op risk stratification  - Patient is  risk for a low risk surgery and does not require further cardiac interventions prior to proceeding with surgery  - As always encouraged compliance with post-op supplements and vitamins    2  Obesity    3  Prior radiculopathy    Lola Garland MD  - PGY-5 Cardiology Fellow  - Tiger text enabled    ======================================================  Physical exam  Objective   Vitals: /60 (BP Location: Left arm, Patient Position: Sitting, Cuff Size: Adult) Comment (BP Location): lower arm  Pulse 80   Wt (!) 160 kg (352 lb)   BMI 55 96 kg/m²   Gen: well appearing, obese woman  Psych: AOx3  Skin: intact  Cardiac: S1, S2, regular rate, no S3 or S4 appreciated  No murmurs  +2 PT, radial pulses  No peripheral edema No carotid bruits  Resp: CTABL  No crackles  MSK: 5/5 strength throughout muscle groups  Neuro: CN grossly intact   Sensory to light touch, pain, proprioception intact BL LE, UE  LN: no cervical LAD  Rheum: no joint deformities in UE or LE  ======================================================  Review of Systems - Negative  acetaminophen  cyclobenzaprine  multivitamin  naproxen   Active Ambulatory Problems     Diagnosis Date Noted    Right lumbar radiculopathy 02/22/2017    Chondromalacia 03/15/2019    Knee pain 03/15/2019    Class 3 severe obesity due to excess calories without serious comorbidity with body mass index (BMI) of 50 0 to 59 9 in adult [facility]) 07/08/2019    Anxiety 07/08/2019    Derang of medial meniscus due to old tear/inj, left knee 10/12/2020    Well adult exam 10/12/2020    Thyroid nodule 10/12/2020    Weight gain 10/12/2020    Cervical cancer screening 10/12/2020    Lipid screening 10/12/2020    Encounter for screening for HIV 10/12/2020    Parathyroid neoplasm 10/15/2020    Radial styloid tenosynovitis of right hand 08/28/2020    Right wrist pain 08/28/2020    Headache 01/14/2021     Resolved Ambulatory Problems     Diagnosis Date Noted    Acute non-recurrent maxillary sinusitis 12/12/2018    Vaginal candida 12/12/2018    Influenza 12/24/2018    Acute upper respiratory infection 03/15/2019    Elevated blood pressure reading 03/15/2019    Eustachian tube dysfunction, bilateral 11/08/2019     Past Medical History:   Diagnosis Date    Back pain     MRSA carrier     Thyroid disease       Review of Systems  ROS as noted above, otherwise 12 point review of systems was performed and is negative       Historical Information   Past Medical History:   Diagnosis Date    Back pain     Knee pain     MRSA carrier     Thyroid disease     benign nodule     Past Surgical History:   Procedure Laterality Date    TONSILLECTOMY      TOOTH EXTRACTION  10/01/2014     Social History     Substance and Sexual Activity   Alcohol Use Yes    Comment: social     Social History     Substance and Sexual Activity   Drug Use No     Social History     Tobacco Use   Smoking Status Never Smoker   Smokeless Tobacco Never Used   Tobacco Comment    no passive smoke exposure     Family History   Problem Relation Age of Onset    Brain cancer Mother    Clifm Carrillo Cancer Mother         glioblastoma    Hypertension Father     Stroke Family     Diabetes type II Family     Diabetes Paternal Grandfather     Stroke Paternal Grandfather     Heart disease Neg Hx     Thyroid disease Neg Hx      Meds/Allergies    Allergies Allergen Reactions    Zyrtec [Cetirizine] Hives

## 2021-05-13 ENCOUNTER — OFFICE VISIT (OUTPATIENT)
Dept: BARIATRICS | Facility: CLINIC | Age: 31
End: 2021-05-13

## 2021-05-13 ENCOUNTER — APPOINTMENT (OUTPATIENT)
Dept: LAB | Facility: HOSPITAL | Age: 31
End: 2021-05-13
Attending: SURGERY
Payer: COMMERCIAL

## 2021-05-13 VITALS — BODY MASS INDEX: 54.53 KG/M2 | WEIGHT: 293 LBS

## 2021-05-13 DIAGNOSIS — F41.9 ANXIETY: ICD-10-CM

## 2021-05-13 DIAGNOSIS — D49.7 PARATHYROID NEOPLASM: ICD-10-CM

## 2021-05-13 DIAGNOSIS — R63.5 WEIGHT GAIN: ICD-10-CM

## 2021-05-13 DIAGNOSIS — Z01.818 PRE-OPERATIVE CLEARANCE: ICD-10-CM

## 2021-05-13 DIAGNOSIS — E04.1 THYROID NODULE: ICD-10-CM

## 2021-05-13 DIAGNOSIS — E66.01 CLASS 3 SEVERE OBESITY DUE TO EXCESS CALORIES WITHOUT SERIOUS COMORBIDITY WITH BODY MASS INDEX (BMI) OF 50.0 TO 59.9 IN ADULT (HCC): ICD-10-CM

## 2021-05-13 DIAGNOSIS — Z01.812 BLOOD TESTS PRIOR TO TREATMENT OR PROCEDURE: ICD-10-CM

## 2021-05-13 DIAGNOSIS — E66.01 CLASS 3 SEVERE OBESITY DUE TO EXCESS CALORIES WITHOUT SERIOUS COMORBIDITY WITH BODY MASS INDEX (BMI) OF 50.0 TO 59.9 IN ADULT (HCC): Primary | ICD-10-CM

## 2021-05-13 LAB
ALBUMIN SERPL BCP-MCNC: 4.3 G/DL (ref 3–5.2)
ALP SERPL-CCNC: 63 U/L (ref 43–122)
ALT SERPL W P-5'-P-CCNC: 32 U/L
ANION GAP SERPL CALCULATED.3IONS-SCNC: 10 MMOL/L (ref 5–14)
AST SERPL W P-5'-P-CCNC: 26 U/L (ref 14–36)
BILIRUB SERPL-MCNC: 0.26 MG/DL
BUN SERPL-MCNC: 14 MG/DL (ref 5–25)
CALCIUM SERPL-MCNC: 9.4 MG/DL (ref 8.4–10.2)
CHLORIDE SERPL-SCNC: 106 MMOL/L (ref 97–108)
CO2 SERPL-SCNC: 26 MMOL/L (ref 22–30)
CREAT SERPL-MCNC: 0.75 MG/DL (ref 0.6–1.2)
GFR SERPL CREATININE-BSD FRML MDRD: 105 ML/MIN/1.73SQ M
GLUCOSE P FAST SERPL-MCNC: 91 MG/DL (ref 70–99)
POTASSIUM SERPL-SCNC: 4.4 MMOL/L (ref 3.6–5)
PROT SERPL-MCNC: 7.8 G/DL (ref 5.9–8.4)
SODIUM SERPL-SCNC: 142 MMOL/L (ref 137–147)

## 2021-05-13 PROCEDURE — 36415 COLL VENOUS BLD VENIPUNCTURE: CPT

## 2021-05-13 PROCEDURE — 80053 COMPREHEN METABOLIC PANEL: CPT

## 2021-05-13 PROCEDURE — RECHECK: Performed by: DIETITIAN, REGISTERED
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
## 2021-05-17 ENCOUNTER — ANESTHESIA EVENT (OUTPATIENT)
Dept: GASTROENTEROLOGY | Facility: HOSPITAL | Age: 31
End: 2021-05-17

## 2021-05-19 ENCOUNTER — ANESTHESIA (OUTPATIENT)
Dept: GASTROENTEROLOGY | Facility: HOSPITAL | Age: 31
End: 2021-05-19

## 2021-05-19 ENCOUNTER — HOSPITAL ENCOUNTER (OUTPATIENT)
Dept: GASTROENTEROLOGY | Facility: HOSPITAL | Age: 31
Setting detail: OUTPATIENT SURGERY
Discharge: HOME | End: 2021-05-19
Attending: SURGERY
Payer: COMMERCIAL

## 2021-05-19 VITALS
OXYGEN SATURATION: 100 % | DIASTOLIC BLOOD PRESSURE: 69 MMHG | WEIGHT: 293 LBS | RESPIRATION RATE: 24 BRPM | TEMPERATURE: 97.5 F | HEIGHT: 67 IN | BODY MASS INDEX: 45.99 KG/M2 | SYSTOLIC BLOOD PRESSURE: 122 MMHG | HEART RATE: 82 BPM

## 2021-05-19 DIAGNOSIS — E66.01 MORBID OBESITY (HCC): ICD-10-CM

## 2021-05-19 LAB
EXT PREGNANCY TEST URINE: NEGATIVE
EXT. CONTROL: NORMAL

## 2021-05-19 PROCEDURE — 88342 IMHCHEM/IMCYTCHM 1ST ANTB: CPT | Performed by: PATHOLOGY

## 2021-05-19 PROCEDURE — 43239 EGD BIOPSY SINGLE/MULTIPLE: CPT | Performed by: SURGERY

## 2021-05-19 PROCEDURE — 81025 URINE PREGNANCY TEST: CPT | Performed by: ANESTHESIOLOGY

## 2021-05-19 PROCEDURE — 88305 TISSUE EXAM BY PATHOLOGIST: CPT | Performed by: PATHOLOGY

## 2021-05-19 PROCEDURE — 88313 SPECIAL STAINS GROUP 2: CPT | Performed by: PATHOLOGY

## 2021-05-19 RX ORDER — SODIUM CHLORIDE 9 MG/ML
125 INJECTION, SOLUTION INTRAVENOUS CONTINUOUS
Status: DISCONTINUED | OUTPATIENT
Start: 2021-05-19 | End: 2021-05-23 | Stop reason: HOSPADM

## 2021-05-19 RX ORDER — PROPOFOL 10 MG/ML
INJECTION, EMULSION INTRAVENOUS AS NEEDED
Status: DISCONTINUED | OUTPATIENT
Start: 2021-05-19 | End: 2021-05-19

## 2021-05-19 RX ADMIN — PROPOFOL 150 MG: 10 INJECTION, EMULSION INTRAVENOUS at 08:23

## 2021-05-19 RX ADMIN — PROPOFOL 50 MG: 10 INJECTION, EMULSION INTRAVENOUS at 08:24

## 2021-05-19 RX ADMIN — SODIUM CHLORIDE 125 ML/HR: 0.9 INJECTION, SOLUTION INTRAVENOUS at 07:21

## 2021-05-19 NOTE — ANESTHESIA POSTPROCEDURE EVALUATION
Post-Op Assessment Note    CV Status:  Stable  Pain Score: 1    Pain management: adequate     Mental Status:  Alert and awake   Hydration Status:  Euvolemic   PONV Controlled:  Controlled   Airway Patency:  Patent      Post Op Vitals Reviewed: Yes      Staff: Anesthesiologist         No complications documented      /69 (05/19/21 0832)    Temp      Pulse 83 (05/19/21 0832)   Resp 12 (05/19/21 0832)    SpO2 100 % (05/19/21 4745)
No

## 2021-05-19 NOTE — H&P
This is a 33 y o  female with a history of morbid obesity and Body mass index is 54 06 kg/m²  Here for an EGD to evaluate the anatomy of the GI tract  Physical Exam    /65   Pulse 101   Temp 97 5 °F (36 4 °C) (Temporal)   Resp 16   Ht 5' 6 5" (1 689 m)   Wt (!) 154 kg (340 lb)   SpO2 98%   BMI 54 06 kg/m²    AAOx3  RRR  CTA B  Abdomen obese  Benign  A/P:    This is a 33 y o  female with a history of morbid obesity and Body mass index is 54 06 kg/m²       Will proceed with the EGD and biopsies        Ankita Huntley MD  05/19/21  8:20 AM

## 2021-05-19 NOTE — ANESTHESIA PREPROCEDURE EVALUATION
Procedure:  EGD    Relevant Problems   ENDO   (+) Parathyroid neoplasm      MUSCULOSKELETAL   (+) Chondromalacia      NEURO/PSYCH   (+) Anxiety   (+) Headache      Other   (+) Class 3 severe obesity due to excess calories without serious comorbidity with body mass index (BMI) of 50 0 to 59 9 in adult [facility])        Physical Exam    Airway    Mallampati score: III  TM Distance: >3 FB  Neck ROM: full     Dental   No notable dental hx     Cardiovascular  Rhythm: regular, Rate: normal, Cardiovascular exam normal    Pulmonary  Pulmonary exam normal Breath sounds clear to auscultation,     Other Findings        Anesthesia Plan  ASA Score- 3     Anesthesia Type- general and IV sedation with anesthesia with ASA Monitors  Additional Monitors:   Airway Plan:           Plan Factors-    Chart reviewed  Patient summary reviewed  Induction-     Postoperative Plan-     Informed Consent- Anesthetic plan and risks discussed with patient

## 2021-05-19 NOTE — DISCHARGE INSTRUCTIONS
Upper Endoscopy   WHAT YOU NEED TO KNOW:   An upper endoscopy is also called an upper gastrointestinal (GI) endoscopy, or an esophagogastroduodenoscopy (EGD)  You may feel bloated, gassy, or have some abdominal discomfort after your procedure  Your throat may be sore for 24 to 36 hours  You may burp or pass gas from air that is still inside your body  DISCHARGE INSTRUCTIONS:   Call 911 if:   · You have sudden chest pain or trouble breathing  Seek care immediately if:   · You feel dizzy or faint  · You have trouble swallowing  · You have severe throat pain  · Your bowel movements are very dark or black  · Your abdomen is hard and firm and you have severe pain  · You vomit blood  Contact your healthcare provider if:   · You feel full or bloated and cannot burp or pass gas  · You have not had a bowel movement for 3 days after your procedure  · You have neck pain  · You have a fever or chills  · You have nausea or are vomiting  · You have a rash or hives  · You have questions or concerns about your endoscopy  Relieve a sore throat:  Suck on throat lozenges or crushed ice  Gargle with a small amount of warm salt water  Mix 1 teaspoon of salt and 1 cup of warm water to make salt water  Relieve gas and discomfort from bloating:  Lie on your right side with a heating pad on your abdomen  Take short walks to help pass gas  Eat small meals until bloating is relieved  Rest after your procedure:  Do not drive or make important decisions until the day after your procedure  Return to your normal activity as directed  You can usually return to work the day after your procedure  Follow up with your healthcare provider as directed:  Write down your questions so you remember to ask them during your visits  © Copyright [address] Information is for End User's use only and may not be sold, redistributed or otherwise used for commercial purposes   All illustrations and images included in CareNotes® are the copyrighted property of A D A M , Inc  or Aranza Stanton   The above information is an  only  It is not intended as medical advice for individual conditions or treatments  Talk to your doctor, nurse or pharmacist before following any medical regimen to see if it is safe and effective for you

## 2021-05-20 PROBLEM — K29.70 GASTRITIS WITHOUT BLEEDING: Status: ACTIVE | Noted: 2021-05-20

## 2021-06-16 ENCOUNTER — OFFICE VISIT (OUTPATIENT)
Dept: BARIATRICS | Facility: CLINIC | Age: 31
End: 2021-06-16

## 2021-06-16 VITALS — WEIGHT: 293 LBS | BODY MASS INDEX: 54.36 KG/M2

## 2021-06-16 DIAGNOSIS — E66.01 CLASS 3 SEVERE OBESITY DUE TO EXCESS CALORIES WITHOUT SERIOUS COMORBIDITY WITH BODY MASS INDEX (BMI) OF 50.0 TO 59.9 IN ADULT (HCC): Primary | ICD-10-CM

## 2021-06-16 PROCEDURE — RECHECK
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
## 2021-06-22 ENCOUNTER — HOSPITAL ENCOUNTER (OUTPATIENT)
Facility: HOSPITAL | Age: 31
Setting detail: SURGERY ADMIT
End: 2021-06-22
Attending: SURGERY | Admitting: SURGERY
Payer: COMMERCIAL

## 2021-07-01 ENCOUNTER — OFFICE VISIT (OUTPATIENT)
Dept: BARIATRICS | Facility: CLINIC | Age: 31
End: 2021-07-01
Payer: COMMERCIAL

## 2021-07-01 ENCOUNTER — CLINICAL SUPPORT (OUTPATIENT)
Dept: BARIATRICS | Facility: CLINIC | Age: 31
End: 2021-07-01

## 2021-07-01 VITALS
TEMPERATURE: 97.9 F | BODY MASS INDEX: 45.99 KG/M2 | DIASTOLIC BLOOD PRESSURE: 74 MMHG | HEIGHT: 67 IN | WEIGHT: 293 LBS | RESPIRATION RATE: 18 BRPM | HEART RATE: 88 BPM | SYSTOLIC BLOOD PRESSURE: 109 MMHG

## 2021-07-01 DIAGNOSIS — M25.562 CHRONIC PAIN OF LEFT KNEE: ICD-10-CM

## 2021-07-01 DIAGNOSIS — G89.29 CHRONIC PAIN OF LEFT KNEE: ICD-10-CM

## 2021-07-01 DIAGNOSIS — E66.01 CLASS 3 SEVERE OBESITY DUE TO EXCESS CALORIES WITHOUT SERIOUS COMORBIDITY WITH BODY MASS INDEX (BMI) OF 50.0 TO 59.9 IN ADULT (HCC): Primary | ICD-10-CM

## 2021-07-01 PROCEDURE — 1036F TOBACCO NON-USER: CPT | Performed by: SURGERY

## 2021-07-01 PROCEDURE — 99213 OFFICE O/P EST LOW 20 MIN: CPT | Performed by: SURGERY

## 2021-07-01 PROCEDURE — 3008F BODY MASS INDEX DOCD: CPT | Performed by: SURGERY

## 2021-07-01 PROCEDURE — RECHECK: Performed by: DIETITIAN, REGISTERED

## 2021-07-01 RX ORDER — GABAPENTIN 100 MG/1
100 CAPSULE ORAL 3 TIMES DAILY
COMMUNITY
Start: 2021-06-17 | End: 2022-04-13 | Stop reason: SDUPTHER

## 2021-07-01 RX ORDER — HEPARIN SODIUM 5000 [USP'U]/ML
5000 INJECTION, SOLUTION INTRAVENOUS; SUBCUTANEOUS
Status: CANCELLED | OUTPATIENT
Start: 2021-07-02 | End: 2021-07-03

## 2021-07-01 RX ORDER — CELECOXIB 200 MG/1
200 CAPSULE ORAL ONCE
Status: CANCELLED | OUTPATIENT
Start: 2021-07-01 | End: 2021-07-01

## 2021-07-01 RX ORDER — ACETAMINOPHEN 325 MG/1
975 TABLET ORAL ONCE
Status: CANCELLED | OUTPATIENT
Start: 2021-07-01 | End: 2021-07-01

## 2021-07-01 RX ORDER — GABAPENTIN 300 MG/1
600 CAPSULE ORAL ONCE
Status: CANCELLED | OUTPATIENT
Start: 2021-07-01 | End: 2021-07-01

## 2021-07-01 RX ORDER — MELOXICAM 15 MG/1
15 TABLET ORAL DAILY
COMMUNITY
Start: 2021-06-17 | End: 2021-09-15

## 2021-07-01 RX ORDER — SCOLOPAMINE TRANSDERMAL SYSTEM 1 MG/1
1 PATCH, EXTENDED RELEASE TRANSDERMAL ONCE
Status: CANCELLED | OUTPATIENT
Start: 2021-07-01 | End: 2021-07-01

## 2021-07-01 NOTE — PROGRESS NOTES
Pt attended VIRTUAL pre-op education session  Standardized packet of information for bariatric surgery was sent via email and was reviewed with pt  Importance of lifestyle change and development of regular exercise routine stressed  Pt given the opportunity to ask questions  Ensure pre-surgery drink instructions were given  Questions were answered  Pt verbalized understanding of all information provided  Pt appeared prepared for upcoming surgery  Pt  educated on two-week pre operative liver shrinking diet  Pt understands that the diet needs to be followed for 2 weeks prior to surgery  Handout reviewed  Emphasized the need to drink 80 ounces of fluid per day while on the diet  Reviewed pre-op ERAS drink, post-operative clear liquid, full liquid, and pureed diet, post-operative nutrition rules and facts, and post-operative bariatric multivitamin/mineral recommendations and brand comparison  Contact information provided for any questions/concerns

## 2021-07-01 NOTE — H&P
BARIATRIC H&P - BARIATRIC SURGERY  Antonino Mtz 33 y o  female MRN: [id number]  Unit/Bed#:  Encounter: [id number]      HPI:  Antonino Mtz is a 33 y o  female who presents with a long-standing history of morbid obesity  She was found to be a good candidate to undergo a bariatric operation upon being [facility]  She is here today to discuss details of her surgery  Review of Systems   All other systems reviewed and are negative  Historical Information   Past Medical History:   Diagnosis Date    Back pain     Disease of thyroid gland     nodule    Knee pain     MRSA carrier     Thyroid disease     benign nodule     Past Surgical History:   Procedure Laterality Date    TONSILLECTOMY      TOOTH EXTRACTION  10/01/2014    WRIST SURGERY  05/25/2021     Social History   Social History     Substance and Sexual Activity   Alcohol Use Yes    Comment: social     Social History     Substance and Sexual Activity   Drug Use No     Social History     Tobacco Use   Smoking Status Never Smoker   Smokeless Tobacco Never Used   Tobacco Comment    no passive smoke exposure     Family History: non-contributory    Meds/Allergies   all medications and allergies reviewed  Allergies   Allergen Reactions    Zyrtec [Cetirizine] Hives       Objective     Current Vitals:   Blood Pressure: 109/74 (07/01/21 1331)  Pulse: 88 (07/01/21 1331)  Temperature: 97 9 °F (36 6 °C) (07/01/21 1331)  Temp Source: Tympanic (07/01/21 1331)  Respirations: 18 (07/01/21 1331)  Height: 5' 6 5" (168 9 cm) (07/01/21 1331)  Weight - Scale: (!) 155 kg (342 lb 8 oz) (07/01/21 1331)      Invasive Devices     None                 Physical Exam  Vitals and nursing note reviewed  Constitutional:       General: She is not in acute distress  Appearance: Normal appearance  She is well-developed  She is not diaphoretic  HENT:      Head: Normocephalic and atraumatic        Nose: Nose normal    Eyes: General: No scleral icterus  Right eye: No discharge  Left eye: No discharge  Conjunctiva/sclera: Conjunctivae normal    Cardiovascular:      Rate and Rhythm: Normal rate and regular rhythm  Heart sounds: Normal heart sounds  Pulmonary:      Effort: Pulmonary effort is normal  No respiratory distress  Breath sounds: Normal breath sounds  No stridor  No wheezing or rales  Chest:      Chest wall: No tenderness  Abdominal:      General: Bowel sounds are normal       Palpations: Abdomen is soft  Tenderness: There is no abdominal tenderness  There is no guarding or rebound  Comments: Abdomen is obese, soft and benign  Musculoskeletal:         General: No deformity  Normal range of motion  Cervical back: Normal range of motion and neck supple  Lymphadenopathy:      Cervical: No cervical adenopathy  Skin:     General: Skin is warm and dry  Findings: No erythema or rash  Neurological:      Mental Status: She is alert and oriented to person, place, and time  Psychiatric:         Behavior: Behavior normal          Thought Content: Thought content normal          Judgment: Judgment normal          Lab Results: I have personally reviewed pertinent lab results  Imaging: I have personally reviewed pertinent reports  EKG, Pathology, and Other Studies: I have personally reviewed pertinent reports  The endoscopy showed  gastritis  The biopsies revealed  Final Diagnosis  A  Stomach (biopsy):  - Chronic inactive antral and oxyntic gastritis  - No H pylori identified (H&E)  - No intestinal metaplasia            Assessment/PLAN:    33 y o  female morbidly obese found to be a good candidate to undergo a weight loss operation upon being enrolled here at the [facility]     Patient has a long history of morbid obesity and is presenting to discuss the surgical weight loss options   Despite the patient best efforts patient was unable to lose any meaningful or sustainable weight using nonsurgical means  We had a long discussion regarding all the surgical weight-loss options at our disposal at this point and reviewed the risks and benefits of each procedure in details as it relates to her age, BMI and medical conditions  She has been pre certified to undergo a Laparoscopic  Fani-en-Y gastric bypass  possible sleeve gastrectomy  Here today to review her pre op test results  Has been medically cleared for the procedure  I have discussed with her at length the risks and benefits of the operation and reiterated the components of our multidisciplinary program and the importance of compliance and follow up in the post operative period  Although there is a great statistical chance of improvement or even resolution of most of her associated comorbidities, the results vary from patient to patient and they largely depend on her commitment  The patient was also instructed with regards to the importance of behavior modification, nutritional counseling, support meeting attendance and lifestyle changes that are important to ensure success  She was given the opportunity to ask questions and I have answered all of them  I have addressed with the patient the level of CODE STATUS for this hospital stay and after explaining the different options currently she wishes to be a Level I  She understands and wishes to proceed  Still needs to lose 15 lbs prior to surgery  Will return next week for a weight check and if the weight loss is at least half the way there, the surgery date will not be re scheduled  Patient understands and agrees            Brandi Murphy MD  7/1/2021  1:49 PM

## 2021-07-02 DIAGNOSIS — E66.9 OBESITY, UNSPECIFIED: Primary | ICD-10-CM

## 2021-07-02 RX ORDER — OXYCODONE HYDROCHLORIDE 5 MG/1
5 TABLET ORAL EVERY 4 HOURS PRN
Qty: 10 TABLET | Refills: 0 | Status: SHIPPED | OUTPATIENT
Start: 2021-07-02 | End: 2021-09-24

## 2021-07-02 RX ORDER — OMEPRAZOLE 20 MG/1
20 CAPSULE, DELAYED RELEASE ORAL DAILY
Qty: 30 CAPSULE | Refills: 3 | Status: SHIPPED | OUTPATIENT
Start: 2021-07-02 | End: 2021-09-24

## 2021-07-06 PROCEDURE — U0003 INFECTIOUS AGENT DETECTION BY NUCLEIC ACID (DNA OR RNA); SEVERE ACUTE RESPIRATORY SYNDROME CORONAVIRUS 2 (SARS-COV-2) (CORONAVIRUS DISEASE [COVID-19]), AMPLIFIED PROBE TECHNIQUE, MAKING USE OF HIGH THROUGHPUT TECHNOLOGIES AS DESCRIBED BY CMS-2020-01-R: HCPCS | Performed by: SURGERY

## 2021-07-06 PROCEDURE — U0005 INFEC AGEN DETEC AMPLI PROBE: HCPCS | Performed by: SURGERY

## 2021-07-08 ENCOUNTER — TELEPHONE (OUTPATIENT)
Dept: BARIATRICS | Facility: CLINIC | Age: 31
End: 2021-07-08

## 2021-07-09 ENCOUNTER — OFFICE VISIT (OUTPATIENT)
Dept: URGENT CARE | Age: 31
End: 2021-07-09
Payer: COMMERCIAL

## 2021-07-09 ENCOUNTER — OFFICE VISIT (OUTPATIENT)
Dept: BARIATRICS | Facility: CLINIC | Age: 31
End: 2021-07-09

## 2021-07-09 VITALS
WEIGHT: 293 LBS | HEIGHT: 66 IN | BODY MASS INDEX: 47.09 KG/M2 | SYSTOLIC BLOOD PRESSURE: 130 MMHG | HEART RATE: 86 BPM | DIASTOLIC BLOOD PRESSURE: 77 MMHG | RESPIRATION RATE: 18 BRPM | TEMPERATURE: 98.2 F | OXYGEN SATURATION: 96 %

## 2021-07-09 DIAGNOSIS — R30.0 DYSURIA: Primary | ICD-10-CM

## 2021-07-09 DIAGNOSIS — E66.01 CLASS 3 SEVERE OBESITY DUE TO EXCESS CALORIES WITHOUT SERIOUS COMORBIDITY WITH BODY MASS INDEX (BMI) OF 50.0 TO 59.9 IN ADULT (HCC): Primary | ICD-10-CM

## 2021-07-09 DIAGNOSIS — N89.8 VAGINAL DISCHARGE: ICD-10-CM

## 2021-07-09 LAB
SL AMB  POCT GLUCOSE, UA: NEGATIVE
SL AMB LEUKOCYTE ESTERASE,UA: ABNORMAL
SL AMB POCT BILIRUBIN,UA: NEGATIVE
SL AMB POCT BLOOD,UA: ABNORMAL
SL AMB POCT CLARITY,UA: ABNORMAL
SL AMB POCT COLOR,UA: YELLOW
SL AMB POCT KETONES,UA: 15
SL AMB POCT NITRITE,UA: NEGATIVE
SL AMB POCT PH,UA: 5
SL AMB POCT SPECIFIC GRAVITY,UA: 1.02
SL AMB POCT URINE HCG: NEGATIVE
SL AMB POCT URINE PROTEIN: 30
SL AMB POCT UROBILINOGEN: 0.2

## 2021-07-09 PROCEDURE — 99213 OFFICE O/P EST LOW 20 MIN: CPT | Performed by: PHYSICIAN ASSISTANT

## 2021-07-09 PROCEDURE — 87086 URINE CULTURE/COLONY COUNT: CPT | Performed by: PHYSICIAN ASSISTANT

## 2021-07-09 PROCEDURE — 87591 N.GONORRHOEAE DNA AMP PROB: CPT | Performed by: PHYSICIAN ASSISTANT

## 2021-07-09 PROCEDURE — 81025 URINE PREGNANCY TEST: CPT | Performed by: PHYSICIAN ASSISTANT

## 2021-07-09 PROCEDURE — RECHECK

## 2021-07-09 PROCEDURE — 87147 CULTURE TYPE IMMUNOLOGIC: CPT | Performed by: PHYSICIAN ASSISTANT

## 2021-07-09 PROCEDURE — 87491 CHLMYD TRACH DNA AMP PROBE: CPT | Performed by: PHYSICIAN ASSISTANT

## 2021-07-09 PROCEDURE — 81002 URINALYSIS NONAUTO W/O SCOPE: CPT | Performed by: PHYSICIAN ASSISTANT

## 2021-07-09 NOTE — PATIENT INSTRUCTIONS
Vaginal discharge and pelvic pressure: Advised patient her urine dip did show leukocytes and red blood cells  I advised her it is possible that she may have a urinary infection as well  Urine culture and sensitivity will be ordered  Also urine gonorrhea and chlamydia testing will be performed as well  If any of your testing comes back positive for infection it will require to return for antibiotic treatment  Patient states she prefers to hold on any antibiotic treatment as it does not feel like a urinary infection until results are available  Patient will call back her PCPs office to see if they are able to perform a full pelvic exam, and I also gave her the number for STD clinic care and [place]  I advised her for symptoms worsen and she is unable to get an appointment for STD testing she may present to the emergency room

## 2021-07-09 NOTE — PROGRESS NOTES
[facility] Now        NAME: Priscilla Figueredo is a 33 y o  female  : 1990    MRN: [id number]  DATE: 2021  TIME: 3:54 PM    Assessment and Plan   Dysuria [R30 0]  1  Dysuria  POCT urine dip    POCT urine HCG    Urine culture   2  Vaginal discharge  Chlamydia/GC amplified DNA by PCR         Patient Instructions     Follow up with PCP in 3-5 days  Proceed to  ER if symptoms worsen  Chief Complaint     Chief Complaint   Patient presents with    Vaginal Discharge     pt stteas "I feel weird down there and I have a yellow discharge"          History of Present Illness         40-year-old female presents for vaginal discharge  Patient states for last several days she has had a runny, yellow odorous vaginal discharge  Patient states she also has some lower pelvic pressure  She denies any fever, chills, sweats, significant pelvic pain, urinary symptoms  Patient states she is currently sexually active with 1 partner  She believes she is in a committed exclusive relationship  Review of Systems   Review of Systems   Constitutional: Negative  Genitourinary: Positive for pelvic pain and vaginal discharge  Negative for decreased urine volume, difficulty urinating, dysuria, enuresis, flank pain, frequency, genital sores, hematuria, menstrual problem, urgency and vaginal pain  Skin: Negative  Current Medications       Current Outpatient Medications:     acetaminophen (TYLENOL) 500 mg tablet, Take 500 mg by mouth every 6 (six) hours as needed, Disp: , Rfl:     gabapentin (NEURONTIN) 100 mg capsule, Take 100 mg by mouth Three times a day, Disp: , Rfl:     Multiple Vitamin (multivitamin) tablet, Take 1 tablet by mouth daily, Disp: , Rfl:     cyclobenzaprine (FLEXERIL) 10 mg tablet, TAKE 1/2 1 TABLET AT BEDTIME AS NEEDED   (Patient not taking: Reported on 2021), Disp: , Rfl:     meloxicam (MOBIC) 15 mg tablet, Take 15 mg by mouth daily (Patient not taking: Reported on 7/9/2021), Disp: , Rfl:     naproxen (NAPROSYN) 500 mg tablet, , Disp: , Rfl:     omeprazole (PriLOSEC) 20 mg delayed release capsule, Take 1 capsule (20 mg total) by mouth daily (Patient not taking: Reported on 7/9/2021), Disp: 30 capsule, Rfl: 3    oxyCODONE (ROXICODONE) 5 mg immediate release tablet, Take 1 tablet (5 mg total) by mouth every 4 (four) hours as needed for moderate painMax Daily Amount: 30 mg (Patient not taking: Reported on 7/9/2021), Disp: 10 tablet, Rfl: 0    Current Allergies     Allergies as of 07/09/2021 - Reviewed 07/09/2021   Allergen Reaction Noted    Zyrtec [cetirizine] Hives 02/15/2017            The following portions of the patient's history were reviewed and updated as appropriate: allergies, current medications, past family history, past medical history, past social history, past surgical history and problem list     Objective   /77   Pulse 86   Temp 98 2 °F (36 8 °C)   Resp 18   Ht 5' 6" (1 676 m)   Wt (!) 150 kg (330 lb)   LMP 07/02/2021 (Exact Date)   SpO2 96%   BMI 53 26 kg/m²        Physical Exam     Physical Exam  Vitals and nursing note reviewed  Cardiovascular:      Rate and Rhythm: Normal rate and regular rhythm  Pulmonary:      Effort: Pulmonary effort is normal       Breath sounds: Normal breath sounds  Abdominal:      Tenderness: There is no abdominal tenderness

## 2021-07-09 NOTE — PROGRESS NOTES
Behavioral Health Follow Up Note:        Starting weight 341 9  #  Today's weight 328 9   #    Had HPI on July 1st and weighed 342#  Surgeon wanted her to lose 15 more pounds prior to surgery  Presented today for a weight check leading into surgery that is scheduled for Tuesday, July 13th  She lost 13 1# since last appointment  This writer will inform the  and the surgeon of her weight loss  Answered some questions leading into surgery

## 2021-07-11 LAB
BACTERIA UR CULT: ABNORMAL
BACTERIA UR CULT: ABNORMAL
C TRACH DNA SPEC QL NAA+PROBE: POSITIVE
N GONORRHOEA DNA SPEC QL NAA+PROBE: NEGATIVE

## 2021-07-12 ENCOUNTER — TELEPHONE (OUTPATIENT)
Dept: BARIATRICS | Facility: CLINIC | Age: 31
End: 2021-07-12

## 2021-07-12 RX ORDER — HYDROMORPHONE HCL/PF 1 MG/ML
0.5 SYRINGE (ML) INJECTION
Status: CANCELLED | OUTPATIENT
Start: 2021-07-12

## 2021-07-12 RX ORDER — SODIUM CHLORIDE 9 MG/ML
125 INJECTION, SOLUTION INTRAVENOUS CONTINUOUS
Status: CANCELLED | OUTPATIENT
Start: 2021-07-12

## 2021-07-12 RX ORDER — FENTANYL CITRATE 50 UG/ML
50 INJECTION, SOLUTION INTRAMUSCULAR; INTRAVENOUS
Status: CANCELLED | OUTPATIENT
Start: 2021-07-12

## 2021-07-12 RX ORDER — ONDANSETRON 2 MG/ML
4 INJECTION INTRAMUSCULAR; INTRAVENOUS EVERY 6 HOURS PRN
Status: CANCELLED | OUTPATIENT
Start: 2021-07-12

## 2021-07-12 RX ORDER — PROMETHAZINE HYDROCHLORIDE 25 MG/ML
12.5 INJECTION, SOLUTION INTRAMUSCULAR; INTRAVENOUS EVERY 4 HOURS PRN
Status: CANCELLED | OUTPATIENT
Start: 2021-07-12

## 2021-07-20 ENCOUNTER — OFFICE VISIT (OUTPATIENT)
Dept: FAMILY MEDICINE CLINIC | Facility: CLINIC | Age: 31
End: 2021-07-20
Payer: COMMERCIAL

## 2021-07-20 VITALS
WEIGHT: 293 LBS | TEMPERATURE: 98.3 F | DIASTOLIC BLOOD PRESSURE: 84 MMHG | HEART RATE: 82 BPM | BODY MASS INDEX: 47.09 KG/M2 | RESPIRATION RATE: 16 BRPM | SYSTOLIC BLOOD PRESSURE: 128 MMHG | HEIGHT: 66 IN | OXYGEN SATURATION: 98 %

## 2021-07-20 DIAGNOSIS — A74.9 CHLAMYDIA INFECTION: ICD-10-CM

## 2021-07-20 DIAGNOSIS — R82.71 GROUP B STREPTOCOCCAL BACTERIURIA: Primary | ICD-10-CM

## 2021-07-20 PROCEDURE — 3725F SCREEN DEPRESSION PERFORMED: CPT | Performed by: PHYSICIAN ASSISTANT

## 2021-07-20 PROCEDURE — 1036F TOBACCO NON-USER: CPT | Performed by: PHYSICIAN ASSISTANT

## 2021-07-20 PROCEDURE — 3008F BODY MASS INDEX DOCD: CPT | Performed by: PHYSICIAN ASSISTANT

## 2021-07-20 PROCEDURE — 99213 OFFICE O/P EST LOW 20 MIN: CPT | Performed by: PHYSICIAN ASSISTANT

## 2021-07-20 RX ORDER — CEPHALEXIN 500 MG/1
CAPSULE ORAL
Status: ON HOLD | COMMUNITY
Start: 2021-07-11 | End: 2021-10-04

## 2021-07-20 RX ORDER — METRONIDAZOLE 500 MG/1
TABLET ORAL
COMMUNITY
Start: 2021-07-11 | End: 2021-08-25

## 2021-07-20 NOTE — PROGRESS NOTES
Assessment/Plan:     lab results from July 9th have been reviewed in the culture was positive for chlamydia in group B strep  I do not see any culture results indicating Trichomonas     could not collect a urine sample at this time since she has urinated within the last hour which could affect the chlamydia culture  - she has been given a urine specimen cup along with the lab requisitions to check a urine culture and chlamydia culture and has been advised not to urinate within an hour of giving the sample  - further recommendations pending results    [employer] software was used to dictate this note  It may contain errors with dictating incorrect words/spelling  Please contact provider directly for any questions  Diagnoses and all orders for this visit:    Group B streptococcal bacteriuria  -     Urine culture; Future    Chlamydia infection  -     Chlamydia/GC amplified DNA by PCR; Future    Other orders  -     metroNIDAZOLE (FLAGYL) 500 mg tablet; TAKE 1 TABLET BY MOUTH TWICE A DAY FOR 7 DAYS  -     cephalexin (KEFLEX) 500 mg capsule; TAKE 1 CAPSULE BY MOUTH TWICE A DAY FOR 7 DAYS          Subjective:      Patient ID: Cain Montano is a 33 y o  female  On July 9th patient presented to emergency room for vaginal discharge, urinary symptoms  She was treated at that time with an injection  For chlamydia, 2 pills given in the emergency room for gonorrhea along with oral Flagyl for possible Trichomonas and oral cephalexin for UTI  She states the vaginal discharge and urinary symptoms have resolved  She does need an another urine sample to show that her infections have resolved for her bariatric surgery  The following portions of the patient's history were reviewed and updated as appropriate:   She  has a past medical history of Back pain, Disease of thyroid gland, Knee pain, MRSA carrier, and Thyroid disease    She   Patient Active Problem List    Diagnosis Date Noted    Group B streptococcal bacteriuria 07/20/2021    Chlamydia infection 07/20/2021    Gastritis without bleeding 05/20/2021    Headache 01/14/2021    Parathyroid neoplasm 10/15/2020    Derang of medial meniscus due to old tear/inj, left knee 10/12/2020    Well adult exam 10/12/2020    Thyroid nodule 10/12/2020    Weight gain 10/12/2020    Cervical cancer screening 10/12/2020    Lipid screening 10/12/2020    Encounter for screening for HIV 10/12/2020    Radial styloid tenosynovitis of right hand 08/28/2020    Right wrist pain 08/28/2020    Class 3 severe obesity due to excess calories without serious comorbidity with body mass index (BMI) of 50 0 to 59 9 in adult [facility]) 07/08/2019    Anxiety 07/08/2019    Chondromalacia 03/15/2019    Knee pain 03/15/2019    Right lumbar radiculopathy 02/22/2017     She  has a past surgical history that includes Tonsillectomy; Tooth extraction (10/01/2014); and Wrist surgery (05/25/2021)  Her family history includes Brain cancer in her mother; Cancer in her mother; Diabetes in her paternal grandfather; Diabetes type II in her family; Hypertension in her father; Stroke in her family and paternal grandfather  She  reports that she has never smoked  She has never used smokeless tobacco  She reports current alcohol use  She reports that she does not use drugs  Current Outpatient Medications   Medication Sig Dispense Refill    acetaminophen (TYLENOL) 500 mg tablet Take 500 mg by mouth every 6 (six) hours as needed      cephalexin (KEFLEX) 500 mg capsule TAKE 1 CAPSULE BY MOUTH TWICE A DAY FOR 7 DAYS      gabapentin (NEURONTIN) 100 mg capsule Take 100 mg by mouth Three times a day      metroNIDAZOLE (FLAGYL) 500 mg tablet TAKE 1 TABLET BY MOUTH TWICE A DAY FOR 7 DAYS      Multiple Vitamin (multivitamin) tablet Take 1 tablet by mouth daily      cyclobenzaprine (FLEXERIL) 10 mg tablet TAKE 1/2 1 TABLET AT BEDTIME AS NEEDED   (Patient not taking: Reported on 7/1/2021)      meloxicam (MOBIC) 15 mg tablet Take 15 mg by mouth daily (Patient not taking: Reported on 7/9/2021)      naproxen (NAPROSYN) 500 mg tablet  (Patient not taking: Reported on 7/1/2021)      omeprazole (PriLOSEC) 20 mg delayed release capsule Take 1 capsule (20 mg total) by mouth daily (Patient not taking: Reported on 7/9/2021) 30 capsule 3    oxyCODONE (ROXICODONE) 5 mg immediate release tablet Take 1 tablet (5 mg total) by mouth every 4 (four) hours as needed for moderate painMax Daily Amount: 30 mg (Patient not taking: Reported on 7/9/2021) 10 tablet 0     No current facility-administered medications for this visit  Current Outpatient Medications on File Prior to Visit   Medication Sig    acetaminophen (TYLENOL) 500 mg tablet Take 500 mg by mouth every 6 (six) hours as needed    cephalexin (KEFLEX) 500 mg capsule TAKE 1 CAPSULE BY MOUTH TWICE A DAY FOR 7 DAYS    gabapentin (NEURONTIN) 100 mg capsule Take 100 mg by mouth Three times a day    metroNIDAZOLE (FLAGYL) 500 mg tablet TAKE 1 TABLET BY MOUTH TWICE A DAY FOR 7 DAYS    Multiple Vitamin (multivitamin) tablet Take 1 tablet by mouth daily    cyclobenzaprine (FLEXERIL) 10 mg tablet TAKE 1/2 1 TABLET AT BEDTIME AS NEEDED  (Patient not taking: Reported on 7/1/2021)    meloxicam (MOBIC) 15 mg tablet Take 15 mg by mouth daily (Patient not taking: Reported on 7/9/2021)    naproxen (NAPROSYN) 500 mg tablet  (Patient not taking: Reported on 7/1/2021)    omeprazole (PriLOSEC) 20 mg delayed release capsule Take 1 capsule (20 mg total) by mouth daily (Patient not taking: Reported on 7/9/2021)    oxyCODONE (ROXICODONE) 5 mg immediate release tablet Take 1 tablet (5 mg total) by mouth every 4 (four) hours as needed for moderate painMax Daily Amount: 30 mg (Patient not taking: Reported on 7/9/2021)     No current facility-administered medications on file prior to visit  She is allergic to zyrtec [cetirizine]       Review of Systems   Constitutional: Negative for chills and fever    Genitourinary:          As stated in HPI         Objective:      /84 (BP Location: Left arm, Patient Position: Sitting, Cuff Size: Large)   Pulse 82   Temp 98 3 °F (36 8 °C) (Tympanic)   Resp 16   Ht 5' 6" (1 676 m)   Wt (!) 150 kg (331 lb)   LMP 07/02/2021 (Exact Date)   SpO2 98%   BMI 53 42 kg/m²          Physical Exam  Constitutional:       General: She is not in acute distress  Appearance: Normal appearance  She is not ill-appearing, toxic-appearing or diaphoretic  HENT:      Head: Normocephalic and atraumatic  Cardiovascular:      Rate and Rhythm: Normal rate and regular rhythm  Heart sounds: No murmur heard  Pulmonary:      Effort: Pulmonary effort is normal  No respiratory distress  Breath sounds: Normal breath sounds  No wheezing, rhonchi or rales  Abdominal:      General: Abdomen is flat  Bowel sounds are normal       Tenderness: There is no abdominal tenderness  There is no right CVA tenderness or left CVA tenderness  Musculoskeletal:      Cervical back: Neck supple  Skin:     General: Skin is warm  Neurological:      General: No focal deficit present  Mental Status: She is alert  Psychiatric:         Mood and Affect: Mood normal          Behavior: Behavior normal          Thought Content:  Thought content normal          Judgment: Judgment normal

## 2021-07-21 ENCOUNTER — APPOINTMENT (OUTPATIENT)
Dept: LAB | Facility: IMAGING CENTER | Age: 31
End: 2021-07-21
Payer: COMMERCIAL

## 2021-07-21 DIAGNOSIS — A74.9 CHLAMYDIA INFECTION: ICD-10-CM

## 2021-07-21 DIAGNOSIS — R82.71 GROUP B STREPTOCOCCAL BACTERIURIA: ICD-10-CM

## 2021-07-21 PROCEDURE — 87086 URINE CULTURE/COLONY COUNT: CPT

## 2021-07-21 PROCEDURE — 87491 CHLMYD TRACH DNA AMP PROBE: CPT

## 2021-07-21 PROCEDURE — 87591 N.GONORRHOEAE DNA AMP PROB: CPT

## 2021-07-22 LAB
C TRACH DNA SPEC QL NAA+PROBE: NEGATIVE
N GONORRHOEA DNA SPEC QL NAA+PROBE: NEGATIVE

## 2021-07-24 LAB — BACTERIA UR CULT: ABNORMAL

## 2021-07-26 ENCOUNTER — TELEPHONE (OUTPATIENT)
Dept: FAMILY MEDICINE CLINIC | Facility: CLINIC | Age: 31
End: 2021-07-26

## 2021-07-26 DIAGNOSIS — B37.9 YEAST INFECTION: Primary | ICD-10-CM

## 2021-07-26 RX ORDER — FLUCONAZOLE 150 MG/1
150 TABLET ORAL ONCE
Qty: 1 TABLET | Refills: 0 | Status: SHIPPED | OUTPATIENT
Start: 2021-07-26 | End: 2021-07-26

## 2021-07-26 NOTE — TELEPHONE ENCOUNTER
----- Message from Lisandra Rashid sent at 7/26/2021  7:48 AM EDT -----  Regarding: RE: Test Results Question  Contact: [phone]  I see the test for chlamydia came back negative also a test for a yeast infection came back did they test for all the other things with the bacteria and everything from last time??? What do I take or do with the yeast infection? ??    ----- Message -----  From: Raciel Pereira  Sent: 7/26/21 7:15 AM  To: Lisandra Rashid  Subject: RE: Test Results Question    Hello,  Can you please be more specific on what you are questioning  Would it be your lab results? Please let us know  Thank you        ----- Message -----       From:Brianna Sanchez       Sent:7/24/2021 11:04 PM EDT         To:Sara Cameron PA-C    Subject:Test Results Question    So what does that mean? ?

## 2021-07-26 NOTE — TELEPHONE ENCOUNTER
Pt aware of results and would like diflucan sent to [facility] which I did send for provider approval

## 2021-07-26 NOTE — TELEPHONE ENCOUNTER
Please let her know that her chlamydia/gonorrhea is now negative  Her urine culture reveals that the group B strep resolved  She does have yeast cells which may have been related to the antibiotic that she took for her infections  She can either get Monistat over-the-counter which is a suppository or I could prescribe Diflucan 150 mg which would be a 1 time dose which usually resolves the issue over 3 days    Otherwise she should be okay for surgery

## 2021-08-16 ENCOUNTER — OFFICE VISIT (OUTPATIENT)
Dept: OBGYN CLINIC | Facility: CLINIC | Age: 31
End: 2021-08-16

## 2021-08-16 VITALS
WEIGHT: 293 LBS | DIASTOLIC BLOOD PRESSURE: 76 MMHG | SYSTOLIC BLOOD PRESSURE: 117 MMHG | HEART RATE: 82 BPM | BODY MASS INDEX: 53.26 KG/M2

## 2021-08-16 DIAGNOSIS — Z01.419 WOMEN'S ANNUAL ROUTINE GYNECOLOGICAL EXAMINATION: ICD-10-CM

## 2021-08-16 DIAGNOSIS — Z12.4 CERVICAL CANCER SCREENING: ICD-10-CM

## 2021-08-16 DIAGNOSIS — Z11.3 SCREEN FOR STD (SEXUALLY TRANSMITTED DISEASE): Primary | ICD-10-CM

## 2021-08-16 DIAGNOSIS — Z12.39 ENCOUNTER FOR BREAST CANCER SCREENING USING NON-MAMMOGRAM MODALITY: ICD-10-CM

## 2021-08-16 DIAGNOSIS — Z72.51 HIGH RISK HETEROSEXUAL BEHAVIOR: ICD-10-CM

## 2021-08-16 PROCEDURE — 99385 PREV VISIT NEW AGE 18-39: CPT | Performed by: NURSE PRACTITIONER

## 2021-08-16 PROCEDURE — G0476 HPV COMBO ASSAY CA SCREEN: HCPCS | Performed by: NURSE PRACTITIONER

## 2021-08-16 PROCEDURE — G0145 SCR C/V CYTO,THINLAYER,RESCR: HCPCS | Performed by: NURSE PRACTITIONER

## 2021-08-16 NOTE — PROGRESS NOTES
Erika Calix is a 33 y o  female who presents today for annual GYN exam   Her last pap smear was performed 2-3 years ago per patient and result was normal   She reports a history of one abnormal pap smears in her past, followed up in one year with a normal pap smear  She reports menses as normal   Patient's last menstrual period was 2021  Her contraceptive method is condoms  Her general medical history has been reviewed and she reports it as follows:    Past Medical History:   Diagnosis Date    Back pain     Bacterial vaginosis     Chlamydia     Disease of thyroid gland     nodule    Knee pain     MRSA carrier     Thyroid disease     benign nodule    Urogenital trichomoniasis      Past Surgical History:   Procedure Laterality Date    TONSILLECTOMY      TOOTH EXTRACTION  10/01/2014    WRIST SURGERY  2021     OB History        3    Para   1    Term   1            AB   2    Living   1       SAB        TAB   2    Ectopic        Multiple        Live Births               Obstetric Comments   Child birth 14           Social History     Tobacco Use    Smoking status: Never Smoker    Smokeless tobacco: Never Used    Tobacco comment: no passive smoke exposure   Vaping Use    Vaping Use: Never used   Substance Use Topics    Alcohol use: Yes     Comment: social    Drug use: No     Cancer-related family history includes Brain cancer in her mother; Cancer in her mother  There is no history of Breast cancer, Colon cancer, or Ovarian cancer  Current Outpatient Medications   Medication Instructions    gabapentin (NEURONTIN) 100 mg, Oral, 3 times daily    Multiple Vitamin (multivitamin) tablet 1 tablet, Daily       Review of Systems:  Review of Systems   Constitutional: Negative  Gastrointestinal: Negative  Genitourinary: Negative  Skin: Negative          Physical Exam:  /76   Pulse 82   Wt (!) 150 kg (330 lb)   LMP 07/26/2021   BMI 53 26 kg/m²   Physical Exam  Constitutional:       General: She is not in acute distress  Appearance: Normal appearance  She is obese  Genitourinary:      Vulva, inguinal canal, urethra, bladder, vagina, cervix, uterus and right adnexa normal    Cardiovascular:      Rate and Rhythm: Normal rate and regular rhythm  Pulmonary:      Effort: Pulmonary effort is normal       Breath sounds: Normal breath sounds  Abdominal:      Palpations: Abdomen is soft  Musculoskeletal:      Cervical back: Neck supple  Neurological:      Mental Status: She is alert  Skin:     General: Skin is warm and dry  Psychiatric:         Mood and Affect: Mood normal          Behavior: Behavior normal    Vitals reviewed  Assessment/Plan:   1  Normal well-woman GYN exam   2  Cervical cancer screening:  Normal cervical exam   Pap smear done with HPV co-testing  3  STD screening: Orders placed for vaginal GC/CT cultures   4  Breast cancer screening:  Normal breast exam  Reviewed breast self-awareness  5  Depression Screening: Patient's depression screening was assessed with a PHQ-2 score of 0  Their PHQ-9 score was 0   Clinically patient does not have depression  No treatment is required  6  BMI Counseling: Body mass index is 53 26 kg/m²  Discussed the patient's BMI with her  The BMI is above normal  She is working closely with the weight management center and is planning to have gastric bypass in the next few months  She has been able to lose some weight on her own by following the diet plan they have established for her  7  Contraception:  condoms   8  Return to office in one year for annual exam or PRN  Reviewed with patient that [facility], but that they will not necessarily be reviewed by me immediately  Explained that I will review results at my earliest opportunity and contact patient appropriately

## 2021-08-18 LAB
C TRACH DNA SPEC QL NAA+PROBE: ABNORMAL
N GONORRHOEA DNA SPEC QL NAA+PROBE: ABNORMAL

## 2021-08-19 LAB
HPV HR 12 DNA CVX QL NAA+PROBE: NEGATIVE
HPV16 DNA CVX QL NAA+PROBE: NEGATIVE
HPV18 DNA CVX QL NAA+PROBE: NEGATIVE

## 2021-08-22 LAB
LAB AP GYN PRIMARY INTERPRETATION: NORMAL
Lab: NORMAL
PATH INTERP SPEC-IMP: NORMAL

## 2021-08-25 ENCOUNTER — TELEPHONE (OUTPATIENT)
Dept: OBGYN CLINIC | Facility: CLINIC | Age: 31
End: 2021-08-25

## 2021-08-25 DIAGNOSIS — A59.9 TRICHOMONIASIS: Primary | ICD-10-CM

## 2021-08-25 DIAGNOSIS — Z11.3 SCREEN FOR STD (SEXUALLY TRANSMITTED DISEASE): ICD-10-CM

## 2021-08-25 RX ORDER — METRONIDAZOLE 500 MG/1
500 TABLET ORAL 2 TIMES DAILY
Qty: 14 TABLET | Refills: 0 | Status: SHIPPED | OUTPATIENT
Start: 2021-08-25 | End: 2021-09-01

## 2021-08-26 ENCOUNTER — APPOINTMENT (OUTPATIENT)
Dept: LAB | Facility: IMAGING CENTER | Age: 31
End: 2021-08-26
Payer: COMMERCIAL

## 2021-08-26 DIAGNOSIS — Z11.3 SCREEN FOR STD (SEXUALLY TRANSMITTED DISEASE): ICD-10-CM

## 2021-08-26 PROCEDURE — 87591 N.GONORRHOEAE DNA AMP PROB: CPT

## 2021-08-26 PROCEDURE — 87491 CHLMYD TRACH DNA AMP PROBE: CPT

## 2021-08-28 LAB
C TRACH DNA SPEC QL NAA+PROBE: NEGATIVE
N GONORRHOEA DNA SPEC QL NAA+PROBE: NEGATIVE

## 2021-09-10 ENCOUNTER — TELEPHONE (OUTPATIENT)
Dept: FAMILY MEDICINE CLINIC | Facility: CLINIC | Age: 31
End: 2021-09-10

## 2021-09-10 ENCOUNTER — OFFICE VISIT (OUTPATIENT)
Dept: URGENT CARE | Age: 31
End: 2021-09-10
Payer: COMMERCIAL

## 2021-09-10 VITALS — HEART RATE: 114 BPM | RESPIRATION RATE: 18 BRPM | TEMPERATURE: 97.6 F | OXYGEN SATURATION: 98 %

## 2021-09-10 DIAGNOSIS — J06.9 VIRAL URI: Primary | ICD-10-CM

## 2021-09-10 PROCEDURE — 99213 OFFICE O/P EST LOW 20 MIN: CPT | Performed by: PHYSICIAN ASSISTANT

## 2021-09-10 PROCEDURE — U0003 INFECTIOUS AGENT DETECTION BY NUCLEIC ACID (DNA OR RNA); SEVERE ACUTE RESPIRATORY SYNDROME CORONAVIRUS 2 (SARS-COV-2) (CORONAVIRUS DISEASE [COVID-19]), AMPLIFIED PROBE TECHNIQUE, MAKING USE OF HIGH THROUGHPUT TECHNOLOGIES AS DESCRIBED BY CMS-2020-01-R: HCPCS | Performed by: PHYSICIAN ASSISTANT

## 2021-09-10 PROCEDURE — U0005 INFEC AGEN DETEC AMPLI PROBE: HCPCS | Performed by: PHYSICIAN ASSISTANT

## 2021-09-10 NOTE — TELEPHONE ENCOUNTER
Pt left message and said she and her daughter were exposed to someone who is positive, asking for a test  Left a message for patient to get tested at [facility] or [facility] and if she has any symptoms then call us to set up virtual visit

## 2021-09-10 NOTE — LETTER
September 10, 2021     Patient: Romeo Mayers   YOB: 1990   Date of Visit: 9/10/2021       To Whom It May Concern: It is my medical opinion that Sathish Sapp should remain out of work until test results are received and are negative  If you have any questions or concerns, please don't hesitate to call           Sincerely,        Alberto Boswell PA-C    CC: No Recipients

## 2021-09-10 NOTE — PROGRESS NOTES
[facility] Now        NAME: Priscilla Figueredo is a 33 y o  female  : 1990    MRN: [id number]  DATE: September 10, 2021  TIME: 7:46 PM    Assessment and Plan   Viral URI [J06 9]  1  Viral URI  Novel Coronavirus (Covid-19),PCR [facility] - Office Collection         Patient Instructions     Patient Instructions   Isolate until results are received  Quarantine for 10 days from symptom onset if positive, must have symptoms improving and be fever free  May continue OTC cough and cold medications or tylenol to help symptoms  ER if any distress      Follow up with PCP in 3-5 days  Proceed to  ER if symptoms worsen  Chief Complaint     Chief Complaint   Patient presents with    Generalized Body Aches     Patient relates "I have been sick for 1 week with body aches and fatigue sometimes " Denies fever  Denies chest pain and SOB  "I had a cough last week and sore throat, but not any more "          History of Present Illness       34 y/o F presents c/o coild sx x 5 days  Started with a sore throat, congestion, and mild cough which has since resolved  Then developed myalgias and fatigue  No fever, chills, SOB, abdominal pain, n/v/d  No tx  + COVID contact 6 days ago      Review of Systems   Review of Systems   Constitutional: Positive for appetite change and fatigue  Negative for chills and fever  HENT: Positive for congestion, postnasal drip, rhinorrhea and sore throat  Negative for ear pain, facial swelling, hearing loss, sinus pressure, sinus pain and voice change  Eyes: Negative for pain, redness and visual disturbance  Respiratory: Positive for cough  Negative for chest tightness, shortness of breath and wheezing  Cardiovascular: Negative for chest pain, palpitations and leg swelling  Gastrointestinal: Negative for abdominal pain, constipation, diarrhea, nausea and vomiting  Genitourinary: Negative for difficulty urinating, dysuria and frequency  Musculoskeletal: Positive for myalgias  Skin: Negative for color change, pallor and wound  Neurological: Negative for dizziness, syncope, numbness and headaches  Hematological: Negative for adenopathy  Current Medications       Current Outpatient Medications:     acetaminophen (TYLENOL) 500 mg tablet, Take 500 mg by mouth every 6 (six) hours as needed, Disp: , Rfl:     gabapentin (NEURONTIN) 100 mg capsule, Take 100 mg by mouth Three times a day, Disp: , Rfl:     cephalexin (KEFLEX) 500 mg capsule, TAKE 1 CAPSULE BY MOUTH TWICE A DAY FOR 7 DAYS (Patient not taking: Reported on 8/16/2021), Disp: , Rfl:     cyclobenzaprine (FLEXERIL) 10 mg tablet, TAKE 1/2 1 TABLET AT BEDTIME AS NEEDED   (Patient not taking: Reported on 7/1/2021), Disp: , Rfl:     meloxicam (MOBIC) 15 mg tablet, Take 15 mg by mouth daily (Patient not taking: Reported on 7/9/2021), Disp: , Rfl:     Multiple Vitamin (multivitamin) tablet, Take 1 tablet by mouth daily (Patient not taking: Reported on 8/16/2021), Disp: , Rfl:     naproxen (NAPROSYN) 500 mg tablet, , Disp: , Rfl:     omeprazole (PriLOSEC) 20 mg delayed release capsule, Take 1 capsule (20 mg total) by mouth daily (Patient not taking: Reported on 7/9/2021), Disp: 30 capsule, Rfl: 3    oxyCODONE (ROXICODONE) 5 mg immediate release tablet, Take 1 tablet (5 mg total) by mouth every 4 (four) hours as needed for moderate painMax Daily Amount: 30 mg (Patient not taking: Reported on 7/9/2021), Disp: 10 tablet, Rfl: 0    Current Allergies     Allergies as of 09/10/2021 - Reviewed 09/10/2021   Allergen Reaction Noted    Zyrtec [cetirizine] Hives 02/15/2017            The following portions of the patient's history were reviewed and updated as appropriate: allergies, current medications, past family history, past medical history, past social history, past surgical history and problem list      Past Medical History:   Diagnosis Date    Back pain     Bacterial vaginosis     Chlamydia     Disease of thyroid gland nodule    Knee pain     MRSA carrier     Thyroid disease     benign nodule    Urogenital trichomoniasis        Past Surgical History:   Procedure Laterality Date    TONSILLECTOMY      TOOTH EXTRACTION  10/01/2014    WRIST SURGERY  05/25/2021       Family History   Problem Relation Age of Onset    Brain cancer Mother     Cancer Mother         glioblastoma    Hypertension Father     No Known Problems Paternal Grandfather     No Known Problems Sister     No Known Problems Daughter     No Known Problems Maternal Grandmother     Diabetes Paternal Grandmother     Stroke Paternal Grandmother     Heart disease Neg Hx     Thyroid disease Neg Hx     Breast cancer Neg Hx     Colon cancer Neg Hx     Ovarian cancer Neg Hx          Medications have been verified  Objective   Pulse (!) 114   Temp 97 6 °F (36 4 °C)   Resp 18   LMP 08/27/2021   SpO2 98%   Patient's last menstrual period was 08/27/2021  Physical Exam     Physical Exam  Constitutional:       General: She is not in acute distress  Appearance: She is well-developed  She is not diaphoretic  HENT:      Head: Normocephalic and atraumatic  Right Ear: Hearing, tympanic membrane, ear canal and external ear normal  No decreased hearing noted  No tenderness  No middle ear effusion  Left Ear: Hearing, tympanic membrane, ear canal and external ear normal       Nose: Mucosal edema and rhinorrhea present  Right Sinus: No maxillary sinus tenderness or frontal sinus tenderness  Left Sinus: No maxillary sinus tenderness or frontal sinus tenderness  Mouth/Throat:      Pharynx: Posterior oropharyngeal erythema present  No oropharyngeal exudate or uvula swelling  Tonsils: No tonsillar abscesses  Eyes:      General: No scleral icterus  Right eye: No discharge  Left eye: No discharge  Conjunctiva/sclera: Conjunctivae normal       Pupils: Pupils are equal, round, and reactive to light  Cardiovascular:      Rate and Rhythm: Normal rate and regular rhythm  Heart sounds: Normal heart sounds  No murmur heard  No friction rub  No gallop  Pulmonary:      Effort: Pulmonary effort is normal  No respiratory distress  Breath sounds: Normal breath sounds  No wheezing or rales  Abdominal:      General: There is no distension  Palpations: Abdomen is soft  There is no mass  Tenderness: There is no abdominal tenderness  There is no guarding or rebound  Musculoskeletal:         General: Normal range of motion  Cervical back: Normal range of motion and neck supple  Lymphadenopathy:      Cervical: No cervical adenopathy  Skin:     General: Skin is warm and dry  Coloration: Skin is not pale  Findings: No erythema or rash  Neurological:      Mental Status: She is alert and oriented to person, place, and time  Cranial Nerves: No cranial nerve deficit

## 2021-09-11 LAB — SARS-COV-2 RNA RESP QL NAA+PROBE: NEGATIVE

## 2021-09-23 ENCOUNTER — OFFICE VISIT (OUTPATIENT)
Dept: BARIATRICS | Facility: CLINIC | Age: 31
End: 2021-09-23
Payer: COMMERCIAL

## 2021-09-23 VITALS
WEIGHT: 293 LBS | DIASTOLIC BLOOD PRESSURE: 84 MMHG | BODY MASS INDEX: 45.99 KG/M2 | SYSTOLIC BLOOD PRESSURE: 110 MMHG | HEART RATE: 93 BPM | TEMPERATURE: 98.5 F | HEIGHT: 67 IN

## 2021-09-23 DIAGNOSIS — M54.16 RIGHT LUMBAR RADICULOPATHY: ICD-10-CM

## 2021-09-23 DIAGNOSIS — K29.70 GASTRITIS WITHOUT BLEEDING: ICD-10-CM

## 2021-09-23 DIAGNOSIS — M25.562 CHRONIC PAIN OF LEFT KNEE: ICD-10-CM

## 2021-09-23 DIAGNOSIS — E66.01 CLASS 3 SEVERE OBESITY DUE TO EXCESS CALORIES WITHOUT SERIOUS COMORBIDITY WITH BODY MASS INDEX (BMI) OF 50.0 TO 59.9 IN ADULT (HCC): Primary | ICD-10-CM

## 2021-09-23 DIAGNOSIS — G89.29 CHRONIC PAIN OF LEFT KNEE: ICD-10-CM

## 2021-09-23 PROCEDURE — 99213 OFFICE O/P EST LOW 20 MIN: CPT | Performed by: SURGERY

## 2021-09-23 PROCEDURE — 1036F TOBACCO NON-USER: CPT | Performed by: SURGERY

## 2021-09-23 RX ORDER — HEPARIN SODIUM 5000 [USP'U]/ML
5000 INJECTION, SOLUTION INTRAVENOUS; SUBCUTANEOUS
Status: CANCELLED | OUTPATIENT
Start: 2021-10-04 | End: 2021-10-05

## 2021-09-23 RX ORDER — SCOLOPAMINE TRANSDERMAL SYSTEM 1 MG/1
1 PATCH, EXTENDED RELEASE TRANSDERMAL ONCE
Status: CANCELLED | OUTPATIENT
Start: 2021-10-04 | End: 2021-09-23

## 2021-09-23 RX ORDER — CEFAZOLIN SODIUM 2 G/50ML
2000 SOLUTION INTRAVENOUS ONCE
Status: CANCELLED | OUTPATIENT
Start: 2021-10-04 | End: 2021-09-23

## 2021-09-23 RX ORDER — ACETAMINOPHEN 325 MG/1
975 TABLET ORAL ONCE
Status: CANCELLED | OUTPATIENT
Start: 2021-10-04 | End: 2021-09-23

## 2021-09-23 RX ORDER — CELECOXIB 200 MG/1
200 CAPSULE ORAL ONCE
Status: CANCELLED | OUTPATIENT
Start: 2021-10-04 | End: 2021-09-23

## 2021-09-23 RX ORDER — GABAPENTIN 300 MG/1
300 CAPSULE ORAL ONCE
Status: CANCELLED | OUTPATIENT
Start: 2021-10-04 | End: 2021-09-23

## 2021-09-23 NOTE — H&P
BARIATRIC H&P - BARIATRIC SURGERY  Fernanda Atkins 33 y o  female MRN: [id number]  Unit/Bed#:  Encounter: [id number]      HPI:  Fernanda Atkins is a 33 y o  female who presents with a long-standing history of morbid obesity  She was found to be a good candidate to undergo a bariatric operation upon being enrolled here at [facility]  She is here today to discuss details of her surgery  Review of Systems   All other systems reviewed and are negative  Historical Information   Past Medical History:   Diagnosis Date    Back pain     Bacterial vaginosis     Chlamydia     Disease of thyroid gland     nodule    Knee pain     MRSA carrier     Thyroid disease     benign nodule    Urogenital trichomoniasis      Past Surgical History:   Procedure Laterality Date    TONSILLECTOMY      TOOTH EXTRACTION  10/01/2014    WRIST SURGERY  05/25/2021     Social History   Social History     Substance and Sexual Activity   Alcohol Use Yes    Comment: social     Social History     Substance and Sexual Activity   Drug Use No     Social History     Tobacco Use   Smoking Status Never Smoker   Smokeless Tobacco Never Used   Tobacco Comment    no passive smoke exposure     Family History: non-contributory    Meds/Allergies   all medications and allergies reviewed  Allergies   Allergen Reactions    Zyrtec [Cetirizine] Hives       Objective     Current Vitals:   Blood Pressure: 110/84 (09/23/21 1354)  Pulse: 93 (09/23/21 1354)  Temperature: 98 5 °F (36 9 °C) (09/23/21 1354)  Temp Source: Tympanic (09/23/21 1354)  Height: 5' 6 5" (168 9 cm) (09/23/21 1354)  Weight - Scale: (!) 151 kg (332 lb) (09/23/21 1354)      Invasive Devices     None                 Physical Exam  Vitals and nursing note reviewed  Constitutional:       General: She is not in acute distress  Appearance: Normal appearance  She is well-developed  She is not diaphoretic  HENT:      Head: Normocephalic and atraumatic  Nose: Nose normal    Eyes:      General: No scleral icterus  Right eye: No discharge  Left eye: No discharge  Conjunctiva/sclera: Conjunctivae normal    Cardiovascular:      Rate and Rhythm: Normal rate and regular rhythm  Heart sounds: Normal heart sounds  Pulmonary:      Effort: Pulmonary effort is normal  No respiratory distress  Breath sounds: Normal breath sounds  No stridor  No wheezing or rales  Chest:      Chest wall: No tenderness  Abdominal:      General: Bowel sounds are normal       Palpations: Abdomen is soft  Tenderness: There is no abdominal tenderness  There is no guarding or rebound  Comments: Abdomen is obese, soft and benign  Musculoskeletal:         General: No deformity  Normal range of motion  Cervical back: Normal range of motion and neck supple  Lymphadenopathy:      Cervical: No cervical adenopathy  Skin:     General: Skin is warm and dry  Findings: No erythema or rash  Neurological:      Mental Status: She is alert and oriented to person, place, and time  Psychiatric:         Behavior: Behavior normal          Thought Content: Thought content normal          Judgment: Judgment normal          Lab Results: I have personally reviewed pertinent lab results  Imaging: I have personally reviewed pertinent reports  EKG, Pathology, and Other Studies: I have personally reviewed pertinent reports  The endoscopy showed   Gastritis  The biopsies revealed  A  Stomach (biopsy):  - Chronic inactive antral and oxyntic gastritis  - No H pylori identified (H&E)  - No intestinal metaplasia     Assessment/PLAN:    33 y o  female morbidly obese found to be a good candidate to undergo a weight loss operation upon being enrolled here at the [facility]     Patient has a long history of morbid obesity and is presenting to discuss the surgical weight loss options   Despite the patient best efforts patient was unable to lose any meaningful or sustainable weight using nonsurgical means  We had a long discussion regarding all the surgical weight-loss options at our disposal at this point and reviewed the risks and benefits of each procedure in details as it relates to her age, BMI and medical conditions  She has been pre certified to undergo a Laparoscopic  Fani-en-Y gastric bypass possible sleeve gastrectomy  Here today to review her pre op test results  Has been medically cleared for the procedure  I have discussed with her at length the risks and benefits of the operation and reiterated the components of our multidisciplinary program and the importance of compliance and follow up in the post operative period  Although there is a great statistical chance of improvement or even resolution of most of her associated comorbidities, the results vary from patient to patient and they largely depend on her commitment  The patient was also instructed with regards to the importance of behavior modification, nutritional counseling, support meeting attendance and lifestyle changes that are important to ensure success  She was given the opportunity to ask questions and I have answered all of them  I have addressed with the patient the level of CODE STATUS for this hospital stay and after explaining the different options currently she wishes to be a Level I  She understands and wishes to proceed  She still needs to lose  12 lb prior to the surgery  She will return in 1 week and if she is at least half the way there her surgery will not be rescheduled  Patient understands and agrees  I have encouraged her to had her vaccination against COVID  Given the current trends of infection there is a chance that we might need to stop doing elective surgeries just like it happened last year      Cayden Gillespie MD  9/23/2021  2:15 PM

## 2021-09-24 DIAGNOSIS — E66.01 CLASS 3 SEVERE OBESITY DUE TO EXCESS CALORIES WITHOUT SERIOUS COMORBIDITY WITH BODY MASS INDEX (BMI) OF 50.0 TO 59.9 IN ADULT (HCC): Primary | ICD-10-CM

## 2021-09-24 RX ORDER — OXYCODONE HYDROCHLORIDE 5 MG/1
5 TABLET ORAL EVERY 4 HOURS PRN
Qty: 5 TABLET | Refills: 0 | Status: SHIPPED | OUTPATIENT
Start: 2021-10-04 | End: 2021-10-14 | Stop reason: ALTCHOICE

## 2021-09-24 RX ORDER — OMEPRAZOLE 20 MG/1
20 CAPSULE, DELAYED RELEASE ORAL DAILY
Qty: 30 CAPSULE | Refills: 3 | Status: SHIPPED | OUTPATIENT
Start: 2021-09-24 | End: 2021-09-24

## 2021-09-27 ENCOUNTER — TELEPHONE (OUTPATIENT)
Dept: BARIATRICS | Facility: CLINIC | Age: 31
End: 2021-09-27

## 2021-09-27 ENCOUNTER — TELEPHONE (OUTPATIENT)
Dept: FAMILY MEDICINE CLINIC | Facility: CLINIC | Age: 31
End: 2021-09-27

## 2021-09-27 PROCEDURE — U0003 INFECTIOUS AGENT DETECTION BY NUCLEIC ACID (DNA OR RNA); SEVERE ACUTE RESPIRATORY SYNDROME CORONAVIRUS 2 (SARS-COV-2) (CORONAVIRUS DISEASE [COVID-19]), AMPLIFIED PROBE TECHNIQUE, MAKING USE OF HIGH THROUGHPUT TECHNOLOGIES AS DESCRIBED BY CMS-2020-01-R: HCPCS | Performed by: SURGERY

## 2021-09-27 PROCEDURE — U0005 INFEC AGEN DETEC AMPLI PROBE: HCPCS | Performed by: SURGERY

## 2021-09-30 ENCOUNTER — OFFICE VISIT (OUTPATIENT)
Dept: BARIATRICS | Facility: CLINIC | Age: 31
End: 2021-09-30

## 2021-09-30 VITALS — BODY MASS INDEX: 45.99 KG/M2 | HEIGHT: 67 IN | WEIGHT: 293 LBS

## 2021-09-30 DIAGNOSIS — E66.01 MORBID (SEVERE) OBESITY DUE TO EXCESS CALORIES (HCC): Primary | ICD-10-CM

## 2021-09-30 PROCEDURE — RECHECK: Performed by: DIETITIAN, REGISTERED

## 2021-09-30 PROCEDURE — 3008F BODY MASS INDEX DOCD: CPT | Performed by: SURGERY
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
## 2021-10-01 ENCOUNTER — ANESTHESIA EVENT (OUTPATIENT)
Dept: PERIOP | Facility: HOSPITAL | Age: 31
DRG: 403 | End: 2021-10-01
Payer: COMMERCIAL

## 2021-10-04 ENCOUNTER — HOSPITAL ENCOUNTER (INPATIENT)
Facility: HOSPITAL | Age: 31
LOS: 2 days | Discharge: HOME | DRG: 403 | End: 2021-10-06
Attending: SURGERY | Admitting: SURGERY
Payer: COMMERCIAL

## 2021-10-04 ENCOUNTER — ANESTHESIA (OUTPATIENT)
Dept: PERIOP | Facility: HOSPITAL | Age: 31
DRG: 403 | End: 2021-10-04
Payer: COMMERCIAL

## 2021-10-04 DIAGNOSIS — E66.01 CLASS 3 SEVERE OBESITY DUE TO EXCESS CALORIES WITHOUT SERIOUS COMORBIDITY WITH BODY MASS INDEX (BMI) OF 50.0 TO 59.9 IN ADULT (HCC): Primary | ICD-10-CM

## 2021-10-04 LAB
EXT PREGNANCY TEST URINE: NEGATIVE
EXT. CONTROL: NORMAL

## 2021-10-04 PROCEDURE — C1781 MESH (IMPLANTABLE): HCPCS | Performed by: SURGERY

## 2021-10-04 PROCEDURE — C9290 INJ, BUPIVACAINE LIPOSOME: HCPCS | Performed by: SURGERY

## 2021-10-04 PROCEDURE — 0DJ08ZZ INSPECTION OF UPPER INTESTINAL TRACT, VIA NATURAL OR ARTIFICIAL OPENING ENDOSCOPIC: ICD-10-PCS | Performed by: SURGERY

## 2021-10-04 PROCEDURE — 81025 URINE PREGNANCY TEST: CPT | Performed by: SURGERY

## 2021-10-04 PROCEDURE — 43644 LAP GASTRIC BYPASS/ROUX-EN-Y: CPT | Performed by: STUDENT IN AN ORGANIZED HEALTH CARE EDUCATION/TRAINING PROGRAM

## 2021-10-04 PROCEDURE — 43644 LAP GASTRIC BYPASS/ROUX-EN-Y: CPT | Performed by: SURGERY

## 2021-10-04 PROCEDURE — 0D164ZA BYPASS STOMACH TO JEJUNUM, PERCUTANEOUS ENDOSCOPIC APPROACH: ICD-10-PCS | Performed by: SURGERY

## 2021-10-04 DEVICE — SEAMGUARD STPL REINF ENDO GIA ULTRA UNIV 60 PURPLE: Type: IMPLANTABLE DEVICE | Site: ABDOMEN | Status: FUNCTIONAL

## 2021-10-04 RX ORDER — SIMETHICONE 80 MG
80 TABLET,CHEWABLE ORAL 4 TIMES DAILY PRN
Status: DISCONTINUED | OUTPATIENT
Start: 2021-10-04 | End: 2021-10-06 | Stop reason: HOSPADM

## 2021-10-04 RX ORDER — METOCLOPRAMIDE HYDROCHLORIDE 5 MG/ML
10 INJECTION INTRAMUSCULAR; INTRAVENOUS EVERY 6 HOURS PRN
Status: DISCONTINUED | OUTPATIENT
Start: 2021-10-04 | End: 2021-10-06 | Stop reason: HOSPADM

## 2021-10-04 RX ORDER — FENTANYL CITRATE 50 UG/ML
INJECTION, SOLUTION INTRAMUSCULAR; INTRAVENOUS AS NEEDED
Status: DISCONTINUED | OUTPATIENT
Start: 2021-10-04 | End: 2021-10-04

## 2021-10-04 RX ORDER — ONDANSETRON 2 MG/ML
4 INJECTION INTRAMUSCULAR; INTRAVENOUS EVERY 6 HOURS PRN
Status: DISCONTINUED | OUTPATIENT
Start: 2021-10-04 | End: 2021-10-04 | Stop reason: HOSPADM

## 2021-10-04 RX ORDER — SODIUM CHLORIDE 9 MG/ML
INJECTION, SOLUTION INTRAVENOUS AS NEEDED
Status: DISCONTINUED | OUTPATIENT
Start: 2021-10-04 | End: 2021-10-04 | Stop reason: HOSPADM

## 2021-10-04 RX ORDER — HYDROMORPHONE HCL/PF 1 MG/ML
SYRINGE (ML) INJECTION AS NEEDED
Status: DISCONTINUED | OUTPATIENT
Start: 2021-10-04 | End: 2021-10-04

## 2021-10-04 RX ORDER — OXYCODONE HCL 5 MG/5 ML
5 SOLUTION, ORAL ORAL EVERY 4 HOURS PRN
Status: DISCONTINUED | OUTPATIENT
Start: 2021-10-04 | End: 2021-10-06 | Stop reason: HOSPADM

## 2021-10-04 RX ORDER — KETAMINE HYDROCHLORIDE 50 MG/ML
INJECTION, SOLUTION, CONCENTRATE INTRAMUSCULAR; INTRAVENOUS AS NEEDED
Status: DISCONTINUED | OUTPATIENT
Start: 2021-10-04 | End: 2021-10-04

## 2021-10-04 RX ORDER — HEPARIN SODIUM 5000 [USP'U]/ML
5000 INJECTION, SOLUTION INTRAVENOUS; SUBCUTANEOUS
Status: COMPLETED | OUTPATIENT
Start: 2021-10-04 | End: 2021-10-04

## 2021-10-04 RX ORDER — ACETAMINOPHEN 160 MG/5ML
975 SUSPENSION, ORAL (FINAL DOSE FORM) ORAL EVERY 8 HOURS
Status: DISCONTINUED | OUTPATIENT
Start: 2021-10-04 | End: 2021-10-06 | Stop reason: HOSPADM

## 2021-10-04 RX ORDER — SODIUM CHLORIDE, SODIUM LACTATE, POTASSIUM CHLORIDE, CALCIUM CHLORIDE 600; 310; 30; 20 MG/100ML; MG/100ML; MG/100ML; MG/100ML
INJECTION, SOLUTION INTRAVENOUS CONTINUOUS PRN
Status: DISCONTINUED | OUTPATIENT
Start: 2021-10-04 | End: 2021-10-04

## 2021-10-04 RX ORDER — DIPHENHYDRAMINE HCL 25 MG
25 TABLET ORAL EVERY 8 HOURS PRN
Status: DISCONTINUED | OUTPATIENT
Start: 2021-10-04 | End: 2021-10-06 | Stop reason: HOSPADM

## 2021-10-04 RX ORDER — MAGNESIUM HYDROXIDE 1200 MG/15ML
LIQUID ORAL AS NEEDED
Status: DISCONTINUED | OUTPATIENT
Start: 2021-10-04 | End: 2021-10-04 | Stop reason: HOSPADM

## 2021-10-04 RX ORDER — LIDOCAINE HYDROCHLORIDE 20 MG/ML
INJECTION, SOLUTION EPIDURAL; INFILTRATION; INTRACAUDAL; PERINEURAL AS NEEDED
Status: DISCONTINUED | OUTPATIENT
Start: 2021-10-04 | End: 2021-10-04

## 2021-10-04 RX ORDER — GLYCOPYRROLATE 0.2 MG/ML
INJECTION INTRAMUSCULAR; INTRAVENOUS AS NEEDED
Status: DISCONTINUED | OUTPATIENT
Start: 2021-10-04 | End: 2021-10-04

## 2021-10-04 RX ORDER — ACETAMINOPHEN 325 MG/1
975 TABLET ORAL EVERY 8 HOURS
Status: DISCONTINUED | OUTPATIENT
Start: 2021-10-04 | End: 2021-10-06 | Stop reason: HOSPADM

## 2021-10-04 RX ORDER — PROPOFOL 10 MG/ML
INJECTION, EMULSION INTRAVENOUS AS NEEDED
Status: DISCONTINUED | OUTPATIENT
Start: 2021-10-04 | End: 2021-10-04

## 2021-10-04 RX ORDER — ONDANSETRON 2 MG/ML
4 INJECTION INTRAMUSCULAR; INTRAVENOUS EVERY 6 HOURS PRN
Status: DISCONTINUED | OUTPATIENT
Start: 2021-10-04 | End: 2021-10-06 | Stop reason: HOSPADM

## 2021-10-04 RX ORDER — CELECOXIB 200 MG/1
200 CAPSULE ORAL ONCE
Status: COMPLETED | OUTPATIENT
Start: 2021-10-04 | End: 2021-10-04

## 2021-10-04 RX ORDER — HYDROMORPHONE HCL/PF 1 MG/ML
0.5 SYRINGE (ML) INJECTION
Status: DISCONTINUED | OUTPATIENT
Start: 2021-10-04 | End: 2021-10-04 | Stop reason: HOSPADM

## 2021-10-04 RX ORDER — ONDANSETRON 2 MG/ML
INJECTION INTRAMUSCULAR; INTRAVENOUS AS NEEDED
Status: DISCONTINUED | OUTPATIENT
Start: 2021-10-04 | End: 2021-10-04

## 2021-10-04 RX ORDER — NEOSTIGMINE METHYLSULFATE 1 MG/ML
INJECTION INTRAVENOUS AS NEEDED
Status: DISCONTINUED | OUTPATIENT
Start: 2021-10-04 | End: 2021-10-04

## 2021-10-04 RX ORDER — PROMETHAZINE HYDROCHLORIDE 25 MG/ML
12.5 INJECTION, SOLUTION INTRAMUSCULAR; INTRAVENOUS EVERY 4 HOURS PRN
Status: DISCONTINUED | OUTPATIENT
Start: 2021-10-04 | End: 2021-10-04 | Stop reason: HOSPADM

## 2021-10-04 RX ORDER — PROMETHAZINE HYDROCHLORIDE 25 MG/ML
25 INJECTION, SOLUTION INTRAMUSCULAR; INTRAVENOUS EVERY 6 HOURS PRN
Status: DISCONTINUED | OUTPATIENT
Start: 2021-10-04 | End: 2021-10-06 | Stop reason: HOSPADM

## 2021-10-04 RX ORDER — DEXAMETHASONE SODIUM PHOSPHATE 4 MG/ML
INJECTION, SOLUTION INTRA-ARTICULAR; INTRALESIONAL; INTRAMUSCULAR; INTRAVENOUS; SOFT TISSUE AS NEEDED
Status: DISCONTINUED | OUTPATIENT
Start: 2021-10-04 | End: 2021-10-04

## 2021-10-04 RX ORDER — CEFAZOLIN SODIUM 2 G/50ML
2000 SOLUTION INTRAVENOUS EVERY 8 HOURS
Status: COMPLETED | OUTPATIENT
Start: 2021-10-04 | End: 2021-10-05

## 2021-10-04 RX ORDER — SODIUM CHLORIDE 9 MG/ML
125 INJECTION, SOLUTION INTRAVENOUS CONTINUOUS
Status: DISCONTINUED | OUTPATIENT
Start: 2021-10-04 | End: 2021-10-06 | Stop reason: HOSPADM

## 2021-10-04 RX ORDER — FENTANYL CITRATE 50 UG/ML
50 INJECTION, SOLUTION INTRAMUSCULAR; INTRAVENOUS
Status: DISCONTINUED | OUTPATIENT
Start: 2021-10-04 | End: 2021-10-04 | Stop reason: HOSPADM

## 2021-10-04 RX ORDER — ROCURONIUM BROMIDE 10 MG/ML
INJECTION, SOLUTION INTRAVENOUS AS NEEDED
Status: DISCONTINUED | OUTPATIENT
Start: 2021-10-04 | End: 2021-10-04

## 2021-10-04 RX ORDER — MIDAZOLAM HYDROCHLORIDE 2 MG/2ML
INJECTION, SOLUTION INTRAMUSCULAR; INTRAVENOUS AS NEEDED
Status: DISCONTINUED | OUTPATIENT
Start: 2021-10-04 | End: 2021-10-04

## 2021-10-04 RX ORDER — GABAPENTIN 300 MG/1
300 CAPSULE ORAL ONCE
Status: COMPLETED | OUTPATIENT
Start: 2021-10-04 | End: 2021-10-04

## 2021-10-04 RX ORDER — SODIUM CHLORIDE, SODIUM LACTATE, POTASSIUM CHLORIDE, CALCIUM CHLORIDE 600; 310; 30; 20 MG/100ML; MG/100ML; MG/100ML; MG/100ML
100 INJECTION, SOLUTION INTRAVENOUS CONTINUOUS
Status: DISCONTINUED | OUTPATIENT
Start: 2021-10-04 | End: 2021-10-06 | Stop reason: HOSPADM

## 2021-10-04 RX ORDER — EPHEDRINE SULFATE 50 MG/ML
INJECTION INTRAVENOUS AS NEEDED
Status: DISCONTINUED | OUTPATIENT
Start: 2021-10-04 | End: 2021-10-04

## 2021-10-04 RX ORDER — OXYCODONE HCL 5 MG/5 ML
10 SOLUTION, ORAL ORAL EVERY 4 HOURS PRN
Status: DISCONTINUED | OUTPATIENT
Start: 2021-10-04 | End: 2021-10-06 | Stop reason: HOSPADM

## 2021-10-04 RX ORDER — ACETAMINOPHEN 325 MG/1
975 TABLET ORAL ONCE
Status: DISCONTINUED | OUTPATIENT
Start: 2021-10-04 | End: 2021-10-04 | Stop reason: HOSPADM

## 2021-10-04 RX ORDER — CEFAZOLIN SODIUM 2 G/50ML
2000 SOLUTION INTRAVENOUS ONCE
Status: DISCONTINUED | OUTPATIENT
Start: 2021-10-04 | End: 2021-10-04 | Stop reason: HOSPADM

## 2021-10-04 RX ORDER — SCOLOPAMINE TRANSDERMAL SYSTEM 1 MG/1
1 PATCH, EXTENDED RELEASE TRANSDERMAL ONCE
Status: DISCONTINUED | OUTPATIENT
Start: 2021-10-04 | End: 2021-10-06 | Stop reason: HOSPADM

## 2021-10-04 RX ORDER — MORPHINE SULFATE 4 MG/ML
4 INJECTION, SOLUTION INTRAMUSCULAR; INTRAVENOUS EVERY 4 HOURS PRN
Status: DISCONTINUED | OUTPATIENT
Start: 2021-10-04 | End: 2021-10-06 | Stop reason: HOSPADM

## 2021-10-04 RX ADMIN — SODIUM CHLORIDE, SODIUM LACTATE, POTASSIUM CHLORIDE, AND CALCIUM CHLORIDE: .6; .31; .03; .02 INJECTION, SOLUTION INTRAVENOUS at 10:08

## 2021-10-04 RX ADMIN — KETAMINE HYDROCHLORIDE 50 MG: 50 INJECTION INTRAMUSCULAR; INTRAVENOUS at 07:34

## 2021-10-04 RX ADMIN — SODIUM CHLORIDE, SODIUM LACTATE, POTASSIUM CHLORIDE, AND CALCIUM CHLORIDE 100 ML/HR: .6; .31; .03; .02 INJECTION, SOLUTION INTRAVENOUS at 14:07

## 2021-10-04 RX ADMIN — DEXAMETHASONE SODIUM PHOSPHATE 8 MG: 4 INJECTION INTRA-ARTICULAR; INTRALESIONAL; INTRAMUSCULAR; INTRAVENOUS; SOFT TISSUE at 07:37

## 2021-10-04 RX ADMIN — EPHEDRINE SULFATE 2.5 MG: 50 INJECTION, SOLUTION INTRAVENOUS at 07:38

## 2021-10-04 RX ADMIN — ROCURONIUM BROMIDE 10 MG: 50 INJECTION, SOLUTION INTRAVENOUS at 08:39

## 2021-10-04 RX ADMIN — SCOPALAMINE 1 PATCH: 1 PATCH, EXTENDED RELEASE TRANSDERMAL at 05:33

## 2021-10-04 RX ADMIN — OXYCODONE HYDROCHLORIDE 10 MG: 5 SOLUTION ORAL at 17:38

## 2021-10-04 RX ADMIN — GLYCOPYRROLATE 0.4 MG: 0.2 INJECTION, SOLUTION INTRAMUSCULAR; INTRAVENOUS at 10:42

## 2021-10-04 RX ADMIN — Medication 500 MG: at 07:37

## 2021-10-04 RX ADMIN — CEFAZOLIN SODIUM 2000 MG: 2 SOLUTION INTRAVENOUS at 15:55

## 2021-10-04 RX ADMIN — ONDANSETRON 4 MG: 2 INJECTION INTRAMUSCULAR; INTRAVENOUS at 07:37

## 2021-10-04 RX ADMIN — EPHEDRINE SULFATE 10 MG: 50 INJECTION, SOLUTION INTRAVENOUS at 07:47

## 2021-10-04 RX ADMIN — SIMETHICONE 80 MG: 80 TABLET, CHEWABLE ORAL at 21:17

## 2021-10-04 RX ADMIN — ROCURONIUM BROMIDE 50 MG: 50 INJECTION, SOLUTION INTRAVENOUS at 07:34

## 2021-10-04 RX ADMIN — SODIUM CHLORIDE, SODIUM LACTATE, POTASSIUM CHLORIDE, AND CALCIUM CHLORIDE: .6; .31; .03; .02 INJECTION, SOLUTION INTRAVENOUS at 08:12

## 2021-10-04 RX ADMIN — FAMOTIDINE 20 MG: 10 INJECTION INTRAVENOUS at 21:17

## 2021-10-04 RX ADMIN — ROCURONIUM BROMIDE 20 MG: 50 INJECTION, SOLUTION INTRAVENOUS at 08:03

## 2021-10-04 RX ADMIN — ONDANSETRON 4 MG: 2 INJECTION INTRAMUSCULAR; INTRAVENOUS at 10:04

## 2021-10-04 RX ADMIN — FENTANYL CITRATE 100 MCG: 50 INJECTION INTRAMUSCULAR; INTRAVENOUS at 07:34

## 2021-10-04 RX ADMIN — NEOSTIGMINE METHYLSULFATE 3 MG: 1 INJECTION INTRAVENOUS at 10:42

## 2021-10-04 RX ADMIN — SODIUM CHLORIDE 125 ML/HR: 0.9 INJECTION, SOLUTION INTRAVENOUS at 06:41

## 2021-10-04 RX ADMIN — FENTANYL CITRATE 50 MCG: 50 INJECTION INTRAMUSCULAR; INTRAVENOUS at 11:00

## 2021-10-04 RX ADMIN — LIDOCAINE HYDROCHLORIDE 100 MG: 20 INJECTION, SOLUTION EPIDURAL; INFILTRATION; INTRACAUDAL; PERINEURAL at 07:34

## 2021-10-04 RX ADMIN — MORPHINE SULFATE 4 MG: 4 INJECTION INTRAVENOUS at 14:06

## 2021-10-04 RX ADMIN — MIDAZOLAM 2 MG: 1 INJECTION INTRAMUSCULAR; INTRAVENOUS at 07:23

## 2021-10-04 RX ADMIN — FENTANYL CITRATE 50 MCG: 50 INJECTION INTRAMUSCULAR; INTRAVENOUS at 11:59

## 2021-10-04 RX ADMIN — Medication 3000 MG: at 07:16

## 2021-10-04 RX ADMIN — SODIUM CHLORIDE, SODIUM LACTATE, POTASSIUM CHLORIDE, AND CALCIUM CHLORIDE: .6; .31; .03; .02 INJECTION, SOLUTION INTRAVENOUS at 10:40

## 2021-10-04 RX ADMIN — HEPARIN SODIUM 5000 UNITS: 5000 INJECTION INTRAVENOUS; SUBCUTANEOUS at 06:00

## 2021-10-04 RX ADMIN — EPHEDRINE SULFATE 7.5 MG: 50 INJECTION, SOLUTION INTRAVENOUS at 07:44

## 2021-10-04 RX ADMIN — FAMOTIDINE 20 MG: 10 INJECTION INTRAVENOUS at 14:07

## 2021-10-04 RX ADMIN — METRONIDAZOLE 500 MG: 500 INJECTION, SOLUTION INTRAVENOUS at 22:50

## 2021-10-04 RX ADMIN — PROPOFOL 200 MG: 10 INJECTION, EMULSION INTRAVENOUS at 07:34

## 2021-10-04 RX ADMIN — HYDROMORPHONE HYDROCHLORIDE 0.5 MG: 1 INJECTION, SOLUTION INTRAMUSCULAR; INTRAVENOUS; SUBCUTANEOUS at 08:43

## 2021-10-04 RX ADMIN — FENTANYL CITRATE 50 MCG: 50 INJECTION INTRAMUSCULAR; INTRAVENOUS at 10:47

## 2021-10-04 RX ADMIN — GABAPENTIN 300 MG: 300 CAPSULE ORAL at 05:33

## 2021-10-04 RX ADMIN — CELECOXIB 200 MG: 200 CAPSULE ORAL at 05:33

## 2021-10-04 RX ADMIN — ONDANSETRON 4 MG: 2 INJECTION INTRAMUSCULAR; INTRAVENOUS at 19:36

## 2021-10-04 RX ADMIN — ROCURONIUM BROMIDE 20 MG: 50 INJECTION, SOLUTION INTRAVENOUS at 08:56

## 2021-10-04 RX ADMIN — CEFAZOLIN SODIUM 2000 MG: 2 SOLUTION INTRAVENOUS at 22:49

## 2021-10-04 RX ADMIN — EPHEDRINE SULFATE 5 MG: 50 INJECTION, SOLUTION INTRAVENOUS at 07:41

## 2021-10-04 RX ADMIN — ROCURONIUM BROMIDE 15 MG: 50 INJECTION, SOLUTION INTRAVENOUS at 09:51

## 2021-10-04 RX ADMIN — METRONIDAZOLE 500 MG: 500 INJECTION, SOLUTION INTRAVENOUS at 16:35

## 2021-10-04 RX ADMIN — ACETAMINOPHEN 975 MG: 325 SUSPENSION ORAL at 21:16

## 2021-10-05 DIAGNOSIS — E66.01 CLASS 3 SEVERE OBESITY DUE TO EXCESS CALORIES WITHOUT SERIOUS COMORBIDITY WITH BODY MASS INDEX (BMI) OF 50.0 TO 59.9 IN ADULT (HCC): Primary | ICD-10-CM

## 2021-10-05 LAB
ANION GAP SERPL CALCULATED.3IONS-SCNC: 10 MMOL/L (ref 4–13)
BUN SERPL-MCNC: 6 MG/DL (ref 5–25)
CALCIUM SERPL-MCNC: 9 MG/DL (ref 8.3–10.1)
CHLORIDE SERPL-SCNC: 105 MMOL/L (ref 100–108)
CO2 SERPL-SCNC: 27 MMOL/L (ref 21–32)
CREAT SERPL-MCNC: 0.88 MG/DL (ref 0.6–1.3)
ERYTHROCYTE [DISTWIDTH] IN BLOOD BY AUTOMATED COUNT: 14.4 % (ref 11.6–15.1)
GFR SERPL CREATININE-BSD FRML MDRD: 87 ML/MIN/1.73SQ M
GLUCOSE SERPL-MCNC: 104 MG/DL (ref 65–140)
HCT VFR BLD AUTO: 40.4 % (ref 34.8–46.1)
HGB BLD-MCNC: 12.6 G/DL (ref 11.5–15.4)
MCH RBC QN AUTO: 27.5 PG (ref 26.8–34.3)
MCHC RBC AUTO-ENTMCNC: 31.2 G/DL (ref 31.4–37.4)
MCV RBC AUTO: 88 FL (ref 82–98)
PLATELET # BLD AUTO: 319 THOUSANDS/UL (ref 149–390)
PMV BLD AUTO: 10.8 FL (ref 8.9–12.7)
POTASSIUM SERPL-SCNC: 3.8 MMOL/L (ref 3.5–5.3)
RBC # BLD AUTO: 4.59 MILLION/UL (ref 3.81–5.12)
SODIUM SERPL-SCNC: 142 MMOL/L (ref 136–145)
WBC # BLD AUTO: 14.04 THOUSAND/UL (ref 4.31–10.16)

## 2021-10-05 PROCEDURE — 80048 BASIC METABOLIC PNL TOTAL CA: CPT | Performed by: PHYSICIAN ASSISTANT

## 2021-10-05 PROCEDURE — 85027 COMPLETE CBC AUTOMATED: CPT | Performed by: PHYSICIAN ASSISTANT

## 2021-10-05 PROCEDURE — 99024 POSTOP FOLLOW-UP VISIT: CPT | Performed by: STUDENT IN AN ORGANIZED HEALTH CARE EDUCATION/TRAINING PROGRAM

## 2021-10-05 RX ADMIN — FAMOTIDINE 20 MG: 10 INJECTION INTRAVENOUS at 21:05

## 2021-10-05 RX ADMIN — ACETAMINOPHEN 975 MG: 325 SUSPENSION ORAL at 13:25

## 2021-10-05 RX ADMIN — OXYCODONE HYDROCHLORIDE 10 MG: 5 SOLUTION ORAL at 00:56

## 2021-10-05 RX ADMIN — OXYCODONE HYDROCHLORIDE 10 MG: 5 SOLUTION ORAL at 18:20

## 2021-10-05 RX ADMIN — FAMOTIDINE 20 MG: 10 INJECTION INTRAVENOUS at 08:37

## 2021-10-05 RX ADMIN — SODIUM CHLORIDE, SODIUM LACTATE, POTASSIUM CHLORIDE, AND CALCIUM CHLORIDE 100 ML/HR: .6; .31; .03; .02 INJECTION, SOLUTION INTRAVENOUS at 02:00

## 2021-10-05 RX ADMIN — OXYCODONE HYDROCHLORIDE 10 MG: 5 SOLUTION ORAL at 08:36

## 2021-10-05 RX ADMIN — ONDANSETRON 4 MG: 2 INJECTION INTRAMUSCULAR; INTRAVENOUS at 08:37

## 2021-10-05 RX ADMIN — SODIUM CHLORIDE, SODIUM LACTATE, POTASSIUM CHLORIDE, AND CALCIUM CHLORIDE 100 ML/HR: .6; .31; .03; .02 INJECTION, SOLUTION INTRAVENOUS at 19:42

## 2021-10-05 RX ADMIN — SIMETHICONE 80 MG: 80 TABLET, CHEWABLE ORAL at 14:49

## 2021-10-05 RX ADMIN — ACETAMINOPHEN 975 MG: 325 SUSPENSION ORAL at 05:20

## 2021-10-05 RX ADMIN — ACETAMINOPHEN 975 MG: 325 SUSPENSION ORAL at 21:04

## 2021-10-05 RX ADMIN — SODIUM CHLORIDE, SODIUM LACTATE, POTASSIUM CHLORIDE, AND CALCIUM CHLORIDE 100 ML/HR: .6; .31; .03; .02 INJECTION, SOLUTION INTRAVENOUS at 09:43

## 2021-10-05 RX ADMIN — Medication 2 SPRAY: at 00:57

## 2021-10-06 VITALS
DIASTOLIC BLOOD PRESSURE: 75 MMHG | RESPIRATION RATE: 18 BRPM | SYSTOLIC BLOOD PRESSURE: 125 MMHG | WEIGHT: 293 LBS | BODY MASS INDEX: 45.99 KG/M2 | TEMPERATURE: 98.2 F | OXYGEN SATURATION: 93 % | HEIGHT: 67 IN | HEART RATE: 83 BPM

## 2021-10-06 PROCEDURE — NC001 PR NO CHARGE: Performed by: SURGERY

## 2021-10-06 PROCEDURE — 99024 POSTOP FOLLOW-UP VISIT: CPT | Performed by: STUDENT IN AN ORGANIZED HEALTH CARE EDUCATION/TRAINING PROGRAM

## 2021-10-06 RX ORDER — ACETAMINOPHEN 160 MG/5ML
975 SUSPENSION, ORAL (FINAL DOSE FORM) ORAL EVERY 8 HOURS
Qty: 638.4 ML | Refills: 0 | Status: SHIPPED | OUTPATIENT
Start: 2021-10-06 | End: 2021-10-13

## 2021-10-06 RX ORDER — OMEPRAZOLE 20 MG/1
20 CAPSULE, DELAYED RELEASE ORAL DAILY
Qty: 30 CAPSULE | Refills: 3 | Status: SHIPPED | OUTPATIENT
Start: 2021-10-06 | End: 2021-11-01 | Stop reason: SDUPTHER

## 2021-10-06 RX ADMIN — ACETAMINOPHEN 975 MG: 325 SUSPENSION ORAL at 04:14

## 2021-10-06 RX ADMIN — OXYCODONE HYDROCHLORIDE 5 MG: 5 SOLUTION ORAL at 08:00

## 2021-10-06 RX ADMIN — FAMOTIDINE 20 MG: 10 INJECTION INTRAVENOUS at 08:00

## 2021-10-07 ENCOUNTER — TELEPHONE (OUTPATIENT)
Dept: MEDSURG UNIT | Facility: HOSPITAL | Age: 31
End: 2021-10-07

## 2021-10-07 ENCOUNTER — TRANSITIONAL CARE MANAGEMENT (OUTPATIENT)
Dept: FAMILY MEDICINE CLINIC | Facility: CLINIC | Age: 31
End: 2021-10-07

## 2021-10-07 ENCOUNTER — TELEPHONE (OUTPATIENT)
Dept: FAMILY MEDICINE CLINIC | Facility: CLINIC | Age: 31
End: 2021-10-07

## 2021-10-08 ENCOUNTER — OFFICE VISIT (OUTPATIENT)
Dept: FAMILY MEDICINE CLINIC | Facility: CLINIC | Age: 31
End: 2021-10-08
Payer: COMMERCIAL

## 2021-10-08 VITALS
TEMPERATURE: 99.1 F | DIASTOLIC BLOOD PRESSURE: 80 MMHG | HEART RATE: 97 BPM | OXYGEN SATURATION: 97 % | HEIGHT: 66 IN | BODY MASS INDEX: 47.09 KG/M2 | WEIGHT: 293 LBS | SYSTOLIC BLOOD PRESSURE: 120 MMHG

## 2021-10-08 DIAGNOSIS — E66.01 CLASS 3 SEVERE OBESITY DUE TO EXCESS CALORIES WITHOUT SERIOUS COMORBIDITY WITH BODY MASS INDEX (BMI) OF 50.0 TO 59.9 IN ADULT (HCC): Primary | ICD-10-CM

## 2021-10-08 DIAGNOSIS — R10.84 GENERALIZED ABDOMINAL PAIN: ICD-10-CM

## 2021-10-08 DIAGNOSIS — R14.2 BELCHING: ICD-10-CM

## 2021-10-08 PROCEDURE — 99496 TRANSJ CARE MGMT HIGH F2F 7D: CPT | Performed by: PHYSICIAN ASSISTANT

## 2021-10-08 RX ORDER — LIDOCAINE 50 MG/G
PATCH TOPICAL
COMMUNITY
Start: 2021-09-29

## 2021-10-08 RX ORDER — POLYETHYLENE GLYCOL 3350 17 G/17G
17 POWDER, FOR SOLUTION ORAL DAILY
COMMUNITY

## 2021-10-14 ENCOUNTER — OFFICE VISIT (OUTPATIENT)
Dept: BARIATRICS | Facility: CLINIC | Age: 31
End: 2021-10-14

## 2021-10-14 VITALS
BODY MASS INDEX: 45.99 KG/M2 | DIASTOLIC BLOOD PRESSURE: 72 MMHG | TEMPERATURE: 99 F | SYSTOLIC BLOOD PRESSURE: 138 MMHG | HEART RATE: 92 BPM | HEIGHT: 67 IN | WEIGHT: 293 LBS

## 2021-10-14 DIAGNOSIS — E66.01 MORBID (SEVERE) OBESITY DUE TO EXCESS CALORIES (HCC): Primary | ICD-10-CM

## 2021-10-14 DIAGNOSIS — E66.01 CLASS 3 SEVERE OBESITY DUE TO EXCESS CALORIES WITHOUT SERIOUS COMORBIDITY WITH BODY MASS INDEX (BMI) OF 50.0 TO 59.9 IN ADULT (HCC): Primary | ICD-10-CM

## 2021-10-14 PROCEDURE — RECHECK: Performed by: DIETITIAN, REGISTERED

## 2021-10-14 PROCEDURE — 99024 POSTOP FOLLOW-UP VISIT: CPT | Performed by: SURGERY

## 2021-10-14 RX ORDER — OMEPRAZOLE 20 MG/1
20 CAPSULE, DELAYED RELEASE ORAL DAILY
Qty: 30 CAPSULE | Refills: 3 | OUTPATIENT
Start: 2021-10-14

## 2021-10-18 ENCOUNTER — OFFICE VISIT (OUTPATIENT)
Dept: FAMILY MEDICINE CLINIC | Facility: CLINIC | Age: 31
End: 2021-10-18
Payer: COMMERCIAL

## 2021-10-18 VITALS
SYSTOLIC BLOOD PRESSURE: 120 MMHG | HEIGHT: 66 IN | RESPIRATION RATE: 16 BRPM | WEIGHT: 293 LBS | DIASTOLIC BLOOD PRESSURE: 80 MMHG | BODY MASS INDEX: 47.09 KG/M2 | TEMPERATURE: 98.3 F | HEART RATE: 99 BPM | OXYGEN SATURATION: 99 %

## 2021-10-18 DIAGNOSIS — M54.16 RIGHT LUMBAR RADICULOPATHY: ICD-10-CM

## 2021-10-18 DIAGNOSIS — M79.604 ACUTE LEG PAIN, RIGHT: Primary | ICD-10-CM

## 2021-10-18 DIAGNOSIS — Z98.84 HISTORY OF BARIATRIC SURGERY: ICD-10-CM

## 2021-10-18 PROCEDURE — 3008F BODY MASS INDEX DOCD: CPT | Performed by: SURGERY

## 2021-10-18 PROCEDURE — 99214 OFFICE O/P EST MOD 30 MIN: CPT | Performed by: PHYSICIAN ASSISTANT

## 2021-10-18 PROCEDURE — 1111F DSCHRG MED/CURRENT MED MERGE: CPT | Performed by: PHYSICIAN ASSISTANT

## 2021-10-22 ENCOUNTER — HOSPITAL ENCOUNTER (OUTPATIENT)
Dept: NON INVASIVE DIAGNOSTICS | Facility: HOSPITAL | Age: 31
Discharge: HOME | End: 2021-10-22
Payer: COMMERCIAL

## 2021-10-22 DIAGNOSIS — M79.604 ACUTE LEG PAIN, RIGHT: ICD-10-CM

## 2021-10-22 PROCEDURE — 93971 EXTREMITY STUDY: CPT

## 2021-10-22 PROCEDURE — 93971 EXTREMITY STUDY: CPT | Performed by: SURGERY

## 2021-11-01 DIAGNOSIS — E66.01 CLASS 3 SEVERE OBESITY DUE TO EXCESS CALORIES WITHOUT SERIOUS COMORBIDITY WITH BODY MASS INDEX (BMI) OF 50.0 TO 59.9 IN ADULT (HCC): ICD-10-CM

## 2021-11-02 RX ORDER — OMEPRAZOLE 20 MG/1
20 CAPSULE, DELAYED RELEASE ORAL DAILY
Qty: 30 CAPSULE | Refills: 0 | Status: SHIPPED | OUTPATIENT
Start: 2021-11-02 | End: 2022-01-05 | Stop reason: SDUPTHER

## 2021-11-13 ENCOUNTER — OFFICE VISIT (OUTPATIENT)
Dept: URGENT CARE | Age: 31
End: 2021-11-13
Payer: COMMERCIAL

## 2021-11-13 VITALS
BODY MASS INDEX: 45 KG/M2 | HEIGHT: 66 IN | RESPIRATION RATE: 18 BRPM | OXYGEN SATURATION: 98 % | WEIGHT: 280 LBS | TEMPERATURE: 98.4 F | HEART RATE: 102 BPM

## 2021-11-13 DIAGNOSIS — R05.9 COUGH: Primary | ICD-10-CM

## 2021-11-13 PROCEDURE — U0005 INFEC AGEN DETEC AMPLI PROBE: HCPCS | Performed by: PHYSICIAN ASSISTANT

## 2021-11-13 PROCEDURE — U0003 INFECTIOUS AGENT DETECTION BY NUCLEIC ACID (DNA OR RNA); SEVERE ACUTE RESPIRATORY SYNDROME CORONAVIRUS 2 (SARS-COV-2) (CORONAVIRUS DISEASE [COVID-19]), AMPLIFIED PROBE TECHNIQUE, MAKING USE OF HIGH THROUGHPUT TECHNOLOGIES AS DESCRIBED BY CMS-2020-01-R: HCPCS | Performed by: PHYSICIAN ASSISTANT

## 2021-11-13 PROCEDURE — 99213 OFFICE O/P EST LOW 20 MIN: CPT | Performed by: PHYSICIAN ASSISTANT

## 2021-11-15 LAB — SARS-COV-2 RNA RESP QL NAA+PROBE: POSITIVE

## 2021-11-16 ENCOUNTER — TELEPHONE (OUTPATIENT)
Dept: URGENT CARE | Facility: MEDICAL CENTER | Age: 31
End: 2021-11-16

## 2021-11-17 ENCOUNTER — CLINICAL SUPPORT (OUTPATIENT)
Dept: BARIATRICS | Facility: CLINIC | Age: 31
End: 2021-11-17

## 2021-11-17 DIAGNOSIS — E66.01 CLASS 3 SEVERE OBESITY DUE TO EXCESS CALORIES WITHOUT SERIOUS COMORBIDITY WITH BODY MASS INDEX (BMI) OF 50.0 TO 59.9 IN ADULT (HCC): Primary | ICD-10-CM

## 2021-11-17 PROCEDURE — RECHECK: Performed by: DIETITIAN, REGISTERED

## 2021-11-22 ENCOUNTER — APPOINTMENT (EMERGENCY)
Dept: RADIOLOGY | Facility: HOSPITAL | Age: 31
End: 2021-11-22
Payer: COMMERCIAL

## 2021-11-22 ENCOUNTER — HOSPITAL ENCOUNTER (EMERGENCY)
Facility: HOSPITAL | Age: 31
Discharge: HOME | End: 2021-11-22
Attending: EMERGENCY MEDICINE | Admitting: EMERGENCY MEDICINE
Payer: COMMERCIAL

## 2021-11-22 VITALS
TEMPERATURE: 97.7 F | RESPIRATION RATE: 18 BRPM | DIASTOLIC BLOOD PRESSURE: 75 MMHG | SYSTOLIC BLOOD PRESSURE: 131 MMHG | OXYGEN SATURATION: 98 % | HEART RATE: 74 BPM

## 2021-11-22 DIAGNOSIS — U07.1 COVID-19: Primary | ICD-10-CM

## 2021-11-22 LAB
BACTERIA UR QL AUTO: ABNORMAL /HPF
BILIRUB UR QL STRIP: ABNORMAL
CLARITY UR: CLEAR
COLOR UR: YELLOW
EXT PREG TEST URINE: NORMAL
EXT. CONTROL ED NAV: NORMAL
GLUCOSE UR STRIP-MCNC: NEGATIVE MG/DL
HGB UR QL STRIP.AUTO: ABNORMAL
KETONES UR STRIP-MCNC: ABNORMAL MG/DL
LEUKOCYTE ESTERASE UR QL STRIP: ABNORMAL
MUCOUS THREADS UR QL AUTO: ABNORMAL
NITRITE UR QL STRIP: NEGATIVE
NON-SQ EPI CELLS URNS QL MICRO: ABNORMAL /HPF
PH UR STRIP.AUTO: 5.5 [PH] (ref 4.5–8)
PROT UR STRIP-MCNC: ABNORMAL MG/DL
RBC #/AREA URNS AUTO: ABNORMAL /HPF
SP GR UR STRIP.AUTO: >=1.03 (ref 1–1.03)
UROBILINOGEN UR QL STRIP.AUTO: 0.2 E.U./DL
WBC #/AREA URNS AUTO: ABNORMAL /HPF

## 2021-11-22 PROCEDURE — 81025 URINE PREGNANCY TEST: CPT | Performed by: EMERGENCY MEDICINE

## 2021-11-22 PROCEDURE — 81001 URINALYSIS AUTO W/SCOPE: CPT

## 2021-11-22 PROCEDURE — 99284 EMERGENCY DEPT VISIT MOD MDM: CPT | Performed by: EMERGENCY MEDICINE

## 2021-11-22 PROCEDURE — 99283 EMERGENCY DEPT VISIT LOW MDM: CPT

## 2021-11-22 PROCEDURE — 87086 URINE CULTURE/COLONY COUNT: CPT

## 2021-11-22 PROCEDURE — 71045 X-RAY EXAM CHEST 1 VIEW: CPT

## 2021-11-22 RX ORDER — BENZONATATE 100 MG/1
100 CAPSULE ORAL 3 TIMES DAILY PRN
Qty: 21 CAPSULE | Refills: 0 | Status: SHIPPED | OUTPATIENT
Start: 2021-11-22 | End: 2021-11-29

## 2021-11-22 RX ORDER — ONDANSETRON 4 MG/1
4 TABLET, FILM COATED ORAL EVERY 8 HOURS PRN
Qty: 7 TABLET | Refills: 0 | Status: SHIPPED | OUTPATIENT
Start: 2021-11-22

## 2021-11-23 LAB — BACTERIA UR CULT: NORMAL

## 2021-11-24 ENCOUNTER — TELEMEDICINE (OUTPATIENT)
Dept: FAMILY MEDICINE CLINIC | Facility: CLINIC | Age: 31
End: 2021-11-24
Payer: COMMERCIAL

## 2021-11-24 DIAGNOSIS — U07.1 COVID-19 VIRUS INFECTION: Primary | ICD-10-CM

## 2021-11-24 PROCEDURE — 1036F TOBACCO NON-USER: CPT | Performed by: PHYSICIAN ASSISTANT

## 2021-11-24 PROCEDURE — 99214 OFFICE O/P EST MOD 30 MIN: CPT | Performed by: PHYSICIAN ASSISTANT

## 2021-11-24 RX ORDER — ACETAMINOPHEN 160 MG/5ML
SOLUTION ORAL
COMMUNITY
Start: 2021-10-06

## 2021-12-08 ENCOUNTER — OFFICE VISIT (OUTPATIENT)
Dept: FAMILY MEDICINE CLINIC | Facility: CLINIC | Age: 31
End: 2021-12-08
Payer: COMMERCIAL

## 2021-12-08 VITALS
SYSTOLIC BLOOD PRESSURE: 130 MMHG | DIASTOLIC BLOOD PRESSURE: 78 MMHG | HEIGHT: 66 IN | TEMPERATURE: 98.9 F | RESPIRATION RATE: 20 BRPM | BODY MASS INDEX: 43.91 KG/M2 | HEART RATE: 97 BPM | OXYGEN SATURATION: 99 % | WEIGHT: 273.2 LBS

## 2021-12-08 DIAGNOSIS — R43.0 LOSS OF SMELL: ICD-10-CM

## 2021-12-08 DIAGNOSIS — Z23 NEED FOR INFLUENZA VACCINATION: ICD-10-CM

## 2021-12-08 DIAGNOSIS — M76.31 IT BAND SYNDROME, RIGHT: ICD-10-CM

## 2021-12-08 DIAGNOSIS — R43.2 LOSS OF TASTE: ICD-10-CM

## 2021-12-08 DIAGNOSIS — M79.651 RIGHT THIGH PAIN: Primary | ICD-10-CM

## 2021-12-08 PROCEDURE — 3008F BODY MASS INDEX DOCD: CPT | Performed by: PHYSICIAN ASSISTANT

## 2021-12-08 PROCEDURE — 3725F SCREEN DEPRESSION PERFORMED: CPT | Performed by: PHYSICIAN ASSISTANT

## 2021-12-08 PROCEDURE — 90686 IIV4 VACC NO PRSV 0.5 ML IM: CPT

## 2021-12-08 PROCEDURE — 99213 OFFICE O/P EST LOW 20 MIN: CPT | Performed by: PHYSICIAN ASSISTANT

## 2021-12-08 PROCEDURE — 1036F TOBACCO NON-USER: CPT | Performed by: PHYSICIAN ASSISTANT

## 2021-12-08 PROCEDURE — 90471 IMMUNIZATION ADMIN: CPT

## 2021-12-16 ENCOUNTER — EVALUATION (OUTPATIENT)
Dept: PHYSICAL THERAPY | Facility: REHABILITATION | Age: 31
End: 2021-12-16
Payer: COMMERCIAL

## 2021-12-16 DIAGNOSIS — M79.651 RIGHT THIGH PAIN: Primary | ICD-10-CM

## 2021-12-16 DIAGNOSIS — M76.31 IT BAND SYNDROME, RIGHT: ICD-10-CM

## 2021-12-16 PROCEDURE — 97110 THERAPEUTIC EXERCISES: CPT | Performed by: PHYSICAL THERAPIST

## 2021-12-16 PROCEDURE — 97162 PT EVAL MOD COMPLEX 30 MIN: CPT | Performed by: PHYSICAL THERAPIST

## 2021-12-23 ENCOUNTER — OFFICE VISIT (OUTPATIENT)
Dept: PHYSICAL THERAPY | Facility: REHABILITATION | Age: 31
End: 2021-12-23
Payer: COMMERCIAL

## 2021-12-23 DIAGNOSIS — M79.651 RIGHT THIGH PAIN: Primary | ICD-10-CM

## 2021-12-23 DIAGNOSIS — M76.31 IT BAND SYNDROME, RIGHT: ICD-10-CM

## 2021-12-23 PROCEDURE — 97110 THERAPEUTIC EXERCISES: CPT | Performed by: PHYSICAL THERAPIST

## 2021-12-23 PROCEDURE — 97010 HOT OR COLD PACKS THERAPY: CPT | Performed by: PHYSICAL THERAPIST

## 2021-12-23 PROCEDURE — 97140 MANUAL THERAPY 1/> REGIONS: CPT | Performed by: PHYSICAL THERAPIST

## 2021-12-28 ENCOUNTER — OFFICE VISIT (OUTPATIENT)
Dept: PHYSICAL THERAPY | Facility: REHABILITATION | Age: 31
End: 2021-12-28
Payer: COMMERCIAL

## 2021-12-28 DIAGNOSIS — M79.651 RIGHT THIGH PAIN: Primary | ICD-10-CM

## 2021-12-28 DIAGNOSIS — M76.31 IT BAND SYNDROME, RIGHT: ICD-10-CM

## 2021-12-28 PROCEDURE — 97110 THERAPEUTIC EXERCISES: CPT | Performed by: PHYSICAL THERAPIST

## 2021-12-28 PROCEDURE — 97140 MANUAL THERAPY 1/> REGIONS: CPT | Performed by: PHYSICAL THERAPIST

## 2021-12-28 PROCEDURE — 97112 NEUROMUSCULAR REEDUCATION: CPT | Performed by: PHYSICAL THERAPIST

## 2021-12-30 ENCOUNTER — OFFICE VISIT (OUTPATIENT)
Dept: PHYSICAL THERAPY | Facility: REHABILITATION | Age: 31
End: 2021-12-30
Payer: COMMERCIAL

## 2021-12-30 DIAGNOSIS — M76.31 IT BAND SYNDROME, RIGHT: ICD-10-CM

## 2021-12-30 DIAGNOSIS — M79.651 RIGHT THIGH PAIN: Primary | ICD-10-CM

## 2021-12-30 PROCEDURE — 97112 NEUROMUSCULAR REEDUCATION: CPT | Performed by: PHYSICAL THERAPIST

## 2021-12-30 PROCEDURE — 97140 MANUAL THERAPY 1/> REGIONS: CPT | Performed by: PHYSICAL THERAPIST

## 2021-12-30 PROCEDURE — 97010 HOT OR COLD PACKS THERAPY: CPT | Performed by: PHYSICAL THERAPIST

## 2021-12-30 PROCEDURE — 97110 THERAPEUTIC EXERCISES: CPT | Performed by: PHYSICAL THERAPIST

## 2022-01-04 ENCOUNTER — OFFICE VISIT (OUTPATIENT)
Dept: PHYSICAL THERAPY | Facility: REHABILITATION | Age: 32
End: 2022-01-04
Payer: MEDICARE

## 2022-01-04 DIAGNOSIS — M79.651 RIGHT THIGH PAIN: Primary | ICD-10-CM

## 2022-01-04 DIAGNOSIS — M76.31 IT BAND SYNDROME, RIGHT: ICD-10-CM

## 2022-01-04 PROCEDURE — 97110 THERAPEUTIC EXERCISES: CPT | Performed by: PHYSICAL THERAPIST

## 2022-01-04 PROCEDURE — 97140 MANUAL THERAPY 1/> REGIONS: CPT | Performed by: PHYSICAL THERAPIST

## 2022-01-04 NOTE — PROGRESS NOTES
Daily Note     Today's date: 2022  Patient name: Christopher Palomares  : 1990  MRN: [id number]  Referring provider: Maya Botello PA-C  Dx:   Encounter Diagnosis     ICD-10-CM    1  Right thigh pain  M79 651    2  It band syndrome, right  M76 31                   Subjective: Pt comes to therapy reporting pain seems to be lessening in her R lateral thigh  States she continues to feel discomfort in area of distal lateral R thigh, proximal to the knee joint line  However, overall states she feels this is improving  Objective: See treatment diary below      Assessment: Tolerated treatment fair  When palpating with mild-mod pressure along lateral R thigh, in area of distal IT band, patient reported significant pain and tenderness  Upon further investigation, light touch to superficially to this area then produced significant hypersensitivity  Patient reported throbbing and burning in this area following palpation  Ice applied, in attempt to calm down irritated area, which ehlped to numb the knee  However, patient reported persistent pain and throbbing  Advised patient to continue with ice wrap and to perform Hs and ITB stretches at home as tolerated  Patient states she plans to contact PCP regarding obtaining diagnostic imaging of RLE  Patient would benefit from continued PT      Plan: Progress treatment as tolerated         Precautions: n/a    Daily Treatment Diary    Date         FOTO IE    nv        Re-Eval IE               Manuals    STM R ITB, hip flexor             Hip flexor stretch   KEVIN KEVIN         Lat hip mobs    KEVIN KEVIN KEVIN        Piriformis str  [address]        HS stretch  [address]        HS/nn glides  [address]                     Neuro Re-Ed                                                         Ther Ex    EOT HF stretch 30"x5            Supine hip abd clamshell    Black 5"2x10         Bridges     5"x10         Stand ITB stretch 30"x5  doorway 10"x5 doorway 10"x5 20"x5                     Piriformis stretch  20"x5 20"x5 20"x5         Hamstring stretch  20"x5 20"x5 20"x5 20"x5        90/90 glides  x10 3x10 3x10                                   Ther Activity    Treadmill                           Gait Training                              Modalities    MHP  10' pre supine  10' pre supine         CP PRN

## 2022-01-05 DIAGNOSIS — E66.01 CLASS 3 SEVERE OBESITY DUE TO EXCESS CALORIES WITHOUT SERIOUS COMORBIDITY WITH BODY MASS INDEX (BMI) OF 50.0 TO 59.9 IN ADULT (HCC): ICD-10-CM

## 2022-01-05 RX ORDER — OMEPRAZOLE 20 MG/1
20 CAPSULE, DELAYED RELEASE ORAL DAILY
Qty: 30 CAPSULE | Refills: 0 | Status: SHIPPED | OUTPATIENT
Start: 2022-01-05 | End: 2022-02-03

## 2022-01-05 NOTE — TELEPHONE ENCOUNTER
Called patient and let her know that her requested medication was approved and sent to her preferred pharmacy

## 2022-01-07 ENCOUNTER — OFFICE VISIT (OUTPATIENT)
Dept: PHYSICAL THERAPY | Facility: REHABILITATION | Age: 32
End: 2022-01-07
Payer: MEDICARE

## 2022-01-07 DIAGNOSIS — M76.31 IT BAND SYNDROME, RIGHT: ICD-10-CM

## 2022-01-07 DIAGNOSIS — M79.651 RIGHT THIGH PAIN: Primary | ICD-10-CM

## 2022-01-07 PROCEDURE — 97140 MANUAL THERAPY 1/> REGIONS: CPT | Performed by: PHYSICAL THERAPIST

## 2022-01-07 PROCEDURE — 97110 THERAPEUTIC EXERCISES: CPT | Performed by: PHYSICAL THERAPIST

## 2022-01-07 PROCEDURE — 97010 HOT OR COLD PACKS THERAPY: CPT | Performed by: PHYSICAL THERAPIST

## 2022-01-07 NOTE — PROGRESS NOTES
Daily Note     Today's date: 2022  Patient name: Dave Hernandez  : 1990  MRN: [id number]  Referring provider: Cherry Weller PA-C  Dx:   Encounter Diagnosis     ICD-10-CM    1  Right thigh pain  M79 651    2  It band syndrome, right  M76 31                   Subjective: Pt reports she has continued tenderness along lateral aspect of distal R thigh  Objective: See treatment diary below      Assessment: Tolerated treatment fair  Patient demonstrates high levels of guarding and apprehension towards manual therapy  Tenderness and tingling reported in area of distal R ITB  Reported increased tingling and pain with attempts at hip flexor stretch at EOT  Reported reduction of tingling in R thigh with PA mobs to L1-3 and prone on elbows  Reported improvement in tingling further with TPR to R piriformis  Edu patient RE: piriformis TPR with tennis ball and EOT ITB stretch with pillow support under foot for home  Patient would benefit from continued PT      Plan: Progress treatment as tolerated         Precautions: n/a    Daily Treatment Diary    Date        FOTO IE    nv nv       Re-Eval IE               Manuals    STM R ITB, hip flexor             Hip flexor stretch   KEVIN KEVIN         Lat hip mobs    KEVIN KEVIN KEVIN        Piriformis str  [address]        HS stretch  KEVIN KEVIN Denny Sees        HS/nn glides  KEVIN KEVIN KEVIN KEVIN        PA mobs L1-3      KEVIN Gr 4       TPR R piriformis      KEVIN                    Neuro Re-Ed                                                         Ther Ex    EOT HF stretch 30"x5            Supine hip abd clamshell    Black 5"2x10         Bridges     5"x10         Stand ITB stretch 30"x5  doorway 10"x5 doorway 10"x5 20"x5 EOT x2'                    Piriformis stretch  20"x5 20"x5 20"x5         Hamstring stretch  20"x5 20"x5 20"x5 20"x5        90/90 glides  x10 3x10 3x10                                   Ther Activity    Treadmill                           Gait Training Modalities    MHP  10' pre supine  10' pre supine  10' pre supine       CP PRN

## 2022-01-10 ENCOUNTER — OFFICE VISIT (OUTPATIENT)
Dept: PHYSICAL THERAPY | Facility: REHABILITATION | Age: 32
End: 2022-01-10
Payer: MEDICARE

## 2022-01-10 DIAGNOSIS — M76.31 IT BAND SYNDROME, RIGHT: Primary | ICD-10-CM

## 2022-01-10 DIAGNOSIS — M79.651 RIGHT THIGH PAIN: ICD-10-CM

## 2022-01-10 PROCEDURE — 97140 MANUAL THERAPY 1/> REGIONS: CPT | Performed by: PHYSICAL THERAPIST

## 2022-01-10 PROCEDURE — 97010 HOT OR COLD PACKS THERAPY: CPT | Performed by: PHYSICAL THERAPIST

## 2022-01-10 PROCEDURE — 97112 NEUROMUSCULAR REEDUCATION: CPT | Performed by: PHYSICAL THERAPIST

## 2022-01-10 PROCEDURE — 97110 THERAPEUTIC EXERCISES: CPT | Performed by: PHYSICAL THERAPIST

## 2022-01-10 NOTE — PROGRESS NOTES
Daily Note     Today's date: 1/10/2022  Patient name: Tone Celestin  : 1990  MRN: [id number]  Referring provider: Luis Omalley PA-C  Dx:   Encounter Diagnosis     ICD-10-CM    1  It band syndrome, right  M76 31    2  Right thigh pain  M79 651                   Subjective: Pt reports improvements in right lateral LE symptoms  Denies notable tingling or throbbing upon arrival to therapy today, however, describes symptoms as "sore " States she had an easy weekend and did not over extend herself  Notes she was also applying ice throughout the weekend  Notes she found L-sidelying ITB stretch the most beneficial        Objective: See treatment diary below      Assessment: Tolerated treatment well  TTP over R piriformis, however, denied distal symptoms  Patient exhibited good technique with therapeutic exercises and would benefit from continued PT      Plan: Progress treatment as tolerated         Precautions: n/a    Daily Treatment Diary    Date 12/16 12/23 12/28 12/30 1/4 1/7 1/10      FOTO IE    nv nv perf      Re-Eval IE               Manuals    STM R ITB, hip flexor             Hip flexor stretch   KEVIN KEVIN         Lat hip mobs    KEVIN KEVIN KEVIN        Piriformis str  [address]        HS stretch  KEVIN KEVIN Sebastián Double        HS/nn glides  KEVIN KEVIN KEVIN KEVIN        PA mobs L1-3      KEVIN Gr 4 KEVIN Gr 4      TPR R piriformis      KEVIN KEVIN                   Neuro Re-Ed                                                         Ther Ex    EOT HF stretch 30"x5            Supine hip abd clamshell    Black 5"2x10         Bridges     5"x10         Stand ITB stretch 30"x5  doorway 10"x5 doorway 10"x5 20"x5 EOT x2' c MH                   Piriformis stretch  20"x5 20"x5 20"x5   20"x5      Hamstring stretch  20"x5 20"x5 20"x5 20"x5  20"x5      90/90 glides  x10 3x10 3x10   3x10                                Ther Activity    Treadmill                           Gait Training                              Modalities    MHP  10' pre supine  10' pre supine  10' pre supine 10' pre c ITB str      CP PRN

## 2022-01-13 ENCOUNTER — OFFICE VISIT (OUTPATIENT)
Dept: FAMILY MEDICINE CLINIC | Facility: CLINIC | Age: 32
End: 2022-01-13
Payer: MEDICARE

## 2022-01-13 ENCOUNTER — OFFICE VISIT (OUTPATIENT)
Dept: PHYSICAL THERAPY | Facility: REHABILITATION | Age: 32
End: 2022-01-13
Payer: MEDICARE

## 2022-01-13 VITALS
BODY MASS INDEX: 41.33 KG/M2 | WEIGHT: 257.2 LBS | RESPIRATION RATE: 19 BRPM | HEIGHT: 66 IN | TEMPERATURE: 98.2 F | OXYGEN SATURATION: 99 % | DIASTOLIC BLOOD PRESSURE: 74 MMHG | SYSTOLIC BLOOD PRESSURE: 112 MMHG | HEART RATE: 89 BPM

## 2022-01-13 DIAGNOSIS — M79.651 RIGHT THIGH PAIN: Primary | ICD-10-CM

## 2022-01-13 DIAGNOSIS — M79.651 RIGHT THIGH PAIN: ICD-10-CM

## 2022-01-13 DIAGNOSIS — M76.31 IT BAND SYNDROME, RIGHT: Primary | ICD-10-CM

## 2022-01-13 DIAGNOSIS — R10.2 PERINEUM PAIN, FEMALE: ICD-10-CM

## 2022-01-13 PROCEDURE — 97140 MANUAL THERAPY 1/> REGIONS: CPT | Performed by: PHYSICAL THERAPIST

## 2022-01-13 PROCEDURE — 97112 NEUROMUSCULAR REEDUCATION: CPT | Performed by: PHYSICAL THERAPIST

## 2022-01-13 PROCEDURE — 99213 OFFICE O/P EST LOW 20 MIN: CPT | Performed by: PHYSICIAN ASSISTANT

## 2022-01-13 PROCEDURE — 97110 THERAPEUTIC EXERCISES: CPT | Performed by: PHYSICAL THERAPIST

## 2022-01-13 NOTE — PROGRESS NOTES
Daily Note     Today's date: 2022  Patient name: Jennie Pickard  : 1990  MRN: [id number]  Referring provider: Juan Carlos Bliss PA-C  Dx:   Encounter Diagnosis     ICD-10-CM    1  It band syndrome, right  M76 31    2  Right thigh pain  M79 651                   Subjective: Pt comes to therapy reporting less tingling in her LE  Notes mild soreness to the touch  Objective: See treatment diary below      Assessment: Tolerated treatment well  Patient demonstrated fatigue post treatment, exhibited good technique with therapeutic exercises and would benefit from continued PT      Plan: Progress treatment as tolerated         Precautions: n/a    Daily Treatment Diary    Date 12/16 12/23 12/28 12/30 1/4 1/7 1/10 1/13     FOTO IE    nv nv perf      Re-Eval IE               Manuals    STM R ITB, hip flexor             Hip flexor stretch   KEVIN KEVIN         Lat hip mobs    KEVIN KEVIN KEVIN        Piriformis str  [address]        HS stretch  KEVIN KEVIN KEVIN KEVIN        HS/nn glides  KEVIN KEVIN KEVIN KEVIN        PA mobs L1-3      KEVIN Gr 4 KEVIN Gr 4 KEVIN Gr 4     TPR R piriformis      KEVIN KEVIN KEVIN                  Neuro Re-Ed                                                         Ther Ex    EOT HF stretch 30"x5            Supine hip abd clamshell    Black 5"2x10         Bridges     5"x10         Stand ITB stretch 30"x5  doorway 10"x5 doorway 10"x5 20"x5 EOT x2' c MH                   Piriformis stretch  20"x5 20"x5 20"x5   20"x5 20"x5     Hamstring stretch  20"x5 20"x5 20"x5 20"x5  20"x5 20"x5     90/90 glides  x10 3x10 3x10   3x10 3x10                               Ther Activity    Treadmill                           Gait Training                              Modalities    MHP  10' pre supine  10' pre supine  10' pre supine 10' pre c ITB str np     CP PRN

## 2022-01-13 NOTE — PROGRESS NOTES
Assessment/Plan:    -I did order an MRI of the femur for persistent pain despite going to physical therapy     -I did recommend applying moist heat 3 times daily for 10 minutes as needed   -continue with physical therapy     -apply heat to the perineum region   -advised to call gynecology for a follow-up since not due for her routine for another 6 mo  -routine follow-up here in 3 months    M*Modal software was used to dictate this note  It may contain errors with dictating incorrect words/spelling  Please contact provider directly for any questions  Diagnoses and all orders for this visit:    Right thigh pain  -     MRI femur right w wo contrast; Future    Perineum pain, female          Subjective:      Patient ID: Sam Tejada is a 33 y o  female  Patient presents today for follow-up for persistent distal right thigh pain  She has been going to physical therapy and the tingling has resolved  She states that when the therapist touches the distal lateral thigh region she has quite a bit of pain  She denies any trauma  This is been ongoing since her bariatric surgery several months ago  She also states that she gets some pain and points to the perineum region when she has her menstrual cycle  She has her cycle right now  The following portions of the patient's history were reviewed and updated as appropriate:   She  has a past medical history of Back pain, Bacterial vaginosis, Bariatric surgery status, Chlamydia, Disease of thyroid gland, Knee pain, Morbidly obese ([address] ), MRSA carrier, Obesity, Postsurgical malabsorption, Thyroid disease, Urogenital trichomoniasis, and Wears glasses    She   Patient Active Problem List    Diagnosis Date Noted    Perineum pain, female 01/13/2022    Right thigh pain 12/08/2021    It band syndrome, right 12/08/2021    Loss of smell 12/08/2021    Loss of taste 12/08/2021    COVID-19 virus infection 11/24/2021    Acute leg pain, right 10/18/2021    History of bariatric surgery 10/18/2021    Belching 10/08/2021    Generalized abdominal pain 10/08/2021    Group B streptococcal bacteriuria 07/20/2021    Chlamydia infection 07/20/2021    Gastritis without bleeding 05/20/2021    Headache 01/14/2021    Parathyroid neoplasm 10/15/2020    Derang of medial meniscus due to old tear/inj, left knee 10/12/2020    Well adult exam 10/12/2020    Thyroid nodule 10/12/2020    Weight gain 10/12/2020    Cervical cancer screening 10/12/2020    Lipid screening 10/12/2020    Encounter for screening for HIV 10/12/2020    Radial styloid tenosynovitis of right hand 08/28/2020    Right wrist pain 08/28/2020    Class 3 severe obesity due to excess calories without serious comorbidity with body mass index (BMI) of 50 0 to 59 9 in adult [facility]) 07/08/2019    Chondromalacia 03/15/2019    Knee pain 03/15/2019    Right lumbar radiculopathy 02/22/2017     She  has a past surgical history that includes Tonsillectomy; Tooth extraction (10/01/2014); Wrist surgery (05/25/2021); and pr lap gastric bypass/shant-en-y (N/A, 10/4/2021)  Her family history includes Brain cancer in her mother; Cancer in her mother; Diabetes in her paternal grandmother; Hypertension in her father; No Known Problems in her daughter, maternal grandmother, paternal grandfather, and sister; Stroke in her paternal grandmother  She  reports that she has never smoked  She has never used smokeless tobacco  She reports previous alcohol use of about 3 0 standard drinks of alcohol per week  She reports that she does not use drugs    Current Outpatient Medications   Medication Sig Dispense Refill    Calcium Carbonate-Vit D-Min (CALCIUM 1200 PO) Take by mouth        Diclofenac Sodium (VOLTAREN) 1 %       Multiple Vitamin (multivitamin) tablet Take 1 tablet by mouth daily       omeprazole (PriLOSEC) 20 mg delayed release capsule Take 1 capsule (20 mg total) by mouth daily 30 capsule 0    ondansetron (ZOFRAN) 4 mg tablet Take 1 tablet (4 mg total) by mouth every 8 (eight) hours as needed for nausea or vomiting for up to 7 doses 7 tablet 0    polyethylene glycol (MIRALAX) 17 g packet Take 17 g by mouth daily TAKEN AS NEEDED       acetaminophen (TYLENOL) 160 mg/5 mL solution  (Patient not taking: Reported on 12/8/2021 )      gabapentin (NEURONTIN) 100 mg capsule Take 100 mg by mouth Three times a day      lidocaine (LIDODERM) 5 % PLEASE SEE ATTACHED FOR DETAILED DIRECTIONS (Patient not taking: Reported on 12/8/2021)       No current facility-administered medications for this visit  Current Outpatient Medications on File Prior to Visit   Medication Sig    Calcium Carbonate-Vit D-Min (CALCIUM 1200 PO) Take by mouth      Diclofenac Sodium (VOLTAREN) 1 %     Multiple Vitamin (multivitamin) tablet Take 1 tablet by mouth daily     omeprazole (PriLOSEC) 20 mg delayed release capsule Take 1 capsule (20 mg total) by mouth daily    ondansetron (ZOFRAN) 4 mg tablet Take 1 tablet (4 mg total) by mouth every 8 (eight) hours as needed for nausea or vomiting for up to 7 doses    polyethylene glycol (MIRALAX) 17 g packet Take 17 g by mouth daily TAKEN AS NEEDED     acetaminophen (TYLENOL) 160 mg/5 mL solution  (Patient not taking: Reported on 12/8/2021 )    gabapentin (NEURONTIN) 100 mg capsule Take 100 mg by mouth Three times a day    lidocaine (LIDODERM) 5 % PLEASE SEE ATTACHED FOR DETAILED DIRECTIONS (Patient not taking: Reported on 12/8/2021)     No current facility-administered medications on file prior to visit  She is allergic to zyrtec [cetirizine]       Review of Systems   Constitutional: Negative      Skin:        As stated in HPI         Objective:      /74 (BP Location: Left arm, Patient Position: Sitting, Cuff Size: Standard)   Pulse 89   Temp 98 2 °F (36 8 °C) (Tympanic)   Resp 19   Ht 5' 6" (1 676 m)   Wt 117 kg (257 lb 3 2 oz)   SpO2 99%   BMI 41 51 kg/m²          Physical Exam  Constitutional: General: She is not in acute distress  Appearance: Normal appearance  She is not ill-appearing, toxic-appearing or diaphoretic  HENT:      Head: Normocephalic and atraumatic  Genitourinary:     Comments: She does have tenderness in the region of the perineum  No palpable mass  No open area  Musculoskeletal:      Cervical back: Neck supple  Comments: Right thigh: She does have quite a bit of tenderness over the distal IT band  No knee effusion or joint line tenderness         Skin:     General: Skin is warm  Neurological:      General: No focal deficit present  Mental Status: She is alert  Psychiatric:         Mood and Affect: Mood normal          Behavior: Behavior normal          Thought Content:  Thought content normal          Judgment: Judgment normal

## 2022-01-14 ENCOUNTER — OFFICE VISIT (OUTPATIENT)
Dept: BARIATRICS | Facility: CLINIC | Age: 32
End: 2022-01-14
Payer: MEDICARE

## 2022-01-14 VITALS
BODY MASS INDEX: 40.02 KG/M2 | WEIGHT: 255 LBS | TEMPERATURE: 96.2 F | HEIGHT: 67 IN | SYSTOLIC BLOOD PRESSURE: 102 MMHG | DIASTOLIC BLOOD PRESSURE: 70 MMHG | HEART RATE: 72 BPM

## 2022-01-14 DIAGNOSIS — E66.01 OBESITY, CLASS III, BMI 40-49.9 (MORBID OBESITY) (HCC): ICD-10-CM

## 2022-01-14 DIAGNOSIS — Z48.815 ENCOUNTER FOR SURGICAL AFTERCARE FOLLOWING SURGERY OF DIGESTIVE SYSTEM: Primary | ICD-10-CM

## 2022-01-14 DIAGNOSIS — K91.2 POSTSURGICAL MALABSORPTION: ICD-10-CM

## 2022-01-14 DIAGNOSIS — Z98.84 BARIATRIC SURGERY STATUS: ICD-10-CM

## 2022-01-14 PROCEDURE — 99214 OFFICE O/P EST MOD 30 MIN: CPT | Performed by: NURSE PRACTITIONER

## 2022-01-14 PROCEDURE — 3008F BODY MASS INDEX DOCD: CPT | Performed by: PHYSICIAN ASSISTANT

## 2022-01-14 NOTE — PROGRESS NOTES
Assessment/Plan:     Patient ID: Tsering Rockwell is a 33 y o  female  Bariatric Surgery Status    -s/p Fani-En-Y Gastric Bypass with Dr Daniel Juárez on 10/04/2021  Presents to the office today for 2nd postop  Overall doing well  Continues to take her MVI and PPI daily  Tolerating a regular diet, denies any N/V/D/C, regurgitation, reflux or dysphagia  - Continue with PPI for now and will start tapering process at next visit  Continue with regular diet, increase protein intake and fluid intake  Take MVI and have labs done  Labs ordered  Follow up in 3 Months for 6 month post op visit  · Continued/Maintain healthy weight loss with good nutrition intakes  · Adequate hydration with at least 64oz  fluid intake  · Follow diet as discussed  · Follow vitamin and mineral recommendations as reviewed with you  · Exercise as tolerated  · Colonoscopy referral made: no of age range  · Mammogram - not of age range  · Follow-up in 3 months    We kindly ask that your arrive 15 minutes before your scheduled appointment time with your provider to allow our staff to room you, get your vital signs and update your chart  · Get lab work  Please call the office if you need a script  It is recommended to check with your insurance BEFORE getting labs done to make sure they are covered by your policy  · Call our office if you have any problems with abdominal pain especially associated with fever, chills, nausea, vomiting or any other concerns  · All  Post-bariatric surgery patients should be aware that very small quantities of any alcohol can cause impairment and it is very possible not to feel the effect  The effect can be in the system for several hours  It is also a stomach irritant  · It is advised to AVOID alcohol, Nonsteroidal antiinflammatory drugs (NSAIDS) and nicotine of all forms   Any of these can cause stomach irritation/pain      · Discussed the effects of alcohol on a bariatric patient and the increased impairment risk  · Keep up the good work! Postsurgical Malabsorption   -At risk for malabsorption of vitamins/minerals secondary to malabsorption and restriction of intake from bariatric surgery  -Currently taking adequate postop bariatric surgery vitamin supplementation  -Next set of bariatric labs ordered for approximately 2 weeks  -Patient received education about the importance of adhering to a lifelong supplementation regimen to avoid vitamin/mineral deficiencies      Diagnoses and all orders for this visit:    Encounter for surgical aftercare following surgery of digestive system  -     CBC and Platelet; Future  -     Comprehensive metabolic panel; Future  -     Ferritin; Future  -     Folate; Future  -     Zinc; Future  -     Vitamin D 25 hydroxy; Future  -     Vitamin B12; Future  -     Vitamin A; Future  -     Vitamin B1, whole blood; Future  -     PTH, intact; Future  -     Iron Saturation %; Future    Bariatric surgery status  -     CBC and Platelet; Future  -     Comprehensive metabolic panel; Future  -     Ferritin; Future  -     Folate; Future  -     Zinc; Future  -     Vitamin D 25 hydroxy; Future  -     Vitamin B12; Future  -     Vitamin A; Future  -     Vitamin B1, whole blood; Future  -     PTH, intact; Future  -     Iron Saturation %; Future    Postsurgical malabsorption  -     CBC and Platelet; Future  -     Comprehensive metabolic panel; Future  -     Ferritin; Future  -     Folate; Future  -     Zinc; Future  -     Vitamin D 25 hydroxy; Future  -     Vitamin B12; Future  -     Vitamin A; Future  -     Vitamin B1, whole blood; Future  -     PTH, intact; Future  -     Iron Saturation %; Future    Obesity, Class III, BMI 40-49 9 (morbid obesity) (HCC)  -     CBC and Platelet; Future  -     Comprehensive metabolic panel; Future  -     Ferritin; Future  -     Folate; Future  -     Zinc; Future  -     Vitamin D 25 hydroxy;  Future  -     Vitamin B12; Future  -     Vitamin A; Future  -     Vitamin B1, whole blood; Future  -     PTH, intact; Future  -     Iron Saturation %; Future         Subjective:      Patient ID: Aiden Puentes is a 33 y o  female  -s/p Fani-En-Y Gastric Bypass with Dr Rc Hernandez on 10/04/2021  Presents to the office today for 2nd postop  Overall doing well  Continues to take her MVI and PPI daily  Tolerating a regular diet, denies any N/V/D/C, regurgitation, reflux or dysphagia  Initial: 363 lbs  Current: 255 lbs  EWL: (Weight loss is ahead of schedule at this post surgical period )  Jorge: current   Current BMI is Body mass index is 40 54 kg/m²  · Tolerating a regular diet-yes  · Eating at least 60 grams of protein per day-no - discussed other food options to help with her protein intake  Advise to have at least 60 gm of protein  · Following 30/60 minute rule with liquids-occasionally -  Advised to at least try the 30/30  · Drinking at least 64 ounces of fluid per day-yes  · Drinking carbonated beverages-no  · Sufficient exercise-no - limited due to wrist pain  · Using NSAIDs regularly-no  · Using nicotine-no  · Using alcohol-no  · Supplements: MVI + Calcium     · EWL is 51% , which places the patient ahead of schedule for expected post surgical weight loss at this time  The following portions of the patient's history were reviewed and updated as appropriate: allergies, current medications, past family history, past medical history, past social history, past surgical history and problem list     Review of Systems   Constitutional: Negative  Respiratory: Negative  Cardiovascular: Negative  Gastrointestinal: Negative  Musculoskeletal: Positive for back pain  Skin: Negative  Neurological: Positive for dizziness (with positional changes - advised to increase fluid intake and slow positional changes  If excessive, see PCP  ) and light-headedness (positional changes)  Psychiatric/Behavioral: Negative            Objective:    BP 102/70 (BP Location: Left arm, Patient Position: Sitting, Cuff Size: Standard)   Pulse 72   Temp (!) 96 2 °F (35 7 °C) (Tympanic)   Ht 5' 6 5" (1 689 m)   Wt 116 kg (255 lb)   BMI 40 54 kg/m²      Physical Exam  Vitals and nursing note reviewed  Constitutional:       Appearance: Normal appearance  She is obese  Cardiovascular:      Rate and Rhythm: Normal rate and regular rhythm  Pulses: Normal pulses  Heart sounds: Normal heart sounds  Pulmonary:      Effort: Pulmonary effort is normal       Breath sounds: Normal breath sounds  Abdominal:      General: Bowel sounds are normal       Palpations: Abdomen is soft  Musculoskeletal:         General: Normal range of motion  Skin:     General: Skin is warm and dry  Neurological:      General: No focal deficit present  Mental Status: She is alert and oriented to person, place, and time  Psychiatric:         Mood and Affect: Mood normal          Behavior: Behavior normal          Thought Content:  Thought content normal          Judgment: Judgment normal

## 2022-01-14 NOTE — PATIENT INSTRUCTIONS
· Follow-up in 3 months    We kindly ask that your arrive 15 minutes before your scheduled appointment time with your provider to allow our staff to room you, get your vital signs and update your chart  · Get lab work  Please call the office if you need a script  It is recommended to check with your insurance BEFORE getting labs done to make sure they are covered by your policy  · Call our office if you have any problems with abdominal pain especially associated with fever, chills, nausea, vomiting or any other concerns  · All  Post-bariatric surgery patients should be aware that very small quantities of any alcohol can cause impairment and it is very possible not to feel the effect  The effect can be in the system for several hours  It is also a stomach irritant  · It is advised to AVOID alcohol, Nonsteroidal antiinflammatory drugs (NSAIDS) and nicotine of all forms   Any of these can cause stomach irritation/pain  · Discussed the effects of alcohol on a bariatric patient and the increased impairment risk  · Keep up the good work!

## 2022-01-20 ENCOUNTER — OFFICE VISIT (OUTPATIENT)
Dept: PHYSICAL THERAPY | Facility: REHABILITATION | Age: 32
End: 2022-01-20
Payer: MEDICARE

## 2022-01-20 DIAGNOSIS — M79.651 RIGHT THIGH PAIN: ICD-10-CM

## 2022-01-20 DIAGNOSIS — M76.31 IT BAND SYNDROME, RIGHT: Primary | ICD-10-CM

## 2022-01-20 PROCEDURE — 97110 THERAPEUTIC EXERCISES: CPT | Performed by: PHYSICAL THERAPIST

## 2022-01-20 PROCEDURE — 97140 MANUAL THERAPY 1/> REGIONS: CPT | Performed by: PHYSICAL THERAPIST

## 2022-01-20 PROCEDURE — 97010 HOT OR COLD PACKS THERAPY: CPT | Performed by: PHYSICAL THERAPIST

## 2022-01-20 NOTE — PROGRESS NOTES
Daily Note     Today's date: 2022  Patient name: Ramon Nicole  : 1990  MRN: [id number]  Referring provider: Dennice Saint, PA-C  Dx:   Encounter Diagnosis     ICD-10-CM    1  It band syndrome, right  M76 31    2  Right thigh pain  M79 651                   Subjective: Pt comes to therapy noting she continues to feel throbbing in lateral right thigh, however, notes she no longer has numbness in this area  Described RLE symptoms as soreness and throbbing at present time, which is constant throughout the day  Objective: See treatment diary below      Assessment: Tolerated treatment well  Patient demonstrated fatigue post treatment, exhibited good technique with therapeutic exercises and would benefit from continued PT      Plan: Progress treatment as tolerated         Precautions: n/a    Daily Treatment Diary    Date 12/16 12/23 12/28 12/30 1/4 1/7 1/10 1/13 1/20    FOTO IE    nv nv perf      Re-Eval IE               Manuals    Hip flexor stretch   KEVIN KEVIN         Lat hip mobs    KEVIN KEVIN KEVIN        Piriformis str  Altagracia Gathers KEVIN KEVIN        HS stretch  KEVIN KEVIN KEVIN KEVIN        HS/nn glides  KEVIN KEVIN KEVIN KEVIN        R ITB stretch         KEVIN    PA mobs L1-3      KEVIN Gr 4 KEVIN Gr 4 KEVIN Gr 4 KEVIN Gr 4    TPR R piriformis      [address] [facility] [address]                 Neuro Re-Ed                                                         Ther Ex    EOT HF stretch 30"x5            Supine hip abd clamshell    Black 5"2x10         Bridges     5"x10         Stand ITB stretch 30"x5  doorway 10"x5 doorway 10"x5 20"x5 EOT x2' c MH                   Piriformis stretch  20"x5 20"x5 20"x5   20"x5 20"x5 20"x5    Hamstring stretch  20"x5 20"x5 20"x5 20"x5  20"x5 20"x5 np    90/90 glides  x10 3x10 3x10   3x10 3x10 30x3    ITB stretch c strap         20"x5                 Ther Activity    bike                          Gait Training                              Modalities    MHP  10' pre supine  10' pre supine  10' pre supine 10' pre c ITB str np 10' pre supine CP PRN

## 2022-01-21 ENCOUNTER — APPOINTMENT (OUTPATIENT)
Dept: PHYSICAL THERAPY | Facility: REHABILITATION | Age: 32
End: 2022-01-21
Payer: MEDICARE

## 2022-01-24 ENCOUNTER — APPOINTMENT (OUTPATIENT)
Dept: PHYSICAL THERAPY | Facility: REHABILITATION | Age: 32
End: 2022-01-24
Payer: MEDICARE

## 2022-01-27 ENCOUNTER — APPOINTMENT (OUTPATIENT)
Dept: PHYSICAL THERAPY | Facility: REHABILITATION | Age: 32
End: 2022-01-27
Payer: MEDICARE

## 2022-02-01 ENCOUNTER — EVALUATION (OUTPATIENT)
Dept: PHYSICAL THERAPY | Facility: REHABILITATION | Age: 32
End: 2022-02-01
Payer: MEDICARE

## 2022-02-01 ENCOUNTER — TELEPHONE (OUTPATIENT)
Dept: FAMILY MEDICINE CLINIC | Facility: CLINIC | Age: 32
End: 2022-02-01

## 2022-02-01 DIAGNOSIS — M79.651 RIGHT THIGH PAIN: ICD-10-CM

## 2022-02-01 DIAGNOSIS — M79.651 RIGHT THIGH PAIN: Primary | ICD-10-CM

## 2022-02-01 DIAGNOSIS — M76.31 IT BAND SYNDROME, RIGHT: Primary | ICD-10-CM

## 2022-02-01 PROCEDURE — 97164 PT RE-EVAL EST PLAN CARE: CPT | Performed by: PHYSICAL THERAPIST

## 2022-02-01 PROCEDURE — 97110 THERAPEUTIC EXERCISES: CPT | Performed by: PHYSICAL THERAPIST

## 2022-02-01 PROCEDURE — 97140 MANUAL THERAPY 1/> REGIONS: CPT | Performed by: PHYSICAL THERAPIST

## 2022-02-01 NOTE — TELEPHONE ENCOUNTER
Please let her know that I did put an order in the system for an x-ray of her right femur  I do see that she has an upcoming appointment with Jessie Asher on 02/17  Orthopedics is going to have a better chance of ordering an MRI    Thank you

## 2022-02-01 NOTE — PROGRESS NOTES
PT Re-Evaluation     Today's date: 2022  Patient name: Lauren Dorado  : 1990  MRN: [id number]  Referring provider: Peter Paulson PA-C  Dx:   Encounter Diagnosis     ICD-10-CM    1  It band syndrome, right  M76 31    2  Right thigh pain  M79 651                   Assessment  Assessment details: Lauren Dorado has been treated in outpatient physical therapy for diagnosis/complaints of It band syndrome, right  (primary encounter diagnosis)  Right thigh pain  Pt demonstrates increased range of motion, improved strength, decreased pain, and increased activity tolerance  Pt continues to present with pain, decreased strength, and decreased tolerance to activity  Pt is a pleasant 33 y o  female presenting to outpatient physical therapy with It band syndrome, right  (primary encounter diagnosis)  Right thigh pain   Pt presents with pain, decreased range of motion, decreased strength, and decreased tolerance to activity  Pt presents with several signs and symptoms suggestive of central sensitization dysfunction  Pt educated on the concept of the nervous system as the bodies alarm system, and the role of nociception to warn the body of danger  Peripheral nerve sensitization, hyeralgesia and allodynia were explained using metaphors to promote deep learning  Patient encouraged to identify personal yellow flags, and explore/identify activity limitations as a result of the nervous system hypersensitivity  Patient also encouraged to attempt walking exercise, free of distractions, for 5 minutes per day, as well as attempt formulating a sleeping schedule  Encouraged patient to set a designated sleep time, avoid use of screens for 1 hour prior to going to bed  Pt would continue to benefit from skilled physical therapy to address limitations and to achieve goals   Thank you for this referral    Impairments: abnormal coordination, abnormal or restricted ROM, activity intolerance, impaired physical strength and pain with function  Understanding of Dx/Px/POC: good   Prognosis: good    Goals  ST  Patient will report 25% decrease in pain in 4 weeks  2  Patient will demonstrate 25% improvement in ROM in 4 weeks  3  Patient will demonstrate 1/2 grade improvement in strength in 4 weeks  LT  Patient will be able to perform IADLS without restriction or pain by discharge  2  Patient will be independent in HEP by discharge  3  Patient will be able to return to recreational/work duties without restriction or pain by discharge  Plan  Patient would benefit from: PT eval and skilled PT  Planned modality interventions: cryotherapy and thermotherapy: hydrocollator packs  Planned therapy interventions: IADL retraining, body mechanics training, flexibility, functional ROM exercises, home exercise program, neuromuscular re-education, manual therapy, postural training, strengthening, stretching, therapeutic activities, therapeutic exercise and joint mobilization  Frequency: 1x week  Duration in visits: 4  Duration in weeks: 4  Treatment plan discussed with: patient        Subjective Evaluation    History of Present Illness  Mechanism of injury: 21  Pt comes to therapy stating janell leosad gastric bypass in October, and since this time janell schroeder been having tingling and numbness in distal lateral R thigh  Reports paresthesias are constant  Denies symptoms distal to knee, proximal to mid-thigh, or contralaterally  States she experiences pain in her thigh when walking long distances  However, notes nothing changes paresthesias for better or worse  Goals are to relief paresthesias  22  Pt reports she continues to have pain in distal lateral R thigh, described as throbbing and tingling  States she experiences symptoms when squatting or kneeling  Notes she will also experience symptoms randomly, such as when driving, and need to readjust her leg   States she was scheduled for an MRI, however, this was canceled until she has a radiograph performed first    Pain  Current pain ratin  At worst pain rating: 10    Patient Goals  Patient goals for therapy: decreased pain  Patient goal: decrease paresthesias         Objective     Lumbar Screen  Lumbar range of motion within normal limits with the following exceptions:21  WFL range, however, pain noted in lumbar spine with extension  Pain in RUE with right lateral flexion, however, appears related to weight-bearing through RLE    Neurological Testing     Reflexes   Left   Patellar (L4): trace (1+)  Achilles (S1): trace (1+)  Clonus sign: negative    Right   Patellar (L4): trace (1+)  Achilles (S1): trace (1+)  Clonus sign: negative    Additional Neurological Details  21  LOWER EXTREMITY MYOTOMES: WNL, symmetrical, and intact L1-S2   LOWER EXTREMITY DERMATOMES: WNL, symmetrical, and intact L2-S2      Passive Range of Motion     Right Hip   Flexion: [facility]  External rotation (90/90): 35 degrees   Internal rotation (90/90): 35 degrees     Strength/Myotome Testing     Left Hip   Planes of Motion   Flexion: 4+  Abduction: 4+  External rotation: 4+  Internal rotation: 4+    Right Hip   Planes of Motion   Flexion: 4-  Abduction: 4-  External rotation: 4-  Internal rotation: 4    Left Knee   Flexion: 4+  Extension: 4+    Right Knee   Flexion: 4  Extension: 4-    Tests     Right Hip   Negative AVIVA, FADIR, Sandra, piriformis, scour, SI compression, SI distraction and sign of the buttock  Modified Ike: Negative  SLR: Negative       Additional Tests Details  21  TTP over distal lateral R thigh, ITB    22  TTP over distal lateral R thigh, ITB             Precautions: n/a    Daily Treatment Diary    Date  1/4 1/7 1/10 1/13 1/20 2/1   FOTO IE    nv nv perf      Re-Eval IE         perf      Manuals    Hip flexor stretch   KEVIN KEVIN         Lat hip [address]        Piriformis str  Laurell Coca        HS stretch  [address] [facility] HS/nn glides  KEVIN KEVIN KEVIN KEVIN        R ITB stretch         KEVIN    PA mobs L1-3      KEVIN Gr 4 KEVIN Gr 4 KEVIN Gr 4 KEVIN Gr 4    TPR R piriformis      [address]                 Neuro Re-Ed     PNE education          KEVIN                                          Ther Ex    EOT HF stretch 30"x5            Supine hip abd clamshell    Black 5"2x10         Bridges     5"x10         Stand ITB stretch 30"x5  doorway 10"x5 doorway 10"x5 20"x5 EOT x2' c MH                   Piriformis stretch  20"x5 20"x5 20"x5   20"x5 20"x5 20"x5    Hamstring stretch  20"x5 20"x5 20"x5 20"x5  20"x5 20"x5 np    90/90 glides  x10 3x10 3x10   3x10 3x10 30x3    ITB stretch c strap         20"x5                 Ther Activity    bike                          Gait Training                              Modalities    MHP  10' pre supine  10' pre supine  10' pre supine 10' pre c ITB str np 10' pre supine    CP PRN

## 2022-02-01 NOTE — TELEPHONE ENCOUNTER
Call from Prior Auth department states that pt insurance company is requesting a X-ray of the right knee/leg prior to having a MRI done  Please advise  Call back can be made to [phone] thank you

## 2022-02-02 ENCOUNTER — HOSPITAL ENCOUNTER (OUTPATIENT)
Dept: RADIOLOGY | Facility: IMAGING CENTER | Age: 32
Discharge: HOME | End: 2022-02-02
Payer: MEDICARE

## 2022-02-02 ENCOUNTER — APPOINTMENT (OUTPATIENT)
Dept: LAB | Facility: IMAGING CENTER | Age: 32
End: 2022-02-02
Payer: MEDICARE

## 2022-02-02 DIAGNOSIS — Z98.84 BARIATRIC SURGERY STATUS: ICD-10-CM

## 2022-02-02 DIAGNOSIS — E66.01 OBESITY, CLASS III, BMI 40-49.9 (MORBID OBESITY) (HCC): ICD-10-CM

## 2022-02-02 DIAGNOSIS — M79.651 RIGHT THIGH PAIN: ICD-10-CM

## 2022-02-02 DIAGNOSIS — K91.2 POSTSURGICAL MALABSORPTION: ICD-10-CM

## 2022-02-02 DIAGNOSIS — Z48.815 ENCOUNTER FOR SURGICAL AFTERCARE FOLLOWING SURGERY OF DIGESTIVE SYSTEM: ICD-10-CM

## 2022-02-02 LAB
25(OH)D3 SERPL-MCNC: 14.3 NG/ML (ref 30–100)
ALBUMIN SERPL BCP-MCNC: 4 G/DL (ref 3.5–5)
ALP SERPL-CCNC: 61 U/L (ref 46–116)
ALT SERPL W P-5'-P-CCNC: 35 U/L (ref 12–78)
ANION GAP SERPL CALCULATED.3IONS-SCNC: 5 MMOL/L (ref 4–13)
AST SERPL W P-5'-P-CCNC: 17 U/L (ref 5–45)
BILIRUB SERPL-MCNC: 1.32 MG/DL (ref 0.2–1)
BUN SERPL-MCNC: 8 MG/DL (ref 5–25)
CALCIUM SERPL-MCNC: 9.7 MG/DL (ref 8.3–10.1)
CHLORIDE SERPL-SCNC: 110 MMOL/L (ref 100–108)
CO2 SERPL-SCNC: 26 MMOL/L (ref 21–32)
CREAT SERPL-MCNC: 0.78 MG/DL (ref 0.6–1.3)
ERYTHROCYTE [DISTWIDTH] IN BLOOD BY AUTOMATED COUNT: 15.2 % (ref 11.6–15.1)
FERRITIN SERPL-MCNC: 26 NG/ML (ref 8–388)
FOLATE SERPL-MCNC: 9.4 NG/ML (ref 3.1–17.5)
GFR SERPL CREATININE-BSD FRML MDRD: 100 ML/MIN/1.73SQ M
GLUCOSE P FAST SERPL-MCNC: 83 MG/DL (ref 65–99)
HCT VFR BLD AUTO: 43.8 % (ref 34.8–46.1)
HGB BLD-MCNC: 13.8 G/DL (ref 11.5–15.4)
IRON SATN MFR SERPL: 14 % (ref 15–50)
IRON SERPL-MCNC: 51 UG/DL (ref 50–170)
MCH RBC QN AUTO: 27.9 PG (ref 26.8–34.3)
MCHC RBC AUTO-ENTMCNC: 31.5 G/DL (ref 31.4–37.4)
MCV RBC AUTO: 89 FL (ref 82–98)
PLATELET # BLD AUTO: 218 THOUSANDS/UL (ref 149–390)
PMV BLD AUTO: 12.6 FL (ref 8.9–12.7)
POTASSIUM SERPL-SCNC: 4 MMOL/L (ref 3.5–5.3)
PROT SERPL-MCNC: 7.5 G/DL (ref 6.4–8.2)
PTH-INTACT SERPL-MCNC: 78.6 PG/ML (ref 18.4–80.1)
RBC # BLD AUTO: 4.95 MILLION/UL (ref 3.81–5.12)
SODIUM SERPL-SCNC: 141 MMOL/L (ref 136–145)
TIBC SERPL-MCNC: 355 UG/DL (ref 250–450)
VIT B12 SERPL-MCNC: 410 PG/ML (ref 100–900)
WBC # BLD AUTO: 6.71 THOUSAND/UL (ref 4.31–10.16)

## 2022-02-02 PROCEDURE — 83550 IRON BINDING TEST: CPT

## 2022-02-02 PROCEDURE — 85027 COMPLETE CBC AUTOMATED: CPT

## 2022-02-02 PROCEDURE — 82607 VITAMIN B-12: CPT

## 2022-02-02 PROCEDURE — 82728 ASSAY OF FERRITIN: CPT

## 2022-02-02 PROCEDURE — 36415 COLL VENOUS BLD VENIPUNCTURE: CPT

## 2022-02-02 PROCEDURE — 73552 X-RAY EXAM OF FEMUR 2/>: CPT

## 2022-02-02 PROCEDURE — 82746 ASSAY OF FOLIC ACID SERUM: CPT

## 2022-02-02 PROCEDURE — 82306 VITAMIN D 25 HYDROXY: CPT

## 2022-02-02 PROCEDURE — 83970 ASSAY OF PARATHORMONE: CPT

## 2022-02-02 PROCEDURE — 83540 ASSAY OF IRON: CPT

## 2022-02-02 PROCEDURE — 80053 COMPREHEN METABOLIC PANEL: CPT

## 2022-02-02 PROCEDURE — 84630 ASSAY OF ZINC: CPT

## 2022-02-02 PROCEDURE — 84425 ASSAY OF VITAMIN B-1: CPT

## 2022-02-02 PROCEDURE — 84590 ASSAY OF VITAMIN A: CPT

## 2022-02-03 DIAGNOSIS — E66.01 CLASS 3 SEVERE OBESITY DUE TO EXCESS CALORIES WITHOUT SERIOUS COMORBIDITY WITH BODY MASS INDEX (BMI) OF 50.0 TO 59.9 IN ADULT (HCC): ICD-10-CM

## 2022-02-03 RX ORDER — OMEPRAZOLE 20 MG/1
20 CAPSULE, DELAYED RELEASE ORAL DAILY
Qty: 30 CAPSULE | Refills: 2 | Status: SHIPPED | OUTPATIENT
Start: 2022-02-03 | End: 2022-03-05

## 2022-02-04 LAB — ZINC SERPL-MCNC: 95 UG/DL (ref 44–115)

## 2022-02-09 ENCOUNTER — OFFICE VISIT (OUTPATIENT)
Dept: PHYSICAL THERAPY | Facility: REHABILITATION | Age: 32
End: 2022-02-09
Payer: MEDICARE

## 2022-02-09 DIAGNOSIS — M76.31 IT BAND SYNDROME, RIGHT: ICD-10-CM

## 2022-02-09 DIAGNOSIS — M79.651 RIGHT THIGH PAIN: Primary | ICD-10-CM

## 2022-02-09 LAB — VIT A SERPL-MCNC: 36.3 UG/DL (ref 18.9–57.3)

## 2022-02-09 PROCEDURE — 97140 MANUAL THERAPY 1/> REGIONS: CPT | Performed by: PHYSICAL THERAPIST

## 2022-02-09 PROCEDURE — 97010 HOT OR COLD PACKS THERAPY: CPT | Performed by: PHYSICAL THERAPIST

## 2022-02-09 PROCEDURE — 97110 THERAPEUTIC EXERCISES: CPT | Performed by: PHYSICAL THERAPIST

## 2022-02-09 NOTE — PROGRESS NOTES
Daily Note     Today's date: 2022  Patient name: Myesha Hughes  : 1990  MRN: [id number]  Referring provider: Mora Freeman PA-C  Dx:   Encounter Diagnosis     ICD-10-CM    1  Right thigh pain  M79 651    2  It band syndrome, right  M76 31                   Subjective: Pt comes to therapy reporting she has had minimal discomfort in area of left lateral LE lately  However, notes she occasionally still experiences some tingling  Reports she is scheduled for an MRI tomorrow evening  Objective: See treatment diary below      Assessment: Tolerated treatment well  Responded favorably to manuals, with appropriate stretches noted  Patient exhibited good technique with therapeutic exercises and would benefit from continued PT      Plan: Potential discharge next visit       Precautions: n/a    Daily Treatment Diary    Date 2/9  12/28 12/30 1/4 1/7 1/10 1/13 1/20 2/1   FOTO     nv nv perf      Re-Eval          perf      Manuals    Hip flexor stretch   KEVIN KEVIN         Lat hip mobs    KEVIN KEVIN KEVIN        Piriformis str   David Levels KEVIN        HS stretch   Flakita Duran        HS/nn glides   KEVIN KEVIN KEVIN        R ITB stretch KEVIN        KEVIN    PA mobs L1-3      KEVNI Gr 4 KEVIN Gr 4 KEVIN Gr 4 KEVIN Gr 4    TPR R piriformis      [id number]                 Neuro Re-Ed     PNE education          KEVIN                                          Ther Ex    EOT HF stretch             Supine hip abd clamshell    Black 5"2x10         Bridges     5"x10         Stand ITB stretch   doorway 10"x5 doorway 10"x5 20"x5 EOT x2' c MH                   Piriformis stretch 20"x5  20"x5 20"x5   20"x5 20"x5 20"x5    Hamstring stretch   20"x5 20"x5 20"x5  20"x5 20"x5 np    90/90 glides 30x3  3x10 3x10   3x10 3x10 30x3    ITB stretch c strap 20"x5        20"x5                 Ther Activity    bike                          Gait Training                              Modalities    MHP 10' pre supine   10' pre supine  10' pre supine 10' pre c ITB str np 10' pre supine    CP PRN

## 2022-02-15 ENCOUNTER — OFFICE VISIT (OUTPATIENT)
Dept: PHYSICAL THERAPY | Facility: REHABILITATION | Age: 32
End: 2022-02-15
Payer: MEDICARE

## 2022-02-15 DIAGNOSIS — M79.651 RIGHT THIGH PAIN: Primary | ICD-10-CM

## 2022-02-15 DIAGNOSIS — M76.31 IT BAND SYNDROME, RIGHT: ICD-10-CM

## 2022-02-15 LAB — VIT B1 BLD-SCNC: 79.1 NMOL/L (ref 66.5–200)

## 2022-02-15 PROCEDURE — 97140 MANUAL THERAPY 1/> REGIONS: CPT | Performed by: PHYSICAL THERAPIST

## 2022-02-15 PROCEDURE — 97110 THERAPEUTIC EXERCISES: CPT | Performed by: PHYSICAL THERAPIST

## 2022-02-15 PROCEDURE — 97010 HOT OR COLD PACKS THERAPY: CPT | Performed by: PHYSICAL THERAPIST

## 2022-02-15 NOTE — PROGRESS NOTES
Daily Note     Today's date: 2/15/2022  Patient name: Dave Hernandez  : 1990  MRN: [id number]  Referring provider: Cherry Weller PA-C  Dx:   Encounter Diagnosis     ICD-10-CM    1  Right thigh pain  M79 651    2  It band syndrome, right  M76 31                   Subjective: Pt comes to therapy denying pain or discomfort  Denies discomfort following last treatment session  Reports she feels very minimal discomfort in R thigh at present time  Objective: See treatment diary below      Assessment: Tolerated treatment well  Patient given updated HEP  Patient exhibited good technique with therapeutic exercises      Plan: Discharge to [facility] due to meeting goals and exhausted insurance benefits        Precautions: n/a    Daily Treatment Diary    Date 2/9 2/15 12/28 12/30 1/4 1/7 1/10 1/13 1/20 2/1   FOTO     nv nv perf      Re-Eval          perf      Manuals    Hip flexor stretch   KEVIN KEVIN         Lat hip mobs    KEVIN KEVIN KEVIN        Piriformis str   KEVIN KEVIN KEVIN        HS stretch   KEVIN KEVIN KEVIN        HS/nn glides   KEVIN KEVIN KEVIN        R ITB stretch KEVIN KEVIN       KEVIN    PA mobs L1-3      KEVIN Gr 4 KEVIN Gr 4 KEVIN Gr 4 KEVIN Gr 4    TPR R piriformis      [id number]                 Neuro Re-Ed     PNE education          KEVIN                                          Ther Ex    EOT HF stretch             Supine hip abd clamshell    Black 5"2x10         Bridges     5"x10         Stand ITB stretch   doorway 10"x5 doorway 10"x5 20"x5 EOT x2' c MH      LTR  20"x5 ea           Piriformis stretch 20"x5 20"x5 20"x5 20"x5   20"x5 20"x5 20"x5    Hamstring stretch   20"x5 20"x5 20"x5  20"x5 20"x5 np    90/90 glides 30x3 30x3 3x10 3x10   3x10 3x10 30x3    ITB stretch c strap 20"x5 20"x5       20"x5                 Ther Activity    bike                          Gait Training                              Modalities    MHP 10' pre supine 10' pre supine  10' pre supine  10' pre supine 10' pre c ITB str np 10' pre supine    CP PRN

## 2022-02-16 ENCOUNTER — TELEPHONE (OUTPATIENT)
Dept: BARIATRICS | Facility: CLINIC | Age: 32
End: 2022-02-16

## 2022-02-16 NOTE — TELEPHONE ENCOUNTER
Called patient to review her lab results  Low vitamin D levels - patient states she recently seen Rheumatology and was started on Vitamin D2 once weekly  Will get recheck after  Continue management for now and monitor  CMP results with elevated bilirubin levels of 1 32  Currently no complaints of RUQ pain or generalized pain  Discussed with patient levels can be monitor for now  Patient referred to PCP if any further interventions is warranted  All questions answered  No other concerns at this time  Follow up appt was provided to patient

## 2022-02-23 ENCOUNTER — APPOINTMENT (OUTPATIENT)
Dept: LAB | Facility: IMAGING CENTER | Age: 32
End: 2022-02-23
Payer: MEDICARE

## 2022-02-23 DIAGNOSIS — Z11.59 SCREENING EXAMINATION FOR POLIOMYELITIS: ICD-10-CM

## 2022-02-23 DIAGNOSIS — E55.9 VITAMIN D DEFICIENCY: ICD-10-CM

## 2022-02-23 DIAGNOSIS — M67.331 TRANSIENT SYNOVITIS OF RIGHT WRIST: ICD-10-CM

## 2022-02-23 LAB
CRP SERPL QL: <3 MG/L
ERYTHROCYTE [SEDIMENTATION RATE] IN BLOOD: 9 MM/HOUR (ref 0–19)
HBV CORE AB SER QL: NORMAL
HBV SURFACE AB SER-ACNC: <3.1 MIU/ML
HBV SURFACE AG SER QL: NORMAL
HCV AB SER QL: NORMAL
TSH SERPL DL<=0.05 MIU/L-ACNC: 1.02 UIU/ML (ref 0.36–3.74)

## 2022-02-23 PROCEDURE — 36415 COLL VENOUS BLD VENIPUNCTURE: CPT

## 2022-02-23 PROCEDURE — 86803 HEPATITIS C AB TEST: CPT

## 2022-02-23 PROCEDURE — 86140 C-REACTIVE PROTEIN: CPT

## 2022-02-23 PROCEDURE — 85652 RBC SED RATE AUTOMATED: CPT

## 2022-02-23 PROCEDURE — 86704 HEP B CORE ANTIBODY TOTAL: CPT

## 2022-02-23 PROCEDURE — 87340 HEPATITIS B SURFACE AG IA: CPT

## 2022-02-23 PROCEDURE — 86706 HEP B SURFACE ANTIBODY: CPT

## 2022-02-23 PROCEDURE — 84443 ASSAY THYROID STIM HORMONE: CPT

## 2022-04-04 ENCOUNTER — OFFICE VISIT (OUTPATIENT)
Dept: OBGYN CLINIC | Facility: CLINIC | Age: 32
End: 2022-04-04

## 2022-04-04 VITALS
BODY MASS INDEX: 36.73 KG/M2 | HEART RATE: 67 BPM | DIASTOLIC BLOOD PRESSURE: 66 MMHG | HEIGHT: 67 IN | WEIGHT: 234 LBS | SYSTOLIC BLOOD PRESSURE: 100 MMHG

## 2022-04-04 DIAGNOSIS — A59.9 TRICHOMONIASIS: ICD-10-CM

## 2022-04-04 DIAGNOSIS — Z72.51 HIGH RISK HETEROSEXUAL BEHAVIOR: Primary | ICD-10-CM

## 2022-04-04 DIAGNOSIS — N89.8 VAGINAL DISCHARGE: ICD-10-CM

## 2022-04-04 DIAGNOSIS — Z11.3 SCREEN FOR STD (SEXUALLY TRANSMITTED DISEASE): ICD-10-CM

## 2022-04-04 LAB
BV WHIFF TEST VAG QL: POSITIVE
CLUE CELLS SPEC QL WET PREP: NEGATIVE
PH SMN: 5 [PH]
SL AMB  POCT GLUCOSE, UA: NORMAL
SL AMB LEUKOCYTE ESTERASE,UA: NORMAL
SL AMB POCT BILIRUBIN,UA: NORMAL
SL AMB POCT BLOOD,UA: NORMAL
SL AMB POCT CLARITY,UA: NORMAL
SL AMB POCT COLOR,UA: YELLOW
SL AMB POCT KETONES,UA: NORMAL
SL AMB POCT NITRITE,UA: NORMAL
SL AMB POCT PH,UA: 6.5
SL AMB POCT SPECIFIC GRAVITY,UA: 1.03
SL AMB POCT URINE HCG: NEGATIVE
SL AMB POCT URINE PROTEIN: NORMAL
SL AMB POCT UROBILINOGEN: 0.2
SL AMB POCT WET MOUNT: ABNORMAL
T VAGINALIS VAG QL WET PREP: POSITIVE
YEAST VAG QL WET PREP: NEGATIVE

## 2022-04-04 PROCEDURE — 87086 URINE CULTURE/COLONY COUNT: CPT | Performed by: NURSE PRACTITIONER

## 2022-04-04 PROCEDURE — 87591 N.GONORRHOEAE DNA AMP PROB: CPT | Performed by: NURSE PRACTITIONER

## 2022-04-04 PROCEDURE — 87491 CHLMYD TRACH DNA AMP PROBE: CPT | Performed by: NURSE PRACTITIONER

## 2022-04-04 PROCEDURE — 99213 OFFICE O/P EST LOW 20 MIN: CPT | Performed by: NURSE PRACTITIONER

## 2022-04-04 PROCEDURE — 87210 SMEAR WET MOUNT SALINE/INK: CPT | Performed by: NURSE PRACTITIONER

## 2022-04-04 PROCEDURE — 81025 URINE PREGNANCY TEST: CPT | Performed by: NURSE PRACTITIONER

## 2022-04-04 PROCEDURE — 81002 URINALYSIS NONAUTO W/O SCOPE: CPT | Performed by: NURSE PRACTITIONER

## 2022-04-04 RX ORDER — METRONIDAZOLE 500 MG/1
500 TABLET ORAL 2 TIMES DAILY
Qty: 14 TABLET | Refills: 0 | Status: SHIPPED | OUTPATIENT
Start: 2022-04-04 | End: 2022-04-11

## 2022-04-04 NOTE — PROGRESS NOTES
PROBLEM GYNECOLOGICAL VISIT    Josemanuel Calvo is a 27 y o  female who presents today with complaint of vulvar/vaginal irritation and vaginal discharge    Her general medical history has been reviewed and she reports it as follows:    Past Medical History:   Diagnosis Date    Back pain     Bacterial vaginosis     Bariatric surgery status     Chlamydia     Disease of thyroid gland     nodule    Knee pain     Morbidly obese (HCC)     gastric sleeve   today 10/4 2021    MRSA carrier     Obesity     Postsurgical malabsorption     Thyroid disease     benign    -right   gets scan yearly    Urogenital trichomoniasis     Wears glasses      Past Surgical History:   Procedure Laterality Date    [place] LAP GASTRIC BYPASS/DRE-EN-Y N/A 10/4/2021    Procedure: BYPASS GASTRIC  DRE-EN-Y LAPAROSCOPIC, AND INTRAOPERATIVE EGD;  Surgeon: Corinne Pitt, MD;  Location: [facility];  Service: Bariatrics    TONSILLECTOMY      TOOTH EXTRACTION  10/01/2014    WRIST SURGERY  2021     OB History        3    Para   1    Term   1            AB   2    Living   1       SAB        IAB   2    Ectopic        Multiple        Live Births               Obstetric Comments   Child birth 12           Social History     Tobacco Use    Smoking status: Never Smoker    Smokeless tobacco: Never Used    Tobacco comment: no passive smoke exposure   Vaping Use    Vaping Use: Never used   Substance Use Topics    Alcohol use: Not Currently     Alcohol/week: 3 0 standard drinks     Types: 1 Glasses of wine, 1 Cans of beer, 1 Shots of liquor per week     Comment: 1 x monthly    Drug use: No     Social History     Substance and Sexual Activity   Sexual Activity Yes    Partners: Male    Birth control/protection: Condom Male, None       Current Outpatient Medications   Medication Instructions    acetaminophen (TYLENOL) 160 mg/5 mL solution      Calcium Carbonate-Vit D-Min (CALCIUM 1200 PO) Oral    Diclofenac Sodium (VOLTAREN) 1 % No dose, route, or frequency recorded   gabapentin (NEURONTIN) 100 mg, Oral, 3 times daily    lidocaine (LIDODERM) 5 % PLEASE SEE ATTACHED FOR DETAILED DIRECTIONS    metroNIDAZOLE (FLAGYL) 500 mg, Oral, 2 times daily    Multiple Vitamin (multivitamin) tablet 1 tablet, Oral, Daily    omeprazole (PRILOSEC) 20 mg, Oral, Daily    ondansetron (ZOFRAN) 4 mg, Oral, Every 8 hours PRN    polyethylene glycol (MIRALAX) 17 g, Oral, Daily, TAKEN AS NEEDED       History of Present Illness:   Ravindra Chow presents today with a one week history of vulvar/vaginal soreness, irritation and itching  She reports it is painful to wipe and painful when voiding when urine comes in contact with vulvar tissue  She has also had thick, yellowish, foul smelling vaginal discharge  She reports all of her symptoms began one day after having unprotected intercourse with a new partner  Review of Systems:  Review of Systems   Constitutional: Negative  Gastrointestinal: Negative  Genitourinary: Positive for vaginal discharge and vaginal pain  Vulvar irritation, vaginal odor        Physical Exam:  /66   Pulse 67   Ht 5' 6 5" (1 689 m)   Wt 106 kg (234 lb)   LMP 03/25/2022   BMI 37 20 kg/m²   Physical Exam  Constitutional:       General: She is not in acute distress  Appearance: Normal appearance  Genitourinary:      Vulva exam comments: Erythema   Vaginal discharge and erythema present  Right Adnexa: not tender, not full and no mass present  Left Adnexa: not tender, not full and no mass present  No cervical motion tenderness  Cervical exam comments: Strawberry appearance on cervix   Abdominal:      General: Abdomen is flat  Palpations: Abdomen is soft  Neurological:      Mental Status: She is alert  Skin:     General: Skin is warm and dry  Psychiatric:         Mood and Affect: Mood normal          Behavior: Behavior normal    Vitals reviewed           Point of Care Testing:   -Wet mount: +trich, -yeast, -clue cells   -KOH mount: negative   -Whiff: positive    -urine pregnancy test: negative   -urinalysis: trace blood    Assessment:   1  Trichomonas    2  STI screening     Plan:   1  Rx for flagyl  Instructed to avoid alcohol during treatment  Discussed need for partner treatment  She reports she is no longer planning to be sexually active with this partner but will contact him about results  2  GC/CT culture collected  Lab orders for HIV, hep B, hep C and RPR  3  Urine dip positive for blood, though likely due to extreme vulvar/vaginal irritation  Urine culture sent to rule out UTI  4  Due for annual exam in August  Will return for annual and re-screening for trichomonas at that time  Will return sooner if needed  Reviewed with patient that test results are available in [facility] immediately, but that they will not necessarily be reviewed by me immediately  Explained that I will review results at my earliest opportunity and contact patient appropriately

## 2022-04-05 LAB
C TRACH DNA SPEC QL NAA+PROBE: NEGATIVE
N GONORRHOEA DNA SPEC QL NAA+PROBE: NEGATIVE

## 2022-04-06 LAB — BACTERIA UR CULT: NORMAL

## 2022-04-07 ENCOUNTER — APPOINTMENT (OUTPATIENT)
Dept: LAB | Facility: IMAGING CENTER | Age: 32
End: 2022-04-07
Payer: MEDICARE

## 2022-04-07 ENCOUNTER — HOSPITAL ENCOUNTER (OUTPATIENT)
Dept: MRI IMAGING | Facility: HOSPITAL | Age: 32
Discharge: HOME | End: 2022-04-07
Payer: MEDICARE

## 2022-04-07 DIAGNOSIS — Z11.3 SCREEN FOR STD (SEXUALLY TRANSMITTED DISEASE): ICD-10-CM

## 2022-04-07 DIAGNOSIS — M79.651 RIGHT THIGH PAIN: ICD-10-CM

## 2022-04-07 PROCEDURE — 36415 COLL VENOUS BLD VENIPUNCTURE: CPT

## 2022-04-07 PROCEDURE — 86592 SYPHILIS TEST NON-TREP QUAL: CPT

## 2022-04-07 PROCEDURE — 87389 HIV-1 AG W/HIV-1&-2 AB AG IA: CPT

## 2022-04-07 PROCEDURE — 86803 HEPATITIS C AB TEST: CPT

## 2022-04-07 PROCEDURE — 73718 MRI LOWER EXTREMITY W/O DYE: CPT

## 2022-04-07 PROCEDURE — 87340 HEPATITIS B SURFACE AG IA: CPT

## 2022-04-07 PROCEDURE — G1004 CDSM NDSC: HCPCS

## 2022-04-08 LAB
HBV SURFACE AG SER QL: NORMAL
HCV AB SER QL: NORMAL
HIV 1+2 AB+HIV1 P24 AG SERPL QL IA: NORMAL
RPR SER QL: NORMAL

## 2022-04-11 PROBLEM — D16.21 ENCHONDROMA OF RIGHT FEMUR: Status: ACTIVE | Noted: 2022-04-11

## 2022-04-13 ENCOUNTER — OFFICE VISIT (OUTPATIENT)
Dept: FAMILY MEDICINE CLINIC | Facility: CLINIC | Age: 32
End: 2022-04-13
Payer: MEDICARE

## 2022-04-13 VITALS
TEMPERATURE: 97.8 F | SYSTOLIC BLOOD PRESSURE: 106 MMHG | WEIGHT: 232.8 LBS | RESPIRATION RATE: 16 BRPM | DIASTOLIC BLOOD PRESSURE: 78 MMHG | BODY MASS INDEX: 37.41 KG/M2 | HEART RATE: 68 BPM | HEIGHT: 66 IN

## 2022-04-13 DIAGNOSIS — D16.22 ENCHONDROMA OF FEMUR, LEFT: ICD-10-CM

## 2022-04-13 DIAGNOSIS — M54.16 RIGHT LUMBAR RADICULOPATHY: Primary | ICD-10-CM

## 2022-04-13 PROCEDURE — 99214 OFFICE O/P EST MOD 30 MIN: CPT | Performed by: PHYSICIAN ASSISTANT

## 2022-04-13 RX ORDER — HYDROXYCHLOROQUINE SULFATE 200 MG/1
200 TABLET, FILM COATED ORAL 2 TIMES DAILY
COMMUNITY
Start: 2022-02-25 | End: 2023-02-25

## 2022-04-13 RX ORDER — HYDROXYCHLOROQUINE SULFATE 200 MG/1
TABLET, FILM COATED ORAL
COMMUNITY
Start: 2022-03-28

## 2022-04-13 RX ORDER — ERGOCALCIFEROL 1.25 MG/1
CAPSULE ORAL
COMMUNITY
Start: 2022-02-09

## 2022-04-13 RX ORDER — GABAPENTIN 300 MG/1
300 CAPSULE ORAL 3 TIMES DAILY
Qty: 90 CAPSULE | Refills: 2 | Status: SHIPPED | OUTPATIENT
Start: 2022-04-13 | End: 2022-08-14
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
## 2022-04-19 ENCOUNTER — OFFICE VISIT (OUTPATIENT)
Dept: BARIATRICS | Facility: CLINIC | Age: 32
End: 2022-04-19
Payer: MEDICARE

## 2022-04-19 VITALS
HEIGHT: 67 IN | HEART RATE: 74 BPM | DIASTOLIC BLOOD PRESSURE: 68 MMHG | WEIGHT: 231.5 LBS | TEMPERATURE: 98.7 F | BODY MASS INDEX: 36.34 KG/M2 | SYSTOLIC BLOOD PRESSURE: 104 MMHG

## 2022-04-19 DIAGNOSIS — Z98.84 BARIATRIC SURGERY STATUS: ICD-10-CM

## 2022-04-19 DIAGNOSIS — Z48.815 ENCOUNTER FOR SURGICAL AFTERCARE FOLLOWING SURGERY OF DIGESTIVE SYSTEM: Primary | ICD-10-CM

## 2022-04-19 DIAGNOSIS — E66.9 OBESITY, CLASS II, BMI 35-39.9: ICD-10-CM

## 2022-04-19 DIAGNOSIS — K91.2 POSTSURGICAL MALABSORPTION: ICD-10-CM

## 2022-04-19 PROCEDURE — 99213 OFFICE O/P EST LOW 20 MIN: CPT | Performed by: NURSE PRACTITIONER

## 2022-04-19 NOTE — PATIENT INSTRUCTIONS
· Follow-up in 6 months for annual  We kindly ask that your arrive 15 minutes before your scheduled appointment time with your provider to allow our staff to room you, get your vital signs and update your chart  · Get lab work done  Please call the office if you need a script  It is recommended to check with your insurance BEFORE getting labs done to make sure they are covered by your policy  · Call our office if you have any problems with abdominal pain especially associated with fever, chills, nausea, vomiting or any other concerns  · All  Post-bariatric surgery patients should be aware that very small quantities of any alcohol can cause impairment and it is very possible not to feel the effect  The effect can be in the system for several hours  It is also a stomach irritant  · It is advised to AVOID alcohol, Nonsteroidal antiinflammatory drugs (NSAIDS) and nicotine of all forms   Any of these can cause stomach irritation/pain  · Discussed the effects of alcohol on a bariatric patient and the increased impairment risk  · Keep up the good work! After finishing your vitamin D2 capsule, please switch to Vitamin D3 2000 units per day     Continue with omeprazole for now

## 2022-04-19 NOTE — PROGRESS NOTES
Assessment/Plan:     Patient ID: Chris Whitten is a 27 y o  female  Bariatric Surgery Status    -s/p Fani-En-Y Gastric Bypass with Dr Brenda Parikh on 10/04/2021  Presents to the office today for 3rd postop  Overall doing well  She reports she has an incident where she continuously felt nauseous and vomiting after she eats  She stopped taking her MVI and protein shakes and resolved  Started her MVI again in the past 2 weeks and has been doing better since  Continues to take her omeprazole daily  Tolerating a regular diet, denies any recent N/V/D/C, regurgitation, reflux or dysphagia  Of note, patient reports she has "inflammation in my bloodwork" which was concerning for autoimmune issues  She recently has seen a rheumatologist and was started on plaquenil - taking twice a day  PLAN:     - Routine follow up in 6 months for annual visit  - Continue omeprazole due to plaquenil use for now  - Continue with healthy lifestyle, adequate protein intake of 60 gm, fluid intake of at least 64 oz  - Continue with MVI daily  - Activity as tolerated  - Labs ordered and will adjust accordingly if any deficiency  - Follow up with RD and SW as needed  · Continued/Maintain healthy weight loss with good nutrition intakes  · Adequate hydration with at least 64oz  fluid intake  · Follow diet as discussed  · Follow vitamin and mineral recommendations as reviewed with you  · Exercise as tolerated  · Colonoscopy referral made: not of age range  · Mammogram - not of age range    · Follow-up in 6 months for annual  We kindly ask that your arrive 15 minutes before your scheduled appointment time with your provider to allow our staff to room you, get your vital signs and update your chart  · Get lab work done  Please call the office if you need a script  It is recommended to check with your insurance BEFORE getting labs done to make sure they are covered by your policy        · Call our office if you have any problems with abdominal pain especially associated with fever, chills, nausea, vomiting or any other concerns  · All  Post-bariatric surgery patients should be aware that very small quantities of any alcohol can cause impairment and it is very possible not to feel the effect  The effect can be in the system for several hours  It is also a stomach irritant  · It is advised to AVOID alcohol, Nonsteroidal antiinflammatory drugs (NSAIDS) and nicotine of all forms   Any of these can cause stomach irritation/pain  · Discussed the effects of alcohol on a bariatric patient and the increased impairment risk  · Keep up the good work! Postsurgical Malabsorption   -At risk for malabsorption of vitamins/minerals secondary to malabsorption and restriction of intake from bariatric surgery  -Currently taking adequate postop bariatric surgery vitamin supplementation  -Last set of bariatric labs completed on 02/22 and showed vitamin D deficiency - on vitamin D2 now  -Patient received education about the importance of adhering to a lifelong supplementation regimen to avoid vitamin/mineral deficiencies      Diagnoses and all orders for this visit:    Encounter for surgical aftercare following surgery of digestive system    Bariatric surgery status    Postsurgical malabsorption    Obesity, Class II, BMI 35-39 9         Subjective:      Patient ID: Shruthi Rivera is a 27 y o  female  - s/p Fani-En-Y Gastric Bypass with Dr Santiago Bueno on 10/04/2021  Presents to the office today for 3rd postop  Overall doing well  She reports she has an incident where she continuously felt nauseous and vomiting after she eats  She stopped taking her MVI and protein shakes and resolved  Started her MVI again in the past 2 weeks and has been doing better since  Continues to take her omeprazole daily  Tolerating a regular diet, denies any recent N/V/D/C, regurgitation, reflux or dysphagia       Of note, patient reports she has "inflammation in my bloodwork" which was concerning for autoimmune issues  She recently has seen a rheumatologist and was started on plaquenil - taking twice a day  Initial: 363 lbs  Current: 231 5 lbs  EWL: (Weight loss is ahead of schedule at this post surgical period )  Jorge: current  Current BMI is Body mass index is 36 81 kg/m²  · Tolerating a regular diet-yes  · Eating at least 60 grams of protein per day-yes  · Following 30/60 minute rule with liquids-yes  · Drinking at least 64 ounces of fluid per day-yes  · Drinking carbonated beverages-no  · Sufficient exercise-limited due to joint pain  She is trying to get back to the gym and walking  Hiking now,  · Using NSAIDs regularly-no  · Using nicotine-no  · Using alcohol- rarely   · Supplements: trena MVI (with iron) + calcium + biotin + Vitamin D2    · EWL is 63%, which places the patient ahead of schedule for expected post surgical weight loss at this time  The following portions of the patient's history were reviewed and updated as appropriate: allergies, current medications, past family history, past medical history, past social history, past surgical history and problem list     Review of Systems   Constitutional: Negative  Cardiovascular: Negative  Gastrointestinal: Positive for diarrhea (occasionally )  Musculoskeletal: Positive for arthralgias (joint pain)  Skin: Negative  Neurological: Negative  Psychiatric/Behavioral: Negative  Objective:    /68 (BP Location: Left arm, Patient Position: Sitting, Cuff Size: Standard)   Pulse 74   Temp 98 7 °F (37 1 °C) (Tympanic)   Ht 5' 6 5" (1 689 m)   Wt 105 kg (231 lb 8 oz)   LMP 03/25/2022   BMI 36 81 kg/m²      Physical Exam  Vitals and nursing note reviewed  Constitutional:       Appearance: Normal appearance  She is obese  Cardiovascular:      Rate and Rhythm: Normal rate and regular rhythm  Pulses: Normal pulses  Heart sounds: Normal heart sounds  Pulmonary:      Effort: Pulmonary effort is normal       Breath sounds: Normal breath sounds  Abdominal:      General: Bowel sounds are normal       Palpations: Abdomen is soft  Musculoskeletal:         General: Normal range of motion  Skin:     General: Skin is warm and dry  Neurological:      General: No focal deficit present  Mental Status: She is alert and oriented to person, place, and time  Psychiatric:         Mood and Affect: Mood normal          Behavior: Behavior normal          Thought Content:  Thought content normal          Judgment: Judgment normal

## 2022-05-19 ENCOUNTER — APPOINTMENT (OUTPATIENT)
Dept: RADIOLOGY | Facility: MEDICAL CENTER | Age: 32
End: 2022-05-19
Payer: MEDICARE

## 2022-05-19 ENCOUNTER — OFFICE VISIT (OUTPATIENT)
Dept: OBGYN CLINIC | Facility: MEDICAL CENTER | Age: 32
End: 2022-05-19
Attending: PHYSICIAN ASSISTANT
Payer: MEDICARE

## 2022-05-19 VITALS — HEART RATE: 64 BPM | DIASTOLIC BLOOD PRESSURE: 67 MMHG | SYSTOLIC BLOOD PRESSURE: 100 MMHG

## 2022-05-19 DIAGNOSIS — M89.8X5 PAIN OF LEFT FEMUR: ICD-10-CM

## 2022-05-19 DIAGNOSIS — M76.31 ILIOTIBIAL BAND SYNDROME OF RIGHT SIDE: Primary | ICD-10-CM

## 2022-05-19 DIAGNOSIS — D16.22 ENCHONDROMA OF FEMUR, LEFT: ICD-10-CM

## 2022-05-19 DIAGNOSIS — M70.50 PES ANSERINE BURSITIS: ICD-10-CM

## 2022-05-19 DIAGNOSIS — M25.552 PAIN IN LEFT HIP: ICD-10-CM

## 2022-05-19 PROCEDURE — 99214 OFFICE O/P EST MOD 30 MIN: CPT | Performed by: PHYSICIAN ASSISTANT

## 2022-05-19 PROCEDURE — 73502 X-RAY EXAM HIP UNI 2-3 VIEWS: CPT

## 2022-05-19 PROCEDURE — 73552 X-RAY EXAM OF FEMUR 2/>: CPT

## 2022-05-19 PROCEDURE — 20610 DRAIN/INJ JOINT/BURSA W/O US: CPT | Performed by: PHYSICIAN ASSISTANT

## 2022-05-19 RX ORDER — TRIAMCINOLONE ACETONIDE 40 MG/ML
40 INJECTION, SUSPENSION INTRA-ARTICULAR; INTRAMUSCULAR
Status: COMPLETED | OUTPATIENT
Start: 2022-05-19 | End: 2022-05-19

## 2022-05-19 RX ORDER — BUPIVACAINE HYDROCHLORIDE 5 MG/ML
1 INJECTION, SOLUTION PERINEURAL
Status: COMPLETED | OUTPATIENT
Start: 2022-05-19 | End: 2022-05-19

## 2022-05-19 RX ADMIN — BUPIVACAINE HYDROCHLORIDE 1 ML: 5 INJECTION, SOLUTION PERINEURAL at 11:49

## 2022-05-19 RX ADMIN — TRIAMCINOLONE ACETONIDE 40 MG: 40 INJECTION, SUSPENSION INTRA-ARTICULAR; INTRAMUSCULAR at 11:49

## 2022-05-19 NOTE — PROGRESS NOTES
Assessment/Plan:  1  Pain of left femur    2  Enchondroma of femur, left    3  Pain in left hip    4  Iliotibial band syndrome of right side      Orders Placed This Encounter   Procedures    Large joint arthrocentesis    XR hip/pelv 2-3 vws left if performed    XR femur 2 vw left    Ambulatory Referral to Physical Therapy       · Patient has right IT band syndrome, right pes anserine bursitis, left femur enchondroma, mild bilateral knee OA, mild bilateral hip OA  · Discussed with patient that I do believe her pain is coming from a few different causes at this time  · Patient received right pes anserine bursa steroid injection today  Tolerated the procedure well  Advised to apply ice and avoid strenuous activity for for 1-2 days as needed  · PT script provided to work specifically on right IT band  · Continue tylenol and gabapentin  No NSAIDs due to previous bariatric surgery  · Encouraged patient to follow up with Rheum regarding plaquenil  She is currently taking it occasionally, not as prescribed  Return in about 2 months (around 7/19/2022) for recheck right knee  I answered all of the patient's questions during the visit and provided education of the patient's condition during the visit  The patient verbalized understanding of the information given and agrees with the plan  This note was dictated using InOpen software  It may contain errors including improperly dictated words  Please contact physician directly for any questions  Subjective   Chief Complaint:   Chief Complaint   Patient presents with    Left Knee - Follow-up       HPI  Soren Hawthorne is a 27 y o  female who presents for evaluation of left thigh pain, right thigh pain, and knee tenderness  At her last visit on 10/20/20, patient received left knee steroid injection  Since her last visit patient has noticed she is having tenderness over the right lateral thigh and tingling achy pain over the distal right thigh   She notes both of her knees feel tender all over  Patient had right femur x-rays and MRI ordered by PCP  The MRI of the right femur incidentally showed enchondroma of the left femur  Patient has previous had left knee steroid injections, last injections 10/20/20  She is currently taking gabapentin and tylenol  Patient has done PT in the past where she worked on her hips and knees  She has stopped home exercises but does try to run and generally work out  Review of Systems  ROS:    See HPI for musculoskeletal review     All other systems reviewed are negative     History:  Past Medical History:   Diagnosis Date    Back pain     Bacterial vaginosis     Bariatric surgery status     Chlamydia     Disease of thyroid gland     nodule    Knee pain     Morbidly obese (HCC)     gastric sleeve   today 10/4 2021    MRSA carrier     Obesity     Postsurgical malabsorption     Thyroid disease     benign    -right   gets scan yearly    Urogenital trichomoniasis     Wears glasses      Past Surgical History:   Procedure Laterality Date    [place] LAP GASTRIC BYPASS/DRE-EN-Y N/A 10/4/2021    Procedure: BYPASS GASTRIC  DRE-EN-Y LAPAROSCOPIC, AND INTRAOPERATIVE EGD;  Surgeon: Nura Smith MD;  Location: [facility];  Service: Bariatrics    TONSILLECTOMY      TOOTH EXTRACTION  10/01/2014    WRIST SURGERY  05/25/2021     Social History   Social History     Substance and Sexual Activity   Alcohol Use Not Currently    Alcohol/week: 3 0 standard drinks    Types: 1 Glasses of wine, 1 Cans of beer, 1 Shots of liquor per week    Comment: 1 x monthly     Social History     Substance and Sexual Activity   Drug Use No     Social History     Tobacco Use   Smoking Status Never Smoker   Smokeless Tobacco Never Used   Tobacco Comment    no passive smoke exposure     Family History:   Family History   Problem Relation Age of Onset    Brain cancer Mother     Cancer Mother         glioblastoma    Hypertension Father     No Known Problems Paternal Grandfather     No Known Problems Sister     No Known Problems Daughter     No Known Problems Maternal Grandmother     Diabetes Paternal Grandmother     Stroke Paternal Grandmother     Heart disease Neg Hx     Thyroid disease Neg Hx     Breast cancer Neg Hx     Colon cancer Neg Hx     Ovarian cancer Neg Hx        Current Outpatient Medications on File Prior to Visit   Medication Sig Dispense Refill    acetaminophen (TYLENOL) 160 mg/5 mL solution        Calcium Carbonate-Vit D-Min (CALCIUM 1200 PO) Take by mouth        Diclofenac Sodium (VOLTAREN) 1 %       ergocalciferol (VITAMIN D2) 50,000 units TAKE 1 CAPSULE BY MOUTH ONE TIME PER WEEK      gabapentin (NEURONTIN) 300 mg capsule Take 1 capsule (300 mg total) by mouth 3 (three) times a day 90 capsule 2    hydroxychloroquine (PLAQUENIL) 200 mg tablet Take 200 mg by mouth 2 (two) times a day      hydroxychloroquine (PLAQUENIL) 200 mg tablet TAKE 1 TABLET (200 MG TOTAL) BY MOUTH 2 TIMES A DAY   lidocaine (LIDODERM) 5 % PLEASE SEE ATTACHED FOR DETAILED DIRECTIONS      Multiple Vitamin (multivitamin) tablet Take 1 tablet by mouth daily       omeprazole (PriLOSEC) 20 mg delayed release capsule Take 1 capsule (20 mg total) by mouth daily 30 capsule 2    ondansetron (ZOFRAN) 4 mg tablet Take 1 tablet (4 mg total) by mouth every 8 (eight) hours as needed for nausea or vomiting for up to 7 doses 7 tablet 0    polyethylene glycol (MIRALAX) 17 g packet Take 17 g by mouth daily TAKEN AS NEEDED        No current facility-administered medications on file prior to visit       Allergies   Allergen Reactions    Zyrtec [Cetirizine] Hives        Objective     /67   Pulse 64      PE:  AAOx 3  WDWN  Hearing intact, no drainage from eyes  no audible wheezing  no abdominal distension  LE compartments soft, skin intact    Ortho Exam:  right Knee:   No erythema  no swelling  no effusion  no warmth  +TTP over IT band and pes anserine bursa and patellar tendon  AROM: 0- 130  Stable to varus/valgus stress    Left Knee:   No erythema  no swelling  no effusion  no warmth  +TTP over patellar tendon  AROM: 0- 130  Stable to varus/valgus stress    No pain with hip ROM  AT/ GS/ EHL intact     Imaging Studies: I have personally reviewed pertinent films in PACS  X-rays left hip: mild osteoarthritis   X-rays left femur: enchondroma distal femur, no aggressive features  MRI right femur: enchondroma incidentally noted in the distal left femur without any aggressive features     Large joint arthrocentesis: R pes anserine bursa  Universal Protocol:  Consent: Verbal consent obtained    Risks and benefits: risks, benefits and alternatives were discussed  Consent given by: patient  Site marked: the operative site was marked  Supporting Documentation  Indications: pain   Procedure Details  Location: knee - R pes anserine bursa  Preparation: Patient was prepped and draped in the usual sterile fashion  Needle size: 22 G  Ultrasound guidance: no  Approach: medial  Medications administered: 1 mL bupivacaine 0 5 %; 40 mg triamcinolone acetonide 40 mg/mL    Patient tolerance: patient tolerated the procedure well with no immediate complications  Dressing:  Sterile dressing applied            Scribe Attestation    I,:  Carlito Bhatt PA-C am acting as a scribe while in the presence of the attending physician :       I,:  Leroy Yin DO personally performed the services described in this documentation    as scribed in my presence :

## 2022-06-01 ENCOUNTER — APPOINTMENT (OUTPATIENT)
Dept: LAB | Facility: IMAGING CENTER | Age: 32
End: 2022-06-01
Payer: MEDICARE

## 2022-06-01 DIAGNOSIS — E55.9 VITAMIN D DEFICIENCY: ICD-10-CM

## 2022-06-01 DIAGNOSIS — M67.331 TRANSIENT SYNOVITIS OF RIGHT WRIST: ICD-10-CM

## 2022-06-01 LAB
ALBUMIN SERPL BCP-MCNC: 3.7 G/DL (ref 3.5–5)
ALP SERPL-CCNC: 47 U/L (ref 46–116)
ALT SERPL W P-5'-P-CCNC: 37 U/L (ref 12–78)
ANION GAP SERPL CALCULATED.3IONS-SCNC: 0 MMOL/L (ref 4–13)
AST SERPL W P-5'-P-CCNC: 19 U/L (ref 5–45)
BASOPHILS # BLD AUTO: 0.07 THOUSANDS/ΜL (ref 0–0.1)
BASOPHILS NFR BLD AUTO: 1 % (ref 0–1)
BILIRUB SERPL-MCNC: 0.46 MG/DL (ref 0.2–1)
BUN SERPL-MCNC: 16 MG/DL (ref 5–25)
CALCIUM SERPL-MCNC: 9.4 MG/DL (ref 8.3–10.1)
CHLORIDE SERPL-SCNC: 110 MMOL/L (ref 100–108)
CO2 SERPL-SCNC: 29 MMOL/L (ref 21–32)
CREAT SERPL-MCNC: 0.74 MG/DL (ref 0.6–1.3)
CRP SERPL QL: <3 MG/L
EOSINOPHIL # BLD AUTO: 0.08 THOUSAND/ΜL (ref 0–0.61)
EOSINOPHIL NFR BLD AUTO: 1 % (ref 0–6)
ERYTHROCYTE [DISTWIDTH] IN BLOOD BY AUTOMATED COUNT: 13.9 % (ref 11.6–15.1)
ERYTHROCYTE [SEDIMENTATION RATE] IN BLOOD: 7 MM/HOUR (ref 0–19)
GFR SERPL CREATININE-BSD FRML MDRD: 106 ML/MIN/1.73SQ M
GLUCOSE P FAST SERPL-MCNC: 76 MG/DL (ref 65–99)
HCT VFR BLD AUTO: 39.3 % (ref 34.8–46.1)
HGB BLD-MCNC: 12.3 G/DL (ref 11.5–15.4)
IMM GRANULOCYTES # BLD AUTO: 0.03 THOUSAND/UL (ref 0–0.2)
IMM GRANULOCYTES NFR BLD AUTO: 0 % (ref 0–2)
LYMPHOCYTES # BLD AUTO: 2.79 THOUSANDS/ΜL (ref 0.6–4.47)
LYMPHOCYTES NFR BLD AUTO: 35 % (ref 14–44)
MCH RBC QN AUTO: 28.9 PG (ref 26.8–34.3)
MCHC RBC AUTO-ENTMCNC: 31.3 G/DL (ref 31.4–37.4)
MCV RBC AUTO: 92 FL (ref 82–98)
MONOCYTES # BLD AUTO: 0.58 THOUSAND/ΜL (ref 0.17–1.22)
MONOCYTES NFR BLD AUTO: 7 % (ref 4–12)
NEUTROPHILS # BLD AUTO: 4.53 THOUSANDS/ΜL (ref 1.85–7.62)
NEUTS SEG NFR BLD AUTO: 56 % (ref 43–75)
NRBC BLD AUTO-RTO: 0 /100 WBCS
PLATELET # BLD AUTO: 224 THOUSANDS/UL (ref 149–390)
PMV BLD AUTO: 11.7 FL (ref 8.9–12.7)
POTASSIUM SERPL-SCNC: 4 MMOL/L (ref 3.5–5.3)
PROT SERPL-MCNC: 6.6 G/DL (ref 6.4–8.2)
RBC # BLD AUTO: 4.26 MILLION/UL (ref 3.81–5.12)
SODIUM SERPL-SCNC: 139 MMOL/L (ref 136–145)
WBC # BLD AUTO: 8.08 THOUSAND/UL (ref 4.31–10.16)

## 2022-06-01 PROCEDURE — 85025 COMPLETE CBC W/AUTO DIFF WBC: CPT

## 2022-06-01 PROCEDURE — 86140 C-REACTIVE PROTEIN: CPT

## 2022-06-01 PROCEDURE — 80053 COMPREHEN METABOLIC PANEL: CPT

## 2022-06-01 PROCEDURE — 85652 RBC SED RATE AUTOMATED: CPT

## 2022-06-01 PROCEDURE — 36415 COLL VENOUS BLD VENIPUNCTURE: CPT

## 2022-07-07 ENCOUNTER — OFFICE VISIT (OUTPATIENT)
Dept: FAMILY MEDICINE CLINIC | Facility: CLINIC | Age: 32
End: 2022-07-07
Payer: MEDICARE

## 2022-07-07 VITALS
DIASTOLIC BLOOD PRESSURE: 68 MMHG | SYSTOLIC BLOOD PRESSURE: 102 MMHG | HEIGHT: 66 IN | OXYGEN SATURATION: 98 % | RESPIRATION RATE: 16 BRPM | HEART RATE: 66 BPM | WEIGHT: 209 LBS | TEMPERATURE: 97 F | BODY MASS INDEX: 33.59 KG/M2

## 2022-07-07 DIAGNOSIS — D49.7 PARATHYROID NEOPLASM: Primary | ICD-10-CM

## 2022-07-07 DIAGNOSIS — K29.70 GASTRITIS WITHOUT BLEEDING, UNSPECIFIED CHRONICITY, UNSPECIFIED GASTRITIS TYPE: ICD-10-CM

## 2022-07-07 DIAGNOSIS — M54.16 RIGHT LUMBAR RADICULOPATHY: ICD-10-CM

## 2022-07-07 DIAGNOSIS — D16.22 ENCHONDROMA OF FEMUR, LEFT: ICD-10-CM

## 2022-07-07 PROCEDURE — 99214 OFFICE O/P EST MOD 30 MIN: CPT | Performed by: PHYSICIAN ASSISTANT

## 2022-07-07 NOTE — PROGRESS NOTES
Assessment/Plan:    -I did order an ultrasound of the parathyroid for follow-up  -PTH done in October 2020 is normal   -she is not sure if the gabapentin is helping her lumbar radiculopathy  I did advise her to reduce it down to twice daily from 3 times daily for the next 2 weeks  If there is no change in her pain I then recommend she reduce it down to once daily for 2 weeks and then discontinue the medication     -she will continue with the GI specialist and the omeprazole as directed for her gastritis   -she will continue follow-up with Orthopedic surgery for her leg pain/enchondroma of her femur  -I did recommend follow-up in 4 months to include her annual physical    M*Modal software was used to dictate this note  It may contain errors with dictating incorrect words/spelling  Please contact provider directly for any questions  Diagnoses and all orders for this visit:    Parathyroid neoplasm  -     US head neck soft tissue; Future    Enchondroma of femur, left    Right lumbar radiculopathy    Gastritis without bleeding, unspecified chronicity, unspecified gastritis type          Subjective:      Patient ID: Irena Balderas is a 27 y o  female  Patient presents today for her routine follow-up  She states that she is very happy with the weight loss since her bariatric surgery  She states that her leg pain has been continuing to get better  She continues to follow with Orthopedic surgery  She states she has been doing home exercise program   She does follow with the GI specialist for her gastritis which is controlled on omeprazole  She is unsure about the follow-up for her parathyroid neoplasm  She know she had an ultrasound and she thought it was a year ago but I did notice that was done for her thyroid in October 2020    She did have a PTH done at that time which was normal       The following portions of the patient's history were reviewed and updated as appropriate:   She  has a past medical history of Back pain, Bacterial vaginosis, Bariatric surgery status, Chlamydia, Disease of thyroid gland, Knee pain, Morbidly obese ([address] ), MRSA carrier, Obesity, Postsurgical malabsorption, Thyroid disease, Urogenital trichomoniasis, and Wears glasses  She   Patient Active Problem List    Diagnosis Date Noted    Enchondroma of femur, left 04/11/2022    Perineum pain, female 01/13/2022    Right thigh pain 12/08/2021    It band syndrome, right 12/08/2021    Loss of smell 12/08/2021    Loss of taste 12/08/2021    COVID-19 virus infection 11/24/2021    Acute leg pain, right 10/18/2021    History of bariatric surgery 10/18/2021    Belching 10/08/2021    Generalized abdominal pain 10/08/2021    Group B streptococcal bacteriuria 07/20/2021    Chlamydia infection 07/20/2021    Gastritis without bleeding 05/20/2021    Headache 01/14/2021    Parathyroid neoplasm 10/15/2020    Derang of medial meniscus due to old tear/inj, left knee 10/12/2020    Well adult exam 10/12/2020    Thyroid nodule 10/12/2020    Weight gain 10/12/2020    Cervical cancer screening 10/12/2020    Lipid screening 10/12/2020    Encounter for screening for HIV 10/12/2020    Radial styloid tenosynovitis of right hand 08/28/2020    Right wrist pain 08/28/2020    Class 3 severe obesity due to excess calories without serious comorbidity with body mass index (BMI) of 50 0 to 59 9 in adult [facility]) 07/08/2019    Chondromalacia 03/15/2019    Knee pain 03/15/2019    Right lumbar radiculopathy 02/22/2017     She  has a past surgical history that includes Tonsillectomy; Tooth extraction (10/01/2014); Wrist surgery (05/25/2021); and pr lap gastric bypass/shant-en-y (N/A, 10/4/2021)    Her family history includes Brain cancer in her mother; Cancer in her mother; Diabetes in her paternal grandmother; Hypertension in her father; No Known Problems in her daughter, maternal grandmother, paternal grandfather, and sister; Stroke in her paternal grandmother  She  reports that she has never smoked  She has never used smokeless tobacco  She reports previous alcohol use of about 3 0 standard drinks of alcohol per week  She reports that she does not use drugs  Current Outpatient Medications   Medication Sig Dispense Refill    acetaminophen (TYLENOL) 160 mg/5 mL solution        Calcium Carbonate-Vit D-Min (CALCIUM 1200 PO) Take by mouth        Diclofenac Sodium (VOLTAREN) 1 %       ergocalciferol (VITAMIN D2) 50,000 units TAKE 1 CAPSULE BY MOUTH ONE TIME PER WEEK      gabapentin (NEURONTIN) 300 mg capsule Take 1 capsule (300 mg total) by mouth 3 (three) times a day 90 capsule 2    hydroxychloroquine (PLAQUENIL) 200 mg tablet Take 200 mg by mouth 2 (two) times a day      hydroxychloroquine (PLAQUENIL) 200 mg tablet TAKE 1 TABLET (200 MG TOTAL) BY MOUTH 2 TIMES A DAY   lidocaine (LIDODERM) 5 % PLEASE SEE ATTACHED FOR DETAILED DIRECTIONS      Multiple Vitamin (multivitamin) tablet Take 1 tablet by mouth daily       ondansetron (ZOFRAN) 4 mg tablet Take 1 tablet (4 mg total) by mouth every 8 (eight) hours as needed for nausea or vomiting for up to 7 doses 7 tablet 0    polyethylene glycol (MIRALAX) 17 g packet Take 17 g by mouth daily TAKEN AS NEEDED       omeprazole (PriLOSEC) 20 mg delayed release capsule Take 1 capsule (20 mg total) by mouth daily 30 capsule 2     No current facility-administered medications for this visit       Current Outpatient Medications on File Prior to Visit   Medication Sig    acetaminophen (TYLENOL) 160 mg/5 mL solution      Calcium Carbonate-Vit D-Min (CALCIUM 1200 PO) Take by mouth      Diclofenac Sodium (VOLTAREN) 1 %     ergocalciferol (VITAMIN D2) 50,000 units TAKE 1 CAPSULE BY MOUTH ONE TIME PER WEEK    gabapentin (NEURONTIN) 300 mg capsule Take 1 capsule (300 mg total) by mouth 3 (three) times a day    hydroxychloroquine (PLAQUENIL) 200 mg tablet Take 200 mg by mouth 2 (two) times a day    hydroxychloroquine (PLAQUENIL) 200 mg tablet TAKE 1 TABLET (200 MG TOTAL) BY MOUTH 2 TIMES A DAY   lidocaine (LIDODERM) 5 % PLEASE SEE ATTACHED FOR DETAILED DIRECTIONS    Multiple Vitamin (multivitamin) tablet Take 1 tablet by mouth daily     ondansetron (ZOFRAN) 4 mg tablet Take 1 tablet (4 mg total) by mouth every 8 (eight) hours as needed for nausea or vomiting for up to 7 doses    polyethylene glycol (MIRALAX) 17 g packet Take 17 g by mouth daily TAKEN AS NEEDED     omeprazole (PriLOSEC) 20 mg delayed release capsule Take 1 capsule (20 mg total) by mouth daily     No current facility-administered medications on file prior to visit  She is allergic to zyrtec [cetirizine]       Review of Systems   Constitutional: Negative  Respiratory: Negative for cough and shortness of breath  Musculoskeletal:        As stated in HPI         Objective:      /68 (BP Location: Left arm, Patient Position: Sitting, Cuff Size: Large)   Pulse 66   Temp (!) 97 °F (36 1 °C) (Tympanic)   Resp 16   Ht 5' 6" (1 676 m)   Wt 94 8 kg (209 lb)   SpO2 98%   BMI 33 73 kg/m²          Physical Exam  Vitals reviewed  Constitutional:       General: She is not in acute distress  Appearance: Normal appearance  She is well-developed  She is not ill-appearing, toxic-appearing or diaphoretic  HENT:      Head: Normocephalic and atraumatic  Neck:      Thyroid: No thyromegaly  Cardiovascular:      Rate and Rhythm: Normal rate and regular rhythm  Heart sounds: Normal heart sounds  No murmur heard  No friction rub  No gallop  Pulmonary:      Effort: Pulmonary effort is normal  No respiratory distress  Breath sounds: Normal breath sounds  No wheezing, rhonchi or rales  Abdominal:      General: Bowel sounds are normal       Palpations: Abdomen is soft  There is no mass  Tenderness: There is no abdominal tenderness  Musculoskeletal:         General: No deformity  Cervical back: Neck supple  Right lower leg: No edema  Left lower leg: No edema  Lymphadenopathy:      Cervical: No cervical adenopathy  Skin:     General: Skin is warm  Neurological:      General: No focal deficit present  Mental Status: She is alert  Psychiatric:         Mood and Affect: Mood normal          Behavior: Behavior normal          Thought Content:  Thought content normal          Judgment: Judgment normal

## 2022-07-29 ENCOUNTER — HOSPITAL ENCOUNTER (OUTPATIENT)
Dept: RADIOLOGY | Facility: IMAGING CENTER | Age: 32
Discharge: HOME | End: 2022-07-29
Payer: MEDICARE

## 2022-07-29 ENCOUNTER — OFFICE VISIT (OUTPATIENT)
Dept: OBGYN CLINIC | Facility: MEDICAL CENTER | Age: 32
End: 2022-07-29
Payer: MEDICARE

## 2022-07-29 VITALS
DIASTOLIC BLOOD PRESSURE: 68 MMHG | HEIGHT: 66 IN | WEIGHT: 205.6 LBS | HEART RATE: 66 BPM | SYSTOLIC BLOOD PRESSURE: 105 MMHG | BODY MASS INDEX: 33.04 KG/M2

## 2022-07-29 DIAGNOSIS — D49.7 PARATHYROID NEOPLASM: ICD-10-CM

## 2022-07-29 DIAGNOSIS — M76.31 ILIOTIBIAL BAND SYNDROME OF RIGHT SIDE: Primary | ICD-10-CM

## 2022-07-29 PROCEDURE — 76536 US EXAM OF HEAD AND NECK: CPT

## 2022-07-29 PROCEDURE — 99213 OFFICE O/P EST LOW 20 MIN: CPT | Performed by: ORTHOPAEDIC SURGERY

## 2022-07-29 NOTE — PROGRESS NOTES
Assessment/Plan:  1  Iliotibial band syndrome of right side      No orders of the defined types were placed in this encounter     - Patient presents with IT band syndrome and hypersensitivity of the area  - Physical therapy script placed to address hypersensitivity of the area  Continue with home exercise plan  - Continue Tylenol as needed for pain  1,000 mg up to 3 times a day  Do not exceed 3,000 mg per day  - Follow up in about 8 weeks  Return in about 8 weeks (around 9/23/2022) for Recheck  I answered all of the patient's questions during the visit and provided education of the patient's condition during the visit  The patient verbalized understanding of the information given and agrees with the plan  This note was dictated using [employer] software  It may contain errors including improperly dictated words  Please contact physician directly for any questions  Subjective   Chief Complaint: No chief complaint on file  HPI  Christopher Palomares is a 27 y o  female who presents for follow up for right thigh pain  She reports 1 5 months of pain relief from the pes anserine injection received on 5/19/22  She believe she aggravated her pain located mid-distal lateral IT band while walking around a lot in [place] recently  She states the area is still pretty tender, but less painful since the injection  She occasionally experiences warmth or tingling around the area with no distal paresthesias  She is completing her home exercises but has not returned to [facility] since she did not like the last location she attended  Pain is alleviated by Tylenol Extra Strength as needed and ice or hot compresses, and she can not take NSAIDs due to recent gastric bypass surgery  Pain is aggravated by squatting or walking for long periods of time  She denies any left hip or thigh pain at this visit  Review of Systems  ROS:    See HPI for musculoskeletal review     All other systems reviewed are negative     History:  Past Medical History:   Diagnosis Date    Back pain     Bacterial vaginosis     Bariatric surgery status     Chlamydia     Disease of thyroid gland     nodule    Knee pain     Morbidly obese (HCC)     gastric sleeve   today 10/4 2021    MRSA carrier     Obesity     Postsurgical malabsorption     Thyroid disease     benign    -right   gets scan yearly    Urogenital trichomoniasis     Wears glasses      Past Surgical History:   Procedure Laterality Date    [place] LAP GASTRIC BYPASS/DRE-EN-Y N/A 10/4/2021    Procedure: BYPASS GASTRIC  DRE-EN-Y LAPAROSCOPIC, AND INTRAOPERATIVE EGD;  Surgeon: Alondra Tran MD;  Location: [facility];  Service: Bariatrics    TONSILLECTOMY      TOOTH EXTRACTION  10/01/2014    WRIST SURGERY  05/25/2021     Social History   Social History     Substance and Sexual Activity   Alcohol Use Not Currently    Alcohol/week: 3 0 standard drinks    Types: 1 Glasses of wine, 1 Cans of beer, 1 Shots of liquor per week    Comment: 1 x monthly     Social History     Substance and Sexual Activity   Drug Use No     Social History     Tobacco Use   Smoking Status Never Smoker   Smokeless Tobacco Never Used   Tobacco Comment    no passive smoke exposure     Family History:   Family History   Problem Relation Age of Onset    Brain cancer Mother     Cancer Mother         glioblastoma    Hypertension Father     No Known Problems Paternal Grandfather     No Known Problems Sister     No Known Problems Daughter     No Known Problems Maternal Grandmother     Diabetes Paternal Grandmother     Stroke Paternal Grandmother     Heart disease Neg Hx     Thyroid disease Neg Hx     Breast cancer Neg Hx     Colon cancer Neg Hx     Ovarian cancer Neg Hx        Current Outpatient Medications on File Prior to Visit   Medication Sig Dispense Refill    acetaminophen (TYLENOL) 160 mg/5 mL solution        Calcium Carbonate-Vit D-Min (CALCIUM 1200 PO) Take by mouth        Diclofenac Sodium (VOLTAREN) 1 %       ergocalciferol (VITAMIN D2) 50,000 units TAKE 1 CAPSULE BY MOUTH ONE TIME PER WEEK      gabapentin (NEURONTIN) 300 mg capsule Take 1 capsule (300 mg total) by mouth 3 (three) times a day 90 capsule 2    hydroxychloroquine (PLAQUENIL) 200 mg tablet Take 200 mg by mouth 2 (two) times a day      hydroxychloroquine (PLAQUENIL) 200 mg tablet TAKE 1 TABLET (200 MG TOTAL) BY MOUTH 2 TIMES A DAY   lidocaine (LIDODERM) 5 % PLEASE SEE ATTACHED FOR DETAILED DIRECTIONS      Multiple Vitamin (multivitamin) tablet Take 1 tablet by mouth daily       omeprazole (PriLOSEC) 20 mg delayed release capsule Take 1 capsule (20 mg total) by mouth daily 30 capsule 2    ondansetron (ZOFRAN) 4 mg tablet Take 1 tablet (4 mg total) by mouth every 8 (eight) hours as needed for nausea or vomiting for up to 7 doses 7 tablet 0    polyethylene glycol (MIRALAX) 17 g packet Take 17 g by mouth daily TAKEN AS NEEDED        No current facility-administered medications on file prior to visit       Allergies   Allergen Reactions    Zyrtec [Cetirizine] Hives        Objective     /68   Pulse 66   Ht 5' 6" (1 676 m)   Wt 93 3 kg (205 lb 9 6 oz)   BMI 33 18 kg/m²      PE:  AAOx 3  WDWN  Hearing intact, no drainage from eyes  no audible wheezing  no abdominal distension  LE compartments soft, skin intact    Ortho Exam:  right Knee:   No erythema  no swelling  no effusion  no warmth  + TTP lateral joint line and mid-distal IT band, significant  AROM: 0-130  Stable to varus/valgus stress      Stable to varus/valgus stress    Imaging Studies: I have personally reviewed pertinent films in PACS  MRI right femur from 4/7/22:   No soft tissue abnormality

## 2022-07-29 NOTE — LETTER
July 29, 2022     Patient: Sam Tejada  YOB: 1990  Date of Visit: 7/29/2022      To Whom it May Concern:    Petra Fordbrennen is under my professional care  Marquis Yang was seen in my office on 7/29/2022  Marquis Yang may return to work on 7/29/22  If you have any questions or concerns, please don't hesitate to call           Sincerely,          Sam Dean DO        CC: No Recipients

## 2022-08-04 NOTE — RESULT ENCOUNTER NOTE
Good morning [facility],   Your thyroid ultrasound does not show any concerning findings  The right thyroid nodule is stable  You recently mentioned to me that you wanted to see me sooner than November  Please call [phone] if you need an appointment since you said your have been difficulty making an appointment through my chart    Have a great day,   Sara

## 2022-08-18 ENCOUNTER — EVALUATION (OUTPATIENT)
Dept: PHYSICAL THERAPY | Facility: MEDICAL CENTER | Age: 32
End: 2022-08-18
Payer: MEDICARE

## 2022-08-18 ENCOUNTER — ANNUAL EXAM (OUTPATIENT)
Dept: OBGYN CLINIC | Facility: CLINIC | Age: 32
End: 2022-08-18

## 2022-08-18 ENCOUNTER — TELEPHONE (OUTPATIENT)
Dept: OBGYN CLINIC | Facility: CLINIC | Age: 32
End: 2022-08-18

## 2022-08-18 ENCOUNTER — TELEPHONE (OUTPATIENT)
Dept: FAMILY MEDICINE CLINIC | Facility: CLINIC | Age: 32
End: 2022-08-18

## 2022-08-18 ENCOUNTER — OFFICE VISIT (OUTPATIENT)
Dept: FAMILY MEDICINE CLINIC | Facility: CLINIC | Age: 32
End: 2022-08-18
Payer: MEDICARE

## 2022-08-18 VITALS
TEMPERATURE: 98.1 F | OXYGEN SATURATION: 98 % | RESPIRATION RATE: 16 BRPM | DIASTOLIC BLOOD PRESSURE: 70 MMHG | SYSTOLIC BLOOD PRESSURE: 98 MMHG | WEIGHT: 201 LBS | BODY MASS INDEX: 32.3 KG/M2 | HEART RATE: 65 BPM | HEIGHT: 66 IN

## 2022-08-18 VITALS
HEIGHT: 66 IN | DIASTOLIC BLOOD PRESSURE: 72 MMHG | BODY MASS INDEX: 32.3 KG/M2 | HEART RATE: 74 BPM | WEIGHT: 201 LBS | SYSTOLIC BLOOD PRESSURE: 106 MMHG

## 2022-08-18 DIAGNOSIS — E78.1 PRIMARY HYPERTRIGLYCERIDEMIA: ICD-10-CM

## 2022-08-18 DIAGNOSIS — Z12.39 ENCOUNTER FOR BREAST CANCER SCREENING USING NON-MAMMOGRAM MODALITY: ICD-10-CM

## 2022-08-18 DIAGNOSIS — M76.31 IT BAND SYNDROME, RIGHT: Primary | ICD-10-CM

## 2022-08-18 DIAGNOSIS — Z01.419 WOMEN'S ANNUAL ROUTINE GYNECOLOGICAL EXAMINATION: Primary | ICD-10-CM

## 2022-08-18 DIAGNOSIS — Z23 NEED FOR HPV VACCINE: ICD-10-CM

## 2022-08-18 DIAGNOSIS — Z11.3 SCREEN FOR STD (SEXUALLY TRANSMITTED DISEASE): ICD-10-CM

## 2022-08-18 DIAGNOSIS — G43.011 INTRACTABLE MIGRAINE WITHOUT AURA AND WITH STATUS MIGRAINOSUS: ICD-10-CM

## 2022-08-18 DIAGNOSIS — Z00.00 WELL ADULT EXAM: Primary | ICD-10-CM

## 2022-08-18 DIAGNOSIS — M76.31 ILIOTIBIAL BAND SYNDROME OF RIGHT SIDE: ICD-10-CM

## 2022-08-18 DIAGNOSIS — S16.1XXA STRAIN OF STERNOCLEIDOMASTOID MUSCLE, INITIAL ENCOUNTER: ICD-10-CM

## 2022-08-18 DIAGNOSIS — J06.9 ACUTE UPPER RESPIRATORY INFECTION: ICD-10-CM

## 2022-08-18 PROBLEM — E66.813 CLASS 3 SEVERE OBESITY DUE TO EXCESS CALORIES WITHOUT SERIOUS COMORBIDITY WITH BODY MASS INDEX (BMI) OF 50.0 TO 59.9 IN ADULT (HCC): Status: RESOLVED | Noted: 2019-07-08 | Resolved: 2022-08-18

## 2022-08-18 PROBLEM — E66.01 CLASS 3 SEVERE OBESITY DUE TO EXCESS CALORIES WITHOUT SERIOUS COMORBIDITY WITH BODY MASS INDEX (BMI) OF 50.0 TO 59.9 IN ADULT (HCC): Status: RESOLVED | Noted: 2019-07-08 | Resolved: 2022-08-18

## 2022-08-18 PROCEDURE — 87661 TRICHOMONAS VAGINALIS AMPLIF: CPT | Performed by: NURSE PRACTITIONER

## 2022-08-18 PROCEDURE — 87491 CHLMYD TRACH DNA AMP PROBE: CPT | Performed by: NURSE PRACTITIONER

## 2022-08-18 PROCEDURE — 87591 N.GONORRHOEAE DNA AMP PROB: CPT | Performed by: NURSE PRACTITIONER

## 2022-08-18 PROCEDURE — 97110 THERAPEUTIC EXERCISES: CPT

## 2022-08-18 PROCEDURE — 99395 PREV VISIT EST AGE 18-39: CPT | Performed by: PHYSICIAN ASSISTANT

## 2022-08-18 PROCEDURE — 90471 IMMUNIZATION ADMIN: CPT

## 2022-08-18 PROCEDURE — 97161 PT EVAL LOW COMPLEX 20 MIN: CPT

## 2022-08-18 PROCEDURE — 90651 9VHPV VACCINE 2/3 DOSE IM: CPT

## 2022-08-18 PROCEDURE — 87563 M. GENITALIUM AMP PROBE: CPT | Performed by: NURSE PRACTITIONER

## 2022-08-18 PROCEDURE — 99214 OFFICE O/P EST MOD 30 MIN: CPT | Performed by: PHYSICIAN ASSISTANT

## 2022-08-18 PROCEDURE — 99395 PREV VISIT EST AGE 18-39: CPT | Performed by: NURSE PRACTITIONER

## 2022-08-18 NOTE — PROGRESS NOTES
ANNUAL GYNECOLOGICAL EXAMINATION    Fei Roldan is a 27 y o  female who presents today for annual GYN exam   Her last pap smear was performed 2021 and result was NILM, HR-HPV negative  She reports history of one abnormal pap smear in her past, followed by a normal pap one year later  She has not received the HPV vaccine series  She reports menses as regular  Patient's last menstrual period was 2022 (approximate)  Her general medical history has been reviewed and she reports it as follows:    Past Medical History:   Diagnosis Date    Back pain     Bacterial vaginosis     Bariatric surgery status     Chlamydia     Disease of thyroid gland     nodule    Knee pain     Morbidly obese (HCC)     gastric sleeve   today 10/4 2021    MRSA carrier     Obesity     Postsurgical malabsorption     Thyroid disease     benign    -right   gets scan yearly    Urogenital trichomoniasis     Wears glasses      Past Surgical History:   Procedure Laterality Date    [place] LAP GASTRIC BYPASS/DRE-EN-Y N/A 10/4/2021    Procedure: BYPASS GASTRIC  DRE-EN-Y LAPAROSCOPIC, AND INTRAOPERATIVE EGD;  Surgeon: José Manuel Tamayo MD;  Location[facility];  Service: Bariatrics    TONSILLECTOMY      TOOTH EXTRACTION  10/01/2014    WRIST SURGERY  2021     OB History        3    Para   1    Term   1            AB   2    Living   1       SAB        IAB   2    Ectopic        Multiple        Live Births               Obstetric Comments   Child birth 12           Social History     Tobacco Use    Smoking status: Never Smoker    Smokeless tobacco: Never Used    Tobacco comment: no passive smoke exposure   Vaping Use    Vaping Use: Never used   Substance Use Topics    Alcohol use:  Yes     Alcohol/week: 3 0 standard drinks     Types: 1 Glasses of wine, 1 Cans of beer, 1 Shots of liquor per week     Comment: 1 x monthly    Drug use: No     Social History     Substance and Sexual Activity   Sexual Activity Yes    Partners: Male    Birth control/protection: Condom Male, None     Cancer-related family history includes Brain cancer in her mother; Cancer in her mother  There is no history of Breast cancer, Colon cancer, or Ovarian cancer  Current Outpatient Medications   Medication Instructions    acetaminophen (TYLENOL) 160 mg/5 mL solution      Calcium Carbonate-Vit D-Min (CALCIUM 1200 PO) Oral    Diclofenac Sodium (VOLTAREN) 1 % No dose, route, or frequency recorded   ergocalciferol (VITAMIN D2) 50,000 units TAKE 1 CAPSULE BY MOUTH ONE TIME PER WEEK    gabapentin (NEURONTIN) 300 mg, Oral, 3 times daily    hydroxychloroquine (PLAQUENIL) 200 mg tablet TAKE 1 TABLET (200 MG TOTAL) BY MOUTH 2 TIMES A DAY   hydroxychloroquine (PLAQUENIL) 200 mg, 2 times daily    lidocaine (LIDODERM) 5 % PLEASE SEE ATTACHED FOR DETAILED DIRECTIONS    Multiple Vitamin (multivitamin) tablet 1 tablet, Oral, Daily    omeprazole (PRILOSEC) 20 mg, Oral, Daily    ondansetron (ZOFRAN) 4 mg, Oral, Every 8 hours PRN    polyethylene glycol (MIRALAX) 17 g, Daily       Review of Systems:  Review of Systems   Constitutional: Negative  Gastrointestinal: Negative  Genitourinary: Negative  Skin: Negative  Physical Exam:  /72   Pulse 74   Ht 5' 6" (1 676 m)   Wt 91 2 kg (201 lb)   LMP 08/08/2022 (Approximate)   BMI 32 44 kg/m²   Physical Exam  Constitutional:       General: She is not in acute distress  Appearance: Normal appearance  Genitourinary:      Vulva and bladder normal       No lesions in the vagina  No vaginal erythema or ulceration  Right Adnexa: not tender and no mass present  Left Adnexa: not tender and no mass present  No cervical motion tenderness or lesion  Uterus is not enlarged or tender  No uterine mass detected  Breasts:      Right: No mass, nipple discharge or skin change  Left: No mass, nipple discharge or skin change         Cardiovascular: Rate and Rhythm: Normal rate and regular rhythm  Pulmonary:      Effort: Pulmonary effort is normal       Breath sounds: Normal breath sounds  Abdominal:      General: Abdomen is flat  Palpations: Abdomen is soft  Musculoskeletal:      Cervical back: Neck supple  Neurological:      Mental Status: She is alert  Skin:     General: Skin is warm and dry  Psychiatric:         Mood and Affect: Mood normal          Behavior: Behavior normal    Vitals reviewed  Assessment/Plan:   1  Normal well-woman GYN exam   2  Cervical cancer screening:  Normal cervical exam   Pap smear up to date  3  STD screening: Orders placed for vaginal GC/CT/trich cultures  4  Breast cancer screening:  Normal breast exam   Reviewed breast self-awareness  5  Depression Screening: Patient's depression screening was assessed with a PHQ-2 score of 0  Their PHQ-9 score was 3  Clinically patient does not have depression  No treatment is required  6  BMI Counseling: Body mass index is 32 44 kg/m²  Discussed the patient's BMI with her  The BMI is above normal  Nutrition recommendations include increasing intake of lean protein  Exercise recommendations include moderate aerobic physical activity for 150 minutes/week and exercising 3-5 times per week  Patient had gastric bypass surgery last year  She has successfully lost over 100 pounds  7  Contraception:  Condoms/withdraw method  She is not planning for pregnancy but would welcome a pregnancy  Recommended prenatal vitamins and folic acid  8  Discussed HPV vaccine  She would like to receive this  First dose given today  9  Return to office as scheduled to complete HPV vaccines and in one year for annual     Reviewed with patient that test results are [facility] immediately, but that they will not necessarily be reviewed by me immediately  Explained that I will review results at my earliest opportunity and contact patient appropriately

## 2022-08-18 NOTE — PROGRESS NOTES
Assessment/Plan:    -she plans to make an appointment for her routine cleaning with her dentist   -she plans to make an appointment for her routine eye exam since she wears glasses   -her Pap smear was done today with her gynecologist   -routine physical in 1 year    M*Modal software was used to dictate this note  It may contain errors with dictating incorrect words/spelling  Please contact provider directly for any questions  Diagnoses and all orders for this visit:    Well adult exam    BMI 32 0-32 9,adult          Subjective:      Patient ID: Myesha Hughes is a 27 y o  female  Patient presents today for her annual physical   Her other concerns will be addressed as a separate visit today  She has not seen a dentist since 2020  She plans on making an appointment  No problems   Her last eye doctor exam was about 1 and half to 2 years ago  She does were glasses  She plans on making an appointment   She states she does eat a healthy diet especially since her bariatric surgery  She has lost about 120 lb   She is not currently exercising but plans to get on a regular schedule in the near future   She does drink alcohol socially but she states that she does not smoke, no drug use or vaping   Her Pap smear was done today with her gynecologist      The following portions of the patient's history were reviewed and updated as appropriate:   She  has a past medical history of Back pain, Bacterial vaginosis, Bariatric surgery status, Chlamydia, Disease of thyroid gland, Knee pain, Morbidly obese ([address] ), MRSA carrier, Obesity, Postsurgical malabsorption, Thyroid disease, Urogenital trichomoniasis, and Wears glasses    She   Patient Active Problem List    Diagnosis Date Noted    BMI 32 0-32 9,adult 08/18/2022    Enchondroma of femur, left 04/11/2022    Perineum pain, female 01/13/2022    Right thigh pain 12/08/2021    It band syndrome, right 12/08/2021    Loss of smell 12/08/2021    Loss of taste 12/08/2021    COVID-19 virus infection 11/24/2021    Acute leg pain, right 10/18/2021    History of bariatric surgery 10/18/2021    Belching 10/08/2021    Generalized abdominal pain 10/08/2021    Group B streptococcal bacteriuria 07/20/2021    Chlamydia infection 07/20/2021    Gastritis without bleeding 05/20/2021    Headache 01/14/2021    Parathyroid neoplasm 10/15/2020    Derang of medial meniscus due to old tear/inj, left knee 10/12/2020    Well adult exam 10/12/2020    Thyroid nodule 10/12/2020    Weight gain 10/12/2020    Cervical cancer screening 10/12/2020    Lipid screening 10/12/2020    Encounter for screening for HIV 10/12/2020    Radial styloid tenosynovitis of right hand 08/28/2020    Right wrist pain 08/28/2020    Chondromalacia 03/15/2019    Knee pain 03/15/2019    Right lumbar radiculopathy 02/22/2017     She  has a past surgical history that includes Tonsillectomy; Tooth extraction (10/01/2014); Wrist surgery (05/25/2021); and pr lap gastric bypass/shant-en-y (N/A, 10/4/2021)  Her family history includes Brain cancer in her mother; Cancer in her mother; Diabetes in her paternal grandmother; Hypertension in her father; No Known Problems in her daughter, maternal grandmother, paternal grandfather, and sister; Stroke in her paternal grandmother  She  reports that she has never smoked  She has never used smokeless tobacco  She reports current alcohol use of about 3 0 standard drinks of alcohol per week  She reports that she does not use drugs    Current Outpatient Medications   Medication Sig Dispense Refill    acetaminophen (TYLENOL) 160 mg/5 mL solution        Calcium Carbonate-Vit D-Min (CALCIUM 1200 PO) Take by mouth        Diclofenac Sodium (VOLTAREN) 1 %       Multiple Vitamin (multivitamin) tablet Take 1 tablet by mouth daily       ergocalciferol (VITAMIN D2) 50,000 units TAKE 1 CAPSULE BY MOUTH ONE TIME PER WEEK (Patient not taking: Reported on 8/18/2022)      gabapentin (NEURONTIN) 300 mg capsule Take 1 capsule (300 mg total) by mouth 3 (three) times a day 90 capsule 2    hydroxychloroquine (PLAQUENIL) 200 mg tablet Take 200 mg by mouth 2 (two) times a day (Patient not taking: No sig reported)      hydroxychloroquine (PLAQUENIL) 200 mg tablet TAKE 1 TABLET (200 MG TOTAL) BY MOUTH 2 TIMES A DAY  (Patient not taking: No sig reported)      lidocaine (LIDODERM) 5 % PLEASE SEE ATTACHED FOR DETAILED DIRECTIONS (Patient not taking: No sig reported)      omeprazole (PriLOSEC) 20 mg delayed release capsule Take 1 capsule (20 mg total) by mouth daily 30 capsule 2    ondansetron (ZOFRAN) 4 mg tablet Take 1 tablet (4 mg total) by mouth every 8 (eight) hours as needed for nausea or vomiting for up to 7 doses (Patient not taking: No sig reported) 7 tablet 0    polyethylene glycol (MIRALAX) 17 g packet Take 17 g by mouth daily TAKEN AS NEEDED  (Patient not taking: No sig reported)       No current facility-administered medications for this visit  Current Outpatient Medications on File Prior to Visit   Medication Sig    acetaminophen (TYLENOL) 160 mg/5 mL solution      Calcium Carbonate-Vit D-Min (CALCIUM 1200 PO) Take by mouth      Diclofenac Sodium (VOLTAREN) 1 %     Multiple Vitamin (multivitamin) tablet Take 1 tablet by mouth daily     ergocalciferol (VITAMIN D2) 50,000 units TAKE 1 CAPSULE BY MOUTH ONE TIME PER WEEK (Patient not taking: Reported on 8/18/2022)    gabapentin (NEURONTIN) 300 mg capsule Take 1 capsule (300 mg total) by mouth 3 (three) times a day    hydroxychloroquine (PLAQUENIL) 200 mg tablet Take 200 mg by mouth 2 (two) times a day (Patient not taking: No sig reported)    hydroxychloroquine (PLAQUENIL) 200 mg tablet TAKE 1 TABLET (200 MG TOTAL) BY MOUTH 2 TIMES A DAY   (Patient not taking: No sig reported)    lidocaine (LIDODERM) 5 % PLEASE SEE ATTACHED FOR DETAILED DIRECTIONS (Patient not taking: No sig reported)    omeprazole (PriLOSEC) 20 mg delayed release capsule Take 1 capsule (20 mg total) by mouth daily    ondansetron (ZOFRAN) 4 mg tablet Take 1 tablet (4 mg total) by mouth every 8 (eight) hours as needed for nausea or vomiting for up to 7 doses (Patient not taking: No sig reported)    polyethylene glycol (MIRALAX) 17 g packet Take 17 g by mouth daily TAKEN AS NEEDED  (Patient not taking: No sig reported)     No current facility-administered medications on file prior to visit  She is allergic to zyrtec [cetirizine]       Review of Systems      Objective:      BP 98/70 (BP Location: Left arm, Patient Position: Sitting, Cuff Size: Large)   Pulse 65   Temp 98 1 °F (36 7 °C) (Tympanic)   Resp 16   Ht 5' 6" (1 676 m)   Wt 91 2 kg (201 lb)   LMP 08/08/2022 (Approximate)   SpO2 98%   BMI 32 44 kg/m²          Physical Exam  Vitals reviewed  Constitutional:       General: She is not in acute distress  Appearance: Normal appearance  She is well-developed  She is not ill-appearing, toxic-appearing or diaphoretic  HENT:      Head: Normocephalic and atraumatic  Neck:      Thyroid: No thyromegaly  Cardiovascular:      Rate and Rhythm: Normal rate and regular rhythm  Heart sounds: Normal heart sounds  No murmur heard  No friction rub  No gallop  Pulmonary:      Effort: Pulmonary effort is normal  No respiratory distress  Breath sounds: Normal breath sounds  No wheezing, rhonchi or rales  Musculoskeletal:         General: No deformity  Cervical back: Neck supple  Right lower leg: No edema  Left lower leg: No edema  Lymphadenopathy:      Cervical: No cervical adenopathy  Skin:     General: Skin is warm  Neurological:      General: No focal deficit present  Mental Status: She is alert  Psychiatric:         Mood and Affect: Mood normal          Behavior: Behavior normal          Thought Content:  Thought content normal          Judgment: Judgment normal

## 2022-08-18 NOTE — LETTER
August 18, 2022     Patient: Zahida Valentin  YOB: 1990  Date of Visit: 8/18/2022      To Whom it May Concern:    Fe Anguiano is under my professional care  Erasmo Hairston was seen in my office on 8/18/2022  If you have any questions or concerns, please don't hesitate to call           Sincerely,          Sara Cameron PA-C        CC: No Recipients

## 2022-08-18 NOTE — PROGRESS NOTES
Assessment/Plan:    -I did recommend physical therapy for her neck pain   -over-the-counter generic Sudafed as needed as package directs   -continue with increase clear liquids   -I did notice that her triglycerides were elevated in October 2020  I did recommend a lipid panel   -I did refer her to Neurology for intermittent headaches  She states there is a family history of a brain tumor with her mom in her 45s  -recommend follow-up pending lab results    M*Modal software was used to dictate this note  It may contain errors with dictating incorrect words/spelling  Please contact provider directly for any questions  Diagnoses and all orders for this visit:    Well adult exam    BMI 32 0-32 9,adult    Strain of sternocleidomastoid muscle, initial encounter  -     Ambulatory Referral to Physical Therapy; Future    Acute upper respiratory infection    Intractable migraine without aura and with status migrainosus  -     Ambulatory Referral to Neurology; Future    Primary hypertriglyceridemia  -     Lipid panel          Subjective:      Patient ID: Rios Landrum is a 27 y o  female  Patient presents today for right-sided neck pain that started about 3 weeks ago  She states that the pain is shooting into the back of her year and she has been getting intermittent headaches  She has no headache at this time  She denies any treatment for the neck pain  She denies any trauma  She points to the lateral aspect of her neck  She also states that she started with some mild nasal congestion and scratchiness of her throat over the past day  Denies any treatment  No fever or chills, no cough        The following portions of the patient's history were reviewed and updated as appropriate:   She  has a past medical history of Back pain, Bacterial vaginosis, Bariatric surgery status, Chlamydia, Disease of thyroid gland, Knee pain, Morbidly obese ([address] ), MRSA carrier, Obesity, Postsurgical malabsorption, Thyroid disease, Urogenital trichomoniasis, and Wears glasses  She   Patient Active Problem List    Diagnosis Date Noted    BMI 32 0-32 9,adult 08/18/2022    Strain of sternocleidomastoid muscle 08/18/2022    Intractable migraine without aura and with status migrainosus 08/18/2022    Primary hypertriglyceridemia 08/18/2022    Enchondroma of femur, left 04/11/2022    Perineum pain, female 01/13/2022    Right thigh pain 12/08/2021    It band syndrome, right 12/08/2021    Loss of smell 12/08/2021    Loss of taste 12/08/2021    COVID-19 virus infection 11/24/2021    Acute leg pain, right 10/18/2021    History of bariatric surgery 10/18/2021    Belching 10/08/2021    Generalized abdominal pain 10/08/2021    Group B streptococcal bacteriuria 07/20/2021    Chlamydia infection 07/20/2021    Gastritis without bleeding 05/20/2021    Headache 01/14/2021    Parathyroid neoplasm 10/15/2020    Derang of medial meniscus due to old tear/inj, left knee 10/12/2020    Well adult exam 10/12/2020    Thyroid nodule 10/12/2020    Weight gain 10/12/2020    Cervical cancer screening 10/12/2020    Lipid screening 10/12/2020    Encounter for screening for HIV 10/12/2020    Radial styloid tenosynovitis of right hand 08/28/2020    Right wrist pain 08/28/2020    Chondromalacia 03/15/2019    Knee pain 03/15/2019    Acute upper respiratory infection 03/15/2019    Right lumbar radiculopathy 02/22/2017     She  has a past surgical history that includes Tonsillectomy; Tooth extraction (10/01/2014); Wrist surgery (05/25/2021); and pr lap gastric bypass/shant-en-y (N/A, 10/4/2021)  Her family history includes Brain cancer in her mother; Cancer in her mother; Diabetes in her paternal grandmother; Hypertension in her father; No Known Problems in her daughter, maternal grandmother, paternal grandfather, and sister; Stroke in her paternal grandmother  She  reports that she has never smoked   She has never used smokeless tobacco  She reports current alcohol use of about 3 0 standard drinks of alcohol per week  She reports that she does not use drugs  Current Outpatient Medications   Medication Sig Dispense Refill    acetaminophen (TYLENOL) 160 mg/5 mL solution        Calcium Carbonate-Vit D-Min (CALCIUM 1200 PO) Take by mouth        Diclofenac Sodium (VOLTAREN) 1 %       Multiple Vitamin (multivitamin) tablet Take 1 tablet by mouth daily       ergocalciferol (VITAMIN D2) 50,000 units TAKE 1 CAPSULE BY MOUTH ONE TIME PER WEEK (Patient not taking: Reported on 8/18/2022)      gabapentin (NEURONTIN) 300 mg capsule Take 1 capsule (300 mg total) by mouth 3 (three) times a day 90 capsule 2    hydroxychloroquine (PLAQUENIL) 200 mg tablet Take 200 mg by mouth 2 (two) times a day (Patient not taking: No sig reported)      hydroxychloroquine (PLAQUENIL) 200 mg tablet TAKE 1 TABLET (200 MG TOTAL) BY MOUTH 2 TIMES A DAY  (Patient not taking: No sig reported)      lidocaine (LIDODERM) 5 % PLEASE SEE ATTACHED FOR DETAILED DIRECTIONS (Patient not taking: No sig reported)      omeprazole (PriLOSEC) 20 mg delayed release capsule Take 1 capsule (20 mg total) by mouth daily 30 capsule 2    ondansetron (ZOFRAN) 4 mg tablet Take 1 tablet (4 mg total) by mouth every 8 (eight) hours as needed for nausea or vomiting for up to 7 doses (Patient not taking: No sig reported) 7 tablet 0    polyethylene glycol (MIRALAX) 17 g packet Take 17 g by mouth daily TAKEN AS NEEDED  (Patient not taking: No sig reported)       No current facility-administered medications for this visit       Current Outpatient Medications on File Prior to Visit   Medication Sig    acetaminophen (TYLENOL) 160 mg/5 mL solution      Calcium Carbonate-Vit D-Min (CALCIUM 1200 PO) Take by mouth      Diclofenac Sodium (VOLTAREN) 1 %     Multiple Vitamin (multivitamin) tablet Take 1 tablet by mouth daily     ergocalciferol (VITAMIN D2) 50,000 units TAKE 1 CAPSULE BY MOUTH ONE TIME PER WEEK (Patient not taking: Reported on 8/18/2022)    gabapentin (NEURONTIN) 300 mg capsule Take 1 capsule (300 mg total) by mouth 3 (three) times a day    hydroxychloroquine (PLAQUENIL) 200 mg tablet Take 200 mg by mouth 2 (two) times a day (Patient not taking: No sig reported)    hydroxychloroquine (PLAQUENIL) 200 mg tablet TAKE 1 TABLET (200 MG TOTAL) BY MOUTH 2 TIMES A DAY  (Patient not taking: No sig reported)    lidocaine (LIDODERM) 5 % PLEASE SEE ATTACHED FOR DETAILED DIRECTIONS (Patient not taking: No sig reported)    omeprazole (PriLOSEC) 20 mg delayed release capsule Take 1 capsule (20 mg total) by mouth daily    ondansetron (ZOFRAN) 4 mg tablet Take 1 tablet (4 mg total) by mouth every 8 (eight) hours as needed for nausea or vomiting for up to 7 doses (Patient not taking: No sig reported)    polyethylene glycol (MIRALAX) 17 g packet Take 17 g by mouth daily TAKEN AS NEEDED  (Patient not taking: No sig reported)     No current facility-administered medications on file prior to visit  She is allergic to zyrtec [cetirizine]       Review of Systems   Constitutional: Negative for chills and fever  HENT: Positive for congestion, postnasal drip and sore throat  Respiratory: Negative for cough and shortness of breath  Musculoskeletal:        As stated in HPI         Objective:      BP 98/70 (BP Location: Left arm, Patient Position: Sitting, Cuff Size: Large)   Pulse 65   Temp 98 1 °F (36 7 °C) (Tympanic)   Resp 16   Ht 5' 6" (1 676 m)   Wt 91 2 kg (201 lb)   LMP 08/08/2022 (Approximate)   SpO2 98%   BMI 32 44 kg/m²          Physical Exam  Vitals reviewed  Constitutional:       General: She is not in acute distress  Appearance: Normal appearance  She is well-developed  She is not ill-appearing, toxic-appearing or diaphoretic  HENT:      Head: Normocephalic and atraumatic        Mouth/Throat:      Mouth: Mucous membranes are moist       Pharynx: No oropharyngeal exudate or posterior oropharyngeal erythema  Neck:      Thyroid: No thyromegaly  Cardiovascular:      Rate and Rhythm: Normal rate and regular rhythm  Heart sounds: Normal heart sounds  No murmur heard  No friction rub  No gallop  Pulmonary:      Effort: Pulmonary effort is normal  No respiratory distress  Breath sounds: Normal breath sounds  No wheezing, rhonchi or rales  Musculoskeletal:         General: No deformity  Cervical back: Neck supple  Right lower leg: No edema  Left lower leg: No edema  Comments: She does have fullness and tenderness of the right sternocleidomastoid muscle   Lymphadenopathy:      Cervical: No cervical adenopathy  Skin:     General: Skin is warm  Neurological:      General: No focal deficit present  Mental Status: She is alert  Psychiatric:         Mood and Affect: Mood normal          Behavior: Behavior normal          Thought Content:  Thought content normal          Judgment: Judgment normal

## 2022-08-18 NOTE — PROGRESS NOTES
PT Evaluation     Today's date: 2022  Patient name: Supriya Crowder  : 1990  MRN: [id number]  Referring provider: Enmanuel Romo,*  Dx:   Encounter Diagnosis     ICD-10-CM    1  It band syndrome, right  M76 31    2  Iliotibial band syndrome of right side  M76 31 Ambulatory Referral to Physical Therapy       Start Time: 1400  Stop Time: 1500  Total time in clinic (min): 60 minutes    Assessment  Assessment details: Priti Forde  is a pleasant 27 y o  female who presents with distal IT band pain   The primary movement problem is proximal weakness resulting in compensation down the kinetic chain, mobility deficit, and poor lumbopelvic control, which limit her ability to perform ADLs, IADLs and recreational activity   No referral is necessary at this time based on examination results  The patient's greatest concerns is not getting relief  Problem List:  1) proximal muscle weakness- addressing with strengthening  2) poor lumbopelvic control- addressing with NMRE  3) mobility deficit- addressing with manual interventions    Etiologic factors include positioning after surgery  Pt  will benefit from skilled PT services that includes manual therapy techniques to enhance tissue extensibility, neuromuscular re-education to facilitate motor control, therapeutic exercise to increase functional mobility, and modalities prn to reduce pain and inflammation    Impairments: abnormal muscle firing, abnormal muscle tone, abnormal or restricted ROM, abnormal movement, activity intolerance, impaired physical strength, lacks appropriate home exercise program and pain with function  Understanding of Dx/Px/POC: good   Prognosis: good  Prognosis details: Positive prognostic indicators: motivation to improve   Negative prognostic indicators: chronicity of symptoms    Goals  Impairment Goals  - Pt I with initial HEP in 1-2 visits  - Improve ROM equal to contralateral side in 4-6 weeks  - Increase strength to 5/5 in all affected areas in 4-6 weeks    Functional Goals  - Increase Functional Status Measure to: 72 in 6-8 weeks  - Patient will be independent with comprehensive HEP in 6-8 weeks  - Ambulation is improved to prior level of function in 6-8 weeks  - Stair climbing is improved to prior level of function in 6-8 weeks  - Squatting is improved to prior level of function in 6-8 weeks      Plan  Plan details: Prognosis above is given PT services 2x/week tapering to 1x/week over the next 2 months and home program adherence  Patient would benefit from: skilled physical therapy  Planned modality interventions: low level laser therapy, TENS and cryotherapy  Planned therapy interventions: joint mobilization, manual therapy, massage, neuromuscular re-education, patient education, postural training, strengthening, stretching, therapeutic activities, therapeutic exercise, flexibility, functional ROM exercises, graded exercise, home exercise program, balance and balance/weight bearing training  Treatment plan discussed with: patient        Subjective Evaluation    History of Present Illness  Mechanism of injury: Emma Underwood presents with c/c of distal IT band pain  Symptoms began October 2021 with mechanism of injury: positioning after surgery (fetal)  Gastric bypass in October and after she started getting shooting pain from lateral mid-thigh to knee  She reports tenderness and pain  Pt reports constant pain     Aggravating factors: laying on leg, hitting leg on something, walking uphill,   Relieving factors: ice, heat, propped  24hr pain pattern: 3/10 (current), 3/10 (best), 8/10 (worst), location: lateral R mid thigh to knee, descriptors: soreness  Imaging: US, MRI  Previous treatments: PT  Occupation/recreation: security in a patrol car,   Primary concern: no relief  Patient goals: less pain    Daily activity: 1-2x/week gym cardio and resistance training, walking at work          Objective     Palpation     Additional Palpation Details  Tenderness over R ITB and piriformis    Lumbar Screen  Lumbar range of motion within normal limits  Active Range of Motion   Left Hip   Normal active range of motion    Right Hip   Normal active range of motion    Passive Range of Motion   Left Hip   Normal passive range of motion    Right Hip   Normal passive range of motion    Joint Play   Left Hip   Joints within functional limits are the long axis distraction  Right Hip     Hypomobile in the long axis distraction    Strength/Myotome Testing     Left Hip   Planes of Motion   Flexion: 4  Extension: 4-  Abduction: 4-    Right Hip   Planes of Motion   Flexion: 3+  Extension: 3+  Abduction: 3-    Tests     Left Hip   Negative AVIVA, FADIR, long sit, Sandra, scour, SI compression and SI distraction  Right Hip   Positive long sit  Negative AVIVA, FADIR, Sandra, scour, SI compression and SI distraction       General Comments:      Hip Comments   R innominate posterior pelvic tilt              Precautions:     HEP: SL hip abd, clamadison  Manuals 8/18            LAD             Hip mobs                                       Neuro Re-Ed                                                                                                        Ther Ex             bike             3 way SLR             Stand hip abd/ext             bridges                                                    HEP review and pt education 10'            Ther Activity                                       Gait Training                                       Modalities

## 2022-08-19 ENCOUNTER — TELEPHONE (OUTPATIENT)
Dept: NEUROLOGY | Facility: CLINIC | Age: 32
End: 2022-08-19

## 2022-08-19 DIAGNOSIS — Z11.3 SCREEN FOR STD (SEXUALLY TRANSMITTED DISEASE): Primary | ICD-10-CM

## 2022-08-19 LAB
C TRACH DNA SPEC QL NAA+PROBE: NEGATIVE
M GENITALIUM DNA SPEC QL NAA+PROBE: POSITIVE
N GONORRHOEA DNA SPEC QL NAA+PROBE: NEGATIVE
T VAGINALIS DNA SPEC QL NAA+PROBE: NEGATIVE

## 2022-08-19 NOTE — TELEPHONE ENCOUNTER
1st attempt to schedule patient from referral   Patient doesn't want to schedule at this time  Patient will call back if and when ready to schedule

## 2022-08-23 ENCOUNTER — OFFICE VISIT (OUTPATIENT)
Dept: PHYSICAL THERAPY | Facility: MEDICAL CENTER | Age: 32
End: 2022-08-23
Payer: MEDICARE

## 2022-08-23 DIAGNOSIS — M76.31 ILIOTIBIAL BAND SYNDROME OF RIGHT SIDE: ICD-10-CM

## 2022-08-23 DIAGNOSIS — M76.31 IT BAND SYNDROME, RIGHT: Primary | ICD-10-CM

## 2022-08-23 PROCEDURE — 97110 THERAPEUTIC EXERCISES: CPT

## 2022-08-23 NOTE — PROGRESS NOTES
Daily Note     Today's date: 2022  Patient name: Jennie Pickard  : 1990  MRN: [id number]  Referring provider: Matti Gandhi,*  Dx:   Encounter Diagnosis     ICD-10-CM    1  It band syndrome, right  M76 31    2  Iliotibial band syndrome of right side  M76 31                   Subjective: Pt reports that she is sore in a different area  Objective: See treatment diary below      Assessment: Trialed REIL  Patient's symptoms improved  Progressed proximal glute strengthening with no adverse effects  Tolerated treatment well  Patient demonstrated fatigue post treatment, exhibited good technique with therapeutic exercises and would benefit from continued PT      Plan: Continue per plan of care        Precautions:     HEP: SL hip abd, clamshells  Manuals            LAD             Hip mobs                                       Neuro Re-Ed                                                                                                        Ther Ex             bike  10'           3 way SLR  2x10           Stand hip abd/ext  2x10           bridges  2x10 hip add, 2x10 hip abd rtb            REIL  2x10                                     HEP review and pt education 10'            Ther Activity                                       Gait Training                                       Modalities

## 2022-08-25 ENCOUNTER — OFFICE VISIT (OUTPATIENT)
Dept: PHYSICAL THERAPY | Facility: MEDICAL CENTER | Age: 32
End: 2022-08-25
Payer: MEDICARE

## 2022-08-25 DIAGNOSIS — M76.31 IT BAND SYNDROME, RIGHT: Primary | ICD-10-CM

## 2022-08-25 PROCEDURE — 97110 THERAPEUTIC EXERCISES: CPT

## 2022-08-25 NOTE — PROGRESS NOTES
Daily Note     Today's date: 2022  Patient name: Tsering Rockwell  : 1990  MRN: [id number]  Referring provider: Kt James,*  Dx:   Encounter Diagnosis     ICD-10-CM    1  It band syndrome, right  M76 31                   Subjective: Pt reports that her lateral knee is not bothering her today  Objective: See treatment diary below      Assessment: Continued with POC  Tolerated treatment well  Patient demonstrated fatigue post treatment, exhibited good technique with therapeutic exercises and would benefit from continued PT      Plan: Continue per plan of care        Precautions:     HEP: SL hip abd, clamshells  Manuals           LAD             Hip mobs                                       Neuro Re-Ed                                                                                                        Ther Ex             bike  10' 10'          3 way SLR  2x10 3x10          Stand hip abd/ext  2x10 3x10          bridges  2x10 hip add, 2x10 hip abd rtb  2x10 hip add, 2x10 hip abd rtb           REIL  2x10 2x10                                    HEP review and pt education 10'            Ther Activity                                       Gait Training                                       Modalities

## 2022-08-29 ENCOUNTER — APPOINTMENT (OUTPATIENT)
Dept: LAB | Facility: MEDICAL CENTER | Age: 32
End: 2022-08-29
Attending: PHYSICIAN ASSISTANT
Payer: MEDICARE

## 2022-08-29 ENCOUNTER — OFFICE VISIT (OUTPATIENT)
Dept: PHYSICAL THERAPY | Facility: MEDICAL CENTER | Age: 32
End: 2022-08-29
Payer: MEDICARE

## 2022-08-29 DIAGNOSIS — M76.31 IT BAND SYNDROME, RIGHT: Primary | ICD-10-CM

## 2022-08-29 DIAGNOSIS — Z11.3 SCREEN FOR STD (SEXUALLY TRANSMITTED DISEASE): ICD-10-CM

## 2022-08-29 LAB
CHOLEST SERPL-MCNC: 172 MG/DL
HBV SURFACE AG SER QL: NORMAL
HCV AB SER QL: NORMAL
HDLC SERPL-MCNC: 50 MG/DL
LDLC SERPL CALC-MCNC: 103 MG/DL (ref 0–100)
NONHDLC SERPL-MCNC: 122 MG/DL
RPR SER QL: NORMAL
TRIGL SERPL-MCNC: 95 MG/DL

## 2022-08-29 PROCEDURE — 87389 HIV-1 AG W/HIV-1&-2 AB AG IA: CPT

## 2022-08-29 PROCEDURE — 86592 SYPHILIS TEST NON-TREP QUAL: CPT

## 2022-08-29 PROCEDURE — 97110 THERAPEUTIC EXERCISES: CPT

## 2022-08-29 PROCEDURE — 80061 LIPID PANEL: CPT | Performed by: PHYSICIAN ASSISTANT

## 2022-08-29 PROCEDURE — 87340 HEPATITIS B SURFACE AG IA: CPT

## 2022-08-29 PROCEDURE — 36415 COLL VENOUS BLD VENIPUNCTURE: CPT | Performed by: PHYSICIAN ASSISTANT

## 2022-08-29 PROCEDURE — 86803 HEPATITIS C AB TEST: CPT

## 2022-08-29 NOTE — PROGRESS NOTES
Daily Note     Today's date: 2022  Patient name: Ruiz Regalado  : 1990  MRN: [id number]  Referring provider: Tera Lozada,*  Dx:   Encounter Diagnosis     ICD-10-CM    1  It band syndrome, right  M76 31                   Subjective: Pt reports that her knee feels tender still, but no significant pain  Pt reports that REIL has been helping her knee symptoms       Objective: See treatment diary below      Assessment: Repeated extensions have been decreasing symptoms in R knee  Progressed strengthening with no adverse effects  Patient had relief in TTP after stretching  Instructed patient to stretch throughout the day  Tolerated treatment well  Patient demonstrated fatigue post treatment, exhibited good technique with therapeutic exercises and would benefit from continued PT      Plan: Continue per plan of care        Precautions:     HEP: SL hip abd, clamshells  Manuals          LAD             Hip mobs                                       Neuro Re-Ed                                                                                                        Ther Ex             bike  10' 10' 10'         3 way SLR  2x10 3x10 3x10 2# fw         Stand hip abd/ext  2x10 3x10 3x10 rtb         bridges  2x10 hip add, 2x10 hip abd rtb  2x10 hip add, 2x10 hip abd rtb  2x10 hip add, hip abd rtb         REIL  2x10 2x10 2x10         ITB stretch    10x10s                       HEP review and pt education 10'            Ther Activity                                       Gait Training                                       Modalities

## 2022-08-30 LAB — HIV 1+2 AB+HIV1 P24 AG SERPL QL IA: NORMAL

## 2022-08-30 NOTE — RESULT ENCOUNTER NOTE
Good morning Marisol Clark,   I did notice that you did review your lab results  Your lipid panel is excellent  Please let me know if you have any questions      Have a great day,   Sara

## 2022-08-31 ENCOUNTER — TELEPHONE (OUTPATIENT)
Dept: OBGYN CLINIC | Facility: CLINIC | Age: 32
End: 2022-08-31

## 2022-08-31 ENCOUNTER — OFFICE VISIT (OUTPATIENT)
Dept: PHYSICAL THERAPY | Facility: MEDICAL CENTER | Age: 32
End: 2022-08-31
Payer: MEDICARE

## 2022-08-31 DIAGNOSIS — M76.31 IT BAND SYNDROME, RIGHT: Primary | ICD-10-CM

## 2022-08-31 PROCEDURE — 97530 THERAPEUTIC ACTIVITIES: CPT

## 2022-08-31 PROCEDURE — 97112 NEUROMUSCULAR REEDUCATION: CPT

## 2022-08-31 PROCEDURE — 97110 THERAPEUTIC EXERCISES: CPT

## 2022-08-31 NOTE — PROGRESS NOTES
Daily Note     Today's date: 2022  Patient name: Tate Díaz  : 1990  MRN: [id number]  Referring provider: Moises Orosco,*  Dx:   Encounter Diagnosis     ICD-10-CM    1  It band syndrome, right  M76 31                   Subjective: Pt reports that she feels a little better  She reports that prone press ups are helping, but she needs to heat afterwards  Objective: See treatment diary below      Assessment: Continued per POC as patient is progressing well  Progressed hip strengthening with no adverse effects  Tolerated treatment well  Patient demonstrated fatigue post treatment, exhibited good technique with therapeutic exercises and would benefit from continued PT      Plan: Continue per plan of care        Precautions:     HEP: SL hip abd, clamshells  Manuals         LAD             Hip mobs                                       Neuro Re-Ed                                                                                                        Ther Ex             bike  10' 10' 10' 10'        3 way SLR  2x10 3x10 3x10 2# fw 3x10 2# fw        Stand hip abd/ext  2x10 3x10 3x10 rtb 3x10 rtb        bridges  2x10 hip add, 2x10 hip abd rtb  2x10 hip add, 2x10 hip abd rtb  2x10 hip add, hip abd rtb 2x10 hip add 3 kg, 2x10 hip abd gtb        REIL  2x10 2x10 2x10 2x10        ITB stretch    10x10s  10x10s        clamshells     x30 GTB        HEP review and pt education 10'            Ther Activity                                       Gait Training                                       Modalities

## 2022-08-31 NOTE — TELEPHONE ENCOUNTER
Called and spoke to patient, and informed her the STI blood work came back negative  Patient verbalized understanding, and did not have any further questions

## 2022-08-31 NOTE — TELEPHONE ENCOUNTER
----- Message from Raymond Culver sent at 8/31/2022  8:56 AM EDT -----  Please let her know the sti blood work is negative  Thank you!

## 2022-09-07 ENCOUNTER — APPOINTMENT (OUTPATIENT)
Dept: PHYSICAL THERAPY | Facility: MEDICAL CENTER | Age: 32
End: 2022-09-07
Payer: MEDICARE

## 2022-09-12 ENCOUNTER — OFFICE VISIT (OUTPATIENT)
Dept: PHYSICAL THERAPY | Facility: MEDICAL CENTER | Age: 32
End: 2022-09-12
Payer: MEDICARE

## 2022-09-12 DIAGNOSIS — M76.31 IT BAND SYNDROME, RIGHT: Primary | ICD-10-CM

## 2022-09-12 PROCEDURE — 97112 NEUROMUSCULAR REEDUCATION: CPT

## 2022-09-12 PROCEDURE — 97530 THERAPEUTIC ACTIVITIES: CPT

## 2022-09-12 PROCEDURE — 97110 THERAPEUTIC EXERCISES: CPT

## 2022-09-12 NOTE — PROGRESS NOTES
Daily Note     Today's date: 2022  Patient name: Annette Joaquin  : 1990  MRN: [id number]  Referring provider: Olga Arellano,*  Dx:   Encounter Diagnosis     ICD-10-CM    1  It band syndrome, right  M76 31                   Subjective: Pt reports that she feels about 60% better  She reports that her leg is less sensitive and less painful  Objective: See treatment diary below      Assessment: Pt is progressing well in therapy and progressing well toward her goals and PT goals  Progressed closed chain exercises with no adverse effects  Tolerated treatment well  Patient demonstrated fatigue post treatment, exhibited good technique with therapeutic exercises and would benefit from continued PT      Plan: Continue per plan of care        Precautions:     HEP: SL hip abd, clamshells  Manuals        LAD             Hip mobs                                       Neuro Re-Ed             squats      3x10       Leg press      3x12 140#                                                                        Ther Ex             bike  10' 10' 10' 10' 10'       3 way SLR  2x10 3x10 3x10 2# fw 3x10 2# fw np       Stand hip abd/ext  2x10 3x10 3x10 rtb 3x10 rtb 3x10 rtb       bridges  2x10 hip add, 2x10 hip abd rtb  2x10 hip add, 2x10 hip abd rtb  2x10 hip add, hip abd rtb 2x10 hip add 3 kg, 2x10 hip abd gtb 3x10 hip add 3 kg, 3x10 hip abd gtb       REIL  2x10 2x10 2x10 2x10        ITB stretch    10x10s  10x10s 10x10s       clamshells     x30 GTB np       HEP review and pt education 10            Ther Activity                                       Gait Training                                       Modalities

## 2022-09-14 ENCOUNTER — APPOINTMENT (OUTPATIENT)
Dept: PHYSICAL THERAPY | Facility: MEDICAL CENTER | Age: 32
End: 2022-09-14
Payer: MEDICARE

## 2022-09-15 ENCOUNTER — OFFICE VISIT (OUTPATIENT)
Dept: PHYSICAL THERAPY | Facility: MEDICAL CENTER | Age: 32
End: 2022-09-15
Payer: MEDICARE

## 2022-09-15 DIAGNOSIS — M76.31 ILIOTIBIAL BAND SYNDROME OF RIGHT SIDE: ICD-10-CM

## 2022-09-15 DIAGNOSIS — M76.31 IT BAND SYNDROME, RIGHT: Primary | ICD-10-CM

## 2022-09-15 PROCEDURE — 97110 THERAPEUTIC EXERCISES: CPT

## 2022-09-15 PROCEDURE — 97530 THERAPEUTIC ACTIVITIES: CPT

## 2022-09-15 PROCEDURE — 97112 NEUROMUSCULAR REEDUCATION: CPT

## 2022-09-15 NOTE — PROGRESS NOTES
Daily Note     Today's date: 9/15/2022  Patient name: Ruiz Regalado  : 1990  MRN: [id number]  Referring provider: Tera Lozada,*  Dx:   Encounter Diagnosis     ICD-10-CM    1  It band syndrome, right  M76 31    2  Iliotibial band syndrome of right side  M76 31                   Subjective: Pt reports that she has noticed significant improvement in regards to pain  Objective: See treatment diary below      Assessment: Tolerated treatment well  Patient exhibited good technique with therapeutic exercises      Plan: Continue per plan of care      1:1 40 min     Precautions:     HEP: SL hip abd, clamshells  Manuals 8/18 8/23 8/25 8/29 8/31 9/12 9/15      LAD             Hip mobs                                       Neuro Re-Ed             squats      3x10 3x10      Leg press      3x12 140# 3x12 140#                                                                       Ther Ex             bike  10' 10' 10' 10' 10' 10'      3 way SLR  2x10 3x10 3x10 2# fw 3x10 2# fw np       Stand hip abd/ext  2x10 3x10 3x10 rtb 3x10 rtb 3x10 rtb 3x10 rtb      bridges  2x10 hip add, 2x10 hip abd rtb  2x10 hip add, 2x10 hip abd rtb  2x10 hip add, hip abd rtb 2x10 hip add 3 kg, 2x10 hip abd gtb 3x10 hip add 3 kg, 3x10 hip abd gtb 3x10 hip add 3 kg, 3x10 hip abd gtb      REIL  2x10 2x10 2x10 2x10        ITB stretch    10x10s  10x10s 10x10s 10"x10      clamshells     x30 GTB np       HEP review and pt education 10'            Ther Activity                                       Gait Training                                       Modalities

## 2022-09-20 ENCOUNTER — OFFICE VISIT (OUTPATIENT)
Dept: PHYSICAL THERAPY | Facility: MEDICAL CENTER | Age: 32
End: 2022-09-20
Payer: MEDICARE

## 2022-09-20 DIAGNOSIS — M76.31 IT BAND SYNDROME, RIGHT: Primary | ICD-10-CM

## 2022-09-20 PROCEDURE — 97530 THERAPEUTIC ACTIVITIES: CPT

## 2022-09-20 PROCEDURE — 97110 THERAPEUTIC EXERCISES: CPT

## 2022-09-20 PROCEDURE — 97112 NEUROMUSCULAR REEDUCATION: CPT

## 2022-09-20 NOTE — PROGRESS NOTES
Daily Note     Today's date: 2022  Patient name: Irena Balderas  : 1990  MRN: [id number]  Referring provider: Chetan Guajardo,*  Dx:   Encounter Diagnosis     ICD-10-CM    1  It band syndrome, right  M76 31                   Subjective: Pt reports that she feels about 80 % better  She reports that her main issue is tenderness in the area and random pains with quick movements  Objective: See treatment diary below      Assessment: Progressed strengthening to patient tolerance  Potential discharge to comprehensive program next week  Tolerated treatment well  Patient exhibited good technique with therapeutic exercises      Plan: Continue per plan of care           Precautions:     HEP: SL hip abd, clamshells  Manuals 8/18 8/23 8/25 8/29 8/31 9/12 9/15 9/20     LAD             Hip mobs                                       Neuro Re-Ed             squats      3x10 3x10 3x10     Leg press      3x12 140# 3x12 140# 3x12 140#                                                                      Ther Ex             bike  10' 10' 10' 10' 10' 10' 10'      3 way SLR  2x10 3x10 3x10 2# fw 3x10 2# fw np       Stand hip abd/ext  2x10 3x10 3x10 rtb 3x10 rtb 3x10 rtb 3x10 rtb 3x10 gtb     bridges  2x10 hip add, 2x10 hip abd rtb  2x10 hip add, 2x10 hip abd rtb  2x10 hip add, hip abd rtb 2x10 hip add 3 kg, 2x10 hip abd gtb 3x10 hip add 3 kg, 3x10 hip abd gtb 3x10 hip add 3 kg, 3x10 hip abd gtb 3x10 hip add 3 kg, 3x10 hip abd gtb     REIL  2x10 2x10 2x10 2x10        ITB stretch    10x10s  10x10s 10x10s 10"x10 10x10s     clamshells     x30 GTB np       HEP review and pt education 10'            Ther Activity                                       Gait Training                                       Modalities

## 2022-09-22 ENCOUNTER — OFFICE VISIT (OUTPATIENT)
Dept: PHYSICAL THERAPY | Facility: MEDICAL CENTER | Age: 32
End: 2022-09-22
Payer: MEDICARE

## 2022-09-22 DIAGNOSIS — M76.31 ILIOTIBIAL BAND SYNDROME OF RIGHT SIDE: ICD-10-CM

## 2022-09-22 DIAGNOSIS — M76.31 IT BAND SYNDROME, RIGHT: Primary | ICD-10-CM

## 2022-09-22 PROCEDURE — 97112 NEUROMUSCULAR REEDUCATION: CPT | Performed by: PHYSICAL THERAPIST

## 2022-09-22 PROCEDURE — 97530 THERAPEUTIC ACTIVITIES: CPT | Performed by: PHYSICAL THERAPIST

## 2022-09-22 PROCEDURE — 97110 THERAPEUTIC EXERCISES: CPT | Performed by: PHYSICAL THERAPIST

## 2022-09-22 NOTE — PROGRESS NOTES
Daily Note     Today's date: 2022  Patient name: Ramon Nicole  : 1990  MRN: [id number]  Referring provider: Adelfo Fonseca,*  Dx:   Encounter Diagnosis     ICD-10-CM    1  It band syndrome, right  M76 31    2  Iliotibial band syndrome of right side  M76 31                   Subjective: Patient reports no significant changes from last visit but overall continues to feel better  Objective: See treatment diary below      Assessment: Patient demonstrates understanding of comprehensive program  No complaints of pain during or post treatment session  Minimal cueing provided with foot positioning with squats  Plan: Continue per plan of care  Potential DC to home program next week          Precautions:     HEP: SL hip abd, clamshells  Manuals 8/18 8/23 8/25 8/29 8/31 9/12 9/15 9/20 9/22    LAD             Hip mobs                                       Neuro Re-Ed             squats      3x10 3x10 3x10 3x10    Leg press      3x12 140# 3x12 140# 3x12 140# 3x12 145#                                                                     Ther Ex             bike  10' 10' 10' 10' 10' 10' 10'  10'    3 way SLR  2x10 3x10 3x10 2# fw 3x10 2# fw np       Stand hip abd/ext  2x10 3x10 3x10 rtb 3x10 rtb 3x10 rtb 3x10 rtb 3x10 gtb 3x10 gtb    bridges  2x10 hip add, 2x10 hip abd rtb  2x10 hip add, 2x10 hip abd rtb  2x10 hip add, hip abd rtb 2x10 hip add 3 kg, 2x10 hip abd gtb 3x10 hip add 3 kg, 3x10 hip abd gtb 3x10 hip add 3 kg, 3x10 hip abd gtb 3x10 hip add 3 kg, 3x10 hip abd gtb 3x10 hip add 3 kg, 3x10 hip abd gtb    REIL  2x10 2x10 2x10 2x10        ITB stretch    10x10s  10x10s 10x10s 10"x10 10x10s 10" x10    clamshells     x30 GTB np       HEP review and pt education 10'            Ther Activity                                       Gait Training                                       Modalities

## 2022-09-26 ENCOUNTER — OFFICE VISIT (OUTPATIENT)
Dept: PHYSICAL THERAPY | Facility: MEDICAL CENTER | Age: 32
End: 2022-09-26
Payer: MEDICARE

## 2022-09-26 DIAGNOSIS — M76.31 IT BAND SYNDROME, RIGHT: Primary | ICD-10-CM

## 2022-09-26 PROCEDURE — 97112 NEUROMUSCULAR REEDUCATION: CPT

## 2022-09-26 PROCEDURE — 97530 THERAPEUTIC ACTIVITIES: CPT

## 2022-09-26 PROCEDURE — 97110 THERAPEUTIC EXERCISES: CPT

## 2022-09-26 NOTE — PROGRESS NOTES
Daily Note     Today's date: 2022  Patient name: Michelle Wheeler  : 1990  MRN: [id number]  Referring provider: Elodia العلي,*  Dx:   Encounter Diagnosis     ICD-10-CM    1  It band syndrome, right  M76 31                   Subjective: Patient reports that she feels good and no significant changes  Objective: See treatment diary below      Assessment: Michelle Wheeler has been compliant with attending PT and home exercise program since initial eval   Shubham Brass  has made improvements in objective data since initial evalulation and has achieved all goals  Patient reports having returned to their prior level or function  Patient provided with updated Home Exercise Program, all questions answered, verbalized understanding and agreement to plan of care  Thus it was mutually decided to discontinue this episode of care and transition to Home Exercise Program       Plan: Discharge to HEP          Precautions:     HEP: SL hip abd, clamshells  Manuals 8/18 8/23 8/25 8/29 8/31 9/12 9/15 9/20 9/22 9/26   LAD             Hip mobs                                       Neuro Re-Ed             squats      3x10 3x10 3x10 3x10 3x10   Leg press      3x12 140# 3x12 140# 3x12 140# 3x12 145# 3x12 145#                                                                    Ther Ex             bike  10' 10' 10' 10' 10' 10' 10'  10' 10'   3 way SLR  2x10 3x10 3x10 2# fw 3x10 2# fw np       Stand hip abd/ext  2x10 3x10 3x10 rtb 3x10 rtb 3x10 rtb 3x10 rtb 3x10 gtb 3x10 gtb 3x10 gtb   bridges  2x10 hip add, 2x10 hip abd rtb  2x10 hip add, 2x10 hip abd rtb  2x10 hip add, hip abd rtb 2x10 hip add 3 kg, 2x10 hip abd gtb 3x10 hip add 3 kg, 3x10 hip abd gtb 3x10 hip add 3 kg, 3x10 hip abd gtb 3x10 hip add 3 kg, 3x10 hip abd gtb 3x10 hip add 3 kg, 3x10 hip abd gtb 3x10 hip add 3 kg, 3x10 hip abd gtb   REIL  2x10 2x10 2x10 2x10        ITB stretch    10x10s  10x10s 10x10s 10"x10 10x10s 10" x10 10"x10   clamshells     x30 GTB np       HEP review and pt education 10'            Ther Activity                                       Gait Training                                       Modalities

## 2022-09-27 ENCOUNTER — OFFICE VISIT (OUTPATIENT)
Dept: OBGYN CLINIC | Facility: MEDICAL CENTER | Age: 32
End: 2022-09-27
Payer: MEDICARE

## 2022-09-27 VITALS
WEIGHT: 199.6 LBS | BODY MASS INDEX: 32.08 KG/M2 | DIASTOLIC BLOOD PRESSURE: 68 MMHG | SYSTOLIC BLOOD PRESSURE: 101 MMHG | HEIGHT: 66 IN | HEART RATE: 74 BPM

## 2022-09-27 DIAGNOSIS — M76.31 ILIOTIBIAL BAND SYNDROME OF RIGHT SIDE: Primary | ICD-10-CM

## 2022-09-27 DIAGNOSIS — T78.40XD HYPERSENSITIVITY, SUBSEQUENT ENCOUNTER: ICD-10-CM

## 2022-09-27 PROCEDURE — 99213 OFFICE O/P EST LOW 20 MIN: CPT | Performed by: ORTHOPAEDIC SURGERY

## 2022-09-27 NOTE — PROGRESS NOTES
Assessment/Plan:  1  Iliotibial band syndrome of right side    2  Hypersensitivity, subsequent encounter      Orders Placed This Encounter   Procedures    Ambulatory Referral to Physical Therapy     - Patient presents with right IT band syndrome and hypersensitivity of her right knee  She is feeling better in terms of her IT band after PT but still with some hypersensitivity  - Advised her to work on hypersensitivity with light rubbing at home  PT order placed to focus specifically on this as well if she would like to return  - Continue Tylenol as needed for pain  1,000 mg up to 3 times a day  Do not exceed 3,000 mg per day  Return if symptoms worsen or fail to improve, for Recheck  I answered all of the patient's questions during the visit and provided education of the patient's condition during the visit  The patient verbalized understanding of the information given and agrees with the plan  This note was dictated using Virtualtwo software  It may contain errors including improperly dictated words  Please contact physician directly for any questions  Subjective   Chief Complaint:   Chief Complaint   Patient presents with    Right Knee - Follow-up       HPI  Kaycee Tesfaye is a 27 y o  female who presents for follow up for right IT band syndrome and hypersensitivity  She reports feeling 80% better since beginning PT, which she was discharged from yesterday  PT did not work on hypersensitivity during her treatment, but she does feel significantly less tender over her mid-thigh, with tenderness still present over the distal IT band  She is still taking Tylenol extra strength as needed with relief, and would like to continue with home exercises and returning to the gym  She does not feel the need for another pes anserine bursa today, last given on 5/19/22 with about 1 month of relief  Review of Systems  ROS:    See HPI for musculoskeletal review     All other systems reviewed are negative History:  Past Medical History:   Diagnosis Date    Back pain     Bacterial vaginosis     Bariatric surgery status     Chlamydia     Disease of thyroid gland     nodule    Knee pain     Morbidly obese (HCC)     gastric sleeve   today 10/4 2021    MRSA carrier     Obesity     Postsurgical malabsorption     Thyroid disease     benign    -right   gets scan yearly    Urogenital trichomoniasis     Wears glasses      Past Surgical History:   Procedure Laterality Date    OR LAP GASTRIC BYPASS/DRE-EN-Y N/A 10/4/2021    Procedure: BYPASS GASTRIC  DRE-EN-Y LAPAROSCOPIC, AND INTRAOPERATIVE EGD;  Surgeon: Lasandra Lefort, MD;  Location: [facility];  Service: Bariatrics    TONSILLECTOMY      TOOTH EXTRACTION  10/01/2014    WRIST SURGERY  05/25/2021     Social History   Social History     Substance and Sexual Activity   Alcohol Use Yes    Alcohol/week: 3 0 standard drinks    Types: 1 Glasses of wine, 1 Cans of beer, 1 Shots of liquor per week    Comment: 1 x monthly     Social History     Substance and Sexual Activity   Drug Use No     Social History     Tobacco Use   Smoking Status Never Smoker   Smokeless Tobacco Never Used   Tobacco Comment    no passive smoke exposure     Family History:   Family History   Problem Relation Age of Onset    Brain cancer Mother     Cancer Mother         glioblastoma    Hypertension Father     No Known Problems Paternal Grandfather     No Known Problems Sister     No Known Problems Daughter     No Known Problems Maternal Grandmother     Diabetes Paternal Grandmother     Stroke Paternal Grandmother     Heart disease Neg Hx     Thyroid disease Neg Hx     Breast cancer Neg Hx     Colon cancer Neg Hx     Ovarian cancer Neg Hx        Current Outpatient Medications on File Prior to Visit   Medication Sig Dispense Refill    acetaminophen (TYLENOL) 160 mg/5 mL solution        Calcium Carbonate-Vit D-Min (CALCIUM 1200 PO) Take by mouth        Diclofenac Sodium (VOLTAREN) 1 %       ergocalciferol (VITAMIN D2) 50,000 units TAKE 1 CAPSULE BY MOUTH ONE TIME PER WEEK (Patient not taking: Reported on 8/18/2022)      gabapentin (NEURONTIN) 300 mg capsule Take 1 capsule (300 mg total) by mouth 3 (three) times a day 90 capsule 2    hydroxychloroquine (PLAQUENIL) 200 mg tablet Take 200 mg by mouth 2 (two) times a day (Patient not taking: No sig reported)      hydroxychloroquine (PLAQUENIL) 200 mg tablet TAKE 1 TABLET (200 MG TOTAL) BY MOUTH 2 TIMES A DAY  (Patient not taking: No sig reported)      lidocaine (LIDODERM) 5 % PLEASE SEE ATTACHED FOR DETAILED DIRECTIONS (Patient not taking: No sig reported)      Multiple Vitamin (multivitamin) tablet Take 1 tablet by mouth daily       omeprazole (PriLOSEC) 20 mg delayed release capsule Take 1 capsule (20 mg total) by mouth daily 30 capsule 2    ondansetron (ZOFRAN) 4 mg tablet Take 1 tablet (4 mg total) by mouth every 8 (eight) hours as needed for nausea or vomiting for up to 7 doses (Patient not taking: No sig reported) 7 tablet 0    polyethylene glycol (MIRALAX) 17 g packet Take 17 g by mouth daily TAKEN AS NEEDED  (Patient not taking: No sig reported)       No current facility-administered medications on file prior to visit       Allergies   Allergen Reactions    Zyrtec [Cetirizine] Hives        Objective     /68   Pulse 74   Ht 5' 6" (1 676 m)   Wt 90 5 kg (199 lb 9 6 oz)   BMI 32 22 kg/m²      PE:  AAOx 3  WDWN  Hearing intact, no drainage from eyes  no audible wheezing  no abdominal distension  LE compartments soft, skin intact    Ortho Exam:  right Knee:   No erythema  no swelling  no effusion  no warmth  + TTP distal IT band  AROM: 0-120  Stable to varus/valgus stress

## 2022-09-27 NOTE — LETTER
September 27, 2022     Patient: Kushal Aquino  YOB: 1990  Date of Visit: 9/27/2022      To Whom it May Concern:    Magalie Stevens is under my professional care  Rajesh Olivia was seen in my office on 9/27/2022  If you have any questions or concerns, please don't hesitate to call           Sincerely,          Elvie Mitchell, DO        CC: Rogers Robles

## 2022-09-29 ENCOUNTER — APPOINTMENT (OUTPATIENT)
Dept: PHYSICAL THERAPY | Facility: MEDICAL CENTER | Age: 32
End: 2022-09-29
Payer: MEDICARE

## 2022-10-17 PROBLEM — J06.9 ACUTE UPPER RESPIRATORY INFECTION: Status: RESOLVED | Noted: 2019-03-15 | Resolved: 2022-10-17

## 2022-10-19 ENCOUNTER — OFFICE VISIT (OUTPATIENT)
Dept: BARIATRICS | Facility: CLINIC | Age: 32
End: 2022-10-19
Payer: MEDICARE

## 2022-10-19 ENCOUNTER — CLINICAL SUPPORT (OUTPATIENT)
Dept: OBGYN CLINIC | Facility: CLINIC | Age: 32
End: 2022-10-19

## 2022-10-19 VITALS
TEMPERATURE: 98.2 F | BODY MASS INDEX: 31.08 KG/M2 | HEART RATE: 64 BPM | WEIGHT: 198 LBS | DIASTOLIC BLOOD PRESSURE: 72 MMHG | SYSTOLIC BLOOD PRESSURE: 110 MMHG | HEIGHT: 67 IN

## 2022-10-19 DIAGNOSIS — K91.2 POSTSURGICAL MALABSORPTION: ICD-10-CM

## 2022-10-19 DIAGNOSIS — Z48.815 ENCOUNTER FOR SURGICAL AFTERCARE FOLLOWING SURGERY OF DIGESTIVE SYSTEM: Primary | ICD-10-CM

## 2022-10-19 DIAGNOSIS — Z23 NEED FOR VACCINATION: Primary | ICD-10-CM

## 2022-10-19 DIAGNOSIS — E66.9 OBESITY, CLASS I, BMI 30-34.9: ICD-10-CM

## 2022-10-19 DIAGNOSIS — Z98.84 BARIATRIC SURGERY STATUS: ICD-10-CM

## 2022-10-19 DIAGNOSIS — E66.01 CLASS 3 SEVERE OBESITY DUE TO EXCESS CALORIES WITHOUT SERIOUS COMORBIDITY WITH BODY MASS INDEX (BMI) OF 50.0 TO 59.9 IN ADULT (HCC): ICD-10-CM

## 2022-10-19 PROCEDURE — 90651 9VHPV VACCINE 2/3 DOSE IM: CPT

## 2022-10-19 PROCEDURE — 99213 OFFICE O/P EST LOW 20 MIN: CPT | Performed by: NURSE PRACTITIONER

## 2022-10-19 PROCEDURE — 90471 IMMUNIZATION ADMIN: CPT

## 2022-10-19 RX ORDER — OMEPRAZOLE 20 MG/1
20 CAPSULE, DELAYED RELEASE ORAL DAILY
Qty: 30 CAPSULE | Refills: 1 | Status: SHIPPED | OUTPATIENT
Start: 2022-10-19

## 2022-10-19 RX ORDER — OMEPRAZOLE 20 MG/1
20 CAPSULE, DELAYED RELEASE ORAL DAILY
Qty: 30 CAPSULE | Refills: 1 | Status: SHIPPED | OUTPATIENT
Start: 2022-10-19 | End: 2022-10-19

## 2022-10-19 NOTE — PATIENT INSTRUCTIONS
Recommend -     - Start miralax daily and go down to every other day if develops softer stool consistency  Hold for loose stools  Can use fiber gummies if not getting enough fiber    - Vital Collagen powder - to help with hair loss  Increase protein intake to help with hair loss as well    - Taper off oemprazole as discussed - skip every other day for 2 weeks, sip every 2 days for 2 more weeks, then skip every 3 days for 2 more weeks then stop after 
regular

## 2022-10-19 NOTE — PROGRESS NOTES
Assessment/Plan:     Patient ID: Elizabeth Shabazz is a 27 y o  female  Bariatric Surgery Status  -s/p Fani-En-Y Danii Frey 10/04/2021  Presents to the office today for annual visit  Overall doing well  Tolerating a regular diet, denies any recent N/V/D/C, regurgitation, reflux or dysphagia  Continues to take her omeprazole daily  Was following with [facility] rheumatology secondary to increase inflammatory markers with joint and back pain - negative work up thus far; has stop plaquenil use  PLAN:     - recommend to taper off omeprazole  Taper off as discuss over the next 1-2 months  - Routine follow up in 6 months for 18 month post op visit  - Continue with healthy lifestyle, adequate protein intake of 60 gm, fluid intake of at least 64 oz  - Continue with MVI daily  - Activity as tolerated  - Labs ordered and will adjust accordingly if any deficiency  - Follow up with RD and SW as needed  · Continued/Maintain healthy weight loss with good nutrition intakes  · Adequate hydration with at least 64oz  fluid intake  · Follow diet as discussed  · Follow vitamin and mineral recommendations as reviewed with you  · Exercise as tolerated  · Colonoscopy referral made: not of age range  · Mammogram - not of age range    · Follow-up in 6 months for 18 month post op visit  We kindly ask that your arrive 15 minutes before your scheduled appointment time with your provider to allow our staff to room you, get your vital signs and update your chart  · Get lab work done prior to annual visit  Please call the office if you need a script  It is recommended to check with your insurance BEFORE getting labs done to make sure they are covered by your policy  · Call our office if you have any problems with abdominal pain especially associated with fever, chills, nausea, vomiting or any other concerns      · All  Post-bariatric surgery patients should be aware that very small quantities of any alcohol can cause impairment and it is very possible not to feel the effect  The effect can be in the system for several hours  It is also a stomach irritant  · It is advised to AVOID alcohol, Nonsteroidal antiinflammatory drugs (NSAIDS) and nicotine of all forms   Any of these can cause stomach irritation/pain  · Discussed the effects of alcohol on a bariatric patient and the increased impairment risk  · Keep up the good work! Postsurgical Malabsorption   -At risk for malabsorption of vitamins/minerals secondary to malabsorption and restriction of intake from bariatric surgery  -Currently taking adequate postop bariatric surgery vitamin supplementation  -Last set of bariatric labs completed on 02/2022 and showed vitamin D deficiency,   -Next set of bariatric labs ordered for approximately 2 weeks  -Patient received education about the importance of adhering to a lifelong supplementation regimen to avoid vitamin/mineral deficiencies      Diagnoses and all orders for this visit:    Encounter for surgical aftercare following surgery of digestive system  -     Zinc; Future  -     Vitamin D 25 hydroxy; Future  -     Vitamin B12; Future  -     Vitamin B1, whole blood; Future  -     Vitamin A; Future  -     PTH, intact; Future  -     Folate; Future  -     Ferritin; Future  -     Iron Saturation %; Future  -     omeprazole (PriLOSEC) 20 mg delayed release capsule; Take 1 capsule (20 mg total) by mouth daily    Bariatric surgery status  -     Zinc; Future  -     Vitamin D 25 hydroxy; Future  -     Vitamin B12; Future  -     Vitamin B1, whole blood; Future  -     Vitamin A; Future  -     PTH, intact; Future  -     Folate; Future  -     Ferritin; Future  -     Iron Saturation %; Future  -     omeprazole (PriLOSEC) 20 mg delayed release capsule; Take 1 capsule (20 mg total) by mouth daily    Postsurgical malabsorption  -     Zinc; Future  -     Vitamin D 25 hydroxy;  Future  -     Vitamin B12; Future  -     Vitamin B1, whole blood; Future  -     Vitamin A; Future  -     PTH, intact; Future  -     Folate; Future  -     Ferritin; Future  -     Iron Saturation %; Future    Obesity, Class I, BMI 30-34 9  -     Zinc; Future  -     Vitamin D 25 hydroxy; Future  -     Vitamin B12; Future  -     Vitamin B1, whole blood; Future  -     Vitamin A; Future  -     PTH, intact; Future  -     Folate; Future  -     Ferritin; Future  -     Iron Saturation %; Future    Class 3 severe obesity due to excess calories without serious comorbidity with body mass index (BMI) of 50 0 to 59 9 in adult [facility])  -     Discontinue: omeprazole (PriLOSEC) 20 mg delayed release capsule; Take 1 capsule (20 mg total) by mouth daily         Subjective:      Patient ID: Checo King is a 27 y o  female     -s/p Fani-En-Y Marnie Simms 10/04/2021  Presents to the office today for annual visit  Overall doing well  Tolerating a regular diet, denies any recent N/V/D/C, regurgitation, reflux or dysphagia  Continues to take her omeprazole daily  Was following with [facility] rheumatology secondary to increase inflammatory markers with joint and back pain - negative work up thus far; has stop plaquenil use  Initial: 363 lbs  Current: 198 lbs  EWL: (Weight loss is ahead of schedule at this post surgical period )  Jorge: current  Current BMI is Body mass index is 31 48 kg/m²  · Tolerating a regular diet-yes  · Eating at least 60 grams of protein per day- not always  But is trying   · Following 30/60 minute rule with liquids-yes  · Drinking at least 64 ounces of fluid per day-yes  · Drinking carbonated beverages-no  · Sufficient exercise-yes - goes to the gym 1-2 a week for now  Walks on other days     · Using NSAIDs regularly-no  · Using nicotine-no  · Using alcohol-social   · Supplements: trena MVI (with iron) + calcium + biotin + Vitamin D2    · EWL is 80%, which places the patient ahead of schedule for expected post surgical weight loss at this time  The following portions of the patient's history were reviewed and updated as appropriate: allergies, current medications, past family history, past medical history, past social history, past surgical history and problem list     Review of Systems   Constitutional: Negative  Respiratory: Negative  Cardiovascular: Negative  Gastrointestinal: Positive for constipation  Musculoskeletal: Positive for back pain  Neurological: Negative  Psychiatric/Behavioral: Negative  Objective:    /72   Pulse 64   Temp 98 2 °F (36 8 °C) (Tympanic)   Ht 5' 6 5" (1 689 m)   Wt 89 8 kg (198 lb)   BMI 31 48 kg/m²      Physical Exam  Vitals and nursing note reviewed  Constitutional:       Appearance: Normal appearance  She is obese  Cardiovascular:      Rate and Rhythm: Normal rate and regular rhythm  Pulses: Normal pulses  Heart sounds: Normal heart sounds  Pulmonary:      Effort: Pulmonary effort is normal       Breath sounds: Normal breath sounds  Abdominal:      General: Bowel sounds are normal       Palpations: Abdomen is soft  Tenderness: There is no abdominal tenderness  Musculoskeletal:         General: Normal range of motion  Skin:     General: Skin is warm and dry  Neurological:      General: No focal deficit present  Mental Status: She is alert and oriented to person, place, and time  Psychiatric:         Mood and Affect: Mood normal          Behavior: Behavior normal          Thought Content:  Thought content normal          Judgment: Judgment normal

## 2022-12-08 ENCOUNTER — TELEPHONE (OUTPATIENT)
Dept: BARIATRICS | Facility: CLINIC | Age: 32
End: 2022-12-08

## 2022-12-08 NOTE — TELEPHONE ENCOUNTER
----- Message from Cristobal Cabot, RD sent at 12/7/2022  3:47 PM EST -----  Regarding: FW: Concerns  Contact: [phone]  Hi Gal[place] Live, yes, if she is having abdominal pains she should come in and get checked out or go to the ER     ----- Message -----  From: Lindsay Anglin  Sent: 12/7/2022   2:44 PM EST  To: Cristobal Cabot, RD  Subject: FW: Concerns                                     Hi  Let me know if you'd like me to schedule her an appt with Ken?  ----- Message -----  From: Carla Mendoza: 12/7/2022   2:40 PM EST  To: , #  Subject: Concerns                                         I've been losing a lot of my hair I'm still losing some  Also I've been getting frequent right abdominal pain! ! I would like to get an internal check to make sure my left side and my right side is okay and there is nothing going on!! I'll be getting sharp pains   Periodically especially on my right side!!

## 2022-12-08 NOTE — TELEPHONE ENCOUNTER
Reached out to Pt re: abdominal pain  Explained to Pt per RD we would like to schedule an appt with MAURI  Pt said she will call back to schedule as she is at work

## 2022-12-15 ENCOUNTER — OFFICE VISIT (OUTPATIENT)
Dept: BARIATRICS | Facility: CLINIC | Age: 32
End: 2022-12-15

## 2022-12-15 VITALS
TEMPERATURE: 97.4 F | HEIGHT: 67 IN | SYSTOLIC BLOOD PRESSURE: 112 MMHG | WEIGHT: 193 LBS | BODY MASS INDEX: 30.29 KG/M2 | DIASTOLIC BLOOD PRESSURE: 70 MMHG | OXYGEN SATURATION: 99 % | HEART RATE: 59 BPM

## 2022-12-15 DIAGNOSIS — Z98.84 BARIATRIC SURGERY STATUS: ICD-10-CM

## 2022-12-15 DIAGNOSIS — Z48.815 ENCOUNTER FOR SURGICAL AFTERCARE FOLLOWING SURGERY OF DIGESTIVE SYSTEM: Primary | ICD-10-CM

## 2022-12-15 DIAGNOSIS — R10.9 ABDOMINAL PAIN: ICD-10-CM

## 2022-12-15 NOTE — PROGRESS NOTES
Assessment/Plan:    No problem-specific Assessment & Plan notes found for this encounter  Diagnoses and all orders for this visit:    Encounter for surgical aftercare following surgery of digestive system  -     FL UPPER GI UGI; Future    Bariatric surgery status  -     FL UPPER GI UGI; Future    Abdominal pain  -     FL UPPER GI UGI; Future        - Appears related to increase flatulence; possibly gas pain  - Can trial gas-x in the meantime to see if this would help  - Obtain UGI to rule bariatric etiology  May need scope depending on the results  Will call with results  - May be gyn related? She does not believe this to be related to her menstrual period - has no pain currently and is on her menstrual cycle  Will work up for bariatric etiology for now and reevaluate  - of note, complains of hair loss - will work on increasing her protein intake  Recommended flax seed - will trial this as well  Subjective:      Patient ID: Cris Li is a 27 y o  female  Patient s/p RNYGB with Dr Connie Tony on 10/04/2021 here for abdominal pain  Started before October 2022, has been ongoing  Initally on the right mid-abdominal side however now radiating to the left side  Pain doesn't correlate at the same time; intermittent pain which occurs spontaneously  The amounts of time she gets this pain fluctuates throughout the week; very inconsistent  Feels crampy "stabbing pain "  Has associated bloating, increase flatulence, at times diarrhea/constipation  Has nausea with certain food intake  She is unable to pinpoint any aggravating factors  Has been trying to stop dairy products, or rody foods since this abdominal pain however pain has not improved  Tapered off omeprazole without any issues since last visit  Tolerating a regular diet  Denies vomiting, heartburn/reflux, regurgitation, or dysphagia  Denies blood in her stool, or bulges of abdominal region  Having regular bowel movements       Denies smoking, NSAIDs, steroid use, caffeinated drinks  Rare use of ETOH          The following portions of the patient's history were reviewed and updated as appropriate: allergies, current medications, past family history, past medical history, past social history, past surgical history and problem list     Review of Systems   Constitutional: Negative  Respiratory: Negative  Cardiovascular: Negative  Gastrointestinal: Positive for abdominal pain (right and left), constipation (improved), diarrhea (intermittent) and nausea  Objective:      /70 (BP Location: Left arm, Patient Position: Sitting, Cuff Size: Large)   Pulse 59   Temp (!) 97 4 °F (36 3 °C) (Tympanic)   Ht 5' 6 5" (1 689 m)   Wt 87 5 kg (193 lb)   SpO2 99%   BMI 30 68 kg/m²          Physical Exam  Vitals and nursing note reviewed  Constitutional:       Appearance: Normal appearance  She is obese  Cardiovascular:      Rate and Rhythm: Normal rate and regular rhythm  Pulses: Normal pulses  Heart sounds: Normal heart sounds  Pulmonary:      Effort: Pulmonary effort is normal       Breath sounds: Normal breath sounds  Abdominal:      General: Bowel sounds are normal  There is no distension  Palpations: Abdomen is soft  Tenderness: There is no abdominal tenderness  There is no guarding  Hernia: No hernia is present  Musculoskeletal:         General: Normal range of motion  Skin:     General: Skin is warm and dry  Neurological:      General: No focal deficit present  Mental Status: She is alert and oriented to person, place, and time  Psychiatric:         Mood and Affect: Mood normal          Behavior: Behavior normal          Thought Content:  Thought content normal          Judgment: Judgment normal

## 2022-12-16 NOTE — PATIENT INSTRUCTIONS
Isolate until results are received   Quarantine for 10 days from symptom onset if positive, must have symptoms improving and be fever free  May continue OTC cough and cold medications or tylenol to help symptoms  ER if any distress Reading material offered by provider's office

## 2022-12-17 ENCOUNTER — APPOINTMENT (OUTPATIENT)
Dept: LAB | Facility: IMAGING CENTER | Age: 32
End: 2022-12-17

## 2022-12-17 DIAGNOSIS — Z48.815 ENCOUNTER FOR SURGICAL AFTERCARE FOLLOWING SURGERY OF DIGESTIVE SYSTEM: ICD-10-CM

## 2022-12-17 DIAGNOSIS — Z98.84 BARIATRIC SURGERY STATUS: ICD-10-CM

## 2022-12-17 DIAGNOSIS — E66.9 OBESITY, CLASS I, BMI 30-34.9: ICD-10-CM

## 2022-12-17 DIAGNOSIS — K91.2 POSTSURGICAL MALABSORPTION: ICD-10-CM

## 2022-12-17 LAB
FERRITIN SERPL-MCNC: 10 NG/ML (ref 8–388)
FOLATE SERPL-MCNC: 14.8 NG/ML (ref 3.1–17.5)
IRON SATN MFR SERPL: 11 % (ref 15–50)
IRON SERPL-MCNC: 47 UG/DL (ref 50–170)
PTH-INTACT SERPL-MCNC: 88 PG/ML (ref 18.4–80.1)
TIBC SERPL-MCNC: 414 UG/DL (ref 250–450)
VIT B12 SERPL-MCNC: 255 PG/ML (ref 100–900)

## 2022-12-18 LAB — 25(OH)D3 SERPL-MCNC: 22.6 NG/ML (ref 30–100)

## 2022-12-21 ENCOUNTER — HOSPITAL ENCOUNTER (EMERGENCY)
Facility: HOSPITAL | Age: 32
Discharge: HOME | End: 2022-12-21
Attending: EMERGENCY MEDICINE

## 2022-12-21 ENCOUNTER — APPOINTMENT (EMERGENCY)
Dept: RADIOLOGY | Facility: HOSPITAL | Age: 32
End: 2022-12-21

## 2022-12-21 VITALS
WEIGHT: 193 LBS | OXYGEN SATURATION: 97 % | TEMPERATURE: 97.3 F | DIASTOLIC BLOOD PRESSURE: 75 MMHG | SYSTOLIC BLOOD PRESSURE: 128 MMHG | RESPIRATION RATE: 18 BRPM | BODY MASS INDEX: 30.68 KG/M2 | HEART RATE: 68 BPM

## 2022-12-21 DIAGNOSIS — R10.9 ABDOMINAL PAIN: Primary | ICD-10-CM

## 2022-12-21 DIAGNOSIS — D25.9 UTERINE FIBROID: ICD-10-CM

## 2022-12-21 DIAGNOSIS — R80.9 PROTEINURIA: ICD-10-CM

## 2022-12-21 LAB
ALBUMIN SERPL BCP-MCNC: 3.4 G/DL (ref 3.5–5)
ALP SERPL-CCNC: 51 U/L (ref 46–116)
ALT SERPL W P-5'-P-CCNC: 33 U/L (ref 12–78)
ANION GAP SERPL CALCULATED.3IONS-SCNC: 5 MMOL/L (ref 4–13)
AST SERPL W P-5'-P-CCNC: 16 U/L (ref 5–45)
BACTERIA UR QL AUTO: ABNORMAL /HPF
BASOPHILS # BLD AUTO: 0.06 THOUSANDS/ÂΜL (ref 0–0.1)
BASOPHILS NFR BLD AUTO: 1 % (ref 0–1)
BILIRUB SERPL-MCNC: 0.42 MG/DL (ref 0.2–1)
BILIRUB UR QL STRIP: NEGATIVE
BUN SERPL-MCNC: 11 MG/DL (ref 5–25)
CALCIUM ALBUM COR SERPL-MCNC: 9.1 MG/DL (ref 8.3–10.1)
CALCIUM SERPL-MCNC: 8.6 MG/DL (ref 8.3–10.1)
CARDIAC TROPONIN I PNL SERPL HS: <2 NG/L
CARDIAC TROPONIN I PNL SERPL HS: <2 NG/L
CHLORIDE SERPL-SCNC: 110 MMOL/L (ref 96–108)
CLARITY UR: CLEAR
CO2 SERPL-SCNC: 27 MMOL/L (ref 21–32)
COLOR UR: YELLOW
CREAT SERPL-MCNC: 0.66 MG/DL (ref 0.6–1.3)
EOSINOPHIL # BLD AUTO: 0.11 THOUSAND/ÂΜL (ref 0–0.61)
EOSINOPHIL NFR BLD AUTO: 2 % (ref 0–6)
ERYTHROCYTE [DISTWIDTH] IN BLOOD BY AUTOMATED COUNT: 12.5 % (ref 11.6–15.1)
EXT PREGNANCY TEST URINE: NEGATIVE
EXT. CONTROL: NORMAL
GFR SERPL CREATININE-BSD FRML MDRD: 115 ML/MIN/1.73SQ M
GLUCOSE SERPL-MCNC: 85 MG/DL (ref 65–140)
GLUCOSE UR STRIP-MCNC: NEGATIVE MG/DL
HCT VFR BLD AUTO: 39.4 % (ref 34.8–46.1)
HGB BLD-MCNC: 12.9 G/DL (ref 11.5–15.4)
HGB UR QL STRIP.AUTO: NEGATIVE
IMM GRANULOCYTES # BLD AUTO: 0.01 THOUSAND/UL (ref 0–0.2)
IMM GRANULOCYTES NFR BLD AUTO: 0 % (ref 0–2)
KETONES UR STRIP-MCNC: NEGATIVE MG/DL
LEUKOCYTE ESTERASE UR QL STRIP: NEGATIVE
LIPASE SERPL-CCNC: 70 U/L (ref 73–393)
LYMPHOCYTES # BLD AUTO: 2.26 THOUSANDS/ÂΜL (ref 0.6–4.47)
LYMPHOCYTES NFR BLD AUTO: 49 % (ref 14–44)
MCH RBC QN AUTO: 29.3 PG (ref 26.8–34.3)
MCHC RBC AUTO-ENTMCNC: 32.7 G/DL (ref 31.4–37.4)
MCV RBC AUTO: 89 FL (ref 82–98)
MONOCYTES # BLD AUTO: 0.37 THOUSAND/ÂΜL (ref 0.17–1.22)
MONOCYTES NFR BLD AUTO: 8 % (ref 4–12)
MUCOUS THREADS UR QL AUTO: ABNORMAL
NEUTROPHILS # BLD AUTO: 1.89 THOUSANDS/ÂΜL (ref 1.85–7.62)
NEUTS SEG NFR BLD AUTO: 40 % (ref 43–75)
NITRITE UR QL STRIP: NEGATIVE
NON-SQ EPI CELLS URNS QL MICRO: ABNORMAL /HPF
NRBC BLD AUTO-RTO: 0 /100 WBCS
PH UR STRIP.AUTO: 7.5 [PH]
PLATELET # BLD AUTO: 211 THOUSANDS/UL (ref 149–390)
PMV BLD AUTO: 10.8 FL (ref 8.9–12.7)
POTASSIUM SERPL-SCNC: 3.8 MMOL/L (ref 3.5–5.3)
PROT SERPL-MCNC: 6.3 G/DL (ref 6.4–8.4)
PROT UR STRIP-MCNC: ABNORMAL MG/DL
RBC # BLD AUTO: 4.41 MILLION/UL (ref 3.81–5.12)
RBC #/AREA URNS AUTO: ABNORMAL /HPF
SODIUM SERPL-SCNC: 142 MMOL/L (ref 135–147)
SP GR UR STRIP.AUTO: 1.03 (ref 1–1.03)
TSH SERPL DL<=0.05 MIU/L-ACNC: 0.96 UIU/ML (ref 0.45–4.5)
UROBILINOGEN UR STRIP-ACNC: <2 MG/DL
VIT A SERPL-MCNC: 35.6 UG/DL (ref 18.9–57.3)
VIT B1 BLD-SCNC: 107.3 NMOL/L (ref 66.5–200)
WBC # BLD AUTO: 4.7 THOUSAND/UL (ref 4.31–10.16)
WBC #/AREA URNS AUTO: ABNORMAL /HPF

## 2022-12-21 RX ORDER — SIMETHICONE 80 MG
80 TABLET,CHEWABLE ORAL ONCE
Status: COMPLETED | OUTPATIENT
Start: 2022-12-21 | End: 2022-12-21

## 2022-12-21 RX ORDER — DICYCLOMINE HCL 20 MG
20 TABLET ORAL 2 TIMES DAILY
Qty: 20 TABLET | Refills: 0 | Status: SHIPPED | OUTPATIENT
Start: 2022-12-21

## 2022-12-21 RX ORDER — SIMETHICONE 80 MG
80 TABLET,CHEWABLE ORAL EVERY 6 HOURS PRN
Qty: 30 TABLET | Refills: 0 | Status: SHIPPED | OUTPATIENT
Start: 2022-12-21

## 2022-12-21 RX ORDER — OMEPRAZOLE 20 MG/1
40 CAPSULE, DELAYED RELEASE ORAL DAILY
Qty: 10 CAPSULE | Refills: 0 | Status: SHIPPED | OUTPATIENT
Start: 2022-12-21

## 2022-12-21 RX ADMIN — MORPHINE SULFATE 2 MG: 2 INJECTION, SOLUTION INTRAMUSCULAR; INTRAVENOUS at 12:52

## 2022-12-21 RX ADMIN — IOHEXOL 90 ML: 350 INJECTION, SOLUTION INTRAVENOUS at 12:21

## 2022-12-21 RX ADMIN — IOHEXOL 35 ML: 300 INJECTION, SOLUTION INTRAVENOUS at 09:19

## 2022-12-21 RX ADMIN — SODIUM CHLORIDE 1000 ML: 0.9 INJECTION, SOLUTION INTRAVENOUS at 08:42

## 2022-12-21 RX ADMIN — SIMETHICONE 80 MG: 80 TABLET, CHEWABLE ORAL at 14:29

## 2022-12-21 NOTE — DISCHARGE INSTRUCTIONS
Return to the ER with any new or worsening of symptoms such as but not limited to increased pain, fevers, dizziness, shortness of breath, bloody or black stools, blood in vomit, or any other concerning symptoms    Thank you for allowing us to be part of your care today

## 2022-12-21 NOTE — Clinical Note
Manju Weber was seen and treated in our emergency department on 12/21/2022  Diagnosis:     Delores Little  is off the rest of the shift today  She may return on this date: If you have any questions or concerns, please don't hesitate to call        Joana Dillard PA-C    ______________________________           _______________          _______________  Hospital Representative                              Date                                Time

## 2022-12-22 ENCOUNTER — OFFICE VISIT (OUTPATIENT)
Dept: FAMILY MEDICINE CLINIC | Facility: CLINIC | Age: 32
End: 2022-12-22

## 2022-12-22 ENCOUNTER — TELEPHONE (OUTPATIENT)
Dept: OBGYN CLINIC | Facility: CLINIC | Age: 32
End: 2022-12-22

## 2022-12-22 ENCOUNTER — PREP FOR PROCEDURE (OUTPATIENT)
Dept: BARIATRICS | Facility: CLINIC | Age: 32
End: 2022-12-22

## 2022-12-22 VITALS
SYSTOLIC BLOOD PRESSURE: 118 MMHG | OXYGEN SATURATION: 98 % | RESPIRATION RATE: 16 BRPM | BODY MASS INDEX: 31.02 KG/M2 | WEIGHT: 193 LBS | TEMPERATURE: 98.5 F | HEART RATE: 76 BPM | DIASTOLIC BLOOD PRESSURE: 76 MMHG | HEIGHT: 66 IN

## 2022-12-22 DIAGNOSIS — E55.9 VITAMIN D DEFICIENCY: ICD-10-CM

## 2022-12-22 DIAGNOSIS — D50.8 IRON DEFICIENCY ANEMIA SECONDARY TO INADEQUATE DIETARY IRON INTAKE: ICD-10-CM

## 2022-12-22 DIAGNOSIS — J98.11 ATELECTASIS: ICD-10-CM

## 2022-12-22 DIAGNOSIS — Z23 IMMUNIZATION DUE: Primary | ICD-10-CM

## 2022-12-22 DIAGNOSIS — E61.1 IRON DEFICIENCY: ICD-10-CM

## 2022-12-22 DIAGNOSIS — R79.89 ELEVATED PTHRP LEVEL: Primary | ICD-10-CM

## 2022-12-22 DIAGNOSIS — E53.8 VITAMIN B12 DEFICIENCY: ICD-10-CM

## 2022-12-22 DIAGNOSIS — K91.2 POSTSURGICAL MALABSORPTION: ICD-10-CM

## 2022-12-22 DIAGNOSIS — E04.1 THYROID NODULE: ICD-10-CM

## 2022-12-22 DIAGNOSIS — Z98.84 BARIATRIC SURGERY STATUS: Primary | ICD-10-CM

## 2022-12-22 DIAGNOSIS — R80.9 PROTEINURIA, UNSPECIFIED TYPE: ICD-10-CM

## 2022-12-22 DIAGNOSIS — R79.89 HIGH SERUM PARATHYROID HORMONE (PTH): ICD-10-CM

## 2022-12-22 PROBLEM — D25.9 UTERINE FIBROID: Status: ACTIVE | Noted: 2022-12-22

## 2022-12-22 PROBLEM — D49.7 PARATHYROID NEOPLASM: Status: RESOLVED | Noted: 2020-10-15 | Resolved: 2022-12-22

## 2022-12-22 LAB
ATRIAL RATE: 62 BPM
ATRIAL RATE: 64 BPM
ATRIAL RATE: 67 BPM
P AXIS: 52 DEGREES
P AXIS: 55 DEGREES
PR INTERVAL: 142 MS
PR INTERVAL: 144 MS
QRS AXIS: 41 DEGREES
QRS AXIS: 46 DEGREES
QRS AXIS: 46 DEGREES
QRSD INTERVAL: 86 MS
QRSD INTERVAL: 92 MS
QRSD INTERVAL: 94 MS
QT INTERVAL: 404 MS
QT INTERVAL: 412 MS
QT INTERVAL: 414 MS
QTC INTERVAL: 410 MS
QTC INTERVAL: 427 MS
QTC INTERVAL: 435 MS
T WAVE AXIS: 1 DEGREES
T WAVE AXIS: 24 DEGREES
T WAVE AXIS: 38 DEGREES
VENTRICULAR RATE: 62 BPM
VENTRICULAR RATE: 64 BPM
VENTRICULAR RATE: 67 BPM
ZINC SERPL-MCNC: 80 UG/DL (ref 44–115)

## 2022-12-22 NOTE — PROGRESS NOTES
Name: Varinder Roe      : 1990      MRN: [id number]  Encounter Provider: Dang De Souza PA-C  Encounter Date: 2022   Encounter department: [address] [place] [address]     1  Elevated PTHrP level  -     Ambulatory referral to Endocrinology; Future    2  Iron deficiency anemia secondary to inadequate dietary iron intake    3  Vitamin D deficiency    4  Thyroid nodule  -     Ambulatory referral to Endocrinology; Future    5  Proteinuria, unspecified type  -     Protein, urine, 24 hour; Future    6  Atelectasis  -     XR chest pa & lateral; Future; Expected date: 2022          Recent labs ordered by bariatric surgery have been reviewed  -she does have an elevated PTH level at 88  I did recommend she follow-up with endocrinology since there has been a slow increase over the last several years  -she is aware she has a low vitamin-D and she was told by the specialist to start over-the-counter vitamin-D and calcium  -she is aware also that she has iron deficiency anemia  She is going to get over-the-counter iron and I did recommend she start vitamin-C also to help absorb the iron  She will also eat more red meat along with increasing the protein in her diet  -I did recommend she start using the MiraLax daily to help regulate her bowels  This could be the reason for the abdominal pain  She will continue with increase water intake daily  -the CT scan of her abdomen also showed minimal atelectasis of her lower lobes  I did recommend she start taking deep breaths  Check a chest x-ray  -FU 2 months    M*Modal software was used to dictate this note  It may contain errors with dictating incorrect words/spelling  Please contact provider directly for any questions  Subjective      Patient presents today with several concerns  Recently she had blood work done that was ordered by bariatric surgery    Her PTH level was elevated so they did recommend she follow-up with her primary care provider  She did go to the emergency room yesterday for lower abdominal pain  She has noticed some improvement  She states today she had hard ground bowel movement  She does not take the MiraLax on a regular basis  She denies any fevers, chills, nausea, vomiting  She did have a CT scan that did show a 1 7 cm uterine fibroid  She is aware to follow-up with her gynecologist for this  She also had some protein in her urine  No blood  Review of Systems   Constitutional: Negative  Gastrointestinal:        As stated in HPI       Current Outpatient Medications on File Prior to Visit   Medication Sig   • acetaminophen (TYLENOL) 500 mg tablet     • Calcium Carbonate-Vit D-Min (CALCIUM 1200 PO) Take by mouth     • Diclofenac Sodium (VOLTAREN) 1 %    • dicyclomine (BENTYL) 20 mg tablet Take 1 tablet (20 mg total) by mouth 2 (two) times a day   • Multiple Vitamin (multivitamin) tablet Take 1 tablet by mouth daily    • omeprazole (PriLOSEC) 20 mg delayed release capsule Take 2 capsules (40 mg total) by mouth daily   • simethicone (MYLICON) 80 mg chewable tablet Chew 1 tablet (80 mg total) every 6 (six) hours as needed for flatulence   • gabapentin (NEURONTIN) 300 mg capsule Take 1 capsule (300 mg total) by mouth 3 (three) times a day   • omeprazole (PriLOSEC) 20 mg delayed release capsule Take 1 capsule (20 mg total) by mouth daily (Patient not taking: Reported on 12/15/2022)   • polyethylene glycol (MIRALAX) 17 g packet Take 17 g by mouth daily TAKEN AS NEEDED (Patient not taking: Reported on 12/15/2022)       Objective     /76 (BP Location: Left arm, Patient Position: Sitting, Cuff Size: Large)   Pulse 76   Temp 98 5 °F (36 9 °C) (Tympanic)   Resp 16   Ht 5' 6" (1 676 m)   Wt 87 5 kg (193 lb)   LMP 12/11/2022 (Approximate)   SpO2 98%   BMI 31 15 kg/m²     Physical Exam  Vitals reviewed  Constitutional:       General: She is not in acute distress       Appearance: Normal appearance  She is well-developed  She is not ill-appearing, toxic-appearing or diaphoretic  HENT:      Head: Normocephalic and atraumatic  Neck:      Thyroid: No thyromegaly  Cardiovascular:      Rate and Rhythm: Normal rate and regular rhythm  Heart sounds: Normal heart sounds  No murmur heard  No friction rub  No gallop  Pulmonary:      Effort: Pulmonary effort is normal  No respiratory distress  Breath sounds: Normal breath sounds  No wheezing, rhonchi or rales  Abdominal:      General: Bowel sounds are normal       Palpations: Abdomen is soft  There is no mass  Tenderness: There is abdominal tenderness (Mild tenderness of left lower quadrant)  Musculoskeletal:         General: No deformity  Cervical back: Neck supple  Right lower leg: No edema  Left lower leg: No edema  Lymphadenopathy:      Cervical: No cervical adenopathy  Skin:     General: Skin is warm  Neurological:      General: No focal deficit present  Mental Status: She is alert  Psychiatric:         Mood and Affect: Mood normal          Behavior: Behavior normal          Thought Content:  Thought content normal          Judgment: Judgment normal        Sara Cameron PA-C

## 2022-12-22 NOTE — ED PROVIDER NOTES
History  Chief Complaint   Patient presents with   • Abdominal Pain     Patient states that she had the gastric sleeve done last year and since October she has been having intermittent abdominal pain and is becoming more frequently  States that she has been following up and had blood work and was told that her levels are low and is suppose to have an xray on the 30th but pain continues and complains of nauseous and lightheaded  Also states that she is getting frequent headaches  Patient presents to the ER for evaluation of abdominal pain  The patient states that she has had abdominal pain intermittently over the last 2 months however it is worsened recently  Patient does note that she had a Fani-en-Y gastric bypass procedure done in October 2021, 14 months ago  States that she was initially doing well with it however over the last 2 months she has began with pain  States that she is followed up with her bariatric doctor who has her scheduled for a upper GI study as well as a possible EGD later this month  States that they recommended she start taking Gas-X however she has not began taking that yet  Patient states that she did take 2 extra strength Tylenol this morning with mild relief  States that the symptoms come and go sporadically  States that they are occasionally on the right side however today they are on the left side  States that it is in her left flank to left lower quadrant  Patient does note that she occasionally feels lightheaded however states that she is not eating and drinking as much as she used to and believes that it is related to that  Patient states that sometimes when she eats and drinks she gets a heavy reflux feeling in her chest however states that it is only ever when she eats and drinks and does not have any chest pain or chest discomfort otherwise  Does note occasional associated nausea    Patient states that she did have outpatient labs done for vitamins and was told that they were low  Patient states that she has not been taking any NSAIDs or aspirin  States that she is very strict about following her bariatric protocols  Patient denies any fevers, syncope, chest pain, shortness of breath, dyspnea, vomiting, diarrhea, bloody or black stools, dysuria, leg swelling, or any other concerning symptoms  Prior to Admission Medications   Prescriptions Last Dose Informant Patient Reported? Taking?    Calcium Carbonate-Vit D-Min (CALCIUM 1200 PO)   Yes No   Sig: Take by mouth     Diclofenac Sodium (VOLTAREN) 1 %   Yes No   Multiple Vitamin (multivitamin) tablet   Yes No   Sig: Take 1 tablet by mouth daily    acetaminophen (TYLENOL) 500 mg tablet   Yes No   Sig:     gabapentin (NEURONTIN) 300 mg capsule   No No   Sig: Take 1 capsule (300 mg total) by mouth 3 (three) times a day   omeprazole (PriLOSEC) 20 mg delayed release capsule   No No   Sig: Take 1 capsule (20 mg total) by mouth daily   Patient not taking: Reported on 12/15/2022   polyethylene glycol (MIRALAX) 17 g packet   Yes No   Sig: Take 17 g by mouth daily TAKEN AS NEEDED   Patient not taking: Reported on 12/15/2022      Facility-Administered Medications: None       Past Medical History:   Diagnosis Date   • Back pain    • Bacterial vaginosis    • Bariatric surgery status    • Chlamydia    • Disease of thyroid gland     nodule   • Knee pain    • Morbidly obese (HCC)     gastric sleeve   today 10/4 2021   • MRSA carrier    • Obesity    • Postsurgical malabsorption    • Thyroid disease     benign    -right   gets scan yearly   • Urogenital trichomoniasis    • Wears glasses        Past Surgical History:   Procedure Laterality Date   • FL LAP GASTRIC BYPASS/DRE-EN-Y N/A 10/4/2021    Procedure: BYPASS GASTRIC  DRE-EN-Y LAPAROSCOPIC, AND INTRAOPERATIVE EGD;  Surgeon: Dewey Orozco MD;  Location: [facility];  Service: Bariatrics   • TONSILLECTOMY     • TOOTH EXTRACTION  10/01/2014   • WRIST SURGERY  05/25/2021       Family History   Problem Relation Age of Onset   • Brain cancer Mother    • Cancer Mother         glioblastoma   • Hypertension Father    • No Known Problems Paternal Grandfather    • No Known Problems Sister    • No Known Problems Daughter    • No Known Problems Maternal Grandmother    • Diabetes Paternal Grandmother    • Stroke Paternal Grandmother    • Heart disease Neg Hx    • Thyroid disease Neg Hx    • Breast cancer Neg Hx    • Colon cancer Neg Hx    • Ovarian cancer Neg Hx      I have reviewed and agree with the history as documented  E-Cigarette/Vaping   • E-Cigarette Use Never User      E-Cigarette/Vaping Substances   • Nicotine No    • THC No    • CBD No    • Flavoring No      Social History     Tobacco Use   • Smoking status: Never   • Smokeless tobacco: Never   • Tobacco comments:     no passive smoke exposure   Vaping Use   • Vaping Use: Never used   Substance Use Topics   • Alcohol use: Yes     Alcohol/week: 3 0 standard drinks     Types: 1 Glasses of wine, 1 Cans of beer, 1 Shots of liquor per week     Comment: 1 x monthly   • Drug use: No       Review of Systems   Constitutional: Negative for fever  HENT: Negative for congestion, rhinorrhea and sore throat  Respiratory: Negative for shortness of breath  Cardiovascular: Negative for chest pain  Gastrointestinal: Positive for abdominal pain and nausea  Negative for blood in stool, diarrhea and vomiting  Genitourinary: Negative for dysuria  Musculoskeletal: Negative for back pain and neck pain  Skin: Negative for rash  Neurological: Negative for weakness, numbness and headaches  All other systems reviewed and are negative  Physical Exam  Physical Exam  Constitutional:       Appearance: She is well-developed  HENT:      Head: Normocephalic and atraumatic  Nose: Nose normal    Eyes:      Conjunctiva/sclera: Conjunctivae normal    Cardiovascular:      Rate and Rhythm: Normal rate  Heart sounds: Normal heart sounds  Pulmonary:      Effort: Pulmonary effort is normal  No respiratory distress  Breath sounds: Normal breath sounds  Abdominal:      Palpations: Abdomen is soft  Tenderness: There is abdominal tenderness  There is no right CVA tenderness, left CVA tenderness or guarding  Comments: Normal tenderness to palpation of left flank and left lower quadrant  No tenderness to palpation of remainder of abdomen  Musculoskeletal:         General: Normal range of motion  Cervical back: Normal range of motion  Right lower leg: No edema  Left lower leg: No edema  Skin:     General: Skin is warm  Capillary Refill: Capillary refill takes less than 2 seconds  Neurological:      Mental Status: She is alert and oriented to person, place, and time           Vital Signs  ED Triage Vitals [12/21/22 0720]   Temperature Pulse Respirations Blood Pressure SpO2   (!) 97 3 °F (36 3 °C) 76 18 128/75 100 %      Temp Source Heart Rate Source Patient Position - Orthostatic VS BP Location FiO2 (%)   Tympanic Monitor Sitting Left arm --      Pain Score       4           Vitals:    12/21/22 0845 12/21/22 1030 12/21/22 1300 12/21/22 1445   BP:       Pulse: 58 62 64 68   Patient Position - Orthostatic VS:             Visual Acuity      ED Medications  Medications   sodium chloride 0 9 % bolus 1,000 mL (0 mL Intravenous Stopped 12/21/22 1306)   iohexol (OMNIPAQUE) 300 mg/mL injection 100 mL (35 mL Oral Given 12/21/22 0919)   morphine injection 2 mg (2 mg Intravenous Given 12/21/22 1252)   iohexol (OMNIPAQUE) 350 MG/ML injection (SINGLE-DOSE) 100 mL (90 mL Intravenous Given 12/21/22 1221)   simethicone (MYLICON) chewable tablet 80 mg (80 mg Oral Given 12/21/22 1429)       Diagnostic Studies  Results Reviewed     Procedure Component Value Units Date/Time    POCT pregnancy, urine [[id number]]  (Normal) Resulted: 12/21/22 1210    Lab Status: Final result Updated: 12/21/22 1210     EXT Preg Test, Ur Negative     Control Valid    HS Troponin I 2hr [[id number]] Collected: 12/21/22 1055    Lab Status: Final result Specimen: Blood from Arm, Right Updated: 12/21/22 1134     hs TnI 2hr <2 ng/L      Delta 2hr hsTnI --    HS Troponin 0hr (reflex protocol) [[id number]]  (Normal) Collected: 12/21/22 0853    Lab Status: Final result Specimen: Blood from Arm, Right Updated: 12/21/22 0934     hs TnI 0hr <2 ng/L     Comprehensive metabolic panel [[id number]]  (Abnormal) Collected: 12/21/22 0853    Lab Status: Final result Specimen: Blood from Arm, Right Updated: 12/21/22 0933     Sodium 142 mmol/L      Potassium 3 8 mmol/L      Chloride 110 mmol/L      CO2 27 mmol/L      ANION GAP 5 mmol/L      BUN 11 mg/dL      Creatinine 0 66 mg/dL      Glucose 85 mg/dL      Calcium 8 6 mg/dL      Corrected Calcium 9 1 mg/dL      AST 16 U/L      ALT 33 U/L      Alkaline Phosphatase 51 U/L      Total Protein 6 3 g/dL      Albumin 3 4 g/dL      Total Bilirubin 0 42 mg/dL      eGFR 115 ml/min/1 73sq m     Narrative:      [facility] guidelines for Chronic Kidney Disease (CKD):   •  Stage 1 with normal or high GFR (GFR > 90 mL/min/1 73 square meters)  •  Stage 2 Mild CKD (GFR = 60-89 mL/min/1 73 square meters)  •  Stage 3A Moderate CKD (GFR = 45-59 mL/min/1 73 square meters)  •  Stage 3B Moderate CKD (GFR = 30-44 mL/min/1 73 square meters)  •  Stage 4 Severe CKD (GFR = 15-29 mL/min/1 73 square meters)  •  Stage 5 End Stage CKD (GFR <15 mL/min/1 73 square meters)  Note: GFR calculation is accurate only with a steady state creatinine    TSH [[id number]]  (Normal) Collected: 12/21/22 0853    Lab Status: Final result Specimen: Blood from Arm, Right Updated: 12/21/22 0933     TSH 3RD GENERATON 0 964 uIU/mL     Narrative:      Patients undergoing fluorescein dye angiography may retain small amounts of fluorescein in the body for 48-72 hours post procedure  Samples containing fluorescein can produce falsely depressed TSH values   If the patient had this procedure,a specimen should be resubmitted post fluorescein clearance        Urine Microscopic [[id number]]  (Abnormal) Collected: 12/21/22 0901    Lab Status: Final result Specimen: Urine, Clean Catch Updated: 12/21/22 0930     RBC, UA None Seen /hpf      WBC, UA 1-2 /hpf      Epithelial Cells Occasional /hpf      Bacteria, UA Occasional /hpf      MUCUS THREADS Moderate    Lipase [[id number]]  (Abnormal) Collected: 12/21/22 0853    Lab Status: Final result Specimen: Blood from Arm, Right Updated: 12/21/22 0925     Lipase 70 u/L     UA w Reflex to Microscopic w Reflex to Culture [[id number]]  (Abnormal) Collected: 12/21/22 0901    Lab Status: Final result Specimen: Urine, Clean Catch Updated: 12/21/22 0920     Color, UA Yellow     Clarity, UA Clear     Specific Gravity, UA 1 029     pH, UA 7 5     Leukocytes, UA Negative     Nitrite, UA Negative     Protein, UA 30 (1+) mg/dl      Glucose, UA Negative mg/dl      Ketones, UA Negative mg/dl      Urobilinogen, UA <2 0 mg/dl      Bilirubin, UA Negative     Occult Blood, UA Negative    CBC and differential [[id number]]  (Abnormal) Collected: 12/21/22 0853    Lab Status: Final result Specimen: Blood from Arm, Right Updated: 12/21/22 0902     WBC 4 70 Thousand/uL      RBC 4 41 Million/uL      Hemoglobin 12 9 g/dL      Hematocrit 39 4 %      MCV 89 fL      MCH 29 3 pg      MCHC 32 7 g/dL      RDW 12 5 %      MPV 10 8 fL      Platelets 290 Thousands/uL      nRBC 0 /100 WBCs      Neutrophils Relative 40 %      Immat GRANS % 0 %      Lymphocytes Relative 49 %      Monocytes Relative 8 %      Eosinophils Relative 2 %      Basophils Relative 1 %      Neutrophils Absolute 1 89 Thousands/µL      Immature Grans Absolute 0 01 Thousand/uL      Lymphocytes Absolute 2 26 Thousands/µL      Monocytes Absolute 0 37 Thousand/µL      Eosinophils Absolute 0 11 Thousand/µL      Basophils Absolute 0 06 Thousands/µL                  CT head without contrast   Final Result by Kristi Osborne MD (12/21 1249)      No acute intracranial abnormality  Workstation performed: [id number]         CT abdomen pelvis with contrast   Final Result by Cleve Wilks MD (12/21 1253)      No acute CT findings in abdomen or pelvis  Status post gastric bypass  Uterine fibroid  Workstation performed: [id number]                    Procedures  Procedures         ED Course  ED Course as of 12/22/22 0804   Wed Dec 21, 2022   5730 Blood Pressure: 128/75   0835 Temperature(!): 97 3 °F (36 3 °C)   0835 Pulse: 76   0835 Respirations: 18   0835 SpO2: 100 %   0908 WBC: 4 70   0908 Hemoglobin: 12 9   0940 Discussed with Hafsa Lara bariatrics  In agreement with plan  Will follow up on results   1043 hs TnI 0hr: <2   46 TSH 3RD GENERATON: 0 964                               SBIRT 20yo+    Flowsheet Row Most Recent Value   SBIRT (25 yo +)    In order to provide better care to our patients, we are screening all of our patients for alcohol and drug use  Would it be okay to ask you these screening questions? No Filed at: 12/21/2022 1241                    MDM  Patient well-appearing in the ER, no acute distress  Labs and imaging grossly benign  Reviewed with bariatrics, Lui Morales, reviewed labs and imaging as well as HPI  States pain control and follow-up outpatient  Reviewed incidental findings of CT including uterine fibroid, levoscoliosis, and small amount of dependent atelectasis  Also discussed small amount of protein in the urine as well as deviations of CBC and CMP  Discussed to follow-up with her primary care doctor for this  Patient states that she has an appointment with her primary care doctor tomorrow  Patient noted improvement of pain with medication in the ER however did remain with some mild pain  Patient contacted her bariatric provider while she was in the ED and they instructed her to follow-up at her upper GI study on 12/30 as well as call to schedule an EGD outpatient  Patient states that she feels well enough for discharge at this time  Discussed symptomatic treatment with her at length as well as with attending  Discussed red flags to watch for and to return if any new symptoms develop, symptoms worsen, or if there are any concerns  Patient extremely appreciative  Again discussed symptomatic treatment and strict return instructions  Patient in no acute distress throughout ER stay  Vitals stable and reassuring  Patient stable for discharge at this time  Reviewed plan with patient/family  Reviewed red flag symptoms and strict return instructions  Patient/family voiced understanding and agreement to plan  Patient/family had opportunity to ask questions and all questions were answered at bedside  Disclosure: Voice to text software was used in the preparation of this document and could have resulted in translational errors  Occasional wrong word or "sound a like" substitutions may have occurred due to the inherent limitations of voice recognition software  Read the chart carefully and recognize, using context, where substitutions have occurred  Disposition  Final diagnoses:   Abdominal pain   Uterine fibroid   Proteinuria     Time reflects when diagnosis was documented in both MDM as applicable and the Disposition within this note     Time User Action Codes Description Comment    12/21/2022 12:08 PM Jackquline Needles Add [R10 9] Abdominal pain     12/21/2022  5:01 PM Jackquline Esmont Add [D25 9] Uterine fibroid     12/21/2022  5:01 PM Jackquline Needles Add [R80 9] Proteinuria       ED Disposition     ED Disposition   Discharge    Condition   Stable    Date/Time   Wed Dec 21, 2022 Mandy Gilmore discharge to home/self care                 Follow-up Information     Follow up With Specialties Details Why [address] [facility] Emergency Department Emergency Medicine  If symptoms worsen [address] [facility] [id number]  [address] [facility] [address], [address], [place], [place], [address] Vesta Patel PA-C Family Medicine, Physician Assistant In 2 days Follow up as discussed Caren [address],  [facility], General Surgery In 2 days  [address] [facility] [address] [facility]  [phone]             Discharge Medication List as of 12/21/2022  5:02 PM      START taking these medications    Details   dicyclomine (BENTYL) 20 mg tablet Take 1 tablet (20 mg total) by mouth 2 (two) times a day, Starting Wed 12/21/2022, Normal      !! omeprazole (PriLOSEC) 20 mg delayed release capsule Take 2 capsules (40 mg total) by mouth daily, Starting Wed 12/21/2022, Normal      simethicone (MYLICON) 80 mg chewable tablet Chew 1 tablet (80 mg total) every 6 (six) hours as needed for flatulence, Starting Wed 12/21/2022, Normal       !! - Potential duplicate medications found  Please discuss with provider  CONTINUE these medications which have NOT CHANGED    Details   acetaminophen (TYLENOL) 500 mg tablet  , Starting Wed 10/6/2021, Historical Med      Calcium Carbonate-Vit D-Min (CALCIUM 1200 PO) Take by mouth  , Historical Med      Diclofenac Sodium (VOLTAREN) 1 % Starting Fri 12/3/2021, Historical Med      gabapentin (NEURONTIN) 300 mg capsule Take 1 capsule (300 mg total) by mouth 3 (three) times a day, Starting Wed 4/13/2022, Until Sun 8/14/2022, Normal      Multiple Vitamin (multivitamin) tablet Take 1 tablet by mouth daily , Historical Med      !! omeprazole (PriLOSEC) 20 mg delayed release capsule Take 1 capsule (20 mg total) by mouth daily, Starting Wed 10/19/2022, Normal      polyethylene glycol (MIRALAX) 17 g packet Take 17 g by mouth daily TAKEN AS NEEDED, Historical Med       !! - Potential duplicate medications found  Please discuss with provider            No discharge procedures on file      PDMP Review       Value Time User    PDMP Reviewed  Yes 10/5/2021 12:47 PM Shelby Rod PA-C          ED Provider  Electronically Signed by           [place] PA-C  12/22/22 9790

## 2022-12-28 ENCOUNTER — CONSULT (OUTPATIENT)
Dept: ENDOCRINOLOGY | Facility: CLINIC | Age: 32
End: 2022-12-28

## 2022-12-28 VITALS
SYSTOLIC BLOOD PRESSURE: 100 MMHG | HEIGHT: 66 IN | DIASTOLIC BLOOD PRESSURE: 70 MMHG | HEART RATE: 58 BPM | BODY MASS INDEX: 31.68 KG/M2 | WEIGHT: 197.13 LBS

## 2022-12-28 DIAGNOSIS — N25.81 SECONDARY HYPERPARATHYROIDISM (HCC): Primary | ICD-10-CM

## 2022-12-28 DIAGNOSIS — E55.9 VITAMIN D DEFICIENCY: ICD-10-CM

## 2022-12-28 DIAGNOSIS — E04.1 THYROID NODULE: ICD-10-CM

## 2022-12-28 RX ORDER — IBUPROFEN 200 MG
1 CAPSULE ORAL 2 TIMES DAILY
Qty: 180 TABLET | Refills: 2 | Status: SHIPPED | OUTPATIENT
Start: 2022-12-28

## 2022-12-28 NOTE — PATIENT INSTRUCTIONS
Start taking calcium citrate at least 1000 - 2000 mg daily  Also increase Vit D supplementation to 10921 units daily   (Can be purchased over the counter)  Check bloodwork PTH, Vit D, B12 in 2 - 3 months  Office follow up in 3 months

## 2022-12-28 NOTE — PROGRESS NOTES
Chief complaint: Elevated PTH     Referring Provider  Virgen Luciano 78  [facility],  [address] [facility]     History of Present Illness:   Florentino Xavier is a 27 y o  female with history of thyroid nodule of 5 years, gastric bypass in 2021 seen in consultation at the request of PCP for evaluation of hyperparathyroidism  Patient reports she had stopped taking her gastric bypass vitamin supplements for a while  So far lost about 120lbs weight loss post bypass procedure  She presented to the ED few weeks ago with abdominal complaints and had bloodwork done which revealed elevated PTH of 88, corrected calcium 9 1, low vitamin D level of 22 6 and low ferritin level of 10  Since then, she was advised to resume taking her vitamin supplements- currently take calcium carbonate (dose unknown), however not on Vit D supplements  PMH of right thyroid nodule diagnosed 5 years ago, stable on repeat ultrasounds, did not meet requirement for biopsy       Patient Active Problem List   Diagnosis   • Right lumbar radiculopathy   • Chondromalacia   • Knee pain   • Derang of medial meniscus due to old tear/inj, left knee   • Well adult exam   • Thyroid nodule   • Weight gain   • Cervical cancer screening   • Lipid screening   • Encounter for screening for HIV   • Radial styloid tenosynovitis of right hand   • Right wrist pain   • Headache   • Gastritis without bleeding   • Group B streptococcal bacteriuria   • Chlamydia infection   • Belching   • Generalized abdominal pain   • Acute leg pain, right   • History of bariatric surgery   • COVID-19 virus infection   • Right thigh pain   • It band syndrome, right   • Loss of smell   • Loss of taste   • Perineum pain, female   • Enchondroma of femur, left   • BMI 32 0-32 9,adult   • Strain of sternocleidomastoid muscle   • Intractable migraine without aura and with status migrainosus   • Primary hypertriglyceridemia   • Uterine fibroid   • Elevated PTHrP level   • Vitamin D deficiency   • Iron deficiency anemia secondary to inadequate dietary iron intake   • Proteinuria   • Atelectasis      Past Medical History:   Diagnosis Date   • Back pain    • Bacterial vaginosis    • Bariatric surgery status    • Chlamydia    • Disease of thyroid gland     nodule   • Knee pain    • Morbidly obese (HCC)     gastric sleeve   today 10/4 2021   • MRSA carrier    • Obesity    • Postsurgical malabsorption    • Thyroid disease     benign    -right   gets scan yearly   • Urogenital trichomoniasis    • Wears glasses       Past Surgical History:   Procedure Laterality Date   • NC LAPS GSTR RSTCV PX W/BYP DRE-EN-Y LIMB <150 CM N/A 10/4/2021    Procedure: BYPASS GASTRIC  DRE-EN-Y LAPAROSCOPIC, AND INTRAOPERATIVE EGD;  Surgeon: Carol Ann Sparrow MD;  Location: [facility];  Service: Bariatrics   • TONSILLECTOMY     • TOOTH EXTRACTION  10/01/2014   • WRIST SURGERY  05/25/2021      Family History   Problem Relation Age of Onset   • Brain cancer Mother    • Cancer Mother         glioblastoma   • Hypertension Father    • No Known Problems Paternal Grandfather    • No Known Problems Sister    • No Known Problems Daughter    • No Known Problems Maternal Grandmother    • Diabetes Paternal Grandmother    • Stroke Paternal Grandmother    • Heart disease Neg Hx    • Thyroid disease Neg Hx    • Breast cancer Neg Hx    • Colon cancer Neg Hx    • Ovarian cancer Neg Hx      Social History     Tobacco Use   • Smoking status: Never   • Smokeless tobacco: Never   • Tobacco comments:     no passive smoke exposure   Substance Use Topics   • Alcohol use:  Yes     Alcohol/week: 3 0 standard drinks     Types: 1 Glasses of wine, 1 Cans of beer, 1 Shots of liquor per week     Comment: 1 x monthly     Allergies   Allergen Reactions   • Zyrtec [Cetirizine] Hives         Current Outpatient Medications:   •  acetaminophen (TYLENOL) 500 mg tablet,  , Disp: , Rfl:   •  Calcium Carbonate-Vit D-Min (CALCIUM 1200 PO), Take by mouth , Disp: , Rfl:   •  calcium citrate (CALCITRATE) 950 (200 Ca) MG tablet, Take 1 tablet (950 mg total) by mouth 2 (two) times a day, Disp: 180 tablet, Rfl: 2  •  Diclofenac Sodium (VOLTAREN) 1 %, , Disp: , Rfl:   •  dicyclomine (BENTYL) 20 mg tablet, Take 1 tablet (20 mg total) by mouth 2 (two) times a day, Disp: 20 tablet, Rfl: 0  •  Multiple Vitamin (multivitamin) tablet, Take 1 tablet by mouth daily , Disp: , Rfl:   •  omeprazole (PriLOSEC) 20 mg delayed release capsule, Take 2 capsules (40 mg total) by mouth daily, Disp: 10 capsule, Rfl: 0  •  polyethylene glycol (MIRALAX) 17 g packet, Take 17 g by mouth daily TAKEN AS NEEDED, Disp: , Rfl:   •  simethicone (MYLICON) 80 mg chewable tablet, Chew 1 tablet (80 mg total) every 6 (six) hours as needed for flatulence, Disp: 30 tablet, Rfl: 0  •  gabapentin (NEURONTIN) 300 mg capsule, Take 1 capsule (300 mg total) by mouth 3 (three) times a day (Patient not taking: Reported on 12/28/2022), Disp: 90 capsule, Rfl: 2  •  omeprazole (PriLOSEC) 20 mg delayed release capsule, Take 1 capsule (20 mg total) by mouth daily (Patient not taking: Reported on 12/15/2022), Disp: 30 capsule, Rfl: 1     ROS  Constitutional: Negative for appetite change  Negative for activity change  Respiratory: Negative for shortness of breath, wheezing, cough  Cardiovascular: Negative for chest pain and palpitations  Gastrointestinal: Constipation+  Negative for abdominal pain, nausea and vomiting  Negative for diarrhea or constipation  Musculoskeletal: Negative for arthralgias  Neurological: Negative for dizziness, light-headedness and headaches  Physical Exam:  Body mass index is 31 82 kg/m²    /70 (BP Location: Left arm, Patient Position: Sitting, Cuff Size: Large)   Pulse 58   Ht 5' 6" (1 676 m)   Wt 89 4 kg (197 lb 2 oz)   LMP 12/11/2022 (Approximate)   BMI 31 82 kg/m²    Wt Readings from Last 3 Encounters:   12/28/22 89 4 kg (197 lb 2 oz)   12/22/22 87 5 kg (193 lb) 12/21/22 87 5 kg (193 lb)       GEN: Well-appearing, not in acute distress  Eyes: no stare or proptosis, nl lids and conjunctiva, EOMI  Neck: trachea midline, thyroid NT to palpation, nl in size, no nodules or neck masses noted, no cervical LAD  CV; heart reg rate s1s2 nl  Resp: CTAB, good effort  Ab+BS  Ext: no edema bilaterally  Psych: nl mood and affect, no gross lapses in memory    DATA:  Labs:    Latest Reference Range & Units 10/13/20 09:10 02/02/22 09:42 02/23/22 09:35 12/17/22 08:32 12/21/22 08:53   PARATHYROID HORMONE 18 4 - 80 1 pg/mL 72 6 78 6  88 0 (H)    TSH 3RD GENERATON 0 450 - 4 500 uIU/mL 1 120  1 020  0 964   (H): Data is abnormally high      Lab Results   Component Value Date    SODIUM 142 12/21/2022    K 3 8 12/21/2022     (H) 12/21/2022    CO2 27 12/21/2022    BUN 11 12/21/2022    CREATININE 0 66 12/21/2022    GLUC 85 12/21/2022    CALCIUM 8 6 12/21/2022      Lab Results   Component Value Date    PTH 88 0 (H) 12/17/2022    CALCIUM 8 6 12/21/2022            Radiology  Narrative & Impression   THYROID ULTRASOUND     INDICATION:    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      COMPARISON:  Ultrasound thyroid 10/13/2020     TECHNIQUE:   Ultrasound of the thyroid was performed with a high frequency linear transducer in transverse and sagittal planes including volumetric imaging sweeps as well as traditional still imaging technique      FINDINGS:  Normal homogeneous smooth echotexture      Right lobe: 5 2 x 1 1 x 1 7 cm  Volume 4 8 mL  Left lobe:  4 9 x 1 0 x 1 3 cm  Volume 3 0 mL  Isthmus: 0 2  cm      Nodule #1  Image 27  RIGHT lower pole nodule measuring 0 5 x 0 3 x 0 4 cm  Given differences in measuring technique, no significant change from prior  COMPOSITION:  2 points, solid or almost completely solid   ECHOGENICITY:  2 points, hypoechoic  SHAPE:  0 points, wider-than-tall  MARGIN: 0 points, smooth    ECHOGENIC FOCI:  0 points, none or large comet-tail artifacts  TI-RADS Classification: TR 4 (4-6 points),  FNA if > 1 5 cm  Follow if > 1cm         IMPRESSION:     Stable right thyroid nodule  No nodule meets current ACR criteria for requiring biopsy or followup ultrasounds                    Impression:  1  Secondary hyperparathyroidism ([address] )    2  Thyroid nodule    3  Vitamin D deficiency           Plan:    Diagnoses and all orders for this visit:    Secondary hyperparathyroidism ([address] )  -     PTH, intact Lab Collect Lab Collect; Future  -     Comprehensive metabolic panel Lab Collect; Future  -     calcium citrate (CALCITRATE) 950 (200 Ca) MG tablet; Take 1 tablet (950 mg total) by mouth 2 (two) times a day    Thyroid nodule  -     Ambulatory referral to Endocrinology    Vitamin D deficiency  -     Vitamin D 25 hydroxy Lab Collect; Future      1  Elevated PTH level  Suggestive of secondary hyperparathyroidism  from Calcium and Vit D malabsorption in recent gastric bypass surgery  Recommend calcium citrate at least 1000 to 2000 mg daily  Vitamin D supplement 10,000 units daily  Recheck PTH, vitamin D level in 2 to 3 months    2  Thyroid nodule  Stable on repeat thyroid ultrasounds  TSH normal    3  Vitamin D deficiency  Vit D supplementation as above  4  Iron deficiency  Low ferritin level of 10  She will discuss with her PCP/ hemonc for possible IV iron supplementation    Office follow up in 4 monthsDiscussed with the patient and all questioned fully answered  She will call me if any problems arise

## 2022-12-30 DIAGNOSIS — D50.8 IRON DEFICIENCY ANEMIA SECONDARY TO INADEQUATE DIETARY IRON INTAKE: Primary | ICD-10-CM

## 2022-12-30 PROBLEM — N25.81 SECONDARY HYPERPARATHYROIDISM (HCC): Status: ACTIVE | Noted: 2022-12-30

## 2023-01-03 ENCOUNTER — OFFICE VISIT (OUTPATIENT)
Dept: OBGYN CLINIC | Facility: CLINIC | Age: 33
End: 2023-01-03

## 2023-01-03 VITALS
HEIGHT: 67 IN | WEIGHT: 197 LBS | SYSTOLIC BLOOD PRESSURE: 103 MMHG | DIASTOLIC BLOOD PRESSURE: 70 MMHG | RESPIRATION RATE: 18 BRPM | HEART RATE: 71 BPM | BODY MASS INDEX: 30.92 KG/M2

## 2023-01-03 DIAGNOSIS — R10.84 GENERALIZED ABDOMINAL PAIN: Primary | ICD-10-CM

## 2023-01-03 NOTE — PROGRESS NOTES
Ralph Pillai is a 28 yo  who presents today to discuss an incidental finding of a 1 7 cm fundal fibroid at a visit to the ED on 22  She had initially presented to the ED with an acute worsening of abdominal pain that she had been experiencing for several months  Her history is notable for a prior Fani-en-Y gastric bypass and one prior vaginal delivery  OB History        3    Para   1    Term   1            AB   2    Living   1       SAB        IAB   2    Ectopic        Multiple        Live Births               Obstetric Comments   Child birth 12                    Objective    Vitals:    23 1636   BP: 103/70   BP Location: Left arm   Patient Position: Sitting   Cuff Size: Adult   Pulse: 71   Resp: 18   Weight: 89 4 kg (197 lb)   Height: 5' 6 5" (1 689 m)        Physical Exam  Constitutional:       Appearance: Normal appearance  HENT:      Head: Normocephalic and atraumatic  Cardiovascular:      Rate and Rhythm: Normal rate  Pulmonary:      Effort: Pulmonary effort is normal  No respiratory distress  Abdominal:      Palpations: Abdomen is soft  Tenderness: There is no abdominal tenderness  Musculoskeletal:         General: Normal range of motion  Neurological:      Mental Status: She is alert  Skin:     General: Skin is warm and dry            Assessment    Patient Active Problem List   Diagnosis   • Right lumbar radiculopathy   • Chondromalacia   • Knee pain   • Derang of medial meniscus due to old tear/inj, left knee   • Well adult exam   • Thyroid nodule   • Weight gain   • Cervical cancer screening   • Lipid screening   • Encounter for screening for HIV   • Radial styloid tenosynovitis of right hand   • Right wrist pain   • Headache   • Gastritis without bleeding   • Group B streptococcal bacteriuria   • Chlamydia infection   • Belching   • Generalized abdominal pain   • Acute leg pain, right   • History of bariatric surgery   • COVID-19 virus infection   • Right thigh pain   • It band syndrome, right   • Loss of smell   • Loss of taste   • Perineum pain, female   • Enchondroma of femur, left   • BMI 32 0-32 9,adult   • Strain of sternocleidomastoid muscle   • Intractable migraine without aura and with status migrainosus   • Primary hypertriglyceridemia   • Uterine fibroid   • Elevated PTHrP level   • Vitamin D deficiency   • Iron deficiency anemia secondary to inadequate dietary iron intake   • Proteinuria   • Atelectasis   • Secondary hyperparathyroidism ([address] )        Plan  - reviewed CT scan finding of a 1 7 cm fundal fibroid  - Pelvic US ordered to further evaluated pelvic pain  - Has follow up with GI and bariatrics scheduled no

## 2023-01-03 NOTE — LETTER
January 3, 2023     Patient: Murray Celis  YOB: 1990  Date of Visit: 1/3/2023      To Whom it May Concern:    Wendy Polanco is under my professional care  Briseyda Addison was seen in my office on 1/3/2023  Briseyda Addison may return to work on 1/4/22  If you have any questions or concerns, please don't hesitate to call           Sincerely,          Tutu Masters MD        CC: No Recipients

## 2023-01-04 ENCOUNTER — HOSPITAL ENCOUNTER (OUTPATIENT)
Dept: RADIOLOGY | Facility: HOSPITAL | Age: 33
Discharge: HOME | End: 2023-01-04

## 2023-01-04 ENCOUNTER — TELEPHONE (OUTPATIENT)
Dept: BARIATRICS | Facility: CLINIC | Age: 33
End: 2023-01-04

## 2023-01-04 DIAGNOSIS — Z98.84 BARIATRIC SURGERY STATUS: ICD-10-CM

## 2023-01-04 DIAGNOSIS — Z48.815 ENCOUNTER FOR SURGICAL AFTERCARE FOLLOWING SURGERY OF DIGESTIVE SYSTEM: ICD-10-CM

## 2023-01-04 DIAGNOSIS — R10.9 ABDOMINAL PAIN: ICD-10-CM

## 2023-01-04 NOTE — TELEPHONE ENCOUNTER
----- Message from [employer] sent at 1/4/2023 11:01 AM EST -----  Please call patient to let her know her xray of her stomach was unremarkable and showed mild reflux  Please continue with EGD as scheduled to rule out any other reasons that may contribute to her abdominal pain       Thank you,   Dorita Gregg

## 2023-01-05 ENCOUNTER — HOSPITAL ENCOUNTER (EMERGENCY)
Facility: HOSPITAL | Age: 33
Discharge: HOME | End: 2023-01-05
Attending: EMERGENCY MEDICINE

## 2023-01-05 ENCOUNTER — TELEPHONE (OUTPATIENT)
Dept: HEMATOLOGY ONCOLOGY | Facility: CLINIC | Age: 33
End: 2023-01-05

## 2023-01-05 VITALS
RESPIRATION RATE: 17 BRPM | SYSTOLIC BLOOD PRESSURE: 103 MMHG | DIASTOLIC BLOOD PRESSURE: 62 MMHG | HEART RATE: 62 BPM | TEMPERATURE: 98.1 F | OXYGEN SATURATION: 98 %

## 2023-01-05 DIAGNOSIS — R10.9 ABDOMINAL PAIN: Primary | ICD-10-CM

## 2023-01-05 LAB
ALBUMIN SERPL BCP-MCNC: 3.8 G/DL (ref 3.5–5)
ALP SERPL-CCNC: 55 U/L (ref 46–116)
ALT SERPL W P-5'-P-CCNC: 34 U/L (ref 12–78)
ANION GAP SERPL CALCULATED.3IONS-SCNC: 6 MMOL/L (ref 4–13)
AST SERPL W P-5'-P-CCNC: 18 U/L (ref 5–45)
ATRIAL RATE: 67 BPM
BASOPHILS # BLD AUTO: 0.06 THOUSANDS/ÂΜL (ref 0–0.1)
BASOPHILS NFR BLD AUTO: 1 % (ref 0–1)
BILIRUB SERPL-MCNC: 0.53 MG/DL (ref 0.2–1)
BILIRUB UR QL STRIP: NEGATIVE
BUN SERPL-MCNC: 10 MG/DL (ref 5–25)
CALCIUM SERPL-MCNC: 9.3 MG/DL (ref 8.3–10.1)
CARDIAC TROPONIN I PNL SERPL HS: 2 NG/L
CHLORIDE SERPL-SCNC: 109 MMOL/L (ref 96–108)
CLARITY UR: CLEAR
CO2 SERPL-SCNC: 24 MMOL/L (ref 21–32)
COLOR UR: NORMAL
CREAT SERPL-MCNC: 0.78 MG/DL (ref 0.6–1.3)
EOSINOPHIL # BLD AUTO: 0.1 THOUSAND/ÂΜL (ref 0–0.61)
EOSINOPHIL NFR BLD AUTO: 2 % (ref 0–6)
ERYTHROCYTE [DISTWIDTH] IN BLOOD BY AUTOMATED COUNT: 13 % (ref 11.6–15.1)
EXT PREGNANCY TEST URINE: NEGATIVE
EXT. CONTROL: NORMAL
GFR SERPL CREATININE-BSD FRML MDRD: 99 ML/MIN/1.73SQ M
GLUCOSE SERPL-MCNC: 103 MG/DL (ref 65–140)
GLUCOSE UR STRIP-MCNC: NEGATIVE MG/DL
HCT VFR BLD AUTO: 37.3 % (ref 34.8–46.1)
HGB BLD-MCNC: 12.2 G/DL (ref 11.5–15.4)
HGB UR QL STRIP.AUTO: NEGATIVE
IMM GRANULOCYTES # BLD AUTO: 0.02 THOUSAND/UL (ref 0–0.2)
IMM GRANULOCYTES NFR BLD AUTO: 0 % (ref 0–2)
KETONES UR STRIP-MCNC: NEGATIVE MG/DL
LEUKOCYTE ESTERASE UR QL STRIP: NEGATIVE
LIPASE SERPL-CCNC: 125 U/L (ref 73–393)
LYMPHOCYTES # BLD AUTO: 2.21 THOUSANDS/ÂΜL (ref 0.6–4.47)
LYMPHOCYTES NFR BLD AUTO: 39 % (ref 14–44)
MCH RBC QN AUTO: 28.9 PG (ref 26.8–34.3)
MCHC RBC AUTO-ENTMCNC: 32.7 G/DL (ref 31.4–37.4)
MCV RBC AUTO: 88 FL (ref 82–98)
MONOCYTES # BLD AUTO: 0.52 THOUSAND/ÂΜL (ref 0.17–1.22)
MONOCYTES NFR BLD AUTO: 9 % (ref 4–12)
NEUTROPHILS # BLD AUTO: 2.79 THOUSANDS/ÂΜL (ref 1.85–7.62)
NEUTS SEG NFR BLD AUTO: 49 % (ref 43–75)
NITRITE UR QL STRIP: NEGATIVE
NRBC BLD AUTO-RTO: 0 /100 WBCS
P AXIS: 77 DEGREES
PH UR STRIP.AUTO: 7.5 [PH]
PLATELET # BLD AUTO: 220 THOUSANDS/UL (ref 149–390)
PMV BLD AUTO: 10.7 FL (ref 8.9–12.7)
POTASSIUM SERPL-SCNC: 4 MMOL/L (ref 3.5–5.3)
PR INTERVAL: 128 MS
PROT SERPL-MCNC: 7 G/DL (ref 6.4–8.4)
PROT UR STRIP-MCNC: NEGATIVE MG/DL
QRS AXIS: 68 DEGREES
QRSD INTERVAL: 88 MS
QT INTERVAL: 386 MS
QTC INTERVAL: 407 MS
RBC # BLD AUTO: 4.22 MILLION/UL (ref 3.81–5.12)
SODIUM SERPL-SCNC: 139 MMOL/L (ref 135–147)
SP GR UR STRIP.AUTO: 1.01 (ref 1–1.03)
T WAVE AXIS: 54 DEGREES
UROBILINOGEN UR STRIP-ACNC: <2 MG/DL
VENTRICULAR RATE: 67 BPM
WBC # BLD AUTO: 5.7 THOUSAND/UL (ref 4.31–10.16)

## 2023-01-05 RX ORDER — SODIUM CHLORIDE, SODIUM GLUCONATE, SODIUM ACETATE, POTASSIUM CHLORIDE, MAGNESIUM CHLORIDE, SODIUM PHOSPHATE, DIBASIC, AND POTASSIUM PHOSPHATE .53; .5; .37; .037; .03; .012; .00082 G/100ML; G/100ML; G/100ML; G/100ML; G/100ML; G/100ML; G/100ML
1000 INJECTION, SOLUTION INTRAVENOUS ONCE
Status: COMPLETED | OUTPATIENT
Start: 2023-01-05 | End: 2023-01-05

## 2023-01-05 RX ORDER — KETOROLAC TROMETHAMINE 30 MG/ML
15 INJECTION, SOLUTION INTRAMUSCULAR; INTRAVENOUS ONCE
Status: COMPLETED | OUTPATIENT
Start: 2023-01-05 | End: 2023-01-05

## 2023-01-05 RX ORDER — DICYCLOMINE HCL 20 MG
20 TABLET ORAL ONCE
Status: COMPLETED | OUTPATIENT
Start: 2023-01-05 | End: 2023-01-05

## 2023-01-05 RX ADMIN — KETOROLAC TROMETHAMINE 15 MG: 30 INJECTION, SOLUTION INTRAMUSCULAR; INTRAVENOUS at 14:05

## 2023-01-05 RX ADMIN — DICYCLOMINE HYDROCHLORIDE 20 MG: 20 TABLET ORAL at 14:56

## 2023-01-05 RX ADMIN — SODIUM CHLORIDE, SODIUM GLUCONATE, SODIUM ACETATE, POTASSIUM CHLORIDE, MAGNESIUM CHLORIDE, SODIUM PHOSPHATE, DIBASIC, AND POTASSIUM PHOSPHATE 1000 ML: .53; .5; .37; .037; .03; .012; .00082 INJECTION, SOLUTION INTRAVENOUS at 14:07

## 2023-01-05 NOTE — ED PROVIDER NOTES
History  Chief Complaint   Patient presents with   • Abdominal Pain     Pt reports constant sharp abd pain starting about 1 hour ago from RLQ to RUQ, worse with inspiration; reports intermittent nausea but denies vomiting/diarrhea     Patient is a 58-year-old female presenting to the emergency department for evaluation of chest pain and abdominal pain  Patient's past medical history significant for Fani-en-Y gastric bypass procedure done in 2021  Patient states that she was seen in the hospital on [phone] with similar symptoms was discharged home and has not felt the same since  Reason patient came into the emergency department today is the pain is worse  Patient states her last bowel movement was last night  Her last menstrual period was approximately 1 month ago and states that she believes she is starting her menstrual period today  Patient states has been taking Tylenol with some mild relief of her symptoms  Patient states the symptoms come and go currently located in the right lower quadrant radiating up into her right upper quadrant up into her chest and right side of her neck  She denies it being associated with any nausea or vomiting diarrhea or constipation she denies any headache, tinnitus, vision change, numbness or tingling in either of her extremities  Patient does note that she does feel dizzy  Patient states that she may need iron infusions for this  Patient denies any fevers, syncope, chest pain, shortness of breath, dyspnea, vomiting, diarrhea, bloody or black stools, dysuria, leg swelling, or any other concerning symptoms  Prior to Admission Medications   Prescriptions Last Dose Informant Patient Reported? Taking?    Calcium Carbonate-Vit D-Min (CALCIUM 1200 PO)   Yes No   Sig: Take by mouth     Diclofenac Sodium (VOLTAREN) 1 %   Yes No   Multiple Vitamin (multivitamin) tablet   Yes No   Sig: Take 1 tablet by mouth daily    acetaminophen (TYLENOL) 500 mg tablet   Yes No   Sig: calcium citrate (CALCITRATE) 950 (200 Ca) MG tablet   No No   Sig: Take 1 tablet (950 mg total) by mouth 2 (two) times a day   Patient not taking: Reported on 1/3/2023   dicyclomine (BENTYL) 20 mg tablet   No No   Sig: Take 1 tablet (20 mg total) by mouth 2 (two) times a day   gabapentin (NEURONTIN) 300 mg capsule   No No   Sig: Take 1 capsule (300 mg total) by mouth 3 (three) times a day   Patient not taking: Reported on 12/28/2022   omeprazole (PriLOSEC) 20 mg delayed release capsule   No No   Sig: Take 1 capsule (20 mg total) by mouth daily   Patient not taking: Reported on 12/15/2022   omeprazole (PriLOSEC) 20 mg delayed release capsule   No No   Sig: Take 2 capsules (40 mg total) by mouth daily   polyethylene glycol (MIRALAX) 17 g packet   Yes No   Sig: Take 17 g by mouth daily TAKEN AS NEEDED   simethicone (MYLICON) 80 mg chewable tablet   No No   Sig: Chew 1 tablet (80 mg total) every 6 (six) hours as needed for flatulence      Facility-Administered Medications: None       Past Medical History:   Diagnosis Date   • Back pain    • Bacterial vaginosis    • Bariatric surgery status    • Chlamydia    • Disease of thyroid gland     nodule   • Knee pain    • Morbidly obese (HCC)     gastric sleeve   today 10/4 2021   • MRSA carrier    • Obesity    • Postsurgical malabsorption    • Thyroid disease     benign    -right   gets scan yearly   • Urogenital trichomoniasis    • Wears glasses        Past Surgical History:   Procedure Laterality Date   • NH LAPS GSTR RSTCV PX W/BYP DRE-EN-Y LIMB <150 CM N/A 10/4/2021    Procedure: BYPASS GASTRIC  DRE-EN-Y LAPAROSCOPIC, AND INTRAOPERATIVE EGD;  Surgeon: Brian Heaton MD;  Location: [facility];  Service: Bariatrics   • TONSILLECTOMY     • TOOTH EXTRACTION  10/01/2014   • WRIST SURGERY  05/25/2021       Family History   Problem Relation Age of Onset   • Brain cancer Mother    • Cancer Mother         glioblastoma   • Hypertension Father    • No Known Problems Paternal Grandfather    • No Known Problems Sister    • No Known Problems Daughter    • No Known Problems Maternal Grandmother    • Diabetes Paternal Grandmother    • Stroke Paternal Grandmother    • Heart disease Neg Hx    • Thyroid disease Neg Hx    • Breast cancer Neg Hx    • Colon cancer Neg Hx    • Ovarian cancer Neg Hx      I have reviewed and agree with the history as documented  E-Cigarette/Vaping   • E-Cigarette Use Never User      E-Cigarette/Vaping Substances   • Nicotine No    • THC No    • CBD No    • Flavoring No      Social History     Tobacco Use   • Smoking status: Never   • Smokeless tobacco: Never   • Tobacco comments:     no passive smoke exposure   Vaping Use   • Vaping Use: Never used   Substance Use Topics   • Alcohol use: Yes     Alcohol/week: 3 0 standard drinks     Types: 1 Glasses of wine, 1 Cans of beer, 1 Shots of liquor per week     Comment: 1 x monthly   • Drug use: No        Review of Systems   Constitutional: Positive for activity change, appetite change and fatigue  Negative for chills and fever  HENT: Negative for congestion, ear pain and sore throat  Eyes: Negative for photophobia, pain and visual disturbance  Respiratory: Negative for cough, chest tightness and shortness of breath  Cardiovascular: Negative for chest pain and palpitations  Gastrointestinal: Positive for abdominal pain  Negative for constipation, diarrhea, nausea and vomiting  Genitourinary: Positive for dysuria and vaginal bleeding  Negative for hematuria, vaginal discharge and vaginal pain  Musculoskeletal: Negative for arthralgias, back pain, neck pain and neck stiffness  Skin: Negative for color change and rash  Neurological: Positive for dizziness  Negative for seizures, syncope, weakness, light-headedness, numbness and headaches  Psychiatric/Behavioral: Negative for agitation and behavioral problems  All other systems reviewed and are negative        Physical Exam  ED Triage Vitals Temperature Pulse Respirations Blood Pressure SpO2   01/05/23 1352 01/05/23 1338 01/05/23 1338 01/05/23 1338 01/05/23 1338   98 1 °F (36 7 °C) 64 22 123/82 96 %      Temp Source Heart Rate Source Patient Position - Orthostatic VS BP Location FiO2 (%)   01/05/23 1352 01/05/23 1445 01/05/23 1338 01/05/23 1338 --   Oral Monitor Sitting Right arm       Pain Score       01/05/23 1405       8             Orthostatic Vital Signs  Vitals:    01/05/23 1338 01/05/23 1445 01/05/23 1500 01/05/23 1530   BP: 123/82 98/57 108/67 103/62   Pulse: 64 68 66 62   Patient Position - Orthostatic VS: Sitting Lying Lying Lying       Physical Exam  Vitals and nursing note reviewed  Constitutional:       General: She is not in acute distress  Appearance: She is well-developed  HENT:      Head: Normocephalic and atraumatic  Mouth/Throat:      Mouth: Mucous membranes are moist    Eyes:      Extraocular Movements: Extraocular movements intact  Conjunctiva/sclera: Conjunctivae normal    Cardiovascular:      Rate and Rhythm: Normal rate and regular rhythm  Heart sounds: No murmur heard  Pulmonary:      Effort: Pulmonary effort is normal  No respiratory distress  Breath sounds: Normal breath sounds  Abdominal:      General: Abdomen is flat  Palpations: Abdomen is soft  Tenderness: There is abdominal tenderness in the right upper quadrant  Musculoskeletal:         General: No swelling  Cervical back: Neck supple  Skin:     General: Skin is warm and dry  Capillary Refill: Capillary refill takes less than 2 seconds  Neurological:      General: No focal deficit present  Mental Status: She is alert and oriented to person, place, and time     Psychiatric:         Mood and Affect: Mood normal          Behavior: Behavior normal          ED Medications  Medications   multi-electrolyte (ISOLYTE-S PH 7 4) bolus 1,000 mL (1,000 mL Intravenous New Bag 1/5/23 1407)   ketorolac (TORADOL) injection 15 mg (15 mg Intravenous Given 1/5/23 1405)   dicyclomine (BENTYL) tablet 20 mg (20 mg Oral Given 1/5/23 1456)       Diagnostic Studies  Results Reviewed     Procedure Component Value Units Date/Time    UA w Reflex to Microscopic w Reflex to Culture [[id number]] Collected: 01/05/23 1445    Lab Status: Final result Specimen: Urine, Other Updated: 01/05/23 1601     Color, UA Light Yellow     Clarity, UA Clear     Specific Gravity, UA 1 012     pH, UA 7 5     Leukocytes, UA Negative     Nitrite, UA Negative     Protein, UA Negative mg/dl      Glucose, UA Negative mg/dl      Ketones, UA Negative mg/dl      Urobilinogen, UA <2 0 mg/dl      Bilirubin, UA Negative     Occult Blood, UA Negative    HS Troponin 0hr (reflex protocol) [[id number]]  (Normal) Collected: 01/05/23 1405    Lab Status: Final result Specimen: Blood from Arm, Right Updated: 01/05/23 1452     hs TnI 0hr 2 ng/L     POCT pregnancy, urine [[id number]]  (Normal) Resulted: 01/05/23 1445    Lab Status: Final result Updated: 01/05/23 1445     EXT Preg Test, Ur Negative     Control Valid    Comprehensive metabolic panel [[id number]]  (Abnormal) Collected: 01/05/23 1405    Lab Status: Final result Specimen: Blood from Arm, Right Updated: 01/05/23 1437     Sodium 139 mmol/L      Potassium 4 0 mmol/L      Chloride 109 mmol/L      CO2 24 mmol/L      ANION GAP 6 mmol/L      BUN 10 mg/dL      Creatinine 0 78 mg/dL      Glucose 103 mg/dL      Calcium 9 3 mg/dL      AST 18 U/L      ALT 34 U/L      Alkaline Phosphatase 55 U/L      Total Protein 7 0 g/dL      Albumin 3 8 g/dL      Total Bilirubin 0 53 mg/dL      eGFR 99 ml/min/1 73sq m     Narrative:      Jocelynn guidelines for Chronic Kidney Disease (CKD):   •  Stage 1 with normal or high GFR (GFR > 90 mL/min/1 73 square meters)  •  Stage 2 Mild CKD (GFR = 60-89 mL/min/1 73 square meters)  •  Stage 3A Moderate CKD (GFR = 45-59 mL/min/1 73 square meters)  •  Stage 3B Moderate CKD (GFR = 30-44 mL/min/1 73 square meters)  •  Stage 4 Severe CKD (GFR = 15-29 mL/min/1 73 square meters)  •  Stage 5 End Stage CKD (GFR <15 mL/min/1 73 square meters)  Note: GFR calculation is accurate only with a steady state creatinine    Lipase [[id number]]  (Normal) Collected: 01/05/23 1405    Lab Status: Final result Specimen: Blood from Arm, Right Updated: 01/05/23 1437     Lipase 125 u/L     CBC and differential [[id number]] Collected: 01/05/23 1405    Lab Status: Final result Specimen: Blood from Arm, Right Updated: 01/05/23 1414     WBC 5 70 Thousand/uL      RBC 4 22 Million/uL      Hemoglobin 12 2 g/dL      Hematocrit 37 3 %      MCV 88 fL      MCH 28 9 pg      MCHC 32 7 g/dL      RDW 13 0 %      MPV 10 7 fL      Platelets 190 Thousands/uL      nRBC 0 /100 WBCs      Neutrophils Relative 49 %      Immat GRANS % 0 %      Lymphocytes Relative 39 %      Monocytes Relative 9 %      Eosinophils Relative 2 %      Basophils Relative 1 %      Neutrophils Absolute 2 79 Thousands/µL      Immature Grans Absolute 0 02 Thousand/uL      Lymphocytes Absolute 2 21 Thousands/µL      Monocytes Absolute 0 52 Thousand/µL      Eosinophils Absolute 0 10 Thousand/µL      Basophils Absolute 0 06 Thousands/µL                  No orders to display         Procedures  ECG 12 Lead Documentation Only    Date/Time: 1/5/2023 2:14 PM  Performed by: Mark Magana DO  Authorized by:  Mark Magana DO     ECG reviewed by me, the ED Provider: yes    Patient location:  ED  Previous ECG:     Previous ECG:  Unavailable  Interpretation:     Interpretation: normal    Rate:     ECG rate:  67    ECG rate assessment: normal    Rhythm:     Rhythm: sinus rhythm    Ectopy:     Ectopy: none    QRS:     QRS axis:  Normal  Conduction:     Conduction: normal    ST segments:     ST segments:  Normal  T waves:     T waves: normal            ED Course  ED Course as of 01/05/23 1622   Thu Jan 05, 2023   1413 Blood Pressure: 123/82   1413 Temperature: 98 1 °F (36 7 °C)   1413 Temp Source: Oral   1413 Pulse: 64   1413 Respirations: 22   1413 SpO2: 96 %   1415 WBC: 5 70   1415 Hemoglobin: 12 2   1449 PREGNANCY TEST URINE: Negative   1457 hs TnI 0hr: 2   1457 Patient reevaluated resting comfortably at this time  Pending UA with reflex culture before discharging patient home  1532 Blood Pressure: 108/67   1532 Pulse: 66   1532 SpO2: 100 %   1621 Pt re-examined and evaluated after testing and treatment  Patient informed of all lab and imaging findings  Spoke with the patient and feeling improved and sxs have resolved  Will discharge home with close f/u with pcp and instructed to return to the ED if sxs worsen or continue  Pt agrees with the plan for discharge and feels comfortable to go home with proper f/u  Advised to return for worsening or additional problems  Diagnostic tests were reviewed and questions answered  Diagnosis, care plan and treatment options were discussed  The patient understand instructions and will follow up as directed  Advised to follow up with their pcp in a few days for re-evaluation  Also advised to call and schedule an appointment with GI for further evaluation and workup  Advised to continue symptomatic care with over the counter mediations  Patient is stable for discharge  SBIRT 22yo+    Flowsheet Row Most Recent Value   SBIRT (25 yo +)    In order to provide better care to our patients, we are screening all of our patients for alcohol and drug use  Would it be okay to ask you these screening questions? No Filed at: 01/05/2023 3220                Medical Decision Making  Patient is a 78-year-old female presenting to the emergency department for evaluation of abdominal pain, chest pain  She complains of some dizziness, right upper quadrant abdominal pain  She denies any other surgeries except the bypass surgery  On exam the patient is nontender, vitals are normotensive, heart rate is 64    Will evaluate patient for possible causes of abdominal pain which include are limited to ACS, cholecystitis, UTI, pregnancy  Will evaluate patient with right upper quadrant ultrasound we will check EKG, CBC, troponin, CMP to evaluate liver enzymes  Asked the ultrasound team to do right upper quadrant ultrasound  Will check UA with reflex to culture as well as point-of-care pregnancy  We will treat patient symptoms with Toradol, Isolyte as well as Bentyl for possible abdominal cramping  Patient is aware she has no questions or concerns at this time we will frequently reevaluate  Amount and/or Complexity of Data Reviewed  Labs: ordered  Risk  Prescription drug management  Disposition  Final diagnoses:   Abdominal pain     Time reflects when diagnosis was documented in both MDM as applicable and the Disposition within this note     Time User Action Codes Description Comment    1/5/2023  4:18 PM Kristi Larsen Add [R10 9] Abdominal pain       ED Disposition     ED Disposition   Discharge    Condition   Stable    Date/Time   Thu Jan 5, 2023  4:18 PM    [address]bil discharge to home/self care  Follow-up Information     Follow up With Specialties Details Why Contact Info Additional Information    Sara Cameron PA-C Family Medicine, Physician Assistant Schedule an appointment as soon as possible for a visit  for follow up 6200  [facility] [phone]       [address] [place] [address] Emergency Department Emergency Medicine Go to  As needed, If symptoms worsen [address] [phone]  [address] Emergency Department, [address], [address]          Patient's Medications   Discharge Prescriptions    No medications on file     No discharge procedures on file      PDMP Review       Value Time User    PDMP Reviewed  Yes 10/5/2021 12:47 PM Tatyana Chawla PA-C ED Provider  Attending physically available and evaluated Michelle Wheeler I managed the patient along with the ED Attending      Electronically Signed by         Benji Roth DO  01/05/23 3438

## 2023-01-05 NOTE — Clinical Note
Tracy Casiano was seen and treated in our emergency department on 1/5/2023  Diagnosis:     Viet Salmon    She may return on this date: 01/06/2023         If you have any questions or concerns, please don't hesitate to call        Tamia Schulz DO    ______________________________           _______________          _______________  Hospital Representative                              Date                                Time

## 2023-01-05 NOTE — ED PROCEDURE NOTE
PROCEDURE  POC Biliary US    Date/Time: 1/5/2023 2:47 PM  Performed by: Yovany Crowley MD  Authorized by: Yovany Crowley MD     Patient location:  ED  Performed by:  Resident  Procedure performed by consultant: Dr Kelsie Duncan, Dr Elida Kenney, Dr Aranza Carty  Procedure details:     Exam Type:  Diagnostic    Indications: upper right quadrant abdominal pain      Assessment for:  Cholecystitis and cholelithiasis    Views obtained: gallbladder (transverse and longitudinal) and common bile duct      Image quality: diagnostic      Image availability:  Images available in PACS  Findings:     Cholelithiasis: identified      Common bile duct:  Normal    Gallbladder wall:  Normal    Pericholecystic fluid: not identified      Sonographic Love's sign: negative      Polyps: not identified      Mass: not identified    Interpretation:     Biliary ultrasound impressions: cholelithiasis    Comments:      Wall echo sign           Yovany Crowley MD  01/05/23 1902

## 2023-01-05 NOTE — TELEPHONE ENCOUNTER
Patient calling to see if there was a sooner appointment   I looked and appointments go far out too about 1/31/23 or further

## 2023-01-05 NOTE — ED ATTENDING ATTESTATION
1/5/2023  IChelsi DO, saw and evaluated the patient  I have discussed the patient with the resident/non-physician practitioner and agree with the resident's/non-physician practitioner's findings, Plan of Care, and MDM as documented in the resident's/non-physician practitioner's note, except where noted  All available labs and Radiology studies were reviewed  I was present for key portions of any procedure(s) performed by the resident/non-physician practitioner and I was immediately available to provide assistance  At this point I agree with the current assessment done in the Emergency Department  I have conducted an independent evaluation of this patient a history and physical is as follows:    Patient is a 15-year-old female status post Fani-en-Y gastric bypass October 2021, presents for abdominal pain  Says last 2-1/2 or 3 months she has been having some waxing waning abdominal pain, sometimes on the right, sometimes on the left  Feels like some of the bloating and crampy nature  Its not there all the time  She tried changing her diet, reducing milk and that does not seem to help  Seems to be unrelated to food or any particular activity  She has no diarrhea, no constipation, still having good flatus  No travel history, no sick contacts, no recent hospitalizations or antibiotics  She saw thoracic surgery on December 15 who recommended upper GI series and further work-up including endoscopy  Patient was seen December 21, 2022 in the emergency department for similar symptoms  CT head showed no acute pathology, CT abdomen and pelvis showed no acute pathology with exception of uterine fibroid  Labs including troponin were unremarkable  She was seen 2 days ago by OB/GYN for follow-up regarding the uterine fibroid, outpatient pelvic ultrasound was ordered which has not been performed yet    Yesterday the patient had an outpatient upper GI performed which showed mild reflux but no other acute pathology  Per review from records, bariatrics recommended that she follow-up with her EGD as scheduled  She says that about noon today her discomfort returned, its more on the right-hand side, constant, worse nothing and better with nothing  It was identical to her prior episodic discomfort  Denies [place] dysuria or hematuria, denies vaginal bleeding or discharge  She says she is due for her menstrual cycle in about a week or so  She says she is sexually active with a male partner, no dyspareunia and to her knowledge her partner has no symptoms  General:  Patient is well-appearing  Head:  Atraumatic  Eyes:  Conjunctiva pink  ENT:  Mucous membranes are moist  Neck:  Supple  Cardiac:  S1-S2, without murmurs  Lungs:  Clear to auscultation bilaterally  Abdomen: Slight right upper and right lower and right mid abdominal discomfort, no tympany, rigidity, no guarding, no less than abdominal discomfort, no CVA tenderness  Extremities:  Normal range of motion  Neurologic:  Awake, fluent speech, normal comprehension, AAOx3  Skin:  Pink warm and dry  Psychiatric:  Alert, pleasant, cooperative            ED Course     Labs Reviewed   COMPREHENSIVE METABOLIC PANEL - Abnormal       Result Value Ref Range Status    Sodium 139  135 - 147 mmol/L Final    Potassium 4 0  3 5 - 5 3 mmol/L Final    Chloride 109 (*) 96 - 108 mmol/L Final    CO2 24  21 - 32 mmol/L Final    ANION GAP 6  4 - 13 mmol/L Final    BUN 10  5 - 25 mg/dL Final    Creatinine 0 78  0 60 - 1 30 mg/dL Final    Comment: Standardized to IDMS reference method    Glucose 103  65 - 140 mg/dL Final    Comment: If the patient is fasting, the ADA then defines impaired fasting glucose as > 100 mg/dL and diabetes as > or equal to 123 mg/dL  Specimen collection should occur prior to Sulfasalazine administration due to the potential for falsely depressed results   Specimen collection should occur prior to Sulfapyridine administration due to the potential for falsely elevated results  Calcium 9 3  8 3 - 10 1 mg/dL Final    AST 18  5 - 45 U/L Final    Comment: Specimen collection should occur prior to Sulfasalazine administration due to the potential for falsely depressed results  ALT 34  12 - 78 U/L Final    Comment: Specimen collection should occur prior to Sulfasalazine and/or Sulfapyridine administration due to the potential for falsely depressed results  Alkaline Phosphatase 55  46 - 116 U/L Final    Total Protein 7 0  6 4 - 8 4 g/dL Final    Albumin 3 8  3 5 - 5 0 g/dL Final    Total Bilirubin 0 53  0 20 - 1 00 mg/dL Final    Comment: Use of this assay is not recommended for patients undergoing treatment with eltrombopag due to the potential for falsely elevated results  eGFR 99  ml/min/1 73sq m Final    Narrative:     [place] guidelines for Chronic Kidney Disease (CKD):   •  Stage 1 with normal or high GFR (GFR > 90 mL/min/1 73 square meters)  •  Stage 2 Mild CKD (GFR = 60-89 mL/min/1 73 square meters)  •  Stage 3A Moderate CKD (GFR = 45-59 mL/min/1 73 square meters)  •  Stage 3B Moderate CKD (GFR = 30-44 mL/min/1 73 square meters)  •  Stage 4 Severe CKD (GFR = 15-29 mL/min/1 73 square meters)  •  Stage 5 End Stage CKD (GFR <15 mL/min/1 73 square meters)  Note: GFR calculation is accurate only with a steady state creatinine   LIPASE - Normal    Lipase 125  73 - 393 u/L Final   HS TROPONIN I 0HR - Normal    hs TnI 0hr 2  "Refer to ACS Flowchart"- see link ng/L Final    Comment:                                              Initial (time 0) result  If >=50 ng/L, Myocardial injury suggested ;  Type of myocardial injury and treatment strategy  to be determined  If 5-49 ng/L, a delta result at 2 hours and or 4 hours will be needed to further evaluate  If <4 ng/L, and chest pain has been >3 hours since onset, patient may qualify for discharge based on the HEART score in the ED    If <5 ng/L and <3hours since onset of chest pain, a delta result at 2 hours will be needed to further evaluate  HS Troponin 99th Percentile URL of a Health Population=12 ng/L with a 95% Confidence Interval of 8-18 ng/L  Second Troponin (time 2 hours)  If calculated delta >= 20 ng/L,  Myocardial injury suggested ; Type of myocardial injury and treatment strategy to be determined  If 5-49 ng/L and the calculated delta is 5-19 ng/L, consult medical service for evaluation  Continue evaluation for ischemia on ecg and other possible etiology and repeat hs troponin at 4 hours  If delta is <5 ng/L at 2 hours, consider discharge based on risk stratification via the HEART score (if in ED), or LÓPEZ risk score in IP/Observation  HS Troponin 99th Percentile URL of a Health Population=12 ng/L with a 95% Confidence Interval of 8-18 ng/L     POCT PREGNANCY, URINE - Normal    EXT Preg Test, Ur Negative   Final    Control Valid   Final   CBC AND DIFFERENTIAL    WBC 5 70  4 31 - 10 16 Thousand/uL Final    RBC 4 22  3 81 - 5 12 Million/uL Final    Hemoglobin 12 2  11 5 - 15 4 g/dL Final    Hematocrit 37 3  34 8 - 46 1 % Final    MCV 88  82 - 98 fL Final    MCH 28 9  26 8 - 34 3 pg Final    MCHC 32 7  31 4 - 37 4 g/dL Final    RDW 13 0  11 6 - 15 1 % Final    MPV 10 7  8 9 - 12 7 fL Final    Platelets 381  983 - 390 Thousands/uL Final    nRBC 0  /100 WBCs Final    Neutrophils Relative 49  43 - 75 % Final    Immat GRANS % 0  0 - 2 % Final    Lymphocytes Relative 39  14 - 44 % Final    Monocytes Relative 9  4 - 12 % Final    Eosinophils Relative 2  0 - 6 % Final    Basophils Relative 1  0 - 1 % Final    Neutrophils Absolute 2 79  1 85 - 7 62 Thousands/µL Final    Immature Grans Absolute 0 02  0 00 - 0 20 Thousand/uL Final    Lymphocytes Absolute 2 21  0 60 - 4 47 Thousands/µL Final    Monocytes Absolute 0 52  0 17 - 1 22 Thousand/µL Final    Eosinophils Absolute 0 10  0 00 - 0 61 Thousand/µL Final    Basophils Absolute 0 06  0 00 - 0 10 Thousands/µL Final   UA W REFLEX TO MICROSCOPIC WITH REFLEX TO CULTURE    Color, UA Light Yellow   Final    Clarity, UA Clear   Final    Specific [place], UA 1 012  1 003 - 1 030 Final    pH, UA 7 5  4 5, 5 0, 5 5, 6 0, 6 5, 7 0, 7 5, 8 0 Final    Leukocytes, UA Negative  Negative Final    Nitrite, UA Negative  Negative Final    Protein, UA Negative  Negative mg/dl Final    Glucose, UA Negative  Negative mg/dl Final    Ketones, UA Negative  Negative mg/dl Final    Urobilinogen, UA <2 0  <2 0 mg/dl mg/dl Final    Bilirubin, UA Negative  Negative Final    Occult Blood, UA Negative  Negative Final   IRON SATURATION       On reassessment there is no change in the above findings, patient feeling comfortable  At this point the cause of the patient's symptoms is unclear but unlikely to represent an acute emergency  Regnancy test is negative, labs unremarkable, given her recent unremarkable CT and her young age, and concerns regarding radiation exposure as well as migratory waxing waning symptoms, do not believe a repeat CT is indicated today  Patient stable for discharge with outpatient follow-up as previous scheduled  She is already scheduled for endoscopy and bariatric follow-up per her report  Supportive care, importance of follow-up and return precautions were discussed with the patient, who expressed understanding        MEDICAL DECISION MAKING CODING    AMOUNT AND/OR COMPLEXITY OF DATA REVIEWED:  Review of non-ED record: Reviewed previous notes as noted above    Tests reviewed personally by me:  ECG: See ED course if applicable  Labs: As noted above      Tests considered but not ordered: As above    RISK      Critical Care Time  Procedures

## 2023-01-05 NOTE — ED PROCEDURE NOTE
PROCEDURE  POC Renal US    Date/Time: 1/5/2023 2:49 PM  Performed by: Jasmin Belcher MD  Authorized by: Jasmin Belcher MD     Patient location:  ED  Performed by:  Resident (Dr Alexi Meyers)  Procedure performed by consultant: Dr Syl Falk, Dr Krissy Parmar      Procedure details:     Exam Type:  Diagnostic    Indications: flank/back pain      Assessment for:  Suspected hydronephrosis    Views obtained: left kidney and right kidney      Image quality: diagnostic      Image availability:  Images available in PACS  Findings:     LEFT kidney findings: unremarkable      LEFT hydronephrosis: none      RIGHT kidney findings: unremarkable      RIGHT hydronephrosis: none    Interpretation:     Renal ultrasound impressions: normal exam           Jasmin Belcher MD  01/05/23 1450

## 2023-01-11 ENCOUNTER — HOSPITAL ENCOUNTER (OUTPATIENT)
Dept: RADIOLOGY | Facility: IMAGING CENTER | Age: 33
Discharge: HOME | End: 2023-01-11

## 2023-01-11 DIAGNOSIS — R10.84 GENERALIZED ABDOMINAL PAIN: ICD-10-CM

## 2023-01-12 DIAGNOSIS — Z98.84 BARIATRIC SURGERY STATUS: ICD-10-CM

## 2023-01-12 DIAGNOSIS — Z48.815 ENCOUNTER FOR SURGICAL AFTERCARE FOLLOWING SURGERY OF DIGESTIVE SYSTEM: ICD-10-CM

## 2023-01-12 RX ORDER — OMEPRAZOLE 20 MG/1
20 CAPSULE, DELAYED RELEASE ORAL DAILY
Qty: 30 CAPSULE | Refills: 3 | Status: SHIPPED | OUTPATIENT
Start: 2023-01-12

## 2023-01-16 DIAGNOSIS — R10.9 ABDOMINAL PAIN: ICD-10-CM

## 2023-01-16 RX ORDER — DICYCLOMINE HCL 20 MG
20 TABLET ORAL 2 TIMES DAILY
Qty: 20 TABLET | Refills: 0 | Status: SHIPPED | OUTPATIENT
Start: 2023-01-16

## 2023-01-17 ENCOUNTER — TELEPHONE (OUTPATIENT)
Dept: BARIATRICS | Facility: CLINIC | Age: 33
End: 2023-01-17

## 2023-01-17 ENCOUNTER — TELEPHONE (OUTPATIENT)
Dept: OBGYN CLINIC | Facility: CLINIC | Age: 33
End: 2023-01-17

## 2023-01-17 NOTE — TELEPHONE ENCOUNTER
----- Message from Ernie Hammans, MD sent at 1/17/2023  3:16 PM EST -----  Please have patient schedule a visit to discuss ultrasound results

## 2023-01-19 ENCOUNTER — OFFICE VISIT (OUTPATIENT)
Dept: OBGYN CLINIC | Facility: CLINIC | Age: 33
End: 2023-01-19

## 2023-01-19 VITALS
HEART RATE: 72 BPM | DIASTOLIC BLOOD PRESSURE: 63 MMHG | BODY MASS INDEX: 30.37 KG/M2 | SYSTOLIC BLOOD PRESSURE: 94 MMHG | WEIGHT: 191 LBS

## 2023-01-19 DIAGNOSIS — N90.89 VULVAR IRRITATION: Primary | ICD-10-CM

## 2023-01-19 NOTE — PROGRESS NOTES
Subjective:     Eva Ruiz is a 27 y o  [id number] female who presents for results follow-up  She had a pelvic ultrasound done in the setting of abdominal pain for the last few months  She also reports for the last month intermittent vulvar discomfort and a "bump" that appears around the time of her period  She does shave but states that does not irritate her  Objective:    Vitals: Blood pressure 94/63, pulse 72, weight 86 6 kg (191 lb), last menstrual period 01/06/2023  Body mass index is 30 37 kg/m²  Physical Exam  Genitourinary:     Labia:         Right: No rash, tenderness or lesion  Left: No rash, tenderness or lesion  Comments: Small area of erythema above the clitoris  Assessment/Plan:  Advised pt she can use moisturizing ointment in the area like Aquafor during times of irritation  If she notices a lesion again she can come into the office for reevaluation and possible biopsy         Harmony Teague MD  1/19/2023  4:54 PM

## 2023-01-24 ENCOUNTER — TELEPHONE (OUTPATIENT)
Dept: BARIATRICS | Facility: CLINIC | Age: 33
End: 2023-01-24

## 2023-01-24 RX ORDER — SODIUM CHLORIDE 9 MG/ML
125 INJECTION, SOLUTION INTRAVENOUS CONTINUOUS
Status: CANCELLED | OUTPATIENT
Start: 2023-01-24

## 2023-01-25 ENCOUNTER — HOSPITAL ENCOUNTER (OUTPATIENT)
Dept: GASTROENTEROLOGY | Facility: HOSPITAL | Age: 33
Setting detail: OUTPATIENT SURGERY
Discharge: HOME | End: 2023-01-25
Attending: SURGERY | Admitting: SURGERY

## 2023-01-25 ENCOUNTER — ANESTHESIA EVENT (OUTPATIENT)
Dept: GASTROENTEROLOGY | Facility: HOSPITAL | Age: 33
End: 2023-01-25

## 2023-01-25 ENCOUNTER — ANESTHESIA (OUTPATIENT)
Dept: GASTROENTEROLOGY | Facility: HOSPITAL | Age: 33
End: 2023-01-25

## 2023-01-25 VITALS
TEMPERATURE: 98.2 F | DIASTOLIC BLOOD PRESSURE: 66 MMHG | SYSTOLIC BLOOD PRESSURE: 107 MMHG | RESPIRATION RATE: 19 BRPM | HEIGHT: 67 IN | HEART RATE: 53 BPM | OXYGEN SATURATION: 100 % | BODY MASS INDEX: 29.82 KG/M2 | WEIGHT: 190 LBS

## 2023-01-25 DIAGNOSIS — Z98.84 BARIATRIC SURGERY STATUS: ICD-10-CM

## 2023-01-25 LAB
EXT PREGNANCY TEST URINE: NEGATIVE
EXT. CONTROL: NORMAL

## 2023-01-25 RX ORDER — PROPOFOL 10 MG/ML
INJECTION, EMULSION INTRAVENOUS AS NEEDED
Status: DISCONTINUED | OUTPATIENT
Start: 2023-01-25 | End: 2023-01-25

## 2023-01-25 RX ORDER — SODIUM CHLORIDE 9 MG/ML
125 INJECTION, SOLUTION INTRAVENOUS CONTINUOUS
Status: DISCONTINUED | OUTPATIENT
Start: 2023-01-25 | End: 2023-01-29 | Stop reason: HOSPADM

## 2023-01-25 RX ADMIN — SODIUM CHLORIDE 125 ML/HR: 0.9 INJECTION, SOLUTION INTRAVENOUS at 08:41

## 2023-01-25 RX ADMIN — PROPOFOL 200 MG: 10 INJECTION, EMULSION INTRAVENOUS at 09:17

## 2023-01-25 RX ADMIN — LIDOCAINE HYDROCHLORIDE 100 MG: 20 INJECTION INTRAVENOUS at 09:15

## 2023-01-25 NOTE — ANESTHESIA POSTPROCEDURE EVALUATION
Post-Op Assessment Note    CV Status:  Stable    Pain management: adequate     Mental Status:  Alert and awake   Hydration Status:  Euvolemic   PONV Controlled:  Controlled   Airway Patency:  Patent      Post Op Vitals Reviewed: Yes      Staff: Anesthesiologist         No notable events documented      /66 (01/25/23 0941)    Temp      Pulse (!) 53 (01/25/23 0941)   Resp 19 (01/25/23 0941)    SpO2 100 % (01/25/23 0941)    /66   Pulse (!) 53   Temp 98 2 °F (36 8 °C)   Resp 19   Ht 5' 6 5" (1 689 m)   Wt 86 2 kg (190 lb)   LMP 01/06/2023 (Approximate)   SpO2 100%   BMI 30 21 kg/m²

## 2023-01-25 NOTE — H&P
This is a 27 y o  female status post a s/p RNYGB with me in 10/04/2021 here for abdominal pain  ED evaluation with negative CT scan  Here for an EGD to evaluate the anatomy of the GI tract  Physical Exam    /64   Pulse 66   Temp 98 2 °F (36 8 °C)   Resp 18   Ht 5' 6 5" (1 689 m)   Wt 86 2 kg (190 lb)   LMP 01/06/2023 (Approximate)   SpO2 100%   BMI 30 21 kg/m²    AAOx3  RRR  CTA B  Abdomen obese  Benign  A/P:    This is a 27 y o  female status post a gastric bypass in 2021 and recent presentation to the office for abdominal pain  Will proceed with the EGD and biopsies        Luke Brar MD  01/25/23  9:07 AM

## 2023-01-25 NOTE — ANESTHESIA PREPROCEDURE EVALUATION
Procedure:  EGD    Relevant Problems   ANESTHESIA (within normal limits)      CARDIO (within normal limits)      ENDO          GI/HEPATIC (within normal limits)      /RENAL (within normal limits)      GYN (within normal limits)      HEMATOLOGY (within normal limits)      MUSCULOSKELETAL   (+) Chondromalacia      NEURO/PSYCH   (+) Headache      PULMONARY (within normal limits)   (-) URI (upper respiratory infection)        Physical Exam    Airway    Mallampati score: II  TM Distance: >3 FB  Neck ROM: full     Dental   No notable dental hx     Cardiovascular  Rhythm: regular, Rate: normal, Cardiovascular exam normal    Pulmonary  Pulmonary exam normal Breath sounds clear to auscultation,     Other Findings        Anesthesia Plan  ASA Score- 2     Anesthesia Type- general with ASA Monitors  Additional Monitors:   Airway Plan: ETT  Plan Factors-Exercise tolerance (METS): >4 METS  Chart reviewed  Existing labs reviewed  Patient summary reviewed  Patient is not a current smoker  Patient did not smoke on day of surgery  Induction- intravenous  Postoperative Plan- Plan for postoperative opioid use  Planned trial extubation    Informed Consent- Anesthetic plan and risks discussed with patient

## 2023-02-03 ENCOUNTER — CONSULT (OUTPATIENT)
Dept: HEMATOLOGY ONCOLOGY | Facility: CLINIC | Age: 33
End: 2023-02-03

## 2023-02-03 VITALS
BODY MASS INDEX: 30.45 KG/M2 | HEART RATE: 78 BPM | SYSTOLIC BLOOD PRESSURE: 128 MMHG | OXYGEN SATURATION: 98 % | HEIGHT: 67 IN | WEIGHT: 194 LBS | DIASTOLIC BLOOD PRESSURE: 70 MMHG

## 2023-02-03 DIAGNOSIS — D50.8 IRON DEFICIENCY ANEMIA SECONDARY TO INADEQUATE DIETARY IRON INTAKE: ICD-10-CM

## 2023-02-03 NOTE — LETTER
February 3, 2023     Patient: Mariela Bravo  YOB: 1990  Date of Visit: 2/3/2023      To Whom it May Concern:    Ricardo Watson is under my professional care  Briana Ruiz was seen in my office on 2/3/2023  Briana Ruiz may return to work on 2/6/23  If you have any questions or concerns, please don't hesitate to call           Sincerely,          Gómez Arredondo PA-C        CC: No Recipients

## 2023-02-03 NOTE — LETTER
February 3, 2023     Rosina Alarcon PA-C  [address]  [place] [address]    Patient: Quinten Avendano   YOB: 1990   Date of Visit: 2/3/2023       Dear Dr Jenn Davies: Thank you for referring Love Huddleston to me for evaluation  Below are my notes for this consultation  If you have questions, please do not hesitate to call me  I look forward to following your patient along with you  Sincerely,        July Vasquez PA-C        CC: Maura Rose, [address]  [place], [place]  2/3/2023  3:58 PM  Sign when Signing Visit  Hematology/Oncology Outpatient Follow-up  Quinten Avendano 27 y o  female 1990 [id number]    Date:  2/3/2023      Assessment and Plan:  1  Iron deficiency without anemia   77-year-old female presents for consultation regarding iron deficiency without anemia  She did have bariatric surgery in 2021  This likely is related to this  She had a recent EGD which was unrevealing  She was instructed to take oral iron starting in December  She has been tolerating this well  Recommend due to tolerating oral iron and not anemic and not with numerous symptoms related to her anemia, she should recheck labs after taking oral iron for 3 months  If her ferritin still remains low, i e  below 45, she can be referred back for iron infusions  Did review that her hair loss can be related to her low iron  This will take months to improve though regardless of oral or IV supplementation  She is in agreement with the plan  I also gave her patient education written down on iron rich foods to incorporate into her diet  She will follow-up with bariatrics to review her labs  Bariatrics will send her back if IV iron is needed  - Ambulatory referral to Hematology / Oncology      HPI:  27year old female presents for consult for low ferritin  She had rou en y in 2021  Post op she was compliant with bariatric vitamins       Oct 2022 abdominal pain started, she d/c vitamins  Appetite was decreased  Pain in wax/waning, either right or left     She is taking PPI for the pain with some benefit  She had an EGD on 1/25/23  This showed mild pouchitis, otherwise negative  Biopsies were negative  Eating better now; eating lunch/dinner     Drinking 5-6 bottles water a day     Her last menstrual period from 1/6 - 1/11  Never heavy bleeding  She has been taking oral iron since Dec  Since this time stools are greener in color  She has noticed hair loss since 2021  ROS: Review of Systems   Constitutional: Positive for fatigue  Respiratory: Negative for cough and shortness of breath  Cardiovascular: Positive for chest pain (x 2 months with deep breaths )  Gastrointestinal: Positive for constipation (miralax as needed)  Negative for blood in stool, diarrhea, nausea and vomiting  Genitourinary: Negative for difficulty urinating, dysuria and hematuria  Musculoskeletal: Positive for arthralgias (bilateral knees, chronic), back pain (lower back, chronic, comes and goes ) and myalgias (in anterior neck, chronic )  Neurological: Positive for headaches (chronic, improved with taking vitamins )  Negative for dizziness and light-headedness         Past Medical History:   Diagnosis Date   • Back pain    • Bacterial vaginosis    • Bariatric surgery status    • Chlamydia    • Disease of thyroid gland     nodule   • Knee pain    • Morbidly obese ([address] )     gastric sleeve   today 10/4 2021   • MRSA carrier    • Obesity    • Postsurgical malabsorption    • Thyroid disease     benign    -right   gets scan yearly   • Urogenital trichomoniasis    • Wears glasses        Past Surgical History:   Procedure Laterality Date   • NC LAPS GSTR RSTCV PX W/BYP DRE-EN-Y LIMB <150 CM N/A 10/4/2021    Procedure: BYPASS GASTRIC  DRE-EN-Y LAPAROSCOPIC, AND INTRAOPERATIVE EGD;  Surgeon: Tyler Brown MD;  Location: [facility];  Service: Bariatrics   • TONSILLECTOMY     • TOOTH EXTRACTION  10/01/2014   • WRIST SURGERY  05/25/2021       Social History     Socioeconomic History   • Marital status: Single     Spouse name: None   • Number of children: None   • Years of education: None   • Highest education level: None   Occupational History   • None   Tobacco Use   • Smoking status: Never   • Smokeless tobacco: Never   • Tobacco comments:     no passive smoke exposure   Vaping Use   • Vaping Use: Never used   Substance and Sexual Activity   • Alcohol use: Yes     Alcohol/week: 3 0 standard drinks     Types: 1 Glasses of wine, 1 Cans of beer, 1 Shots of liquor per week     Comment: 1 x monthly   • Drug use: No   • Sexual activity: Yes     Partners: Male     Birth control/protection: Condom Male, None   Other Topics Concern   • None   Social History Narrative   • None     Social Determinants of Health     Financial Resource Strain: Low Risk    • Difficulty of Paying Living Expenses: Not hard at all   Food Insecurity: No Food Insecurity   • Worried About Running Out of Food in the Last Year: Never true   • Ran Out of Food in the Last Year: Never true   Transportation Needs: No Transportation Needs   • Lack of Transportation (Medical): No   • Lack of Transportation (Non-Medical):  No   Physical Activity: Not on file   Stress: Not on file   Social Connections: Not on file   Intimate Partner Violence: Not on file   Housing Stability: Unknown   • Unable to Pay for Housing in the Last Year: No   • Number of Places Lived in the Last Year: 1   • Unstable Housing in the Last Year: Not on file       Family History   Problem Relation Age of Onset   • Brain cancer Mother    • Cancer Mother         glioblastoma   • Hypertension Father    • No Known Problems Paternal Grandfather    • No Known Problems Sister    • No Known Problems Daughter    • No Known Problems Maternal Grandmother    • Diabetes Paternal Grandmother    • Stroke Paternal Grandmother    • Heart disease Neg Hx    • Thyroid disease Neg Hx    • Breast cancer Neg Hx    • Colon cancer Neg Hx    • Ovarian cancer Neg Hx        Allergies   Allergen Reactions   • Zyrtec [Cetirizine] Hives         Current Outpatient Medications:   •  acetaminophen (TYLENOL) 500 mg tablet,  , Disp: , Rfl:   •  Calcium Carbonate-Vit D-Min (CALCIUM 1200 PO), Take by mouth  , Disp: , Rfl:   •  calcium citrate (CALCITRATE) 950 (200 Ca) MG tablet, Take 1 tablet (950 mg total) by mouth 2 (two) times a day, Disp: 180 tablet, Rfl: 2  •  Diclofenac Sodium (VOLTAREN) 1 %, , Disp: , Rfl:   •  dicyclomine (BENTYL) 20 mg tablet, Take 1 tablet (20 mg total) by mouth 2 (two) times a day, Disp: 20 tablet, Rfl: 0  •  Multiple Vitamin (multivitamin) tablet, Take 1 tablet by mouth daily , Disp: , Rfl:   •  omeprazole (PriLOSEC) 20 mg delayed release capsule, Take 2 capsules (40 mg total) by mouth daily, Disp: 10 capsule, Rfl: 0  •  omeprazole (PriLOSEC) 20 mg delayed release capsule, Take 1 capsule (20 mg total) by mouth daily, Disp: 30 capsule, Rfl: 3  •  polyethylene glycol (MIRALAX) 17 g packet, Take 17 g by mouth daily TAKEN AS NEEDED, Disp: , Rfl:   •  simethicone (MYLICON) 80 mg chewable tablet, Chew 1 tablet (80 mg total) every 6 (six) hours as needed for flatulence, Disp: 30 tablet, Rfl: 0  •  gabapentin (NEURONTIN) 300 mg capsule, Take 1 capsule (300 mg total) by mouth 3 (three) times a day (Patient not taking: Reported on 12/28/2022), Disp: 90 capsule, Rfl: 2      Physical Exam:  /70 (BP Location: Left arm, Patient Position: Sitting, Cuff Size: Adult)   Pulse 78   Ht 5' 6 5" (1 689 m)   Wt 88 kg (194 lb)   LMP 01/06/2023 (Approximate)   SpO2 98%   BMI 30 84 kg/m²     Physical Exam  Vitals reviewed  Constitutional:       General: She is not in acute distress  Appearance: She is well-developed  She is not ill-appearing  HENT:      Head: Normocephalic and atraumatic  Eyes:      General: No scleral icterus       Conjunctiva/sclera: Conjunctivae normal    Cardiovascular: Rate and Rhythm: Normal rate and regular rhythm  Heart sounds: Normal heart sounds  No murmur heard  Pulmonary:      Effort: Pulmonary effort is normal  No respiratory distress  Breath sounds: Normal breath sounds  Abdominal:      Palpations: Abdomen is soft  Tenderness: There is no abdominal tenderness  Musculoskeletal:         General: No tenderness  Normal range of motion  Cervical back: Normal range of motion and neck supple  Right lower leg: No edema  Left lower leg: No edema  Lymphadenopathy:      Cervical: No cervical adenopathy  Upper Body:      Right upper body: No supraclavicular or axillary adenopathy  Left upper body: No supraclavicular or axillary adenopathy  Skin:     General: Skin is warm and dry  Neurological:      Mental Status: She is alert and oriented to person, place, and time  Cranial Nerves: No cranial nerve deficit  Psychiatric:         Mood and Affect: Mood normal          Behavior: Behavior normal        Labs:  Lab Results   Component Value Date    WBC 5 70 01/05/2023    HGB 12 2 01/05/2023    HCT 37 3 01/05/2023    MCV 88 01/05/2023     01/05/2023     I have spent 40 minutes with Patient  today in which greater than 50% of this time was spent in counseling/coordination of care regarding Diagnostic results, Risks and benefits of tx options, Intructions for management, Importance of tx compliance and Impressions  Patient voiced understanding and agreement in the above discussion  Aware to contact our office with questions/symptoms in the interim  This note has been generated by voice recognition software system  Therefore, there may be spelling, grammar, and or syntax errors  Please contact if questions arise

## 2023-02-03 NOTE — LETTER
February 3, 2023     [address]  [address] [facility] [place] [address]    Patient: Cem Garcia   YOB: 1990   Date of Visit: 2/3/2023       Dear Dr Gilma Root: Thank you for referring Arnaud Mercado to me for evaluation  Below are my notes for this consultation  If you have questions, please do not hesitate to call me  I look forward to following your patient along with you  Sincerely,        Reese Lopez PA-C        CC: Mara Miller, [address]  [place], [place]  2/3/2023  3:57 PM  Incomplete  Hematology/Oncology Outpatient Follow-up  Cem Garcia 27 y o  female 1990 [id number]    Date:  2/3/2023      Assessment and Plan:  1  Iron deficiency without anemia   70-year-old female presents for consultation regarding iron deficiency without anemia  She did have bariatric surgery in 2021  This likely is related to this  She had a recent EGD which was unrevealing  She was instructed to take oral iron starting in December  She has been tolerating this well  Recommend due to tolerating oral iron and not anemic and not with numerous symptoms related to her anemia, she should recheck labs after taking oral iron for 3 months  If her ferritin still remains low, i e  below 45, she can be referred back for iron infusions  Did review that her hair loss can be related to her low iron  This will take months to improve though regardless of oral or IV supplementation  She is in agreement with the plan  I also gave her patient education written down on iron rich foods to incorporate into her diet  She will follow-up with bariatrics to review her labs  Bariatrics will send her back if IV iron is needed  - Ambulatory referral to Hematology / Oncology      HPI:  27year old female presents for consult for low ferritin  She had rou en y in 2021  Post op she was compliant with bariatric vitamins       Oct 2022 abdominal pain started, she d/c vitamins  Appetite was decreased  Pain in wax/waning, either right or left     She is taking PPI for the pain with some benefit  She had an EGD on 1/25/23  This showed mild pouchitis, otherwise negative  Biopsies were negative  Eating better now; eating lunch/dinner     Drinking 5-6 bottles water a day     Her last menstrual period from 1/6 - 1/11  Never heavy bleeding  She has been taking oral iron since Dec  Since this time stools are greener in color  She has noticed hair loss since 2021  ROS: Review of Systems   Constitutional: Positive for fatigue  Respiratory: Negative for cough and shortness of breath  Cardiovascular: Positive for chest pain (x 2 months with deep breaths )  Gastrointestinal: Positive for constipation (miralax as needed)  Negative for blood in stool, diarrhea, nausea and vomiting  Genitourinary: Negative for difficulty urinating, dysuria and hematuria  Musculoskeletal: Positive for arthralgias (bilateral knees, chronic), back pain (lower back, chronic, comes and goes ) and myalgias (in anterior neck, chronic )  Neurological: Positive for headaches (chronic, improved with taking vitamins )  Negative for dizziness and light-headedness         Past Medical History:   Diagnosis Date   • Back pain    • Bacterial vaginosis    • Bariatric surgery status    • Chlamydia    • Disease of thyroid gland     nodule   • Knee pain    • Morbidly obese ([address] )     gastric sleeve   today 10/4 2021   • MRSA carrier    • Obesity    • Postsurgical malabsorption    • Thyroid disease     benign    -right   gets scan yearly   • Urogenital trichomoniasis    • Wears glasses        Past Surgical History:   Procedure Laterality Date   • [place] LAPS GSTR RSTCV PX W/BYP DRE-EN-Y LIMB <150 CM N/A 10/4/2021    Procedure: BYPASS GASTRIC  DRE-EN-Y LAPAROSCOPIC, AND INTRAOPERATIVE EGD;  Surgeon: Shruti Rodriguez MD;  Location: [facility];  Service: Bariatrics   • TONSILLECTOMY     • TOOTH EXTRACTION  10/01/2014   • WRIST SURGERY  05/25/2021       Social History     Socioeconomic History   • Marital status: Single     Spouse name: None   • Number of children: None   • Years of education: None   • Highest education level: None   Occupational History   • None   Tobacco Use   • Smoking status: Never   • Smokeless tobacco: Never   • Tobacco comments:     no passive smoke exposure   Vaping Use   • Vaping Use: Never used   Substance and Sexual Activity   • Alcohol use: Yes     Alcohol/week: 3 0 standard drinks     Types: 1 Glasses of wine, 1 Cans of beer, 1 Shots of liquor per week     Comment: 1 x monthly   • Drug use: No   • Sexual activity: Yes     Partners: Male     Birth control/protection: Condom Male, None   Other Topics Concern   • None   Social History Narrative   • None     Social Determinants of Health     Financial Resource Strain: Low Risk    • Difficulty of Paying Living Expenses: Not hard at all   Food Insecurity: No Food Insecurity   • Worried About Running Out of Food in the Last Year: Never true   • Ran Out of Food in the Last Year: Never true   Transportation Needs: No Transportation Needs   • Lack of Transportation (Medical): No   • Lack of Transportation (Non-Medical):  No   Physical Activity: Not on file   Stress: Not on file   Social Connections: Not on file   Intimate Partner Violence: Not on file   Housing Stability: Unknown   • Unable to Pay for Housing in the Last Year: No   • Number of Places Lived in the Last Year: 1   • Unstable Housing in the Last Year: Not on file       Family History   Problem Relation Age of Onset   • Brain cancer Mother    • Cancer Mother         glioblastoma   • Hypertension Father    • No Known Problems Paternal Grandfather    • No Known Problems Sister    • No Known Problems Daughter    • No Known Problems Maternal Grandmother    • Diabetes Paternal Grandmother    • Stroke Paternal Grandmother    • Heart disease Neg Hx    • Thyroid disease Neg Hx    • Breast cancer Neg Hx    • Colon cancer Neg Hx    • Ovarian cancer Neg Hx        Allergies   Allergen Reactions   • Zyrtec [Cetirizine] Hives         Current Outpatient Medications:   •  acetaminophen (TYLENOL) 500 mg tablet,  , Disp: , Rfl:   •  Calcium Carbonate-Vit D-Min (CALCIUM 1200 PO), Take by mouth  , Disp: , Rfl:   •  calcium citrate (CALCITRATE) 950 (200 Ca) MG tablet, Take 1 tablet (950 mg total) by mouth 2 (two) times a day, Disp: 180 tablet, Rfl: 2  •  Diclofenac Sodium (VOLTAREN) 1 %, , Disp: , Rfl:   •  dicyclomine (BENTYL) 20 mg tablet, Take 1 tablet (20 mg total) by mouth 2 (two) times a day, Disp: 20 tablet, Rfl: 0  •  Multiple Vitamin (multivitamin) tablet, Take 1 tablet by mouth daily , Disp: , Rfl:   •  omeprazole (PriLOSEC) 20 mg delayed release capsule, Take 2 capsules (40 mg total) by mouth daily, Disp: 10 capsule, Rfl: 0  •  omeprazole (PriLOSEC) 20 mg delayed release capsule, Take 1 capsule (20 mg total) by mouth daily, Disp: 30 capsule, Rfl: 3  •  polyethylene glycol (MIRALAX) 17 g packet, Take 17 g by mouth daily TAKEN AS NEEDED, Disp: , Rfl:   •  simethicone (MYLICON) 80 mg chewable tablet, Chew 1 tablet (80 mg total) every 6 (six) hours as needed for flatulence, Disp: 30 tablet, Rfl: 0  •  gabapentin (NEURONTIN) 300 mg capsule, Take 1 capsule (300 mg total) by mouth 3 (three) times a day (Patient not taking: Reported on 12/28/2022), Disp: 90 capsule, Rfl: 2      Physical Exam:  /70 (BP Location: Left arm, Patient Position: Sitting, Cuff Size: Adult)   Pulse 78   Ht 5' 6 5" (1 689 m)   Wt 88 kg (194 lb)   LMP 01/06/2023 (Approximate)   SpO2 98%   BMI 30 84 kg/m²     Physical Exam  Vitals reviewed  Constitutional:       General: She is not in acute distress  Appearance: She is well-developed  She is not ill-appearing  HENT:      Head: Normocephalic and atraumatic  Eyes:      General: No scleral icterus       Conjunctiva/sclera: Conjunctivae normal    Cardiovascular: Rate and Rhythm: Normal rate and regular rhythm  Heart sounds: Normal heart sounds  No murmur heard  Pulmonary:      Effort: Pulmonary effort is normal  No respiratory distress  Breath sounds: Normal breath sounds  Abdominal:      Palpations: Abdomen is soft  Tenderness: There is no abdominal tenderness  Musculoskeletal:         General: No tenderness  Normal range of motion  Cervical back: Normal range of motion and neck supple  Right lower leg: No edema  Left lower leg: No edema  Lymphadenopathy:      Cervical: No cervical adenopathy  Upper Body:      Right upper body: No supraclavicular or axillary adenopathy  Left upper body: No supraclavicular or axillary adenopathy  Skin:     General: Skin is warm and dry  Neurological:      Mental Status: She is alert and oriented to person, place, and time  Cranial Nerves: No cranial nerve deficit  Psychiatric:         Mood and Affect: Mood normal          Behavior: Behavior normal        Labs:  Lab Results   Component Value Date    WBC 5 70 01/05/2023    HGB 12 2 01/05/2023    HCT 37 3 01/05/2023    MCV 88 01/05/2023     01/05/2023     I have spent *** minutes with {Patient /Family:[id number]} today in which greater than 50% of this time was spent in counseling/coordination of care regarding {AMB Counseling Topics:8727830878}  Patient voiced understanding and agreement in the above discussion  Aware to contact our office with questions/symptoms in the interim  This note has been generated by voice recognition software system  Therefore, there may be spelling, grammar, and or syntax errors  Please contact if questions arise  Orin Boss  2/3/2023  3:42 PM  Incomplete  Hematology/Oncology Outpatient Follow-up  Nydia Otto 27 y o  female 1990 [id number]    Date:  2/3/2023      Assessment and Plan:        HPI:  27year old female presents for consult for low ferritin  She had rou en y in 2021  Post op she was compliant with bariatric vitamins  Oct 2022 abdominal pain started, she d/c vitamins  Appetite was decreased  Pain in wax/waning, either right or left     She is taking PPI for the pain with some benefit  She had an EGD on 1/25/23  This showed mild pouchitis, otherwise negative  Biopsies were negative  Eating better now; eating lunch/dinner     Drinking 5-6 bottles water a day     Her last menstrual period from 1/6 - 1/11  Never heavy bleeding  She has been taking oral iron since Dec  Since this time stools are greener in color  She has noticed hair loss since 2021  ROS: Review of Systems   Constitutional: Positive for fatigue  Respiratory: Negative for cough and shortness of breath  Cardiovascular: Positive for chest pain (x 2 months with deep breaths )  Gastrointestinal: Positive for constipation (miralax as needed)  Negative for blood in stool, diarrhea, nausea and vomiting  Genitourinary: Negative for difficulty urinating, dysuria and hematuria  Musculoskeletal: Positive for arthralgias (bilateral knees, chronic), back pain (lower back, chronic, comes and goes ) and myalgias (in anterior neck, chronic )  Neurological: Positive for headaches (chronic, improved with taking vitamins )  Negative for dizziness and light-headedness         Past Medical History:   Diagnosis Date   • Back pain    • Bacterial vaginosis    • Bariatric surgery status    • Chlamydia    • Disease of thyroid gland     nodule   • Knee pain    • Morbidly obese ([address] )     gastric sleeve   today 10/4 2021   • MRSA carrier    • Obesity    • Postsurgical malabsorption    • Thyroid disease     benign    -right   gets scan yearly   • Urogenital trichomoniasis    • Wears glasses        Past Surgical History:   Procedure Laterality Date   • [place] LAPS GSTR RSTCV PX W/BYP DRE-EN-Y LIMB <150 CM N/A 10/4/2021    Procedure: BYPASS GASTRIC  DRE-EN-Y LAPAROSCOPIC, AND INTRAOPERATIVE EGD;  Surgeon: Jcarlos Alfred MD;  Location: [facility];  Service: Bariatrics   • TONSILLECTOMY     • TOOTH EXTRACTION  10/01/2014   • WRIST SURGERY  05/25/2021       Social History     Socioeconomic History   • Marital status: Single     Spouse name: None   • Number of children: None   • Years of education: None   • Highest education level: None   Occupational History   • None   Tobacco Use   • Smoking status: Never   • Smokeless tobacco: Never   • Tobacco comments:     no passive smoke exposure   Vaping Use   • Vaping Use: Never used   Substance and Sexual Activity   • Alcohol use: Yes     Alcohol/week: 3 0 standard drinks     Types: 1 Glasses of wine, 1 Cans of beer, 1 Shots of liquor per week     Comment: 1 x monthly   • Drug use: No   • Sexual activity: Yes     Partners: Male     Birth control/protection: Condom Male, None   Other Topics Concern   • None   Social History Narrative   • None     Social Determinants of Health     Financial Resource Strain: Low Risk    • Difficulty of Paying Living Expenses: Not hard at all   Food Insecurity: No Food Insecurity   • Worried About Running Out of Food in the Last Year: Never true   • Ran Out of Food in the Last Year: Never true   Transportation Needs: No Transportation Needs   • Lack of Transportation (Medical): No   • Lack of Transportation (Non-Medical):  No   Physical Activity: Not on file   Stress: Not on file   Social Connections: Not on file   Intimate Partner Violence: Not on file   Housing Stability: Unknown   • Unable to Pay for Housing in the Last Year: No   • Number of Places Lived in the Last Year: 1   • Unstable Housing in the Last Year: Not on file       Family History   Problem Relation Age of Onset   • Brain cancer Mother    • Cancer Mother         glioblastoma   • Hypertension Father    • No Known Problems Paternal Grandfather    • No Known Problems Sister    • No Known Problems Daughter    • No Known Problems Maternal Grandmother    • Diabetes Paternal Grandmother    • Stroke Paternal Grandmother    • Heart disease Neg Hx    • Thyroid disease Neg Hx    • Breast cancer Neg Hx    • Colon cancer Neg Hx    • Ovarian cancer Neg Hx        Allergies   Allergen Reactions   • Zyrtec [Cetirizine] Hives         Current Outpatient Medications:   •  acetaminophen (TYLENOL) 500 mg tablet,  , Disp: , Rfl:   •  Calcium Carbonate-Vit D-Min (CALCIUM 1200 PO), Take by mouth  , Disp: , Rfl:   •  calcium citrate (CALCITRATE) 950 (200 Ca) MG tablet, Take 1 tablet (950 mg total) by mouth 2 (two) times a day, Disp: 180 tablet, Rfl: 2  •  Diclofenac Sodium (VOLTAREN) 1 %, , Disp: , Rfl:   •  dicyclomine (BENTYL) 20 mg tablet, Take 1 tablet (20 mg total) by mouth 2 (two) times a day, Disp: 20 tablet, Rfl: 0  •  Multiple Vitamin (multivitamin) tablet, Take 1 tablet by mouth daily , Disp: , Rfl:   •  omeprazole (PriLOSEC) 20 mg delayed release capsule, Take 2 capsules (40 mg total) by mouth daily, Disp: 10 capsule, Rfl: 0  •  omeprazole (PriLOSEC) 20 mg delayed release capsule, Take 1 capsule (20 mg total) by mouth daily, Disp: 30 capsule, Rfl: 3  •  polyethylene glycol (MIRALAX) 17 g packet, Take 17 g by mouth daily TAKEN AS NEEDED, Disp: , Rfl:   •  simethicone (MYLICON) 80 mg chewable tablet, Chew 1 tablet (80 mg total) every 6 (six) hours as needed for flatulence, Disp: 30 tablet, Rfl: 0  •  gabapentin (NEURONTIN) 300 mg capsule, Take 1 capsule (300 mg total) by mouth 3 (three) times a day (Patient not taking: Reported on 12/28/2022), Disp: 90 capsule, Rfl: 2      Physical Exam:  /70 (BP Location: Left arm, Patient Position: Sitting, Cuff Size: Adult)   Pulse 78   Ht 5' 6 5" (1 689 m)   Wt 88 kg (194 lb)   LMP 01/06/2023 (Approximate)   SpO2 98%   BMI 30 84 kg/m²     Physical Exam      Labs:  Lab Results   Component Value Date    WBC 5 70 01/05/2023    HGB 12 2 01/05/2023    HCT 37 3 01/05/2023    MCV 88 01/05/2023     01/05/2023       I have spent *** minutes with {Patient /Family:[id number]} today in which greater than 50% of this time was spent in counseling/coordination of care regarding {AMB Counseling Topics:2100000100}  I have spent *** minutes with {Patient /Family:61549} today in which greater than 50% of this time was spent in counseling/coordination of care regarding {AMB Counseling Topics:2100000100}  Patient voiced understanding and agreement in the above discussion  Aware to contact our office with questions/symptoms in the interim  This note has been generated by voice recognition software system  Therefore, there may be spelling, grammar, and or syntax errors  Please contact if questions arise

## 2023-02-03 NOTE — PROGRESS NOTES
Hematology/Oncology Outpatient Follow-up  Renetta Bowden 27 y o  female 1990 [id number]    Date:  2/3/2023      Assessment and Plan:  1  Iron deficiency without anemia   66-year-old female presents for consultation regarding iron deficiency without anemia  She did have bariatric surgery in 2021  This likely is related to this  She had a recent EGD which was unrevealing  She was instructed to take oral iron starting in December  She has been tolerating this well  Recommend due to tolerating oral iron and not anemic and not with numerous symptoms related to her anemia, she should recheck labs after taking oral iron for 3 months  If her ferritin still remains low, i e  below 45, she can be referred back for iron infusions  Did review that her hair loss can be related to her low iron  This will take months to improve though regardless of oral or IV supplementation  She is in agreement with the plan  I also gave her patient education written down on iron rich foods to incorporate into her diet  She will follow-up with bariatrics to review her labs  Bariatrics will send her back if IV iron is needed  - Ambulatory referral to Hematology / Oncology      HPI:  27year old female presents for consult for low ferritin  She had rou en y in 2021  Post op she was compliant with bariatric vitamins  Oct 2022 abdominal pain started, she d/c vitamins  Appetite was decreased  Pain in wax/waning, either right or left     She is taking PPI for the pain with some benefit  She had an EGD on 1/25/23  This showed mild pouchitis, otherwise negative  Biopsies were negative  Eating better now; eating lunch/dinner     Drinking 5-6 bottles water a day     Her last menstrual period from 1/6 - 1/11  Never heavy bleeding  She has been taking oral iron since Dec  Since this time stools are greener in color  She has noticed hair loss since 2021       ROS: Review of Systems   Constitutional: Positive for fatigue  Respiratory: Negative for cough and shortness of breath  Cardiovascular: Positive for chest pain (x 2 months with deep breaths )  Gastrointestinal: Positive for constipation (miralax as needed)  Negative for blood in stool, diarrhea, nausea and vomiting  Genitourinary: Negative for difficulty urinating, dysuria and hematuria  Musculoskeletal: Positive for arthralgias (bilateral knees, chronic), back pain (lower back, chronic, comes and goes ) and myalgias (in anterior neck, chronic )  Neurological: Positive for headaches (chronic, improved with taking vitamins )  Negative for dizziness and light-headedness  Past Medical History:   Diagnosis Date   • Back pain    • Bacterial vaginosis    • Bariatric surgery status    • Chlamydia    • Disease of thyroid gland     nodule   • Knee pain    • Morbidly obese ([address] )     gastric sleeve   today 10/4 2021   • MRSA carrier    • Obesity    • Postsurgical malabsorption    • Thyroid disease     benign    -right   gets scan yearly   • Urogenital trichomoniasis    • Wears glasses        Past Surgical History:   Procedure Laterality Date   • [place] LAPS GSTR RSTCV PX W/BYP DRE-EN-Y LIMB <150 CM N/A 10/4/2021    Procedure: BYPASS GASTRIC  DRE-EN-Y LAPAROSCOPIC, AND INTRAOPERATIVE EGD;  Surgeon: Chante Victor MD;  Location: [facility];  Service: Bariatrics   • TONSILLECTOMY     • TOOTH EXTRACTION  10/01/2014   • WRIST SURGERY  05/25/2021       Social History     Socioeconomic History   • Marital status: Single     Spouse name: None   • Number of children: None   • Years of education: None   • Highest education level: None   Occupational History   • None   Tobacco Use   • Smoking status: Never   • Smokeless tobacco: Never   • Tobacco comments:     no passive smoke exposure   Vaping Use   • Vaping Use: Never used   Substance and Sexual Activity   • Alcohol use:  Yes     Alcohol/week: 3 0 standard drinks     Types: 1 Glasses of wine, 1 Cans of beer, 1 Shots of liquor per week     Comment: 1 x monthly   • Drug use: No   • Sexual activity: Yes     Partners: Male     Birth control/protection: Condom Male, None   Other Topics Concern   • None   Social History Narrative   • None     Social Determinants of Health     Financial Resource Strain: Low Risk    • Difficulty of Paying Living Expenses: Not hard at all   Food Insecurity: No Food Insecurity   • Worried About Running Out of Food in the Last Year: Never true   • Ran Out of Food in the Last Year: Never true   Transportation Needs: No Transportation Needs   • Lack of Transportation (Medical): No   • Lack of Transportation (Non-Medical):  No   Physical Activity: Not on file   Stress: Not on file   Social Connections: Not on file   Intimate Partner Violence: Not on file   Housing Stability: Unknown   • Unable to Pay for Housing in the Last Year: No   • Number of Places Lived in the Last Year: 1   • Unstable Housing in the Last Year: Not on file       Family History   Problem Relation Age of Onset   • Brain cancer Mother    • Cancer Mother         glioblastoma   • Hypertension Father    • No Known Problems Paternal Grandfather    • No Known Problems Sister    • No Known Problems Daughter    • No Known Problems Maternal Grandmother    • Diabetes Paternal Grandmother    • Stroke Paternal Grandmother    • Heart disease Neg Hx    • Thyroid disease Neg Hx    • Breast cancer Neg Hx    • Colon cancer Neg Hx    • Ovarian cancer Neg Hx        Allergies   Allergen Reactions   • Zyrtec [Cetirizine] Hives         Current Outpatient Medications:   •  acetaminophen (TYLENOL) 500 mg tablet,  , Disp: , Rfl:   •  Calcium Carbonate-Vit D-Min (CALCIUM 1200 PO), Take by mouth  , Disp: , Rfl:   •  calcium citrate (CALCITRATE) 950 (200 Ca) MG tablet, Take 1 tablet (950 mg total) by mouth 2 (two) times a day, Disp: 180 tablet, Rfl: 2  •  Diclofenac Sodium (VOLTAREN) 1 %, , Disp: , Rfl:   •  dicyclomine (BENTYL) 20 mg tablet, Take 1 tablet (20 mg total) by mouth 2 (two) times a day, Disp: 20 tablet, Rfl: 0  •  Multiple Vitamin (multivitamin) tablet, Take 1 tablet by mouth daily , Disp: , Rfl:   •  omeprazole (PriLOSEC) 20 mg delayed release capsule, Take 2 capsules (40 mg total) by mouth daily, Disp: 10 capsule, Rfl: 0  •  omeprazole (PriLOSEC) 20 mg delayed release capsule, Take 1 capsule (20 mg total) by mouth daily, Disp: 30 capsule, Rfl: 3  •  polyethylene glycol (MIRALAX) 17 g packet, Take 17 g by mouth daily TAKEN AS NEEDED, Disp: , Rfl:   •  simethicone (MYLICON) 80 mg chewable tablet, Chew 1 tablet (80 mg total) every 6 (six) hours as needed for flatulence, Disp: 30 tablet, Rfl: 0  •  gabapentin (NEURONTIN) 300 mg capsule, Take 1 capsule (300 mg total) by mouth 3 (three) times a day (Patient not taking: Reported on 12/28/2022), Disp: 90 capsule, Rfl: 2      Physical Exam:  /70 (BP Location: Left arm, Patient Position: Sitting, Cuff Size: Adult)   Pulse 78   Ht 5' 6 5" (1 689 m)   Wt 88 kg (194 lb)   LMP 01/06/2023 (Approximate)   SpO2 98%   BMI 30 84 kg/m²     Physical Exam  Vitals reviewed  Constitutional:       General: She is not in acute distress  Appearance: She is well-developed  She is not ill-appearing  HENT:      Head: Normocephalic and atraumatic  Eyes:      General: No scleral icterus  Conjunctiva/sclera: Conjunctivae normal    Cardiovascular:      Rate and Rhythm: Normal rate and regular rhythm  Heart sounds: Normal heart sounds  No murmur heard  Pulmonary:      Effort: Pulmonary effort is normal  No respiratory distress  Breath sounds: Normal breath sounds  Abdominal:      Palpations: Abdomen is soft  Tenderness: There is no abdominal tenderness  Musculoskeletal:         General: No tenderness  Normal range of motion  Cervical back: Normal range of motion and neck supple  Right lower leg: No edema  Left lower leg: No edema     Lymphadenopathy:      Cervical: No cervical adenopathy  Upper Body:      Right upper body: No supraclavicular or axillary adenopathy  Left upper body: No supraclavicular or axillary adenopathy  Skin:     General: Skin is warm and dry  Neurological:      Mental Status: She is alert and oriented to person, place, and time  Cranial Nerves: No cranial nerve deficit  Psychiatric:         Mood and Affect: Mood normal          Behavior: Behavior normal        Labs:  Lab Results   Component Value Date    WBC 5 70 01/05/2023    HGB 12 2 01/05/2023    HCT 37 3 01/05/2023    MCV 88 01/05/2023     01/05/2023     I have spent 40 minutes with Patient  today in which greater than 50% of this time was spent in counseling/coordination of care regarding Diagnostic results, Risks and benefits of tx options, Intructions for management, Importance of tx compliance and Impressions  Patient voiced understanding and agreement in the above discussion  Aware to contact our office with questions/symptoms in the interim  This note has been generated by voice recognition software system  Therefore, there may be spelling, grammar, and or syntax errors  Please contact if questions arise

## 2023-02-16 ENCOUNTER — TELEMEDICINE (OUTPATIENT)
Dept: BARIATRICS | Facility: CLINIC | Age: 33
End: 2023-02-16

## 2023-02-16 DIAGNOSIS — Z98.84 BARIATRIC SURGERY STATUS: ICD-10-CM

## 2023-02-16 DIAGNOSIS — Z48.815 ENCOUNTER FOR SURGICAL AFTERCARE FOLLOWING SURGERY OF DIGESTIVE SYSTEM: Primary | ICD-10-CM

## 2023-02-16 DIAGNOSIS — R10.9 ABDOMINAL PAIN: ICD-10-CM

## 2023-02-16 NOTE — PROGRESS NOTES
Virtual Regular Visit    Verification of patient location:    Patient is located in the following state in which I hold an active license [place]      Assessment/Plan:  - EGD and UGI unremarkable without any bariatric etiology for her pain  Pain overall has improved   - Advised to continue with having a diary with also consideration to her menstrual cycle due to known cysts and fibroids  - continue with omeprazole once daily for now  - Follow up in April as scheduled for routine visit  Problem List Items Addressed This Visit    None  Visit Diagnoses     Encounter for surgical aftercare following surgery of digestive system    -  Primary    Bariatric surgery status        Abdominal pain                   Reason for visit is   Chief Complaint   Patient presents with   • Virtual Regular Visit        Encounter provider Cecile Lombardi    Provider located at [address] [place] [id number]      Recent Visits  No visits were found meeting these conditions  Showing recent visits within past 7 days and meeting all other requirements  Today's Visits  Date Type Provider Dept   02/16/23 Telemedicine [facility]   Showing today's visits and meeting all other requirements  Future Appointments  No visits were found meeting these conditions  Showing future appointments within next 150 days and meeting all other requirements       The patient was identified by name and date of birth  Aime Mcgill was informed that this is a telemedicine visit and that the visit is being conducted through the [employer]  She agrees to proceed     My office door was closed  No one else was in the room  She acknowledged consent and understanding of privacy and security of the telephone call  The patient has agreed to participate and understands they can discontinue the visit at any time      Patient is aware this is a billable service  Subjective  Angelo Franco is a 27 y o  female s/p   RNYGB with Dr Franca Goode on 10/04/2021 here for EGD and UGI review workup for abdominal pain  Started before October 2022, has been ongoing  Initally on the right mid-abdominal side however now radiating to the left side  Pain doesn't correlate at the same time; intermittent pain which occurs spontaneously  Since her UGI and EGD - her pain has improved signiicantly  She is able to taper off omeprazole from twice daily to once daily and has been doing well with this dose         UGI 01/04/2023 -   UPPER GI SERIES single contrast INDICATION:  Status post Shant-en-Y gastric bypass on 10/4/2021 with abdominal pain  Patient reports mid to low abdominal pain for the past several months       COMPARISON:  CT abdomen and pelvis with contrast 12/21/2022     IMAGES:  57     FLUOROSCOPY TIME:   2 17 minutes     TECHNIQUE:  The patient was given barium by mouth and images of the esophagus, gastric pouch, and proximal small bowel were obtained       FINDINGS:     Cine evaluation of the cervical esophagus was unremarkable  No aspiration or penetration  No nasopharyngeal reflux        The esophagus is normal in caliber  Esophageal motility is normal and emptying of contrast from the esophagus is prompt  There is no mucosal mass, ulceration or fold thickening identified      Postsurgical changes of Shant-en-Y gastric bypass  The gastric pouch is unremarkable in size and position  No gastrogastric fistula identified  No abnormal narrowing or dilation of the gastric pouch  No extraluminal contrast   The gastric mucosa is   normal   No penetrating ulcers or masses  The gastrojejunal anastomosis is patent  Contrast empties promptly into the shant limb      Mild gastroesophageal reflux was observed (images 22)       There is no hiatal hernia       Patient history and all images were reviewed with Dr Jaclyn Tong prior to patient being released from the fluoroscopy suite         IMPRESSION:     1  Postsurgical changes of Fani-en-Y gastric without evidence of complication      2   Mild gastroesophageal reflux "     EGD -    DATE OF SERVICE:  1/25/23     PHYSICIAN(S):  Attending:   Dewey Orozco MD     Fellow:   No Staff Documented        INDICATION:  Bariatric surgery status     POST-OP DIAGNOSIS:  See the impression below      PREPROCEDURE:  Informed consent was obtained for the procedure, including sedation  Risks of perforation, hemorrhage, adverse drug reaction and aspiration were discussed  The patient was placed in the left lateral decubitus position      Patient was explained about the risks and benefits of the procedure  Risks including but not limited to bleeding, infection, and perforation were explained in detail  Also explained about less than 100% sensitivity with the exam and other alternatives      PROCEDURE: EGD     DETAILS OF PROCEDURE:   Patient was taken to the procedure room where a time out was performed to confirm correct patient and correct procedure  The patient underwent monitored anesthesia care, which was administered by an anesthesia professional  The patient's blood pressure, heart rate, level of consciousness, respirations and oxygen were monitored throughout the procedure  The scope was advanced to the proximal part of the jejunum  Prior to the procedure, the patient's H  Pylori status was negative  The patient experienced no blood loss  The procedure was not difficult  The patient tolerated the procedure well  There were no apparent complications  This is a 27 y o  female status post a s/p RNYGB with me in 10/04/2021 here for abdominal pain    ED evaluation with negative CT scan      Here for an EGD to evaluate the anatomy of the GI tract       ANESTHESIA INFORMATION:  ASA: II  Anesthesia Type: General     MEDICATIONS:  sodium chloride 0 9 % infusion           400 mL*              *From user-documented volume  (Totals for administrations occurring from 0914 to 0923 on 01/25/23)        FINDINGS:  Mild, generalized erythematous mucosa in the stomach; performed 2 cold forceps biopsies to rule out H  pylori        SPECIMENS:  ID         Type    Source Tests   Collected by    Time    Destination  1 : gastric biopsies, rule out h pylori, retrieved with cold biopsy forcep        Tissue  Stomach            TISSUE EXAM            Palmer Riggins MD  1/25/2023 0742                       IMPRESSION:  Mild pouchitis with no evidence of marginal ulceration, gastric gastric fistula or anastomotic stricture  Otherwise normal gastric bypass anatomy      RECOMMENDATION:  Continue with the bariatric program process and follow up in the office  Biopsy results pending  Tissue Exam:   Final Diagnosis   A  Stomach, gastric biopsies, retrieved with cold biopsy forcep:  -  Chronic inactive oxyntic gastritis  -  Negative for Helicobacter pylori, by H&E stain   -  Negative for intestinal metaplasia, dysplasia or carcinoma        HPI     Past Medical History:   Diagnosis Date   • Back pain    • Bacterial vaginosis    • Bariatric surgery status    • Chlamydia    • Disease of thyroid gland     nodule   • Knee pain    • Morbidly obese ([address] )     gastric sleeve   today 10/4 2021   • MRSA carrier    • Obesity    • Postsurgical malabsorption    • Thyroid disease     benign    -right   gets scan yearly   • Urogenital trichomoniasis    • Wears glasses        Past Surgical History:   Procedure Laterality Date   • [place] LAPS GSTR RSTCV PX W/BYP DRE-EN-Y LIMB <150 CM N/A 10/4/2021    Procedure: BYPASS GASTRIC  DRE-EN-Y LAPAROSCOPIC, AND INTRAOPERATIVE EGD;  Surgeon: Palmer Riggins MD;  Location: [facility];  Service: Bariatrics   • TONSILLECTOMY     • TOOTH EXTRACTION  10/01/2014   • WRIST SURGERY  05/25/2021       Current Outpatient Medications   Medication Sig Dispense Refill   • acetaminophen (TYLENOL) 500 mg tablet       • Calcium Carbonate-Vit D-Min (CALCIUM 1200 PO) Take by mouth       • calcium citrate (CALCITRATE) 950 (200 Ca) MG tablet Take 1 tablet (950 mg total) by mouth 2 (two) times a day 180 tablet 2   • Diclofenac Sodium (VOLTAREN) 1 %      • dicyclomine (BENTYL) 20 mg tablet Take 1 tablet (20 mg total) by mouth 2 (two) times a day 20 tablet 0   • Multiple Vitamin (multivitamin) tablet Take 1 tablet by mouth daily      • omeprazole (PriLOSEC) 20 mg delayed release capsule Take 2 capsules (40 mg total) by mouth daily 10 capsule 0   • omeprazole (PriLOSEC) 20 mg delayed release capsule Take 1 capsule (20 mg total) by mouth daily 30 capsule 3   • polyethylene glycol (MIRALAX) 17 g packet Take 17 g by mouth daily TAKEN AS NEEDED     • simethicone (MYLICON) 80 mg chewable tablet Chew 1 tablet (80 mg total) every 6 (six) hours as needed for flatulence 30 tablet 0   • gabapentin (NEURONTIN) 300 mg capsule Take 1 capsule (300 mg total) by mouth 3 (three) times a day (Patient not taking: Reported on 12/28/2022) 90 capsule 2     No current facility-administered medications for this visit  Allergies   Allergen Reactions   • Zyrtec [Cetirizine] Hives       Review of Systems   Constitutional: Negative  Respiratory: Negative  Cardiovascular: Negative  Gastrointestinal: Negative  Video Exam    There were no vitals filed for this visit  Physical Exam  Vitals and nursing note reviewed  Neurological:      Mental Status: She is alert and oriented to person, place, and time  Psychiatric:         Mood and Affect: Mood normal          Behavior: Behavior normal          Thought Content:  Thought content normal          Judgment: Judgment normal           I spent 15 minutes directly with the patient during this visit

## 2023-02-20 ENCOUNTER — CLINICAL SUPPORT (OUTPATIENT)
Dept: OBGYN CLINIC | Facility: CLINIC | Age: 33
End: 2023-02-20

## 2023-02-20 DIAGNOSIS — Z23 NEED FOR HPV VACCINE: Primary | ICD-10-CM

## 2023-03-13 ENCOUNTER — TELEPHONE (OUTPATIENT)
Dept: BARIATRICS | Facility: CLINIC | Age: 33
End: 2023-03-13

## 2023-03-13 NOTE — TELEPHONE ENCOUNTER
Received call from Pt re: symptoms  Pt reported she had eaten an orange and was having burning/ pain similar to reflux  Advised Pt to try omeprazole twice daily and Pepcid for continuing symptoms as needed  Suggested Pt try utilizing an ice for possible pain relief  Encouraged Pt to reach out to office if symptoms persist or worsen  Pt verbalized understanding and was agreeable to plan

## 2023-05-02 ENCOUNTER — OFFICE VISIT (OUTPATIENT)
Dept: ENDOCRINOLOGY | Facility: CLINIC | Age: 33
End: 2023-05-02

## 2023-05-02 VITALS
SYSTOLIC BLOOD PRESSURE: 112 MMHG | HEIGHT: 67 IN | BODY MASS INDEX: 30.42 KG/M2 | WEIGHT: 193.8 LBS | HEART RATE: 75 BPM | DIASTOLIC BLOOD PRESSURE: 78 MMHG

## 2023-05-02 DIAGNOSIS — Z98.84 HISTORY OF BARIATRIC SURGERY: ICD-10-CM

## 2023-05-02 DIAGNOSIS — N25.81 SECONDARY HYPERPARATHYROIDISM (HCC): ICD-10-CM

## 2023-05-02 DIAGNOSIS — E04.1 THYROID NODULE: Primary | ICD-10-CM

## 2023-05-02 DIAGNOSIS — E55.9 VITAMIN D DEFICIENCY: ICD-10-CM

## 2023-05-02 DIAGNOSIS — D50.8 IRON DEFICIENCY ANEMIA SECONDARY TO INADEQUATE DIETARY IRON INTAKE: ICD-10-CM

## 2023-05-02 NOTE — PROGRESS NOTES
Tia Shea 26 y o  female MRN: [id number]    Encounter: [id number]      Assessment/Plan     Assessment: This is a 26y o -year-old female with secondary hyperparathyroidism, thyroid nodule, history of gastric bypass surgery, vitamin D deficiency and iron deficiency  Plan:  1  Secondary hyperparathyroidism-I have encouraged her to have calcium, albumin, intact PTH done to see what improvement has occurred with increased calcium intake  Based on results, I will make additional changes if necessary  2   Thyroid nodule-repeat ultrasound of the thyroid in July of this year  3   History of gastric bypass surgery-await B12 level  4   Vitamin D deficiency-continue supplementation  Await 25-hydroxy vitamin D level  5   Iron deficiency-she will have a repeat ferritin and if this is less than 45, she may require iron infusion  She does follow with hematology  CC:   Thyroid nodule    History of Present Illness     HPI:  25-year-old woman with thyroid nodule presents for follow-up  She is also noticed to have secondary hyperparathyroidism  She was asked to increase her calcium intake to 2000 mg/day  She states that she thinks she is doing this  She is also on vitamin D supplement and oral iron  She denies any difficulty swallowing or breathing  Review of Systems   Constitutional: Negative for chills and fever  Respiratory: Negative for shortness of breath  Cardiovascular: Negative for chest pain  Gastrointestinal: Negative for constipation, diarrhea, nausea and vomiting  All other systems reviewed and are negative        Historical Information   Past Medical History:   Diagnosis Date    Back pain     Bacterial vaginosis     Bariatric surgery status     Chlamydia     Disease of thyroid gland     nodule    Knee pain     Morbidly obese (HCC)     gastric sleeve   today 10/4 2021    MRSA carrier     Obesity     Postsurgical malabsorption     Thyroid disease     benign -right   gets scan yearly    Urogenital trichomoniasis     Wears glasses      Past Surgical History:   Procedure Laterality Date    PA LAPS GSTR RSTCV PX W/BYP DRE-EN-Y LIMB <150 CM N/A 10/4/2021    Procedure: BYPASS GASTRIC  DRE-EN-Y LAPAROSCOPIC, AND INTRAOPERATIVE EGD;  Surgeon: Lana Prabhakar MD;  Location: [facility];  Service: Iv[address]os[address] Ulsamir 59 EXTRACTION  10/01/2014    WRIST SURGERY  05/25/2021     Social History   Social History     Substance and Sexual Activity   Alcohol Use Yes    Comment: 2-3 drinks monthly     Social History     Substance and Sexual Activity   Drug Use No     Social History     Tobacco Use   Smoking Status Never    Passive exposure: Never   Smokeless Tobacco Never     Family History:   Family History   Problem Relation Age of Onset    Brain cancer Mother     Cancer Mother         glioblastoma    Hypertension Father     No Known Problems Paternal Grandfather     No Known Problems Sister     No Known Problems Daughter     No Known Problems Maternal Grandmother     Diabetes Paternal Grandmother     Stroke Paternal Grandmother     Heart disease Neg Hx     Thyroid disease Neg Hx     Breast cancer Neg Hx     Colon cancer Neg Hx     Ovarian cancer Neg Hx        Meds/Allergies   Current Outpatient Medications   Medication Sig Dispense Refill    acetaminophen (TYLENOL) 500 mg tablet        Biotin 2500 MCG CAPS Take by mouth      calcium citrate (CALCITRATE) 950 (200 Ca) MG tablet Take 1 tablet (950 mg total) by mouth 2 (two) times a day 180 tablet 2    cyanocobalamin (VITAMIN B-12) 1000 MCG tablet Take 1,000 mcg by mouth daily      Diclofenac Sodium (VOLTAREN) 1 %       dicyclomine (BENTYL) 20 mg tablet Take 1 tablet (20 mg total) by mouth 2 (two) times a day (Patient taking differently: Take 20 mg by mouth 2 (two) times a day prn) 20 tablet 0    ferrous sulfate 325 (65 Fe) mg tablet Take 325 mg by mouth daily with breakfast      Multiple "Vitamin (multivitamin) tablet Take 1 tablet by mouth daily       omeprazole (PriLOSEC) 20 mg delayed release capsule Take 2 capsules (40 mg total) by mouth daily 10 capsule 0    polyethylene glycol (MIRALAX) 17 g packet Take 17 g by mouth daily TAKEN AS NEEDED      simethicone (MYLICON) 80 mg chewable tablet Chew 1 tablet (80 mg total) every 6 (six) hours as needed for flatulence 30 tablet 0    Calcium Carbonate-Vit D-Min (CALCIUM 1200 PO) Take by mouth   (Patient not taking: Reported on 5/2/2023)      famotidine (PEPCID) 20 mg tablet Take 1 tablet (20 mg total) by mouth 2 (two) times a day as needed for heartburn (Patient not taking: Reported on 5/2/2023) 60 tablet 2    gabapentin (NEURONTIN) 300 mg capsule Take 1 capsule (300 mg total) by mouth 3 (three) times a day (Patient not taking: Reported on 12/28/2022) 90 capsule 2    omeprazole (PriLOSEC) 20 mg delayed release capsule Take 1 capsule (20 mg total) by mouth daily (Patient not taking: Reported on 5/2/2023) 30 capsule 3     No current facility-administered medications for this visit  Allergies   Allergen Reactions    Zyrtec [Cetirizine] Hives       Objective   Vitals: Blood pressure 112/78, pulse 75, height 5' 6 5\" (1 689 m), weight 87 9 kg (193 lb 12 8 oz)  Physical Exam  Vitals reviewed  Constitutional:       General: She is not in acute distress  Appearance: She is well-developed  She is not diaphoretic  HENT:      Head: Normocephalic and atraumatic  Mouth/Throat:      Pharynx: No oropharyngeal exudate  Eyes:      General: Lids are normal  No scleral icterus  Right eye: No discharge  Left eye: No discharge  Conjunctiva/sclera: Conjunctivae normal    Neck:      Thyroid: No thyromegaly  Cardiovascular:      Rate and Rhythm: Normal rate and regular rhythm  Heart sounds: Normal heart sounds  No murmur heard  No friction rub  No gallop     Pulmonary:      Effort: Pulmonary effort is normal  No " respiratory distress  Breath sounds: Normal breath sounds  No wheezing  Abdominal:      General: Bowel sounds are normal  There is no distension  Palpations: Abdomen is soft  Tenderness: There is no abdominal tenderness  Musculoskeletal:         General: No tenderness or deformity  Normal range of motion  Cervical back: Neck supple  Lymphadenopathy:      Head:      Right side of head: No occipital adenopathy  Left side of head: No occipital adenopathy  Upper Body:      Right upper body: No supraclavicular adenopathy  Left upper body: No supraclavicular adenopathy  Skin:     General: Skin is warm  Findings: No erythema or rash  Neurological:      Mental Status: She is alert and oriented to person, place, and time  Cranial Nerves: No cranial nerve deficit  Psychiatric:         Mood and Affect: Mood normal          Behavior: Behavior normal          The history was obtained from the review of the chart, patient  Lab Results:   Lab Results   Component Value Date/Time    TSH 3RD GENERATON 0 964 12/21/2022 08:53 AM       Imaging Studies:   Results for orders placed during the hospital encounter of 10/13/20    US thyroid    Impression  Stable 5 mm nodule in the posterior aspect of right lower lobe, unchanged from prior exams dating back to April 8, 2015, favoring benignity  Its unclear if the nodule represents a thyroid nodule or an extrathyroidal nodule, possibly arising from the  parathyroid gland  Correlation can be made with PTH and calcium values  No nodule meets current ACR criteria for requiring biopsy or followup ultrasounds  Reference: ACR Thyroid Imaging, Reporting and Data System (TI-RADS): White Paper of the [employer]  J AM Denise Radiol 4014;49:281-814  (additional recommendations based on American Thyroid Association 2015 guidelines )      Workstation performed: [id number]      I have personally reviewed pertinent reports  "    Portions of the record may have been created with voice recognition software  Occasional wrong word or \"sound a like\" substitutions may have occurred due to the inherent limitations of voice recognition software  Read the chart carefully and recognize, using context, where substitutions have occurred    "

## 2023-05-08 ENCOUNTER — APPOINTMENT (OUTPATIENT)
Dept: LAB | Facility: IMAGING CENTER | Age: 33
End: 2023-05-08

## 2023-05-08 DIAGNOSIS — Z98.84 BARIATRIC SURGERY STATUS: ICD-10-CM

## 2023-05-08 DIAGNOSIS — K91.2 POSTSURGICAL MALABSORPTION: ICD-10-CM

## 2023-05-08 DIAGNOSIS — E53.8 VITAMIN B12 DEFICIENCY: ICD-10-CM

## 2023-05-08 DIAGNOSIS — R79.89 HIGH SERUM PARATHYROID HORMONE (PTH): ICD-10-CM

## 2023-05-08 DIAGNOSIS — E55.9 VITAMIN D DEFICIENCY: ICD-10-CM

## 2023-05-08 DIAGNOSIS — E61.1 IRON DEFICIENCY: ICD-10-CM

## 2023-05-08 DIAGNOSIS — N25.81 SECONDARY HYPERPARATHYROIDISM (HCC): ICD-10-CM

## 2023-05-08 LAB
25(OH)D3 SERPL-MCNC: 20.9 NG/ML (ref 30–100)
ALBUMIN SERPL BCP-MCNC: 3.6 G/DL (ref 3.5–5)
ALP SERPL-CCNC: 52 U/L (ref 46–116)
ALT SERPL W P-5'-P-CCNC: 32 U/L (ref 12–78)
ANION GAP SERPL CALCULATED.3IONS-SCNC: 2 MMOL/L (ref 4–13)
AST SERPL W P-5'-P-CCNC: 16 U/L (ref 5–45)
BILIRUB SERPL-MCNC: 0.46 MG/DL (ref 0.2–1)
BUN SERPL-MCNC: 11 MG/DL (ref 5–25)
CALCIUM SERPL-MCNC: 9 MG/DL (ref 8.3–10.1)
CHLORIDE SERPL-SCNC: 108 MMOL/L (ref 96–108)
CO2 SERPL-SCNC: 28 MMOL/L (ref 21–32)
CREAT SERPL-MCNC: 0.75 MG/DL (ref 0.6–1.3)
ERYTHROCYTE [DISTWIDTH] IN BLOOD BY AUTOMATED COUNT: 13 % (ref 11.6–15.1)
FERRITIN SERPL-MCNC: 30 NG/ML (ref 8–388)
GFR SERPL CREATININE-BSD FRML MDRD: 103 ML/MIN/1.73SQ M
GLUCOSE P FAST SERPL-MCNC: 49 MG/DL (ref 65–99)
HCT VFR BLD AUTO: 40.9 % (ref 34.8–46.1)
HGB BLD-MCNC: 13.6 G/DL (ref 11.5–15.4)
IRON SATN MFR SERPL: 25 % (ref 15–50)
IRON SERPL-MCNC: 89 UG/DL (ref 50–170)
MCH RBC QN AUTO: 30.3 PG (ref 26.8–34.3)
MCHC RBC AUTO-ENTMCNC: 33.3 G/DL (ref 31.4–37.4)
MCV RBC AUTO: 91 FL (ref 82–98)
PLATELET # BLD AUTO: 241 THOUSANDS/UL (ref 149–390)
PMV BLD AUTO: 11.2 FL (ref 8.9–12.7)
POTASSIUM SERPL-SCNC: 3.9 MMOL/L (ref 3.5–5.3)
PROT SERPL-MCNC: 7 G/DL (ref 6.4–8.4)
PTH-INTACT SERPL-MCNC: 49.9 PG/ML (ref 18.4–80.1)
RBC # BLD AUTO: 4.49 MILLION/UL (ref 3.81–5.12)
SODIUM SERPL-SCNC: 138 MMOL/L (ref 135–147)
TIBC SERPL-MCNC: 359 UG/DL (ref 250–450)
VIT B12 SERPL-MCNC: 334 PG/ML (ref 100–900)
WBC # BLD AUTO: 5.09 THOUSAND/UL (ref 4.31–10.16)

## 2023-05-09 ENCOUNTER — TELEPHONE (OUTPATIENT)
Dept: FAMILY MEDICINE CLINIC | Facility: CLINIC | Age: 33
End: 2023-05-09

## 2023-05-09 DIAGNOSIS — Z98.84 BARIATRIC SURGERY STATUS: ICD-10-CM

## 2023-05-09 DIAGNOSIS — Z48.815 ENCOUNTER FOR SURGICAL AFTERCARE FOLLOWING SURGERY OF DIGESTIVE SYSTEM: ICD-10-CM

## 2023-05-09 RX ORDER — OMEPRAZOLE 20 MG/1
CAPSULE, DELAYED RELEASE ORAL
Qty: 30 CAPSULE | Refills: 3 | Status: SHIPPED | OUTPATIENT
Start: 2023-05-09

## 2023-05-09 NOTE — TELEPHONE ENCOUNTER
Pt just wanted to schedule when she was due for her physical  So she is scheduled for 8/22 at 9:20am

## 2023-05-09 NOTE — TELEPHONE ENCOUNTER
Please call her for routine follow-up appointment  I have not seen her since December 2022    Thank you

## 2023-05-11 LAB — METHYLMALONATE SERPL-SCNC: 117 NMOL/L (ref 0–378)

## 2023-05-12 DIAGNOSIS — E53.8 VITAMIN B12 DEFICIENCY: ICD-10-CM

## 2023-05-12 DIAGNOSIS — Z98.84 BARIATRIC SURGERY STATUS: Primary | ICD-10-CM

## 2023-05-12 DIAGNOSIS — E55.9 VITAMIN D DEFICIENCY: ICD-10-CM

## 2023-05-12 RX ORDER — ERGOCALCIFEROL 1.25 MG/1
50000 CAPSULE ORAL 2 TIMES WEEKLY
Qty: 24 CAPSULE | Refills: 0 | Status: SHIPPED | OUTPATIENT
Start: 2023-05-15 | End: 2023-08-13

## 2023-05-17 ENCOUNTER — TELEPHONE (OUTPATIENT)
Dept: BARIATRICS | Facility: CLINIC | Age: 33
End: 2023-05-17

## 2023-06-08 NOTE — TELEPHONE ENCOUNTER
Mary Aparicio is calling from the work [employer] wanting to speak with you regarding this patient and whether you think this is a work comp case or not  Please call patient. I discussed her symptoms with Dr Parker and he agreed she should have a ultrasound of her left thigh. If there are swollen vessels or reflux in her veins then I will refer her to vascular surgery

## 2023-07-12 ENCOUNTER — HOSPITAL ENCOUNTER (OUTPATIENT)
Dept: RADIOLOGY | Facility: IMAGING CENTER | Age: 33
Discharge: HOME | End: 2023-07-12
Payer: MEDICARE

## 2023-07-12 DIAGNOSIS — E04.1 THYROID NODULE: ICD-10-CM

## 2023-07-12 PROCEDURE — 76536 US EXAM OF HEAD AND NECK: CPT

## 2023-07-18 NOTE — RESULT ENCOUNTER NOTE
Small thyroid nodule stable.   No need for repeat ultrasound unless there is a change in clinical exam.

## 2023-08-03 ENCOUNTER — APPOINTMENT (OUTPATIENT)
Dept: LAB | Facility: IMAGING CENTER | Age: 33
End: 2023-08-03
Payer: MEDICARE

## 2023-08-03 DIAGNOSIS — E55.9 VITAMIN D DEFICIENCY: ICD-10-CM

## 2023-08-03 DIAGNOSIS — Z98.84 BARIATRIC SURGERY STATUS: ICD-10-CM

## 2023-08-03 DIAGNOSIS — E53.8 VITAMIN B12 DEFICIENCY: ICD-10-CM

## 2023-08-03 LAB
25(OH)D3 SERPL-MCNC: 38.1 NG/ML (ref 30–100)
VIT B12 SERPL-MCNC: 654 PG/ML (ref 180–914)

## 2023-08-03 PROCEDURE — 82607 VITAMIN B-12: CPT

## 2023-08-03 PROCEDURE — 36415 COLL VENOUS BLD VENIPUNCTURE: CPT

## 2023-08-03 PROCEDURE — 82306 VITAMIN D 25 HYDROXY: CPT

## 2023-08-06 DIAGNOSIS — E55.9 VITAMIN D DEFICIENCY: ICD-10-CM

## 2023-08-06 DIAGNOSIS — Z98.84 BARIATRIC SURGERY STATUS: ICD-10-CM

## 2023-08-08 RX ORDER — ERGOCALCIFEROL 1.25 MG/1
CAPSULE ORAL
Qty: 8 CAPSULE | Refills: 2 | OUTPATIENT
Start: 2023-08-08

## 2023-08-22 ENCOUNTER — OFFICE VISIT (OUTPATIENT)
Dept: FAMILY MEDICINE CLINIC | Facility: CLINIC | Age: 33
End: 2023-08-22
Payer: MEDICARE

## 2023-08-22 ENCOUNTER — APPOINTMENT (OUTPATIENT)
Dept: LAB | Facility: HOSPITAL | Age: 33
End: 2023-08-22
Payer: MEDICARE

## 2023-08-22 VITALS
HEIGHT: 66 IN | RESPIRATION RATE: 16 BRPM | BODY MASS INDEX: 31.82 KG/M2 | OXYGEN SATURATION: 98 % | DIASTOLIC BLOOD PRESSURE: 80 MMHG | TEMPERATURE: 98.1 F | WEIGHT: 198 LBS | SYSTOLIC BLOOD PRESSURE: 122 MMHG | HEART RATE: 73 BPM

## 2023-08-22 DIAGNOSIS — K59.03 DRUG-INDUCED CONSTIPATION: ICD-10-CM

## 2023-08-22 DIAGNOSIS — R10.11 RIGHT UPPER QUADRANT PAIN: ICD-10-CM

## 2023-08-22 DIAGNOSIS — D50.8 IRON DEFICIENCY ANEMIA SECONDARY TO INADEQUATE DIETARY IRON INTAKE: ICD-10-CM

## 2023-08-22 DIAGNOSIS — Z00.00 WELL ADULT EXAM: Primary | ICD-10-CM

## 2023-08-22 LAB
ALBUMIN SERPL BCP-MCNC: 4.6 G/DL (ref 3.5–5)
ALP SERPL-CCNC: 43 U/L (ref 34–104)
ALT SERPL W P-5'-P-CCNC: 39 U/L (ref 7–52)
AST SERPL W P-5'-P-CCNC: 23 U/L (ref 13–39)
BASOPHILS # BLD AUTO: 0.05 THOUSANDS/ÂΜL (ref 0–0.1)
BASOPHILS NFR BLD AUTO: 1 % (ref 0–1)
BILIRUB DIRECT SERPL-MCNC: 0.12 MG/DL (ref 0–0.2)
BILIRUB SERPL-MCNC: 0.51 MG/DL (ref 0.2–1)
EOSINOPHIL # BLD AUTO: 0.07 THOUSAND/ÂΜL (ref 0–0.61)
EOSINOPHIL NFR BLD AUTO: 1 % (ref 0–6)
ERYTHROCYTE [DISTWIDTH] IN BLOOD BY AUTOMATED COUNT: 12.7 % (ref 11.6–15.1)
FERRITIN SERPL-MCNC: 37 NG/ML (ref 11–307)
HCT VFR BLD AUTO: 43.1 % (ref 34.8–46.1)
HGB BLD-MCNC: 14.1 G/DL (ref 11.5–15.4)
IMM GRANULOCYTES # BLD AUTO: 0.01 THOUSAND/UL (ref 0–0.2)
IMM GRANULOCYTES NFR BLD AUTO: 0 % (ref 0–2)
IRON SATN MFR SERPL: 47 % (ref 15–50)
IRON SERPL-MCNC: 182 UG/DL (ref 50–212)
LYMPHOCYTES # BLD AUTO: 2.42 THOUSANDS/ÂΜL (ref 0.6–4.47)
LYMPHOCYTES NFR BLD AUTO: 48 % (ref 14–44)
MCH RBC QN AUTO: 30.5 PG (ref 26.8–34.3)
MCHC RBC AUTO-ENTMCNC: 32.7 G/DL (ref 31.4–37.4)
MCV RBC AUTO: 93 FL (ref 82–98)
MONOCYTES # BLD AUTO: 0.44 THOUSAND/ÂΜL (ref 0.17–1.22)
MONOCYTES NFR BLD AUTO: 9 % (ref 4–12)
NEUTROPHILS # BLD AUTO: 2.04 THOUSANDS/ÂΜL (ref 1.85–7.62)
NEUTS SEG NFR BLD AUTO: 41 % (ref 43–75)
NRBC BLD AUTO-RTO: 0 /100 WBCS
PLATELET # BLD AUTO: 210 THOUSANDS/UL (ref 149–390)
PMV BLD AUTO: 10.7 FL (ref 8.9–12.7)
PROT SERPL-MCNC: 7 G/DL (ref 6.4–8.4)
RBC # BLD AUTO: 4.62 MILLION/UL (ref 3.81–5.12)
TIBC SERPL-MCNC: 389 UG/DL (ref 250–450)
UIBC SERPL-MCNC: 207 UG/DL (ref 155–355)
WBC # BLD AUTO: 5.03 THOUSAND/UL (ref 4.31–10.16)

## 2023-08-22 PROCEDURE — 85025 COMPLETE CBC W/AUTO DIFF WBC: CPT | Performed by: PHYSICIAN ASSISTANT

## 2023-08-22 PROCEDURE — 80076 HEPATIC FUNCTION PANEL: CPT

## 2023-08-22 PROCEDURE — 83550 IRON BINDING TEST: CPT | Performed by: PHYSICIAN ASSISTANT

## 2023-08-22 PROCEDURE — 83540 ASSAY OF IRON: CPT | Performed by: PHYSICIAN ASSISTANT

## 2023-08-22 PROCEDURE — 36415 COLL VENOUS BLD VENIPUNCTURE: CPT

## 2023-08-22 PROCEDURE — 99214 OFFICE O/P EST MOD 30 MIN: CPT | Performed by: PHYSICIAN ASSISTANT

## 2023-08-22 PROCEDURE — 99395 PREV VISIT EST AGE 18-39: CPT | Performed by: PHYSICIAN ASSISTANT

## 2023-08-22 PROCEDURE — 82728 ASSAY OF FERRITIN: CPT | Performed by: PHYSICIAN ASSISTANT

## 2023-08-22 NOTE — PROGRESS NOTES
Name: Neftaly Llanos      : 1990      MRN: [id number]  Encounter Provider: Jose Green PA-C  Encounter Date: 2023   Encounter department: [address] [place] [address]     1. Well adult exam    2. BMI 30.0-30.9,adult      BMI Counseling: Body mass index is 31.96 kg/m². The BMI is above normal. Nutrition recommendations include increasing intake of lean protein. Exercise recommendations include exercising 3-5 times per week. -Pap smears currently up-to-date  - Routine physical in 1 year    M*Rest[employer] software was used to dictate this note. It may contain errors with dictating incorrect words/spelling. Please contact provider directly for any questions. Subjective      Patient presents today for her annual physical.  Her other concerns will be addressed as a separate visit today. She does see the dentist every 6 months  She does wear glasses and she just saw the eye doctor in July  She does eat a healthy diet. She did have gastric bypass surgery she states that she is lost over 120 pounds. She does increase protein in her diet. She drinks water throughout the day  She states she does exercise regularly by walking at least 2-3 times a week  She does drink alcohol socially.   She denies any drug use, vaping or tobacco use  Her last Pap smear was in  and she is not due until     Review of Systems    Current Outpatient Medications on File Prior to Visit   Medication Sig   • acetaminophen (TYLENOL) 500 mg tablet     • Biotin 2500 MCG CAPS Take by mouth   • calcium citrate (CALCITRATE) 950 (200 Ca) MG tablet Take 1 tablet (950 mg total) by mouth 2 (two) times a day   • cyanocobalamin (VITAMIN B-12) 1000 MCG tablet Take 1,000 mcg by mouth daily   • Diclofenac Sodium (VOLTAREN) 1 %    • ferrous sulfate 325 (65 Fe) mg tablet Take 325 mg by mouth daily with breakfast   • Multiple Vitamin (multivitamin) tablet Take 1 tablet by mouth daily    • omeprazole (PriLOSEC) 20 mg delayed release capsule Take 2 capsules (40 mg total) by mouth daily   • omeprazole (PriLOSEC) 20 mg delayed release capsule TAKE 1 CAPSULE BY MOUTH EVERY DAY   • polyethylene glycol (MIRALAX) 17 g packet Take 17 g by mouth daily TAKEN AS NEEDED   • Calcium Carbonate-Vit D-Min (CALCIUM 1200 PO) Take by mouth   (Patient not taking: Reported on 5/2/2023)   • dicyclomine (BENTYL) 20 mg tablet Take 1 tablet (20 mg total) by mouth 2 (two) times a day (Patient not taking: Reported on 8/22/2023)   • ergocalciferol (VITAMIN D2) 50,000 units Take 1 capsule (50,000 Units total) by mouth 2 (two) times a week   • famotidine (PEPCID) 20 mg tablet Take 1 tablet (20 mg total) by mouth 2 (two) times a day as needed for heartburn (Patient not taking: Reported on 5/2/2023)   • gabapentin (NEURONTIN) 300 mg capsule Take 1 capsule (300 mg total) by mouth 3 (three) times a day (Patient not taking: Reported on 12/28/2022)   • simethicone (MYLICON) 80 mg chewable tablet Chew 1 tablet (80 mg total) every 6 (six) hours as needed for flatulence (Patient not taking: Reported on 8/22/2023)       Objective     /80 (BP Location: Left arm, Patient Position: Sitting, Cuff Size: Standard)   Pulse 73   Temp 98.1 °F (36.7 °C) (Tympanic)   Resp 16   Ht 5' 6" (1.676 m)   Wt 89.8 kg (198 lb)   SpO2 98%   BMI 31.96 kg/m²     Physical Exam  Vitals reviewed. Constitutional:       General: She is not in acute distress. Appearance: Normal appearance. She is well-developed. She is not ill-appearing, toxic-appearing or diaphoretic. HENT:      Head: Normocephalic and atraumatic. Neck:      Thyroid: No thyromegaly. Cardiovascular:      Rate and Rhythm: Normal rate and regular rhythm. Heart sounds: Normal heart sounds. No murmur heard. No friction rub. No gallop. Pulmonary:      Effort: Pulmonary effort is normal. No respiratory distress. Breath sounds: Normal breath sounds.  No wheezing, rhonchi or rales. Abdominal:      General: Bowel sounds are normal.      Palpations: Abdomen is soft. There is no mass. Tenderness: There is abdominal tenderness ( Mild tenderness right upper quadrant). Musculoskeletal:         General: No deformity. Cervical back: Neck supple. Right lower leg: No edema. Left lower leg: No edema. Lymphadenopathy:      Cervical: No cervical adenopathy. Skin:     General: Skin is warm. Neurological:      General: No focal deficit present. Mental Status: She is alert. Psychiatric:         Mood and Affect: Mood normal.         Behavior: Behavior normal.         Thought Content:  Thought content normal.         Judgment: Judgment normal.       Sara Cameron PA-C

## 2023-08-22 NOTE — PROGRESS NOTES
Name: Gilberto Sanchez      : 1990      MRN: [id number]  Encounter Provider: Ricardo Arriaga PA-C  Encounter Date: 2023   Encounter department: [address] [place] [address]     1. Well adult exam    2. BMI 30.0-30.9,adult    3. Iron deficiency anemia secondary to inadequate dietary iron intake  -     CBC and differential  -     Iron Panel (Includes Ferritin, Iron Sat%, Iron, and TIBC)    4. Right upper quadrant pain  -     Hepatic function panel; Future    5. Drug-induced constipation      Annual physical has been done as a separate visit today    - I did review labs results from May and her iron level is good at that time. It is a low normal.  She states this is when endocrinology recommended she increase her over-the-counter iron supplement 65 mg from 2 tablets daily up to 4 tablets daily. -CT scan results from December reveals a fibroid uterine tumor. No evidence of gallstones. No abnormalities with her liver. - I did recommend she reduce her iron supplement down to 3 tablets daily for several days and then she can increase it down to 2 tablets daily to see if her abdominal pain would start to resolve. - I did recommend she supplement with red meat and pumpkin seeds to help increase the iron. I also recommended she check her multivitamin versus a prenatal vitamin which may have a higher dose of iron  - I did recommend she proceed with blood work today including an iron panel, CBC and hepatic panel  - She will continue the omeprazole daily. I advised her this could also cause constipation.  - She will continue with the MiraLAX as needed  - I also recommended a stool softener if needed  - I did recommend she follow-up with me in 3 months, go to the ER with any increasing symptoms    M*[employer] software was used to dictate this note. It may contain errors with dictating incorrect words/spelling. Please contact provider directly for any questions. Subjective      Patient complains of right upper quadrant pain and bloating of her abdomen over the last 3 to 4 weeks. She states several months ago endocrinology increased her iron over-the-counter 65 mg up to 4 tablets daily. She was taking 2 tablets at that time. She is not sure if the medications actually causing some of her symptoms. She states that she has had intermittent constipation. She also takes omeprazole daily. She denies any fever, chills, nausea, vomiting. She states that her stools are loose at times since she has been on the MiraLAX. Her last bowel movement was yesterday and it was soft. She denies any blood in her stools. She has not tried decreasing the iron tablets to see if her abdominal pain would improve. She states back in December 2022 she was in the emergency room for abdominal pain and she had a CT scan done at that time and initially the ER provider told her that she had gallstones. She did not realize that the final reading did not reveal any gallstones. She does not take any other supplements over-the-counter. She has lost over 120 pounds since her gastric bypass surgery. Review of Systems   Constitutional: Positive for fatigue ( Occasional fatigue but not daily). Negative for chills and fever. Respiratory: Negative for cough and shortness of breath. Gastrointestinal: Positive for abdominal pain, constipation and diarrhea. Negative for nausea and vomiting.        Current Outpatient Medications on File Prior to Visit   Medication Sig   • acetaminophen (TYLENOL) 500 mg tablet     • Biotin 2500 MCG CAPS Take by mouth   • calcium citrate (CALCITRATE) 950 (200 Ca) MG tablet Take 1 tablet (950 mg total) by mouth 2 (two) times a day   • cyanocobalamin (VITAMIN B-12) 1000 MCG tablet Take 1,000 mcg by mouth daily   • Diclofenac Sodium (VOLTAREN) 1 %    • ferrous sulfate 325 (65 Fe) mg tablet Take 325 mg by mouth daily with breakfast   • Multiple Vitamin (multivitamin) tablet Take 1 tablet by mouth daily    • omeprazole (PriLOSEC) 20 mg delayed release capsule Take 2 capsules (40 mg total) by mouth daily   • polyethylene glycol (MIRALAX) 17 g packet Take 17 g by mouth daily TAKEN AS NEEDED   • [DISCONTINUED] omeprazole (PriLOSEC) 20 mg delayed release capsule TAKE 1 CAPSULE BY MOUTH EVERY DAY   • Calcium Carbonate-Vit D-Min (CALCIUM 1200 PO) Take by mouth   (Patient not taking: Reported on 5/2/2023)   • dicyclomine (BENTYL) 20 mg tablet Take 1 tablet (20 mg total) by mouth 2 (two) times a day (Patient not taking: Reported on 8/22/2023)   • ergocalciferol (VITAMIN D2) 50,000 units Take 1 capsule (50,000 Units total) by mouth 2 (two) times a week   • famotidine (PEPCID) 20 mg tablet Take 1 tablet (20 mg total) by mouth 2 (two) times a day as needed for heartburn (Patient not taking: Reported on 5/2/2023)   • gabapentin (NEURONTIN) 300 mg capsule Take 1 capsule (300 mg total) by mouth 3 (three) times a day (Patient not taking: Reported on 12/28/2022)   • simethicone (MYLICON) 80 mg chewable tablet Chew 1 tablet (80 mg total) every 6 (six) hours as needed for flatulence (Patient not taking: Reported on 8/22/2023)       Objective     /80 (BP Location: Left arm, Patient Position: Sitting, Cuff Size: Standard)   Pulse 73   Temp 98.1 °F (36.7 °C) (Tympanic)   Resp 16   Ht 5' 6" (1.676 m)   Wt 89.8 kg (198 lb)   SpO2 98%   BMI 31.96 kg/m²     Physical Exam  Vitals reviewed. Constitutional:       General: She is not in acute distress. Appearance: Normal appearance. She is well-developed. She is not ill-appearing, toxic-appearing or diaphoretic. HENT:      Head: Normocephalic and atraumatic. Neck:      Thyroid: No thyromegaly. Cardiovascular:      Rate and Rhythm: Normal rate and regular rhythm. Heart sounds: Normal heart sounds. No murmur heard. No friction rub. No gallop. Pulmonary:      Effort: Pulmonary effort is normal. No respiratory distress. Breath sounds: Normal breath sounds. No wheezing, rhonchi or rales. Abdominal:      General: Bowel sounds are normal.      Palpations: Abdomen is soft. There is no mass. Tenderness: There is abdominal tenderness ( Mild tenderness right upper quadrant). Musculoskeletal:         General: No deformity. Cervical back: Neck supple. Right lower leg: No edema. Left lower leg: No edema. Lymphadenopathy:      Cervical: No cervical adenopathy. Skin:     General: Skin is warm. Neurological:      General: No focal deficit present. Mental Status: She is alert. Psychiatric:         Mood and Affect: Mood normal.         Behavior: Behavior normal.         Thought Content:  Thought content normal.         Judgment: Judgment normal.       Sara Cameron PA-C

## 2023-08-23 ENCOUNTER — APPOINTMENT (EMERGENCY)
Dept: RADIOLOGY | Facility: HOSPITAL | Age: 33
End: 2023-08-23
Payer: MEDICARE

## 2023-08-23 ENCOUNTER — HOSPITAL ENCOUNTER (OUTPATIENT)
Facility: HOSPITAL | Age: 33
Setting detail: OUTPATIENT SURGERY
Discharge: HOME | End: 2023-08-26
Attending: EMERGENCY MEDICINE | Admitting: SURGERY
Payer: MEDICARE

## 2023-08-23 DIAGNOSIS — R10.9 ABDOMINAL PAIN: ICD-10-CM

## 2023-08-23 DIAGNOSIS — K35.890 OTHER ACUTE APPENDICITIS: ICD-10-CM

## 2023-08-23 DIAGNOSIS — R93.5 ABNORMAL US (ULTRASOUND) OF ABDOMEN: ICD-10-CM

## 2023-08-23 DIAGNOSIS — K80.50 BILIARY COLIC: Primary | ICD-10-CM

## 2023-08-23 DIAGNOSIS — K81.0 ACUTE CHOLECYSTITIS: ICD-10-CM

## 2023-08-23 LAB
ALBUMIN SERPL BCP-MCNC: 4.1 G/DL (ref 3.5–5)
ALP SERPL-CCNC: 43 U/L (ref 34–104)
ALT SERPL W P-5'-P-CCNC: 36 U/L (ref 7–52)
ANION GAP SERPL CALCULATED.3IONS-SCNC: 4 MMOL/L
AST SERPL W P-5'-P-CCNC: 21 U/L (ref 13–39)
ATRIAL RATE: 72 BPM
BACTERIA UR QL AUTO: ABNORMAL /HPF
BASOPHILS # BLD AUTO: 0.05 THOUSANDS/ÂΜL (ref 0–0.1)
BASOPHILS NFR BLD AUTO: 1 % (ref 0–1)
BILIRUB SERPL-MCNC: 0.42 MG/DL (ref 0.2–1)
BILIRUB UR QL STRIP: NEGATIVE
BUN SERPL-MCNC: 9 MG/DL (ref 5–25)
CALCIUM SERPL-MCNC: 8.6 MG/DL (ref 8.4–10.2)
CHLORIDE SERPL-SCNC: 107 MMOL/L (ref 96–108)
CLARITY UR: CLEAR
CO2 SERPL-SCNC: 28 MMOL/L (ref 21–32)
COLOR UR: ABNORMAL
CREAT SERPL-MCNC: 0.75 MG/DL (ref 0.6–1.3)
EOSINOPHIL # BLD AUTO: 0.1 THOUSAND/ÂΜL (ref 0–0.61)
EOSINOPHIL NFR BLD AUTO: 2 % (ref 0–6)
ERYTHROCYTE [DISTWIDTH] IN BLOOD BY AUTOMATED COUNT: 12.9 % (ref 11.6–15.1)
EXT PREGNANCY TEST URINE: NEGATIVE
EXT. CONTROL: NORMAL
GFR SERPL CREATININE-BSD FRML MDRD: 103 ML/MIN/1.73SQ M
GLUCOSE SERPL-MCNC: 88 MG/DL (ref 65–140)
GLUCOSE UR STRIP-MCNC: NEGATIVE MG/DL
HCT VFR BLD AUTO: 38.6 % (ref 34.8–46.1)
HGB BLD-MCNC: 12.7 G/DL (ref 11.5–15.4)
HGB UR QL STRIP.AUTO: NEGATIVE
IMM GRANULOCYTES # BLD AUTO: 0.01 THOUSAND/UL (ref 0–0.2)
IMM GRANULOCYTES NFR BLD AUTO: 0 % (ref 0–2)
KETONES UR STRIP-MCNC: NEGATIVE MG/DL
LEUKOCYTE ESTERASE UR QL STRIP: ABNORMAL
LIPASE SERPL-CCNC: 39 U/L (ref 11–82)
LYMPHOCYTES # BLD AUTO: 2.41 THOUSANDS/ÂΜL (ref 0.6–4.47)
LYMPHOCYTES NFR BLD AUTO: 40 % (ref 14–44)
MCH RBC QN AUTO: 30 PG (ref 26.8–34.3)
MCHC RBC AUTO-ENTMCNC: 32.9 G/DL (ref 31.4–37.4)
MCV RBC AUTO: 91 FL (ref 82–98)
MONOCYTES # BLD AUTO: 0.57 THOUSAND/ÂΜL (ref 0.17–1.22)
MONOCYTES NFR BLD AUTO: 10 % (ref 4–12)
NEUTROPHILS # BLD AUTO: 2.86 THOUSANDS/ÂΜL (ref 1.85–7.62)
NEUTS SEG NFR BLD AUTO: 47 % (ref 43–75)
NITRITE UR QL STRIP: NEGATIVE
NON-SQ EPI CELLS URNS QL MICRO: ABNORMAL /HPF
NRBC BLD AUTO-RTO: 0 /100 WBCS
P AXIS: 55 DEGREES
PH UR STRIP.AUTO: 6 [PH]
PLATELET # BLD AUTO: 197 THOUSANDS/UL (ref 149–390)
PMV BLD AUTO: 10.1 FL (ref 8.9–12.7)
POTASSIUM SERPL-SCNC: 3.9 MMOL/L (ref 3.5–5.3)
PR INTERVAL: 140 MS
PROT SERPL-MCNC: 6.5 G/DL (ref 6.4–8.4)
PROT UR STRIP-MCNC: NEGATIVE MG/DL
QRS AXIS: 70 DEGREES
QRSD INTERVAL: 88 MS
QT INTERVAL: 376 MS
QTC INTERVAL: 411 MS
RBC # BLD AUTO: 4.24 MILLION/UL (ref 3.81–5.12)
RBC #/AREA URNS AUTO: ABNORMAL /HPF
SODIUM SERPL-SCNC: 139 MMOL/L (ref 135–147)
SP GR UR STRIP.AUTO: 1.01 (ref 1–1.03)
T WAVE AXIS: 57 DEGREES
UROBILINOGEN UR STRIP-ACNC: <2 MG/DL
VENTRICULAR RATE: 72 BPM
WBC # BLD AUTO: 6 THOUSAND/UL (ref 4.31–10.16)
WBC #/AREA URNS AUTO: ABNORMAL /HPF

## 2023-08-23 PROCEDURE — 76942 ECHO GUIDE FOR BIOPSY: CPT | Performed by: EMERGENCY MEDICINE

## 2023-08-23 PROCEDURE — 36000 PLACE NEEDLE IN VEIN: CPT | Performed by: EMERGENCY MEDICINE

## 2023-08-23 PROCEDURE — 99284 EMERGENCY DEPT VISIT MOD MDM: CPT

## 2023-08-23 PROCEDURE — 96366 THER/PROPH/DIAG IV INF ADDON: CPT

## 2023-08-23 PROCEDURE — 85025 COMPLETE CBC W/AUTO DIFF WBC: CPT | Performed by: EMERGENCY MEDICINE

## 2023-08-23 PROCEDURE — 96375 TX/PRO/DX INJ NEW DRUG ADDON: CPT

## 2023-08-23 PROCEDURE — 81025 URINE PREGNANCY TEST: CPT | Performed by: EMERGENCY MEDICINE

## 2023-08-23 PROCEDURE — 99222 1ST HOSP IP/OBS MODERATE 55: CPT | Performed by: SURGERY

## 2023-08-23 PROCEDURE — 81001 URINALYSIS AUTO W/SCOPE: CPT | Performed by: EMERGENCY MEDICINE

## 2023-08-23 PROCEDURE — 76705 ECHO EXAM OF ABDOMEN: CPT

## 2023-08-23 PROCEDURE — 93005 ELECTROCARDIOGRAM TRACING: CPT

## 2023-08-23 PROCEDURE — C9113 INJ PANTOPRAZOLE SODIUM, VIA: HCPCS

## 2023-08-23 PROCEDURE — 36415 COLL VENOUS BLD VENIPUNCTURE: CPT

## 2023-08-23 PROCEDURE — 96365 THER/PROPH/DIAG IV INF INIT: CPT

## 2023-08-23 PROCEDURE — 96361 HYDRATE IV INFUSION ADD-ON: CPT

## 2023-08-23 PROCEDURE — 93010 ELECTROCARDIOGRAM REPORT: CPT | Performed by: INTERNAL MEDICINE

## 2023-08-23 PROCEDURE — 87086 URINE CULTURE/COLONY COUNT: CPT | Performed by: EMERGENCY MEDICINE

## 2023-08-23 PROCEDURE — 80053 COMPREHEN METABOLIC PANEL: CPT | Performed by: EMERGENCY MEDICINE

## 2023-08-23 PROCEDURE — 99285 EMERGENCY DEPT VISIT HI MDM: CPT | Performed by: EMERGENCY MEDICINE

## 2023-08-23 PROCEDURE — 83690 ASSAY OF LIPASE: CPT | Performed by: EMERGENCY MEDICINE

## 2023-08-23 RX ORDER — ENOXAPARIN SODIUM 100 MG/ML
40 INJECTION SUBCUTANEOUS DAILY
Status: DISCONTINUED | OUTPATIENT
Start: 2023-08-24 | End: 2023-08-24

## 2023-08-23 RX ORDER — OXYCODONE HYDROCHLORIDE 5 MG/1
5 TABLET ORAL EVERY 4 HOURS PRN
Status: DISCONTINUED | OUTPATIENT
Start: 2023-08-23 | End: 2023-08-26 | Stop reason: HOSPADM

## 2023-08-23 RX ORDER — FENTANYL CITRATE 50 UG/ML
50 INJECTION, SOLUTION INTRAMUSCULAR; INTRAVENOUS ONCE
Status: COMPLETED | OUTPATIENT
Start: 2023-08-23 | End: 2023-08-23

## 2023-08-23 RX ORDER — PANTOPRAZOLE SODIUM 40 MG/10ML
40 INJECTION, POWDER, LYOPHILIZED, FOR SOLUTION INTRAVENOUS ONCE
Status: COMPLETED | OUTPATIENT
Start: 2023-08-23 | End: 2023-08-23

## 2023-08-23 RX ORDER — ONDANSETRON 2 MG/ML
4 INJECTION INTRAMUSCULAR; INTRAVENOUS ONCE
Status: COMPLETED | OUTPATIENT
Start: 2023-08-23 | End: 2023-08-23

## 2023-08-23 RX ORDER — OXYCODONE HYDROCHLORIDE 10 MG/1
10 TABLET ORAL EVERY 4 HOURS PRN
Status: DISCONTINUED | OUTPATIENT
Start: 2023-08-23 | End: 2023-08-26 | Stop reason: HOSPADM

## 2023-08-23 RX ORDER — ACETAMINOPHEN 325 MG/1
650 TABLET ORAL EVERY 6 HOURS PRN
Status: DISCONTINUED | OUTPATIENT
Start: 2023-08-23 | End: 2023-08-25

## 2023-08-23 RX ORDER — METRONIDAZOLE 500 MG/100ML
500 INJECTION, SOLUTION INTRAVENOUS EVERY 8 HOURS
Status: DISCONTINUED | OUTPATIENT
Start: 2023-08-23 | End: 2023-08-24

## 2023-08-23 RX ORDER — HYDROMORPHONE HCL IN WATER/PF 6 MG/30 ML
0.2 PATIENT CONTROLLED ANALGESIA SYRINGE INTRAVENOUS
Status: DISCONTINUED | OUTPATIENT
Start: 2023-08-23 | End: 2023-08-25

## 2023-08-23 RX ORDER — SODIUM CHLORIDE, SODIUM LACTATE, POTASSIUM CHLORIDE, CALCIUM CHLORIDE 600; 310; 30; 20 MG/100ML; MG/100ML; MG/100ML; MG/100ML
75 INJECTION, SOLUTION INTRAVENOUS CONTINUOUS
Status: DISCONTINUED | OUTPATIENT
Start: 2023-08-23 | End: 2023-08-24

## 2023-08-23 RX ORDER — CEFAZOLIN SODIUM 2 G/50ML
2000 SOLUTION INTRAVENOUS EVERY 8 HOURS
Status: DISCONTINUED | OUTPATIENT
Start: 2023-08-23 | End: 2023-08-24

## 2023-08-23 RX ADMIN — CEFAZOLIN SODIUM 2000 MG: 2 SOLUTION INTRAVENOUS at 19:16

## 2023-08-23 RX ADMIN — FENTANYL CITRATE 50 MCG: 50 INJECTION INTRAMUSCULAR; INTRAVENOUS at 15:49

## 2023-08-23 RX ADMIN — SODIUM CHLORIDE 1000 ML: 0.9 INJECTION, SOLUTION INTRAVENOUS at 15:45

## 2023-08-23 RX ADMIN — SODIUM CHLORIDE, SODIUM LACTATE, POTASSIUM CHLORIDE, AND CALCIUM CHLORIDE 125 ML/HR: .6; .31; .03; .02 INJECTION, SOLUTION INTRAVENOUS at 19:16

## 2023-08-23 RX ADMIN — METRONIDAZOLE 500 MG: 500 INJECTION, SOLUTION INTRAVENOUS at 20:55

## 2023-08-23 RX ADMIN — ONDANSETRON 4 MG: 2 INJECTION INTRAMUSCULAR; INTRAVENOUS at 15:49

## 2023-08-23 RX ADMIN — PANTOPRAZOLE SODIUM 40 MG: 40 INJECTION, POWDER, FOR SOLUTION INTRAVENOUS at 20:55

## 2023-08-23 NOTE — ED ATTENDING ATTESTATION
Final Diagnoses:     1. Biliary colic    2. Abnormal US (ultrasound) of abdomen    3. Abdominal pain    4. Other acute appendicitis    5. Acute cholecystitis      ED Course as of 08/26/23 1513   Wed Aug 23, 2023   1445 Leukocytes, UA(!): Moderate   1445 Nitrite, UA: Negative   1453 WBC: 6.00   1453 Hemoglobin: 12.7   1453 Platelet Count: 716     Seamus WRIGHT MD, saw and evaluated the patient. All available labs and X-rays were ordered by me or the resident / non-physician and have been reviewed by myself. I discussed the patient with the resident / non-physician and agree with the resident's / non-physician practitioner's findings and plan as documented in the resident's / non-physician practicitioner's note, except where noted. At this point, I agree with the current assessment done in the ED. I was present during key portions of all procedures performed unless otherwise stated. Nursing Triage:     Chief Complaint   Patient presents with   • Abdominal Pain     Pt reports sharp right sided abdominal pain since lunch, no N/V pain radiates to low back, unable to find position of comfort, no urinary distress     HPI:   This is a 26 y.o. female presenting for evaluation of epigastric/rUQ pain starting at noon, after lunch started to have it going to her back, feeling like a knife going through. The pain was getting better initially and now vomiting. No vomiting.  +nausea  No f/ch/cp/sob. Anorexia  Denies any urinary tract infection symptoms (burning, itching, pain, blood, frequency). No vaginal complaints. PMH Hx of gallstones  PSH: Fani-en-Y    ASSESSMENT + PLAN:   - given the presentation, will check CBC for marked leukocytosis  - CMP for liver enzyme elevation that could signal cholecystitis, biliary obstructive disease. Check RFTs for ERIKA / markers of dehydration.  - Lipase given abdominal pain to evaluate specifically for pancreatitis.   - Urine: will check for UTI or signs of pyelonephritis. - Pregnancy test: given she is female, could if positive, could be ectopic and would change workup to ultrasound instead of CT scan. - Lastly, will consider abdominal imaging.  - Formal RUQ ultrasound for gallstones, cholecystitis. - Disposition per workup. Physical:   General: VS reviewed  Appears in NAD  awake, alert. Well-nourished, well-developed. Appears stated age. Speaking normally in full sentences. Head: Normocephalic, atraumatic  Eyes: EOM-I. No diplopia. No hyphema. No subconjunctival hemorrhages. Symmetrical lids. ENT: Atraumatic external nose and ears. MMM  No malocclusion. No stridor. Normal phonation. No drooling. Normal swallowing. Neck: No JVD. CV: No pallor noted  Lungs:   No tachypnea  No respiratory distress  Abd: soft  Very focal RUQ tenderness, +peralta's sign. Rest of belly is essentially non-tender. No CVAT  No rashes. nd   no rebound/guarding  MSK:   FROM spontaneously  Skin: Dry, intact. Neuro: Awake, alert, GCS15, CN II-XII grossly intact. Motor grossly intact. Psychiatric/Behavioral: interacting normally; appropriate mood/affect.  Exam: deferred    Vitals:    08/25/23 2242 08/26/23 0808 08/26/23 1131 08/26/23 1359   BP: 100/64 99/64 108/76 105/73   Pulse: 81 77 85    Resp:  15 18    Temp: 98.4 °F (36.9 °C) 99 °F (37.2 °C) 98 °F (36.7 °C)    TempSrc:       SpO2: 93% 97% 98%      - There are no obvious limitations to social determinants of care. - Nursing note reviewed. - Vitals reviewed. - Orders placed by myself and/or advanced practitioner / resident.    - Previous chart was reviewed  - No language barrier.   - History obtained from patient.     - There are no limitations to the history obtained:     Past Medical:    has a past medical history of Back pain, Bacterial vaginosis, Bariatric surgery status, Chlamydia, Disease of thyroid gland, Knee pain, Morbidly obese ([address]), MRSA carrier, Obesity, Postsurgical malabsorption, Thyroid disease, Urogenital trichomoniasis, and Wears glasses. Past Surgical:    has a past surgical history that includes Tonsillectomy; Tooth extraction (10/01/2014); Wrist surgery (05/25/2021); pr laps gstr rstcv px w/byp shant-en-y limb <150 cm (N/A, 10/4/2021); and CHOLECYSTECTOMY LAPAROSCOPIC (N/A, 8/24/2023). Social:     Social History     Substance and Sexual Activity   Alcohol Use Yes    Comment: 2-3 drinks monthly     Social History     Tobacco Use   Smoking Status Never   • Passive exposure: Never   Smokeless Tobacco Never     Social History     Substance and Sexual Activity   Drug Use No       Echo:   No results found for this or any previous visit. No results found for this or any previous visit. Cath:    No results found for this or any previous visit. Code Status: Level 1 - Full Code  Advance Directive and Living Will:      Power of :    POLST:    Medications   oxyCODONE (ROXICODONE) IR tablet 5 mg (5 mg Oral Given 8/26/23 0812)   oxyCODONE (ROXICODONE) immediate release tablet 10 mg (10 mg Oral Given 8/26/23 1248)   heparin (porcine) subcutaneous injection 5,000 Units (5,000 Units Subcutaneous Given 8/26/23 1248)   lactated ringers infusion (0 mL/hr Intravenous Stopped 8/24/23 2325)   methocarbamol (ROBAXIN) tablet 500 mg (500 mg Oral Given 8/26/23 1125)   acetaminophen (TYLENOL) tablet 650 mg (650 mg Oral Given 8/26/23 1125)   gabapentin (NEURONTIN) capsule 100 mg (100 mg Oral Given 8/26/23 0812)   fentanyl citrate (PF) 100 MCG/2ML 50 mcg (50 mcg Intravenous Given 8/23/23 1549)   ondansetron (ZOFRAN) injection 4 mg (4 mg Intravenous Given 8/23/23 1549)   sodium chloride 0.9 % bolus 1,000 mL (0 mL Intravenous Stopped 8/23/23 1828)   pantoprazole (PROTONIX) injection 40 mg (40 mg Intravenous Given 8/23/23 2055)   ondansetron (ZOFRAN) injection 4 mg (4 mg Intravenous Given 8/24/23 1155)     MRI abdomen wo contrast and mrcp   Final Result      Cholelithiasis.  No evidence of choledocholithiasis. Workstation performed: [id number]         US right upper quadrant   Final Result      1. Cholelithiasis without findings of acute cholecystitis. 2. Linear echogenic focus noted within the common bile duct measuring 8 x 2 mm. Unclear whether this could be artifactual or due to choledocholithiasis. No biliary ductal dilation. Suggest further evaluation with MRCP. 3. Mild hepatomegaly and hepatic steatosis. The study was marked in [facility] for immediate notification.       Workstation performed: [id number]           Orders Placed This Encounter   Procedures   • POC Cardiac US   • Urine culture   • US right upper quadrant   • MRI abdomen wo contrast and mrcp   • CBC and differential   • Comprehensive metabolic panel   • Lipase   • UA w Reflex to Microscopic w Reflex to Culture   • Urine Microscopic   • Comprehensive metabolic panel   • CBC and differential   • Magnesium   • Phosphorus   • Ambulatory Referral to Gastroenterology   • Diet Regular; Regular House   • Discharge Diet   • Insert peripheral IV   • Notify physician   • Vital signs per unit routine   • Notify physician   • Up as tolerated   • I/O   • Insert peripheral IV   • Maintain IV access   • Apply SCD only   • Simple face mask oxygen   • Lifting restrictions   • Call provider for:  extreme fatigue   • Call provider for:  persistent dizziness or light-headedness   • Call provider for:  hives   • Call provider for:  difficulty breathing, headache or visual disturbances   • Call provider for: active or persistent bleeding   • Call provider for:  redness, tenderness, or signs of infection (pain, swelling, redness, odor or green/yellow discharge around incision site)   • Call provider for:  severe uncontrolled pain   • Call provider for:  persistent nausea or vomiting   • Level 1-Full Code: all life saving measures are indicated   • Consult to surgery general   • POCT pregnancy, urine   • ECG 12 lead   • ECG 12 lead   • Type and screen   • Discharge patient   • Outpatient No Charge Bed     Labs Reviewed   UA W REFLEX TO MICROSCOPIC WITH REFLEX TO CULTURE - Abnormal       Result Value Ref Range Status    Color, UA Light Yellow   Final    Clarity, UA Clear   Final    Specific Gravity, UA 1.014  1.003 - 1.030 Final    pH, UA 6.0  4.5, 5.0, 5.5, 6.0, 6.5, 7.0, 7.5, 8.0 Final    Leukocytes, UA Moderate (*) Negative Final    Nitrite, UA Negative  Negative Final    Protein, UA Negative  Negative mg/dl Final    Glucose, UA Negative  Negative mg/dl Final    Ketones, UA Negative  Negative mg/dl Final    Urobilinogen, UA <2.0  <2.0 mg/dl mg/dl Final    Bilirubin, UA Negative  Negative Final    Occult Blood, UA Negative  Negative Final   URINE MICROSCOPIC - Abnormal    RBC, UA 1-2  None Seen, 1-2 /hpf Final    WBC, UA 10-20 (*) None Seen, 1-2 /hpf Final    Epithelial Cells Occasional  None Seen, Occasional /hpf Final    Bacteria, UA None Seen  None Seen, Occasional /hpf Final   LIPASE - Normal    Lipase 39  11 - 82 u/L Final   POCT PREGNANCY, URINE - Normal    EXT Preg Test, Ur Negative   Final    Control Valid   Final   CBC AND DIFFERENTIAL    WBC 6.00  4.31 - 10.16 Thousand/uL Final    RBC 4.24  3.81 - 5.12 Million/uL Final    Hemoglobin 12.7  11.5 - 15.4 g/dL Final    Hematocrit 38.6  34.8 - 46.1 % Final    MCV 91  82 - 98 fL Final    MCH 30.0  26.8 - 34.3 pg Final    MCHC 32.9  31.4 - 37.4 g/dL Final    RDW 12.9  11.6 - 15.1 % Final    MPV 10.1  8.9 - 12.7 fL Final    Platelets 520  858 - 390 Thousands/uL Final    nRBC 0  /100 WBCs Final    Neutrophils Relative 47  43 - 75 % Final    Immat GRANS % 0  0 - 2 % Final    Lymphocytes Relative 40  14 - 44 % Final    Monocytes Relative 10  4 - 12 % Final    Eosinophils Relative 2  0 - 6 % Final    Basophils Relative 1  0 - 1 % Final    Neutrophils Absolute 2.86  1.85 - 7.62 Thousands/µL Final    Immature Grans Absolute 0.01  0.00 - 0.20 Thousand/uL Final    Lymphocytes Absolute 2. 41  0.60 - 4.47 Thousands/µL Final    Monocytes Absolute 0.57  0.17 - 1.22 Thousand/µL Final    Eosinophils Absolute 0.10  0.00 - 0.61 Thousand/µL Final    Basophils Absolute 0.05  0.00 - 0.10 Thousands/µL Final   COMPREHENSIVE METABOLIC PANEL    Sodium 458  135 - 147 mmol/L Final    Potassium 3.9  3.5 - 5.3 mmol/L Final    Chloride 107  96 - 108 mmol/L Final    CO2 28  21 - 32 mmol/L Final    ANION GAP 4  mmol/L Final    BUN 9  5 - 25 mg/dL Final    Creatinine 0.75  0.60 - 1.30 mg/dL Final    Comment: Standardized to IDMS reference method    Glucose 88  65 - 140 mg/dL Final    Comment: If the patient is fasting, the ADA then defines impaired fasting glucose as > 100 mg/dL and diabetes as > or equal to 123 mg/dL. Calcium 8.6  8.4 - 10.2 mg/dL Final    AST 21  13 - 39 U/L Final    ALT 36  7 - 52 U/L Final    Comment: Specimen collection should occur prior to Sulfasalazine administration due to the potential for falsely depressed results. Alkaline Phosphatase 43  34 - 104 U/L Final    Total Protein 6.5  6.4 - 8.4 g/dL Final    Albumin 4.1  3.5 - 5.0 g/dL Final    Total Bilirubin 0.42  0.20 - 1.00 mg/dL Final    Comment: Use of this assay is not recommended for patients undergoing treatment with eltrombopag due to the potential for falsely elevated results. N-acetyl-p-benzoquinone imine (metabolite of Acetaminophen) will generate erroneously low results in samples for patients that have taken an overdose of Acetaminophen.     eGFR 103  ml/min/1.73sq m Final    Narrative:     [place] guidelines for Chronic Kidney Disease (CKD):   •  Stage 1 with normal or high GFR (GFR > 90 mL/min/1.73 square meters)  •  Stage 2 Mild CKD (GFR = 60-89 mL/min/1.73 square meters)  •  Stage 3A Moderate CKD (GFR = 45-59 mL/min/1.73 square meters)  •  Stage 3B Moderate CKD (GFR = 30-44 mL/min/1.73 square meters)  •  Stage 4 Severe CKD (GFR = 15-29 mL/min/1.73 square meters)  •  Stage 5 End Stage CKD (GFR <15 mL/min/1.73 square meters)  Note: GFR calculation is accurate only with a steady state creatinine     Time reflects when diagnosis was documented in both MDM as applicable and the Disposition within this note     Time User Action Codes Description Comment    8/23/2023  4:01 PM Leafy Peat Add [G06.95] Biliary colic     8/06/9438  5:70 PM Amanda Holes, [address] [R93.5] Abnormal US (ultrasound) of abdomen     8/23/2023  4:57 PM Debbye Patel [R10.9] Abdominal pain     8/23/2023  7:42 PM Adriana Irving Other acute appendicitis     8/24/2023  9:28 PM Bobo Figueroa Modify [H49.963] Other acute appendicitis     8/26/2023 11:30 AM Donel Glen Rogers Modify [I48.047] Other acute appendicitis     8/26/2023 11:30 AM Donel Glen Rogers Add [K81.0] Acute cholecystitis       ED Disposition     ED Disposition   Discharge    Condition   Stable    Date/Time   Wed Aug 23, 2023  4:01 PM    [facility] discharge to home/self care.                Follow-up Information     Follow up With Specialties Details Why Contact Info Additional Information    Sara Cameron PA-C Family Medicine, Physician Assistant  For followup [address] [place] [address] [phone]       [facility] Emergency Department Emergency Medicine Go to  If symptoms worsen, As needed [address] [id number]  [facility] Emergency Department, [address], [place], [address]  General Surgery  Call to discuss getting your gallbladder out electively [id number] [facility] [address] [facility] [id number]  [address], [address] [place], [place], [address] [facility] Gastroenterology SPECIALISTS [facility] Gastroenterology   [address] [facility] [address] [facility] [address] [facility]  [phone]  [facility] Gastroenterology Specialists [place], [address], [address] [place] [address], [place], [address], [address] [facility]        Discharge Medication List as of 8/26/2023  1:03 PM      START taking these medications    Details   docusate sodium (COLACE) 100 mg capsule Take 1 capsule (100 mg total) by mouth 2 (two) times a day for 7 days, Starting Sat 8/26/2023, Until Sat 9/2/2023, Normal      methocarbamol (ROBAXIN) 500 mg tablet Take 1 tablet (500 mg total) by mouth 3 (three) times a day for 14 days, Starting Sat 8/26/2023, Until Sat 9/9/2023, Normal      oxyCODONE (Roxicodone) 5 immediate release tablet Take 1 tablet (5 mg total) by mouth every 6 (six) hours as needed for moderate pain or severe pain for up to 6 doses Max Daily Amount: 20 mg, Starting Sat 8/26/2023, Normal         CONTINUE these medications which have NOT CHANGED    Details   acetaminophen (TYLENOL) 500 mg tablet  , Starting Wed 10/6/2021, Historical Med      Biotin 2500 MCG CAPS Take by mouth, Historical Med      Calcium Carbonate-Vit D-Min (CALCIUM 1200 PO) Take by mouth  , Historical Med      calcium citrate (CALCITRATE) 950 (200 Ca) MG tablet Take 1 tablet (950 mg total) by mouth 2 (two) times a day, Starting Wed 12/28/2022, Normal      cyanocobalamin (VITAMIN B-12) 1000 MCG tablet Take 1,000 mcg by mouth daily, Historical Med      Diclofenac Sodium (VOLTAREN) 1 % Starting Fri 12/3/2021, Historical Med      dicyclomine (BENTYL) 20 mg tablet Take 1 tablet (20 mg total) by mouth 2 (two) times a day, Starting Mon 1/16/2023, Normal      ergocalciferol (VITAMIN D2) 50,000 units Take 1 capsule (50,000 Units total) by mouth 2 (two) times a week, Starting Mon 5/15/2023, Until Sun 8/13/2023, Normal      famotidine (PEPCID) 20 mg tablet Take 1 tablet (20 mg total) by mouth 2 (two) times a day as needed for heartburn, Starting Wed 4/19/2023, Until Fri 5/19/2023 at 2359, Normal      ferrous sulfate 325 (65 Fe) mg tablet Take 325 mg by mouth daily with breakfast, Historical Med      Multiple Vitamin (multivitamin) tablet Take 1 tablet by mouth daily , Historical Med      omeprazole (PriLOSEC) 20 mg delayed release capsule Take 2 capsules (40 mg total) by mouth daily, Starting Wed 12/21/2022, Normal      polyethylene glycol (MIRALAX) 17 g packet Take 17 g by mouth daily TAKEN AS NEEDED, Historical Med      simethicone (MYLICON) 80 mg chewable tablet Chew 1 tablet (80 mg total) every 6 (six) hours as needed for flatulence, Starting Wed 12/21/2022, Normal         STOP taking these medications       gabapentin (NEURONTIN) 300 mg capsule Comments:   Reason for Stopping:             Outpatient Discharge Orders   Ambulatory Referral to Gastroenterology   Standing Status: Future Standing Exp. Date: 08/23/24      Discharge Diet     Lifting restrictions     Call provider for:  extreme fatigue     Call provider for:  persistent dizziness or light-headedness     Call provider for:  hives     Call provider for:  difficulty breathing, headache or visual disturbances     Call provider for: active or persistent bleeding     Call provider for:  redness, tenderness, or signs of infection (pain, swelling, redness, odor or green/yellow discharge around incision site)     Call provider for:  severe uncontrolled pain     Call provider for:  persistent nausea or vomiting     Prior to Admission Medications   Prescriptions Last Dose Informant Patient Reported? Taking?    Biotin 2500 MCG CAPS Not Taking Self Yes No   Sig: Take by mouth   Patient not taking: Reported on 8/23/2023   Calcium Carbonate-Vit D-Min (CALCIUM 1200 PO)  Self Yes No   Sig: Take by mouth     Patient not taking: Reported on 5/2/2023   Diclofenac Sodium (VOLTAREN) 1 % Not Taking Self Yes No   Patient not taking: Reported on 8/23/2023   Multiple Vitamin (multivitamin) tablet  Self Yes No   Sig: Take 1 tablet by mouth daily    acetaminophen (TYLENOL) 500 mg tablet  Self Yes No   Sig:     calcium citrate (CALCITRATE) 950 (200 Ca) MG tablet  Self No No   Sig: Take 1 tablet (950 mg total) by mouth 2 (two) times a day   cyanocobalamin (VITAMIN B-12) 1000 MCG tablet  Self Yes No   Sig: Take 1,000 mcg by mouth daily   dicyclomine (BENTYL) 20 mg tablet  Self No No   Sig: Take 1 tablet (20 mg total) by mouth 2 (two) times a day   Patient not taking: Reported on 8/22/2023   ergocalciferol (VITAMIN D2) 50,000 units   No No   Sig: Take 1 capsule (50,000 Units total) by mouth 2 (two) times a week   famotidine (PEPCID) 20 mg tablet  Self No No   Sig: Take 1 tablet (20 mg total) by mouth 2 (two) times a day as needed for heartburn   ferrous sulfate 325 (65 Fe) mg tablet  Self Yes No   Sig: Take 325 mg by mouth daily with breakfast   gabapentin (NEURONTIN) 300 mg capsule   No No   Sig: Take 1 capsule (300 mg total) by mouth 3 (three) times a day   Patient not taking: Reported on 12/28/2022   omeprazole (PriLOSEC) 20 mg delayed release capsule  Self No No   Sig: Take 2 capsules (40 mg total) by mouth daily   polyethylene glycol (MIRALAX) 17 g packet  Self Yes No   Sig: Take 17 g by mouth daily TAKEN AS NEEDED   simethicone (MYLICON) 80 mg chewable tablet  Self No No   Sig: Chew 1 tablet (80 mg total) every 6 (six) hours as needed for flatulence   Patient not taking: Reported on 8/22/2023      Facility-Administered Medications: None                        Portions of the record may have been created with voice recognition software. Occasional wrong word or "sound a like" substitutions may have occurred due to the inherent limitations of voice recognition software. Read the chart carefully and recognize, using context, where substitutions have occurred.     Electronically signed by:  Joey Mcdaniels

## 2023-08-23 NOTE — CONSULTS
Consultation -General surgery   Hermila Giordano 26 y.o. female MRN: [id number]  Unit/Bed#: [facility] Encounter: [id number]    Assessment/Plan     Assessment:  40-year-old female presenting with symptomatic biliary colic, with right upper quadrant pain since noon today, now improving. Vital signs within normal limits    Right upper quadrant ultrasound showed cholelithiasis without acute cholecystitis and an 8 x 2 mm linear foci in the common bile duct read as artifact versus stone with concern for choledocholithiasis but no biliary duct dilation. WBC 6  T. bili 0.42  ase 39   UA with 10-20 WBC     Plan:  Admit to general surgery  Stat MRCP  NPO  IV fluid   IV abx, ancef/flagyl  lovenox  Lap jf tomorrow, 8/24/23, pending MRCP results      History of Present Illness   HPI:  Hermila Giordano is a 26 y.o. female who presents with sharp right upper quadrant abdominal pain since noon today. She reports this pain started 5 to 10 minutes after eating mac & cheese with chicken. Following the onset of pain the patient had a bowel movement. She describes this pain as nonradiating. She had a similar but worse episode of pain back in December which required her to come into the emergency department. At that time the pain did radiate into her right lower abdomen and up into her right chest.  Since his pain began at noon, it has improved but is still bothering her. She denies nausea vomiting fevers chills and shortness of breath at this time. She noticed new onset constipation beginning approximately 3 weeks ago. The patient does take omeprazole for GERD. Additionally, the patient takes multivitamin and vitamin B. Surgical history includes Fani-en-Y gastric bypass on 10/4/2021. Inpatient consult to Acute Care Surgery  Consult performed by: Chelsy Ramirez MD  Consult ordered by: Parul Alvarenga MD          Review of Systems   Constitutional: Negative for chills and fever.    HENT: Negative for ear pain and sore throat. Eyes: Negative for visual disturbance. Respiratory: Negative for cough, chest tightness and shortness of breath. Cardiovascular: Negative for chest pain, palpitations and leg swelling. Gastrointestinal: Positive for abdominal pain (Right upper quadrant and epigastric region, improving) and constipation (Noticed over the past 3 weeks). Negative for diarrhea, nausea and vomiting. Genitourinary: Negative for difficulty urinating, dysuria and hematuria. Musculoskeletal: Negative for arthralgias and back pain. Skin: Negative for color change and rash. Neurological: Negative for seizures and syncope. All other systems reviewed and are negative.       Historical Information   Past Medical History:   Diagnosis Date   • Back pain    • Bacterial vaginosis    • Bariatric surgery status    • Chlamydia    • Disease of thyroid gland     nodule   • Knee pain    • Morbidly obese (HCC)     gastric sleeve   today 10/4 2021   • MRSA carrier    • Obesity    • Postsurgical malabsorption    • Thyroid disease     benign    -right   gets scan yearly   • Urogenital trichomoniasis    • Wears glasses      Past Surgical History:   Procedure Laterality Date   • KS LAPS GSTR RSTCV PX W/BYP DRE-EN-Y LIMB <150 CM N/A 10/4/2021    Procedure: BYPASS GASTRIC  DRE-EN-Y LAPAROSCOPIC, AND INTRAOPERATIVE EGD;  Surgeon: Rosa M Cole MD;  Location: [facility];  Service: Bariatrics   • TONSILLECTOMY     • TOOTH EXTRACTION  10/01/2014   • WRIST SURGERY  05/25/2021     Social History   Social History     Substance and Sexual Activity   Alcohol Use Yes    Comment: 2-3 drinks monthly     Social History     Substance and Sexual Activity   Drug Use No     E-Cigarette/Vaping   • E-Cigarette Use Never User      E-Cigarette/Vaping Substances   • Nicotine No    • THC No    • CBD No    • Flavoring No      Social History     Tobacco Use   Smoking Status Never   • Passive exposure: Never   Smokeless Tobacco Never Family History: non-contributory    Meds/Allergies   all current active meds have been reviewed  Allergies   Allergen Reactions   • Zyrtec [Cetirizine] Hives       Objective   First Vitals:   Blood Pressure: 104/76 (08/23/23 1337)  Pulse: 75 (08/23/23 1337)  Temperature: 98.7 °F (37.1 °C) (08/23/23 1337)  Temp Source: Temporal (08/23/23 1337)  Respirations: 18 (08/23/23 1337)  SpO2: 100 % (08/23/23 1337)    Current Vitals:   Blood Pressure: 104/76 (08/23/23 1337)  Pulse: 75 (08/23/23 1337)  Temperature: 98.7 °F (37.1 °C) (08/23/23 1337)  Temp Source: Temporal (08/23/23 1337)  Respirations: 18 (08/23/23 1337)  SpO2: 100 % (08/23/23 1337)    No intake or output data in the 24 hours ending 08/23/23 1747    Invasive Devices     Peripheral Intravenous Line  Duration           Peripheral IV 08/23/23 Distal;Right;Upper;Ventral (anterior) Arm <1 day                Physical Exam:  General: No acute distress  Neuro: alert and oriented  HEENT: moist mucous membranes  CV: Well perfused, regular rate  Lungs: Normal work of breathing, no increased respiratory effort  Abdomen: Soft nondistended, right upper quadrant and epigastric pain, positive Love sign  Extremities: No lower extremity edema bilaterally  Skin: Warm warm and dry    Lab Results: I have personally reviewed pertinent lab results. Imaging: I have personally reviewed pertinent reports. EKG, Pathology, and Other Studies: I have personally reviewed pertinent reports. Counseling / Coordination of Care  Total floor / unit time spent today 25 minutes. Greater than 50% of total time was spent with the patient and / or family counseling and / or coordination of care.        Lorena Arriaza MD  General Surgery PGY1

## 2023-08-23 NOTE — ED PROVIDER NOTES
History  Chief Complaint   Patient presents with   • Abdominal Pain     Pt reports sharp right sided abdominal pain since lunch, no N/V pain radiates to low back, unable to find position of comfort, no urinary distress     53-year-old female is presenting with complaints of right upper abdominal pain. She notes that her pain started only about 2 hours ago right after she ate lunch. She says that she has had similar pain in the past and was told that it may be due to gallstones. Patient reports that the pain radiates into her back and around her flank. She endorses mild nausea but no vomiting. She denies any urinary changes or bowel changes. She has a past surgical history significant for a Fani-en-Y gastric bypass. Prior to Admission Medications   Prescriptions Last Dose Informant Patient Reported? Taking?    Biotin 2500 MCG CAPS Not Taking Self Yes No   Sig: Take by mouth   Patient not taking: Reported on 8/23/2023   Calcium Carbonate-Vit D-Min (CALCIUM 1200 PO)  Self Yes No   Sig: Take by mouth     Patient not taking: Reported on 5/2/2023   Diclofenac Sodium (VOLTAREN) 1 % Not Taking Self Yes No   Patient not taking: Reported on 8/23/2023   Multiple Vitamin (multivitamin) tablet  Self Yes No   Sig: Take 1 tablet by mouth daily    acetaminophen (TYLENOL) 500 mg tablet  Self Yes No   Sig:     calcium citrate (CALCITRATE) 950 (200 Ca) MG tablet  Self No No   Sig: Take 1 tablet (950 mg total) by mouth 2 (two) times a day   cyanocobalamin (VITAMIN B-12) 1000 MCG tablet  Self Yes No   Sig: Take 1,000 mcg by mouth daily   dicyclomine (BENTYL) 20 mg tablet  Self No No   Sig: Take 1 tablet (20 mg total) by mouth 2 (two) times a day   Patient not taking: Reported on 8/22/2023   ergocalciferol (VITAMIN D2) 50,000 units   No No   Sig: Take 1 capsule (50,000 Units total) by mouth 2 (two) times a week   famotidine (PEPCID) 20 mg tablet  Self No No   Sig: Take 1 tablet (20 mg total) by mouth 2 (two) times a day as needed for heartburn   ferrous sulfate 325 (65 Fe) mg tablet  Self Yes No   Sig: Take 325 mg by mouth daily with breakfast   gabapentin (NEURONTIN) 300 mg capsule   No No   Sig: Take 1 capsule (300 mg total) by mouth 3 (three) times a day   Patient not taking: Reported on 12/28/2022   omeprazole (PriLOSEC) 20 mg delayed release capsule  Self No No   Sig: Take 2 capsules (40 mg total) by mouth daily   polyethylene glycol (MIRALAX) 17 g packet  Self Yes No   Sig: Take 17 g by mouth daily TAKEN AS NEEDED   simethicone (MYLICON) 80 mg chewable tablet  Self No No   Sig: Chew 1 tablet (80 mg total) every 6 (six) hours as needed for flatulence   Patient not taking: Reported on 8/22/2023      Facility-Administered Medications: None       Past Medical History:   Diagnosis Date   • Back pain    • Bacterial vaginosis    • Bariatric surgery status    • Chlamydia    • Disease of thyroid gland     nodule   • Knee pain    • Morbidly obese (HCC)     gastric sleeve   today 10/4 2021   • MRSA carrier    • Obesity    • Postsurgical malabsorption    • Thyroid disease     benign    -right   gets scan yearly   • Urogenital trichomoniasis    • Wears glasses        Past Surgical History:   Procedure Laterality Date   • CHOLECYSTECTOMY LAPAROSCOPIC N/A 8/24/2023    Procedure: CHOLECYSTECTOMY LAPAROSCOPIC;  Surgeon: Veronica Kidd DO;  Location:  MAIN OR;  Service: General   • OK LAPS GSTR RSTCV PX W/BYP DRE-EN-Y LIMB <150 CM N/A 10/4/2021    Procedure: BYPASS GASTRIC  DRE-EN-Y LAPAROSCOPIC, AND INTRAOPERATIVE EGD;  Surgeon: Amanda Zapata MD;  Location: [facility];  Service: Bariatrics   • TONSILLECTOMY     • TOOTH EXTRACTION  10/01/2014   • WRIST SURGERY  05/25/2021       Family History   Problem Relation Age of Onset   • Brain cancer Mother    • Cancer Mother         glioblastoma   • Hypertension Father    • No Known Problems Paternal Grandfather    • No Known Problems Sister    • No Known Problems Daughter    • No Known Problems Maternal Grandmother    • Diabetes Paternal Grandmother    • Stroke Paternal Grandmother    • Heart disease Neg Hx    • Thyroid disease Neg Hx    • Breast cancer Neg Hx    • Colon cancer Neg Hx    • Ovarian cancer Neg Hx      I have reviewed and agree with the history as documented. E-Cigarette/Vaping   • E-Cigarette Use Never User      E-Cigarette/Vaping Substances   • Nicotine No    • THC No    • CBD No    • Flavoring No      Social History     Tobacco Use   • Smoking status: Never     Passive exposure: Never   • Smokeless tobacco: Never   Vaping Use   • Vaping Use: Never used   Substance Use Topics   • Alcohol use: Yes     Comment: 2-3 drinks monthly   • Drug use: No        Review of Systems   Constitutional: Negative for chills and fever. HENT: Negative for ear pain and sore throat. Eyes: Negative for pain and visual disturbance. Respiratory: Negative for cough and shortness of breath. Cardiovascular: Negative for chest pain and palpitations. Gastrointestinal: Positive for abdominal pain and nausea. Negative for vomiting. Genitourinary: Negative for dysuria and hematuria. Musculoskeletal: Negative for arthralgias and back pain. Skin: Negative for color change and rash. Neurological: Negative for seizures and syncope. All other systems reviewed and are negative. Physical Exam  ED Triage Vitals [08/23/23 1337]   Temperature Pulse Respirations Blood Pressure SpO2   98.7 °F (37.1 °C) 75 18 104/76 100 %      Temp Source Heart Rate Source Patient Position - Orthostatic VS BP Location FiO2 (%)   Temporal Monitor Lying Left arm --      Pain Score       9             Orthostatic Vital Signs  Vitals:    08/25/23 2242 08/26/23 0808 08/26/23 1131 08/26/23 1359   BP: 100/64 99/64 108/76 105/73   Pulse: 81 77 85    Patient Position - Orthostatic VS:           Physical Exam  Vitals and nursing note reviewed. Constitutional:       General: She is not in acute distress.      Appearance: She is well-developed. She is not ill-appearing. HENT:      Head: Normocephalic and atraumatic. Right Ear: External ear normal.      Left Ear: External ear normal.      Nose: Nose normal. No congestion or rhinorrhea. Mouth/Throat:      Mouth: Mucous membranes are moist.      Pharynx: Oropharynx is clear. No oropharyngeal exudate or posterior oropharyngeal erythema. Eyes:      General: No scleral icterus. Extraocular Movements: Extraocular movements intact. Conjunctiva/sclera: Conjunctivae normal.      Pupils: Pupils are equal, round, and reactive to light. Cardiovascular:      Rate and Rhythm: Normal rate and regular rhythm. Pulses: Normal pulses. Heart sounds: Normal heart sounds. No murmur heard. Pulmonary:      Effort: Pulmonary effort is normal. No respiratory distress. Breath sounds: Normal breath sounds. No wheezing or rhonchi. Abdominal:      General: Abdomen is flat. There is no distension. Palpations: Abdomen is soft. Tenderness: There is abdominal tenderness in the right upper quadrant. There is no guarding. Musculoskeletal:         General: No swelling. Cervical back: Neck supple. No rigidity. Right lower leg: No edema. Left lower leg: No edema. Lymphadenopathy:      Cervical: No cervical adenopathy. Skin:     General: Skin is warm and dry. Capillary Refill: Capillary refill takes less than 2 seconds. Coloration: Skin is not jaundiced. Findings: No rash. Neurological:      General: No focal deficit present. Mental Status: She is alert and oriented to person, place, and time. Mental status is at baseline.    Psychiatric:         Mood and Affect: Mood normal.         Behavior: Behavior normal.         ED Medications  Medications   fentanyl citrate (PF) 100 MCG/2ML 50 mcg (50 mcg Intravenous Given 8/23/23 1549)   ondansetron (ZOFRAN) injection 4 mg (4 mg Intravenous Given 8/23/23 1549)   sodium chloride 0.9 % bolus 1,000 mL (0 mL Intravenous Stopped 8/23/23 1828)   pantoprazole (PROTONIX) injection 40 mg (40 mg Intravenous Given 8/23/23 2055)   ondansetron (ZOFRAN) injection 4 mg (4 mg Intravenous Given 8/24/23 1155)       Diagnostic Studies  Results Reviewed     Procedure Component Value Units Date/Time    Urine culture [[id number]] Collected: 08/23/23 1352    Lab Status: Final result Specimen: Urine, Other Updated: 08/25/23 0957     Urine Culture 30,000-39,000 cfu/ml    Comprehensive metabolic panel [[id number]]  (Abnormal) Collected: 08/24/23 1215    Lab Status: Final result Specimen: Blood from Arm, Left Updated: 08/24/23 1355     Sodium 140 mmol/L      Potassium 4.0 mmol/L      Chloride 109 mmol/L      CO2 22 mmol/L      ANION GAP 9 mmol/L      BUN 6 mg/dL      Creatinine 0.55 mg/dL      Glucose 75 mg/dL      Glucose, Fasting 75 mg/dL      Calcium 8.0 mg/dL      Corrected Calcium 8.6 mg/dL      AST 12 U/L      ALT 22 U/L      Alkaline Phosphatase 34 U/L      Total Protein 5.2 g/dL      Albumin 3.2 g/dL      Total Bilirubin 0.37 mg/dL      eGFR 121 ml/min/1.73sq m     Narrative:      [place] guidelines for Chronic Kidney Disease (CKD):   •  Stage 1 with normal or high GFR (GFR > 90 mL/min/1.73 square meters)  •  Stage 2 Mild CKD (GFR = 60-89 mL/min/1.73 square meters)  •  Stage 3A Moderate CKD (GFR = 45-59 mL/min/1.73 square meters)  •  Stage 3B Moderate CKD (GFR = 30-44 mL/min/1.73 square meters)  •  Stage 4 Severe CKD (GFR = 15-29 mL/min/1.73 square meters)  •  Stage 5 End Stage CKD (GFR <15 mL/min/1.73 square meters)  Note: GFR calculation is accurate only with a steady state creatinine    Magnesium [[id number]]  (Abnormal) Collected: 08/24/23 1215    Lab Status: Final result Specimen: Blood from Arm, Left Updated: 08/24/23 1355     Magnesium 1.5 mg/dL     Phosphorus [[id number]]  (Normal) Collected: 08/24/23 1215    Lab Status: Final result Specimen: Blood from Arm, Left Updated: 08/24/23 4818 Phosphorus 3.6 mg/dL     CBC and differential [[id number]] Collected: 08/24/23 1215    Lab Status: Final result Specimen: Blood from Arm, Left Updated: 08/24/23 1231     WBC 5.12 Thousand/uL      RBC 4.25 Million/uL      Hemoglobin 13.0 g/dL      Hematocrit 38.6 %      MCV 91 fL      MCH 30.6 pg      MCHC 33.7 g/dL      RDW 12.8 %      MPV 10.5 fL      Platelets 662 Thousands/uL      nRBC 0 /100 WBCs      Neutrophils Relative 44 %      Immat GRANS % 0 %      Lymphocytes Relative 44 %      Monocytes Relative 10 %      Eosinophils Relative 1 %      Basophils Relative 1 %      Neutrophils Absolute 2.24 Thousands/µL      Immature Grans Absolute 0.01 Thousand/uL      Lymphocytes Absolute 2.26 Thousands/µL      Monocytes Absolute 0.49 Thousand/µL      Eosinophils Absolute 0.07 Thousand/µL      Basophils Absolute 0.05 Thousands/µL     Comprehensive metabolic panel [[id number]] Collected: 08/23/23 1344    Lab Status: Final result Specimen: Blood from Arm, Left Updated: 08/23/23 1422     Sodium 139 mmol/L      Potassium 3.9 mmol/L      Chloride 107 mmol/L      CO2 28 mmol/L      ANION GAP 4 mmol/L      BUN 9 mg/dL      Creatinine 0.75 mg/dL      Glucose 88 mg/dL      Calcium 8.6 mg/dL      AST 21 U/L      ALT 36 U/L      Alkaline Phosphatase 43 U/L      Total Protein 6.5 g/dL      Albumin 4.1 g/dL      Total Bilirubin 0.42 mg/dL      eGFR 103 ml/min/1.73sq m     Narrative:      [facility] guidelines for Chronic Kidney Disease (CKD):   •  Stage 1 with normal or high GFR (GFR > 90 mL/min/1.73 square meters)  •  Stage 2 Mild CKD (GFR = 60-89 mL/min/1.73 square meters)  •  Stage 3A Moderate CKD (GFR = 45-59 mL/min/1.73 square meters)  •  Stage 3B Moderate CKD (GFR = 30-44 mL/min/1.73 square meters)  •  Stage 4 Severe CKD (GFR = 15-29 mL/min/1.73 square meters)  •  Stage 5 End Stage CKD (GFR <15 mL/min/1.73 square meters)  Note: GFR calculation is accurate only with a steady state creatinine    Lipase [[id number]]  (Normal) Collected: 08/23/23 1344    Lab Status: Final result Specimen: Blood from Arm, Left Updated: 08/23/23 1422     Lipase 39 u/L     Urine Microscopic [[id number]]  (Abnormal) Collected: 08/23/23 1352    Lab Status: Final result Specimen: Urine, Other Updated: 08/23/23 1422     RBC, UA 1-2 /hpf      WBC, UA 10-20 /hpf      Epithelial Cells Occasional /hpf      Bacteria, UA None Seen /hpf     UA w Reflex to Microscopic w Reflex to Culture [[id number]]  (Abnormal) Collected: 08/23/23 1352    Lab Status: Final result Specimen: Urine, Other Updated: 08/23/23 1405     Color, UA Light Yellow     Clarity, UA Clear     Specific Gravity, UA 1.014     pH, UA 6.0     Leukocytes, UA Moderate     Nitrite, UA Negative     Protein, UA Negative mg/dl      Glucose, UA Negative mg/dl      Ketones, UA Negative mg/dl      Urobilinogen, UA <2.0 mg/dl      Bilirubin, UA Negative     Occult Blood, UA Negative    POCT pregnancy, urine [[id number]]  (Normal) Resulted: 08/23/23 1358    Lab Status: Final result Updated: 08/23/23 1358     EXT Preg Test, Ur Negative     Control Valid    CBC and differential [[id number]] Collected: 08/23/23 1344    Lab Status: Final result Specimen: Blood from Arm, Left Updated: 08/23/23 1354     WBC 6.00 Thousand/uL      RBC 4.24 Million/uL      Hemoglobin 12.7 g/dL      Hematocrit 38.6 %      MCV 91 fL      MCH 30.0 pg      MCHC 32.9 g/dL      RDW 12.9 %      MPV 10.1 fL      Platelets 738 Thousands/uL      nRBC 0 /100 WBCs      Neutrophils Relative 47 %      Immat GRANS % 0 %      Lymphocytes Relative 40 %      Monocytes Relative 10 %      Eosinophils Relative 2 %      Basophils Relative 1 %      Neutrophils Absolute 2.86 Thousands/µL      Immature Grans Absolute 0.01 Thousand/uL      Lymphocytes Absolute 2.41 Thousands/µL      Monocytes Absolute 0.57 Thousand/µL      Eosinophils Absolute 0.10 Thousand/µL      Basophils Absolute 0.05 Thousands/µL                  MRI abdomen wo contrast and mrcp Final Result by Hemant Brown MD (08/24 3486)      Cholelithiasis. No evidence of choledocholithiasis. Workstation performed: [id number]         US right upper quadrant   Final Result by Sonia Cleaning MD (08/23 0023)      1. Cholelithiasis without findings of acute cholecystitis. 2. Linear echogenic focus noted within the common bile duct measuring 8 x 2 mm. Unclear whether this could be artifactual or due to choledocholithiasis. No biliary ductal dilation. Suggest further evaluation with MRCP. 3. Mild hepatomegaly and hepatic steatosis. The study was marked in [facility] for immediate notification. Workstation performed: [id number]               Procedures  US Guided Peripheral IV    Date/Time: 8/23/2023 4:27 PM    Performed by: Adan García MD  Authorized by: Adan García MD    Patient location:  ED  Performed by:  Resident  Indications:     Indications: difficulty obtaining IV access      Image availability:  Images available in PACS  Procedure details:     Patient evaluated for contraindications to access (i.e. fistula, thrombosis, etc): Yes      Standard clean technique used for ultrasound access: Yes      Location:  Left antecubital    Catheter size:  18 gauge    Number of attempts:  1    Successful placement: yes    Post-procedure details:     Post-procedure:  Dressing applied    Patient tolerance of procedure: Tolerated well, no immediate complications          ED Course                                       Medical Decision Making  Medical complexity: Patient presenting with mild epigastric and right upper quadrant discomfort. Considering possibility of biliary colic or cholecystitis. Also possibly gastritis, urinary tract infection with ascension. With patient's benign presentation doubt any intra-abdominal abscess or anastomotic leak.     I did review patient's medical record and prior surgical notes from her medical chart.    Reassessment/disposition: Patient has no evidence of acute cholecystitis but does have gallstones in the gallbladder. Patient is feeling improved after symptomatic treatment here in the emergency department however is  on exam.  Using shared decision making, the patient and I decided together that the next best course of action was to call surgery for an evaluation of patients symptoms and likely admit for observation and serial abdominal exams. Surgical team contacted and came to evaluate patient. I signed out to night resident [facility] prior to final disposition, please see handoff note for any major changes to patient's clinical course or disposition. Amount and/or Complexity of Data Reviewed  Labs: ordered. Radiology: ordered. Risk  Prescription drug management. Disposition  Final diagnoses:   Biliary colic   Abnormal US (ultrasound) of abdomen   Abdominal pain     Time reflects when diagnosis was documented in both MDM as applicable and the Disposition within this note     Time User Action Codes Description Comment    8/23/2023  4:01 PM Mak Gonzalez Add [Q51.35] Biliary colic     1/07/5652  1:65 PM Arely Chowdary[address] [R93.5] Abnormal US (ultrasound) of abdomen     8/23/2023  4:57 PM Reita jail [R10.9] Abdominal pain     8/23/2023  7:42 PM Cathy Irving Other acute appendicitis     8/24/2023  9:28 PM Shannan Castro Modify [H96.564] Other acute appendicitis     8/26/2023 11:30 AM Shukri Francis Modify [U98.690] Other acute appendicitis     8/26/2023 11:30 AM Shukri Francis Add [K81.0] Acute cholecystitis       ED Disposition     ED Disposition   Discharge    Condition   Stable    Date/Time   Wed Aug 23, 2023  4:01 PM    [facility] discharge to home/self care.                Follow-up Information     Follow up With Specialties Details Why Contact Info Additional Information    [facility], Physician Assistant  For followup [phone]       [address] [place] [address] Emergency Department Emergency Medicine Go to  If symptoms worsen, As needed [address] [phone]  [facility] Emergency Department, [address], [place], [place], [address]  General Surgery  Call to discuss getting your gallbladder out electively [address] [place] [address]  [address], [address] [facility], [place], [place], [address] [place] [facility]   [address] [place] [address] [phone] [facility], [address], [address] [place] [address], [address] [facility]          Discharge Medication List as of 8/26/2023  1:03 PM      START taking these medications    Details   docusate sodium (COLACE) 100 mg capsule Take 1 capsule (100 mg total) by mouth 2 (two) times a day for 7 days, Starting Sat 8/26/2023, Until Sat 9/2/2023, Normal      methocarbamol (ROBAXIN) 500 mg tablet Take 1 tablet (500 mg total) by mouth 3 (three) times a day for 14 days, Starting Sat 8/26/2023, Until Sat 9/9/2023, Normal      oxyCODONE (Roxicodone) 5 immediate release tablet Take 1 tablet (5 mg total) by mouth every 6 (six) hours as needed for moderate pain or severe pain for up to 6 doses Max Daily Amount: 20 mg, Starting Sat 8/26/2023, Normal         CONTINUE these medications which have NOT CHANGED    Details   acetaminophen (TYLENOL) 500 mg tablet  , Starting Wed 10/6/2021, Historical Med      Biotin 2500 MCG CAPS Take by mouth, Historical Med      Calcium Carbonate-Vit D-Min (CALCIUM 1200 PO) Take by mouth  , Historical Med      calcium citrate (CALCITRATE) 950 (200 Ca) MG tablet Take 1 tablet (950 mg total) by mouth 2 (two) times a day, Starting Wed 12/28/2022, Normal      cyanocobalamin (VITAMIN B-12) 1000 MCG tablet Take 1,000 mcg by mouth daily, Historical Med      Diclofenac Sodium (VOLTAREN) 1 % Starting Fri 12/3/2021, Historical Med      dicyclomine (BENTYL) 20 mg tablet Take 1 tablet (20 mg total) by mouth 2 (two) times a day, Starting Mon 1/16/2023, Normal      ergocalciferol (VITAMIN D2) 50,000 units Take 1 capsule (50,000 Units total) by mouth 2 (two) times a week, Starting Mon 5/15/2023, Until Sun 8/13/2023, Normal      famotidine (PEPCID) 20 mg tablet Take 1 tablet (20 mg total) by mouth 2 (two) times a day as needed for heartburn, Starting Wed 4/19/2023, Until Fri 5/19/2023 at 2359, Normal      ferrous sulfate 325 (65 Fe) mg tablet Take 325 mg by mouth daily with breakfast, Historical Med      Multiple Vitamin (multivitamin) tablet Take 1 tablet by mouth daily , Historical Med      omeprazole (PriLOSEC) 20 mg delayed release capsule Take 2 capsules (40 mg total) by mouth daily, Starting Wed 12/21/2022, Normal      polyethylene glycol (MIRALAX) 17 g packet Take 17 g by mouth daily TAKEN AS NEEDED, Historical Med      simethicone (MYLICON) 80 mg chewable tablet Chew 1 tablet (80 mg total) every 6 (six) hours as needed for flatulence, Starting Wed 12/21/2022, Normal         STOP taking these medications       gabapentin (NEURONTIN) 300 mg capsule Comments:   Reason for Stopping:             Outpatient Discharge Orders   Ambulatory Referral to Gastroenterology   Standing Status: Future Standing Exp.  Date: 08/23/24      Discharge Diet     Lifting restrictions     Call provider for:  extreme fatigue     Call provider for:  persistent dizziness or light-headedness     Call provider for:  hives     Call provider for:  difficulty breathing, headache or visual disturbances     Call provider for: active or persistent bleeding     Call provider for:  redness, tenderness, or signs of infection (pain, swelling, redness, odor or green/yellow discharge around incision site)     Call provider for:  severe uncontrolled pain     Call provider for:  persistent nausea or vomiting       PDMP Review       Value Time User    PDMP Reviewed  Yes 8/26/2023 11:29 AM Shayne Lancaster PA-C           ED Provider  Attending physically available and evaluated Shante Dinh. I managed the patient along with the ED Attending.     Electronically Signed by         Anni Mcnamara MD  09/01/23 8607

## 2023-08-23 NOTE — ED PROCEDURE NOTE
Procedure  US Guided Peripheral IV    Date/Time: 8/23/2023 3:52 PM    Performed by: Gui Kidd MD  Authorized by: Gui Kidd MD    Patient location:  ED  Performed by:  Resident  Indications:     Indications: difficulty obtaining IV access      Image availability:  Images available in PACS and still images obtained  Procedure details:     Patient evaluated for contraindications to access (i.e. fistula, thrombosis, etc): Yes      Standard clean technique used for ultrasound access: Yes      Location:  Right arm    Catheter size:  18 gauge    Number of attempts:  1    Successful placement: yes    Post-procedure details:     Post-procedure:  Dressing applied    Assessment: free fluid flow and no signs of infiltration      Post-procedure complications: none      Patient tolerance of procedure:   Tolerated well, no immediate complications                     Gui Kidd MD  08/23/23 7071

## 2023-08-23 NOTE — LETTER
[address] [place] [address] 6  [address] [address]  Dept: [phone]    August 26, 2023     Patient: Jewell Bowling   YOB: 1990   Date of Visit: 8/23/2023       To Whom it May Concern:    Jackie Galicia is under my professional care. She was seen in the hospital from 8/23/2023 to 08/26/23. She may return to school on 8/30/2023 with the following limitations weight lifting restrictions of 20 pounds up to 2 weeks after surgery, 30 pounds up to 3 weeks after surgery, and 40 pounds up to 4 weeks after surgery. She may resume weight lifting as tolerated after 4 weeks after surgery. If you have any questions or concerns, please don't hesitate to call.          Sincerely,          Carolyn Gray MD

## 2023-08-23 NOTE — DISCHARGE INSTRUCTIONS
You have multiple gallstones which are very likely blocking at the neck of your gallbladder causing pain especially after eating. Once you are gallbladder relaxes several hours after eating then you seem to get some relief as the stone center free to move back into the gallbladder. This is fairly common especially with your age and risk factors that we discussed. If your pain is recurrent and frequent you will eventually need your gallbladder to be taken out. This can be done electively and you can contact surgery at the number below to discuss the consultation. Please follow-up with your primary care doctor in the meantime. Also be sure to come back to the emergency department if you have a recurrence of your severe pain or if you are having other symptoms such as nausea and vomiting. You have a few other abnormal findings on your Ultrasound: 1) there is an echogenic focus in the CBD without any evidence of any other disease in CBD. This is likely artifact but needs to have further evaluation to rule out more serious causes of the finding that would not be causing your acute symptoms today. 2) You have something called hepatosteatosis on your US. With normal Liver Function, this is something to mention to your primary doctor, but no specific followup is needed at this time.

## 2023-08-24 ENCOUNTER — ANESTHESIA (OUTPATIENT)
Dept: PERIOP | Facility: HOSPITAL | Age: 33
End: 2023-08-24
Payer: MEDICARE

## 2023-08-24 ENCOUNTER — APPOINTMENT (OUTPATIENT)
Dept: RADIOLOGY | Facility: HOSPITAL | Age: 33
End: 2023-08-24
Payer: MEDICARE

## 2023-08-24 ENCOUNTER — ANESTHESIA EVENT (OUTPATIENT)
Dept: PERIOP | Facility: HOSPITAL | Age: 33
End: 2023-08-24
Payer: MEDICARE

## 2023-08-24 LAB
ABO GROUP BLD: NORMAL
ALBUMIN SERPL BCP-MCNC: 3.2 G/DL (ref 3.5–5)
ALP SERPL-CCNC: 34 U/L (ref 34–104)
ALT SERPL W P-5'-P-CCNC: 22 U/L (ref 7–52)
ANION GAP SERPL CALCULATED.3IONS-SCNC: 9 MMOL/L
AST SERPL W P-5'-P-CCNC: 12 U/L (ref 13–39)
BASOPHILS # BLD AUTO: 0.05 THOUSANDS/ÂΜL (ref 0–0.1)
BASOPHILS NFR BLD AUTO: 1 % (ref 0–1)
BILIRUB SERPL-MCNC: 0.37 MG/DL (ref 0.2–1)
BLD GP AB SCN SERPL QL: NEGATIVE
BUN SERPL-MCNC: 6 MG/DL (ref 5–25)
CALCIUM ALBUM COR SERPL-MCNC: 8.6 MG/DL (ref 8.3–10.1)
CALCIUM SERPL-MCNC: 8 MG/DL (ref 8.4–10.2)
CHLORIDE SERPL-SCNC: 109 MMOL/L (ref 96–108)
CO2 SERPL-SCNC: 22 MMOL/L (ref 21–32)
CREAT SERPL-MCNC: 0.55 MG/DL (ref 0.6–1.3)
EOSINOPHIL # BLD AUTO: 0.07 THOUSAND/ÂΜL (ref 0–0.61)
EOSINOPHIL NFR BLD AUTO: 1 % (ref 0–6)
ERYTHROCYTE [DISTWIDTH] IN BLOOD BY AUTOMATED COUNT: 12.8 % (ref 11.6–15.1)
GFR SERPL CREATININE-BSD FRML MDRD: 121 ML/MIN/1.73SQ M
GLUCOSE P FAST SERPL-MCNC: 75 MG/DL (ref 65–99)
GLUCOSE SERPL-MCNC: 75 MG/DL (ref 65–140)
HCT VFR BLD AUTO: 38.6 % (ref 34.8–46.1)
HGB BLD-MCNC: 13 G/DL (ref 11.5–15.4)
IMM GRANULOCYTES # BLD AUTO: 0.01 THOUSAND/UL (ref 0–0.2)
IMM GRANULOCYTES NFR BLD AUTO: 0 % (ref 0–2)
LYMPHOCYTES # BLD AUTO: 2.26 THOUSANDS/ÂΜL (ref 0.6–4.47)
LYMPHOCYTES NFR BLD AUTO: 44 % (ref 14–44)
MAGNESIUM SERPL-MCNC: 1.5 MG/DL (ref 1.9–2.7)
MCH RBC QN AUTO: 30.6 PG (ref 26.8–34.3)
MCHC RBC AUTO-ENTMCNC: 33.7 G/DL (ref 31.4–37.4)
MCV RBC AUTO: 91 FL (ref 82–98)
MONOCYTES # BLD AUTO: 0.49 THOUSAND/ÂΜL (ref 0.17–1.22)
MONOCYTES NFR BLD AUTO: 10 % (ref 4–12)
NEUTROPHILS # BLD AUTO: 2.24 THOUSANDS/ÂΜL (ref 1.85–7.62)
NEUTS SEG NFR BLD AUTO: 44 % (ref 43–75)
NRBC BLD AUTO-RTO: 0 /100 WBCS
PHOSPHATE SERPL-MCNC: 3.6 MG/DL (ref 2.7–4.5)
PLATELET # BLD AUTO: 184 THOUSANDS/UL (ref 149–390)
PMV BLD AUTO: 10.5 FL (ref 8.9–12.7)
POTASSIUM SERPL-SCNC: 4 MMOL/L (ref 3.5–5.3)
PROT SERPL-MCNC: 5.2 G/DL (ref 6.4–8.4)
RBC # BLD AUTO: 4.25 MILLION/UL (ref 3.81–5.12)
RH BLD: NEGATIVE
SODIUM SERPL-SCNC: 140 MMOL/L (ref 135–147)
SPECIMEN EXPIRATION DATE: NORMAL
WBC # BLD AUTO: 5.12 THOUSAND/UL (ref 4.31–10.16)

## 2023-08-24 PROCEDURE — 86850 RBC ANTIBODY SCREEN: CPT

## 2023-08-24 PROCEDURE — 74181 MRI ABDOMEN W/O CONTRAST: CPT

## 2023-08-24 PROCEDURE — 88304 TISSUE EXAM BY PATHOLOGIST: CPT | Performed by: PATHOLOGY

## 2023-08-24 PROCEDURE — 84100 ASSAY OF PHOSPHORUS: CPT

## 2023-08-24 PROCEDURE — 96366 THER/PROPH/DIAG IV INF ADDON: CPT

## 2023-08-24 PROCEDURE — 83735 ASSAY OF MAGNESIUM: CPT

## 2023-08-24 PROCEDURE — 47562 LAPAROSCOPIC CHOLECYSTECTOMY: CPT | Performed by: SURGERY

## 2023-08-24 PROCEDURE — 80053 COMPREHEN METABOLIC PANEL: CPT

## 2023-08-24 PROCEDURE — 86901 BLOOD TYPING SEROLOGIC RH(D): CPT

## 2023-08-24 PROCEDURE — 99232 SBSQ HOSP IP/OBS MODERATE 35: CPT | Performed by: SURGERY

## 2023-08-24 PROCEDURE — 85025 COMPLETE CBC W/AUTO DIFF WBC: CPT

## 2023-08-24 PROCEDURE — 86900 BLOOD TYPING SEROLOGIC ABO: CPT

## 2023-08-24 RX ORDER — HEPARIN SODIUM 5000 [USP'U]/ML
5000 INJECTION, SOLUTION INTRAVENOUS; SUBCUTANEOUS EVERY 8 HOURS SCHEDULED
Status: DISCONTINUED | OUTPATIENT
Start: 2023-08-24 | End: 2023-08-26 | Stop reason: HOSPADM

## 2023-08-24 RX ORDER — ONDANSETRON 2 MG/ML
INJECTION INTRAMUSCULAR; INTRAVENOUS AS NEEDED
Status: DISCONTINUED | OUTPATIENT
Start: 2023-08-24 | End: 2023-08-24

## 2023-08-24 RX ORDER — SUCCINYLCHOLINE/SOD CL,ISO/PF 100 MG/5ML
SYRINGE (ML) INTRAVENOUS AS NEEDED
Status: DISCONTINUED | OUTPATIENT
Start: 2023-08-24 | End: 2023-08-24

## 2023-08-24 RX ORDER — ONDANSETRON 2 MG/ML
4 INJECTION INTRAMUSCULAR; INTRAVENOUS ONCE
Status: COMPLETED | OUTPATIENT
Start: 2023-08-24 | End: 2023-08-24

## 2023-08-24 RX ORDER — ROCURONIUM BROMIDE 10 MG/ML
INJECTION, SOLUTION INTRAVENOUS AS NEEDED
Status: DISCONTINUED | OUTPATIENT
Start: 2023-08-24 | End: 2023-08-24

## 2023-08-24 RX ORDER — PHENYLEPHRINE HCL IN 0.9% NACL 1 MG/10 ML
SYRINGE (ML) INTRAVENOUS AS NEEDED
Status: DISCONTINUED | OUTPATIENT
Start: 2023-08-24 | End: 2023-08-24

## 2023-08-24 RX ORDER — MIDAZOLAM HYDROCHLORIDE 2 MG/2ML
INJECTION, SOLUTION INTRAMUSCULAR; INTRAVENOUS AS NEEDED
Status: DISCONTINUED | OUTPATIENT
Start: 2023-08-24 | End: 2023-08-24

## 2023-08-24 RX ORDER — PROPOFOL 10 MG/ML
INJECTION, EMULSION INTRAVENOUS AS NEEDED
Status: DISCONTINUED | OUTPATIENT
Start: 2023-08-24 | End: 2023-08-24

## 2023-08-24 RX ORDER — FENTANYL CITRATE 50 UG/ML
INJECTION, SOLUTION INTRAMUSCULAR; INTRAVENOUS AS NEEDED
Status: DISCONTINUED | OUTPATIENT
Start: 2023-08-24 | End: 2023-08-24

## 2023-08-24 RX ORDER — ONDANSETRON 2 MG/ML
4 INJECTION INTRAMUSCULAR; INTRAVENOUS ONCE AS NEEDED
Status: DISCONTINUED | OUTPATIENT
Start: 2023-08-24 | End: 2023-08-25 | Stop reason: HOSPADM

## 2023-08-24 RX ORDER — DEXAMETHASONE SODIUM PHOSPHATE 10 MG/ML
INJECTION, SOLUTION INTRAMUSCULAR; INTRAVENOUS AS NEEDED
Status: DISCONTINUED | OUTPATIENT
Start: 2023-08-24 | End: 2023-08-24

## 2023-08-24 RX ORDER — LIDOCAINE HYDROCHLORIDE 10 MG/ML
INJECTION, SOLUTION EPIDURAL; INFILTRATION; INTRACAUDAL; PERINEURAL AS NEEDED
Status: DISCONTINUED | OUTPATIENT
Start: 2023-08-24 | End: 2023-08-24

## 2023-08-24 RX ORDER — HYDROMORPHONE HCL IN WATER/PF 6 MG/30 ML
0.2 PATIENT CONTROLLED ANALGESIA SYRINGE INTRAVENOUS
Status: DISCONTINUED | OUTPATIENT
Start: 2023-08-24 | End: 2023-08-25 | Stop reason: HOSPADM

## 2023-08-24 RX ORDER — BUPIVACAINE HYDROCHLORIDE 5 MG/ML
INJECTION, SOLUTION EPIDURAL; INTRACAUDAL AS NEEDED
Status: DISCONTINUED | OUTPATIENT
Start: 2023-08-24 | End: 2023-08-24 | Stop reason: HOSPADM

## 2023-08-24 RX ORDER — SODIUM CHLORIDE, SODIUM LACTATE, POTASSIUM CHLORIDE, CALCIUM CHLORIDE 600; 310; 30; 20 MG/100ML; MG/100ML; MG/100ML; MG/100ML
100 INJECTION, SOLUTION INTRAVENOUS CONTINUOUS
Status: DISCONTINUED | OUTPATIENT
Start: 2023-08-24 | End: 2023-08-26 | Stop reason: HOSPADM

## 2023-08-24 RX ORDER — FENTANYL CITRATE/PF 50 MCG/ML
50 SYRINGE (ML) INJECTION
Status: DISCONTINUED | OUTPATIENT
Start: 2023-08-24 | End: 2023-08-25 | Stop reason: HOSPADM

## 2023-08-24 RX ADMIN — ROCURONIUM BROMIDE 10 MG: 10 INJECTION, SOLUTION INTRAVENOUS at 21:22

## 2023-08-24 RX ADMIN — SODIUM CHLORIDE 8 MCG: 9 INJECTION, SOLUTION INTRAVENOUS at 20:52

## 2023-08-24 RX ADMIN — FENTANYL CITRATE 50 MCG: 50 INJECTION INTRAMUSCULAR; INTRAVENOUS at 20:23

## 2023-08-24 RX ADMIN — FENTANYL CITRATE 50 MCG: 50 INJECTION INTRAMUSCULAR; INTRAVENOUS at 20:54

## 2023-08-24 RX ADMIN — OXYCODONE HYDROCHLORIDE 10 MG: 10 TABLET ORAL at 09:15

## 2023-08-24 RX ADMIN — FENTANYL CITRATE 50 MCG: 50 INJECTION INTRAMUSCULAR; INTRAVENOUS at 22:40

## 2023-08-24 RX ADMIN — HYDROMORPHONE HYDROCHLORIDE 0.2 MG: 0.2 INJECTION, SOLUTION INTRAMUSCULAR; INTRAVENOUS; SUBCUTANEOUS at 10:49

## 2023-08-24 RX ADMIN — CEFAZOLIN SODIUM 2000 MG: 2 SOLUTION INTRAVENOUS at 03:48

## 2023-08-24 RX ADMIN — METRONIDAZOLE 500 MG: 500 INJECTION, SOLUTION INTRAVENOUS at 13:21

## 2023-08-24 RX ADMIN — ONDANSETRON 4 MG: 2 INJECTION INTRAMUSCULAR; INTRAVENOUS at 11:55

## 2023-08-24 RX ADMIN — LIDOCAINE HYDROCHLORIDE 50 MG: 10 INJECTION, SOLUTION EPIDURAL; INFILTRATION; INTRACAUDAL; PERINEURAL at 20:23

## 2023-08-24 RX ADMIN — METRONIDAZOLE: 500 INJECTION, SOLUTION INTRAVENOUS at 20:26

## 2023-08-24 RX ADMIN — CEFAZOLIN SODIUM 2000 MG: 2 SOLUTION INTRAVENOUS at 20:26

## 2023-08-24 RX ADMIN — Medication 100 MCG: at 20:35

## 2023-08-24 RX ADMIN — ACETAMINOPHEN 650 MG: 325 TABLET, FILM COATED ORAL at 03:48

## 2023-08-24 RX ADMIN — HYDROMORPHONE HYDROCHLORIDE 0.2 MG: 0.2 INJECTION, SOLUTION INTRAMUSCULAR; INTRAVENOUS; SUBCUTANEOUS at 22:52

## 2023-08-24 RX ADMIN — METRONIDAZOLE 500 MG: 500 INJECTION, SOLUTION INTRAVENOUS at 04:52

## 2023-08-24 RX ADMIN — Medication 100 MG: at 20:23

## 2023-08-24 RX ADMIN — HYDROMORPHONE HYDROCHLORIDE 0.2 MG: 0.2 INJECTION, SOLUTION INTRAMUSCULAR; INTRAVENOUS; SUBCUTANEOUS at 22:45

## 2023-08-24 RX ADMIN — FENTANYL CITRATE 50 MCG: 50 INJECTION INTRAMUSCULAR; INTRAVENOUS at 22:34

## 2023-08-24 RX ADMIN — HYDROMORPHONE HYDROCHLORIDE 0.2 MG: 0.2 INJECTION, SOLUTION INTRAMUSCULAR; INTRAVENOUS; SUBCUTANEOUS at 18:43

## 2023-08-24 RX ADMIN — OXYCODONE HYDROCHLORIDE 10 MG: 10 TABLET ORAL at 13:21

## 2023-08-24 RX ADMIN — OXYCODONE HYDROCHLORIDE 10 MG: 10 TABLET ORAL at 17:30

## 2023-08-24 RX ADMIN — MIDAZOLAM 2 MG: 1 INJECTION INTRAMUSCULAR; INTRAVENOUS at 20:17

## 2023-08-24 RX ADMIN — SUGAMMADEX 200 MG: 100 INJECTION, SOLUTION INTRAVENOUS at 21:56

## 2023-08-24 RX ADMIN — ROCURONIUM BROMIDE 30 MG: 10 INJECTION, SOLUTION INTRAVENOUS at 20:35

## 2023-08-24 RX ADMIN — HYDROMORPHONE HYDROCHLORIDE 0.2 MG: 0.2 INJECTION, SOLUTION INTRAMUSCULAR; INTRAVENOUS; SUBCUTANEOUS at 23:08

## 2023-08-24 RX ADMIN — PROPOFOL 200 MG: 10 INJECTION, EMULSION INTRAVENOUS at 20:23

## 2023-08-24 RX ADMIN — CEFAZOLIN SODIUM 2000 MG: 2 SOLUTION INTRAVENOUS at 10:50

## 2023-08-24 RX ADMIN — HYDROMORPHONE HYDROCHLORIDE 0.2 MG: 0.2 INJECTION, SOLUTION INTRAMUSCULAR; INTRAVENOUS; SUBCUTANEOUS at 23:01

## 2023-08-24 RX ADMIN — SODIUM CHLORIDE 4 MCG: 9 INJECTION, SOLUTION INTRAVENOUS at 21:42

## 2023-08-24 RX ADMIN — DEXAMETHASONE SODIUM PHOSPHATE 8 MG: 10 INJECTION, SOLUTION INTRAMUSCULAR; INTRAVENOUS at 20:36

## 2023-08-24 RX ADMIN — ROCURONIUM BROMIDE 20 MG: 10 INJECTION, SOLUTION INTRAVENOUS at 21:03

## 2023-08-24 RX ADMIN — ONDANSETRON 4 MG: 2 INJECTION INTRAMUSCULAR; INTRAVENOUS at 20:36

## 2023-08-24 RX ADMIN — ENOXAPARIN SODIUM 40 MG: 40 INJECTION SUBCUTANEOUS at 09:42

## 2023-08-24 RX ADMIN — CEFAZOLIN SODIUM 2000 MG: 2 SOLUTION INTRAVENOUS at 18:43

## 2023-08-24 NOTE — ANESTHESIA PREPROCEDURE EVALUATION
Procedure:  CHOLECYSTECTOMY LAPAROSCOPIC (Abdomen)    Relevant Problems   CARDIO   (+) Intractable migraine without aura and with status migrainosus   (+) Primary hypertriglyceridemia      ENDO   (+) Secondary hyperparathyroidism (HCC)      HEMATOLOGY   (+) Iron deficiency anemia secondary to inadequate dietary iron intake      MUSCULOSKELETAL   (+) Chondromalacia   (+) Strain of sternocleidomastoid muscle      NEURO/PSYCH   (+) Headache   (+) Intractable migraine without aura and with status migrainosus        Physical Exam    Airway    Mallampati score: II  TM Distance: >3 FB  Neck ROM: full     Dental   No notable dental hx     Cardiovascular  Rhythm: regular, Rate: normal, Cardiovascular exam normal    Pulmonary  Pulmonary exam normal Breath sounds clear to auscultation,     Other Findings        Anesthesia Plan  ASA Score- 2     Anesthesia Type- general with ASA Monitors. Additional Monitors:   Airway Plan: ETT. Comment: Risks of general anesthesia discussed including likely possibility of PONV and sore throat, as well as the rare possibilities of aspiration, dental/oropharyngeal/ocular injuries, or grave/life threatening anesthetic and surgical emergencies. .       Plan Factors-Exercise tolerance (METS): >4 METS. Chart reviewed. Patient summary reviewed. Patient instructed to abstain from smoking on day of procedure. Patient did not smoke on day of surgery. Induction- intravenous. Postoperative Plan- Plan for postoperative opioid use. Planned trial extubation    Informed Consent- Anesthetic plan and risks discussed with patient. I personally reviewed this patient with the CRNA. Discussed and agreed on the Anesthesia Plan with the CRNA. Roger Sahu

## 2023-08-24 NOTE — PLAN OF CARE
Problem: PAIN - ADULT  Goal: Verbalizes/displays adequate comfort level or baseline comfort level  Description: Interventions:  - Encourage patient to monitor pain and request assistance  - Assess pain using appropriate pain scale  - Administer analgesics based on type and severity of pain and evaluate response  - Implement non-pharmacological measures as appropriate and evaluate response  - Consider cultural and social influences on pain and pain management  - Notify physician/advanced practitioner if interventions unsuccessful or patient reports new pain  Outcome: Progressing     Problem: INFECTION - ADULT  Goal: Absence or prevention of progression during hospitalization  Description: INTERVENTIONS:  - Assess and monitor for signs and symptoms of infection  - Monitor lab/diagnostic results  - Monitor all insertion sites, i.e. indwelling lines, tubes, and drains  - Monitor endotracheal if appropriate and nasal secretions for changes in amount and color  - [place] appropriate cooling/warming therapies per order  - Administer medications as ordered  - Instruct and encourage patient and family to use good hand hygiene technique  - Identify and instruct in appropriate isolation precautions for identified infection/condition  Outcome: Progressing

## 2023-08-24 NOTE — RESULT ENCOUNTER NOTE
Traci Horne,  Your levels look great! I would still recommend reducing the dose of the iron to see if your abdominal pain improves. I will continue to monitor your levels. I would recommend repeat in 3 months. Please let me know if you have any further questions.   Have a great day,  Sara

## 2023-08-24 NOTE — PLAN OF CARE
Problem: PAIN - ADULT  Goal: Verbalizes/displays adequate comfort level or baseline comfort level  Description: Interventions:  - Encourage patient to monitor pain and request assistance  - Assess pain using appropriate pain scale  - Administer analgesics based on type and severity of pain and evaluate response  - Implement non-pharmacological measures as appropriate and evaluate response  - Consider cultural and social influences on pain and pain management  - Notify physician/advanced practitioner if interventions unsuccessful or patient reports new pain  Outcome: Progressing     Problem: INFECTION - ADULT  Goal: Absence or prevention of progression during hospitalization  Description: INTERVENTIONS:  - Assess and monitor for signs and symptoms of infection  - Monitor lab/diagnostic results  - Monitor all insertion sites, i.e. indwelling lines, tubes, and drains  - Monitor endotracheal if appropriate and nasal secretions for changes in amount and color  - [place] appropriate cooling/warming therapies per order  - Administer medications as ordered  - Instruct and encourage patient and family to use good hand hygiene technique  - Identify and instruct in appropriate isolation precautions for identified infection/condition  Outcome: Progressing     Problem: SAFETY ADULT  Goal: Patient will remain free of falls  Description: INTERVENTIONS:  - Educate patient/family on patient safety including physical limitations  - Instruct patient to call for assistance with activity   - Consult OT/PT to assist with strengthening/mobility   - Keep Call bell within reach  - Keep bed low and locked with side rails adjusted as appropriate  - Keep care items and personal belongings within reach  - Initiate and maintain comfort rounds  - Make Fall Risk Sign visible to staff  - Offer Toileting every  Hours, in advance of need  - Initiate/Maintain alarm  - Obtain necessary fall risk management equipment:   - Apply yellow socks and bracelet for high fall risk patients  - Consider moving patient to room near nurses station  Outcome: Progressing  Goal: Maintain or return to baseline ADL function  Description: INTERVENTIONS:  -  Assess patient's ability to carry out ADLs; assess patient's baseline for ADL function and identify physical deficits which impact ability to perform ADLs (bathing, care of mouth/teeth, toileting, grooming, dressing, etc.)  - Assess/evaluate cause of self-care deficits   - Assess range of motion  - Assess patient's mobility; develop plan if impaired  - Assess patient's need for assistive devices and provide as appropriate  - Encourage maximum independence but intervene and supervise when necessary  - Involve family in performance of ADLs  - Assess for home care needs following discharge   - Consider OT consult to assist with ADL evaluation and planning for discharge  - Provide patient education as appropriate  Outcome: Progressing  Goal: Maintains/Returns to pre admission functional level  Description: INTERVENTIONS:  - Perform BMAT or MOVE assessment daily.   - Set and communicate daily mobility goal to care team and patient/family/caregiver. - Collaborate with rehabilitation services on mobility goals if consulted  - Perform Range of Motion  times a day. - Reposition patient every  hours.   - Dangle patient  times a day  - Stand patient  times a day  - Ambulate patient  times a day  - Out of bed to chair  times a day   - Out of bed for meals  times a day  - Out of bed for toileting  - Record patient progress and toleration of activity level   Outcome: Progressing     Problem: DISCHARGE PLANNING  Goal: Discharge to home or other facility with appropriate resources  Description: INTERVENTIONS:  - Identify barriers to discharge w/patient and caregiver  - Arrange for needed discharge resources and transportation as appropriate  - Identify discharge learning needs (meds, wound care, etc.)  - Arrange for interpretive services to assist at discharge as needed  - Refer to Case Management Department for coordinating discharge planning if the patient needs post-hospital services based on physician/advanced practitioner order or complex needs related to functional status, cognitive ability, or social support system  Outcome: Progressing     Problem: Knowledge Deficit  Goal: Patient/family/caregiver demonstrates understanding of disease process, treatment plan, medications, and discharge instructions  Description: Complete learning assessment and assess knowledge base. Interventions:  - Provide teaching at level of understanding  - Provide teaching via preferred learning methods  Outcome: Progressing     Problem: Knowledge Deficit  Goal: Patient/family/caregiver demonstrates understanding of disease process, treatment plan, medications, and discharge instructions  Description: Complete learning assessment and assess knowledge base.   Interventions:  - Provide teaching at level of understanding  - Provide teaching via preferred learning methods  Outcome: Progressing     Problem: SAFETY ADULT  Goal: Patient will remain free of falls  Description: INTERVENTIONS:  - Educate patient/family on patient safety including physical limitations  - Instruct patient to call for assistance with activity   - Consult OT/PT to assist with strengthening/mobility   - Keep Call bell within reach  - Keep bed low and locked with side rails adjusted as appropriate  - Keep care items and personal belongings within reach  - Initiate and maintain comfort rounds  - Make Fall Risk Sign visible to staff  - Offer Toileting every  Hours, in advance of need  - Initiate/Maintain alarm  - Obtain necessary fall risk management equipment:   - Apply yellow socks and bracelet for high fall risk patients  - Consider moving patient to room near nurses station  Outcome: Progressing  Goal: Maintain or return to baseline ADL function  Description: INTERVENTIONS:  -  Assess patient's ability to carry out ADLs; assess patient's baseline for ADL function and identify physical deficits which impact ability to perform ADLs (bathing, care of mouth/teeth, toileting, grooming, dressing, etc.)  - Assess/evaluate cause of self-care deficits   - Assess range of motion  - Assess patient's mobility; develop plan if impaired  - Assess patient's need for assistive devices and provide as appropriate  - Encourage maximum independence but intervene and supervise when necessary  - Involve family in performance of ADLs  - Assess for home care needs following discharge   - Consider OT consult to assist with ADL evaluation and planning for discharge  - Provide patient education as appropriate  Outcome: Progressing  Goal: Maintains/Returns to pre admission functional level  Description: INTERVENTIONS:  - Perform BMAT or MOVE assessment daily.   - Set and communicate daily mobility goal to care team and patient/family/caregiver. - Collaborate with rehabilitation services on mobility goals if consulted  - Perform Range of Motion  times a day. - Reposition patient every  hours.   - Dangle patient  times a day  - Stand patient  times a day  - Ambulate patient  times a day  - Out of bed to chair  times a day   - Out of bed for meals times a day  - Out of bed for toileting  - Record patient progress and toleration of activity level   Outcome: Progressing     Problem: DISCHARGE PLANNING  Goal: Discharge to home or other facility with appropriate resources  Description: INTERVENTIONS:  - Identify barriers to discharge w/patient and caregiver  - Arrange for needed discharge resources and transportation as appropriate  - Identify discharge learning needs (meds, wound care, etc.)  - Arrange for interpretive services to assist at discharge as needed  - Refer to Case Management Department for coordinating discharge planning if the patient needs post-hospital services based on physician/advanced practitioner order or complex needs related to functional status, cognitive ability, or social support system  Outcome: Progressing     Problem: DISCHARGE PLANNING  Goal: Discharge to home or other facility with appropriate resources  Description: INTERVENTIONS:  - Identify barriers to discharge w/patient and caregiver  - Arrange for needed discharge resources and transportation as appropriate  - Identify discharge learning needs (meds, wound care, etc.)  - Arrange for interpretive services to assist at discharge as needed  - Refer to Case Management Department for coordinating discharge planning if the patient needs post-hospital services based on physician/advanced practitioner order or complex needs related to functional status, cognitive ability, or social support system  Outcome: Progressing     Problem: Knowledge Deficit  Goal: Patient/family/caregiver demonstrates understanding of disease process, treatment plan, medications, and discharge instructions  Description: Complete learning assessment and assess knowledge base.   Interventions:  - Provide teaching at level of understanding  - Provide teaching via preferred learning methods  Outcome: Progressing

## 2023-08-24 NOTE — PROGRESS NOTES
Progress Note - General surgery Surgery  : Red Surgery Resident on [place] 26 y.o. female MRN: [id number]  Unit/Bed#: [facility] 610-01 Encounter: [id number]    Assessment:  26 y.o. female with biliary colic presenting with 1 day of abdominal pain, found to have stones on right upper quadrant ultrasound, MRCP confirmed cholelithiasis without choledocholithiasis. Vitals signs within normal limits and stable    Urine output 1 cc documented    WBCpending from 6  Hemoglobin pending from 12.7  Creatinine pending from 0.75  T. bili pending from 0.42    Plan:  N.p.o. for or today, may resume diet following procedure  Laparoscopic cholecystectomy  Continue 125 LR, discontinue with toleration of diet  Continue IV antibiotics, Ancef Flagyl  Feeding Plan -n.p.o. for now  Anticoagulation Plan-Lovenox  Infection Plan -Ancef Flagyl  Disposition Plan -pending recovery from laparoscopic cholecystectomy, unlikely to require rehabilitation    Subjective/Objective     Subjective: Patient seen and examined at bedside. No acute events overnight. Discussed the MRCP results with the patient this morning. Patient is agreeable to surgery and will sign consent this morning. Patient reports her pain is improving. She denies nausea vomiting fevers chills and shortness of breath overnight. She is not having bowel movements but is passing gas. She is urinating      Objective:     Physical Exam:  GEN: NAD  HEENT: MMM  CV: Well-perfused regular rate  Lung: Normal effort  Ab: Soft nondistended, right upper quadrant tenderness  Extrem: No lower extremity edema bilaterally  Neuro: A+Ox3    Vitals: Temp:  [98.3 °F (36.8 °C)-98.7 °F (37.1 °C)] 98.6 °F (37 °C)  HR:  [69-75] 69  Resp:  [18] 18  BP: ()/(61-76) 96/64  There is no height or weight on file to calculate BMI.     I/O       08/22 0701 08/23 0700 08/23 0701 08/24 0700    IV Piggyback  1000    Total Intake(mL/kg)  1000 (11.1)    Urine (mL/kg/hr)  1    Total Output  1    Net  +999                  Lab, Imaging and other studies: I have personally reviewed pertinent reports.   , CBC with diff:   Lab Results   Component Value Date    WBC 6.00 08/23/2023    HGB 12.7 08/23/2023    HCT 38.6 08/23/2023    MCV 91 08/23/2023     08/23/2023    RBC 4.24 08/23/2023    MCH 30.0 08/23/2023    MCHC 32.9 08/23/2023    RDW 12.9 08/23/2023    MPV 10.1 08/23/2023    NRBC 0 08/23/2023   , BMP/CMP:   Lab Results   Component Value Date    SODIUM 139 08/23/2023    K 3.9 08/23/2023     08/23/2023    CO2 28 08/23/2023    BUN 9 08/23/2023    CREATININE 0.75 08/23/2023    CALCIUM 8.6 08/23/2023    AST 21 08/23/2023    ALT 36 08/23/2023    ALKPHOS 43 08/23/2023    EGFR 103 08/23/2023     VTE Pharmacologic Prophylaxis: Enoxaparin (Lovenox)  VTE Mechanical Prophylaxis: sequential compression device    UpdateTime 0558    Lee Manuel MD  8/24/2023 5:51 AM

## 2023-08-24 NOTE — H&P
H+P - General Surgery   Neftaly Llanos 26 y.o. female MRN: [id number]  Unit/Bed#: [facility] 610-01 Encounter: [id number]    Assessment/Plan     Assessment:  70-year-old female presenting with symptomatic biliary colic, with right upper quadrant pain since noon today, now improving.       Vital signs within normal limits     Right upper quadrant ultrasound showed cholelithiasis without acute cholecystitis and an 8 x 2 mm linear foci in the common bile duct read as artifact versus stone with concern for choledocholithiasis but no biliary duct dilation.       WBC 6  T. bili 0.42  ase 39   UA with 10-20 WBC      Plan:  Admit to general surgery  Stat MRCP  NPO  IV fluid   IV abx, ancef/flagyl  lovenox  Lap jf tomorrow, 8/24/23, pending MRCP results               HPI:  Neftaly Lalnos is a 26 y.o. female who presents with sharp right upper quadrant abdominal pain since noon today. She reports this pain started 5 to 10 minutes after eating mac & cheese with chicken. Following the onset of pain the patient had a bowel movement. She describes this pain as nonradiating. She had a similar but worse episode of pain back in December which required her to come into the emergency department. At that time the pain did radiate into her right lower abdomen and up into her right chest.  Since his pain began at noon, it has improved but is still bothering her. She denies nausea vomiting fevers chills and shortness of breath at this time. She noticed new onset constipation beginning approximately 3 weeks ago. The patient does take omeprazole for GERD. Additionally, the patient takes multivitamin and vitamin B. Surgical history includes Fani-en-Y gastric bypass on 10/4/2021.      Inpatient consult to Acute Care Surgery  Consult performed by: Marvin Gonzalez MD  Consult ordered by: Connie Benton MD     Consults    Review of Systems   Constitutional: Negative for chills and fever.    HENT: Negative for ear pain and sore throat. Eyes: Negative for pain and visual disturbance. Respiratory: Negative for cough and shortness of breath. Cardiovascular: Negative for chest pain and palpitations. Gastrointestinal: Positive for abdominal pain (RUQ and epigastric region, improving) and constipation (Last 3 weeks). Negative for abdominal distention, diarrhea, nausea and vomiting. Genitourinary: Negative for dysuria and hematuria. Musculoskeletal: Negative for arthralgias and back pain. Skin: Negative for color change and rash. Neurological: Negative for seizures and syncope. All other systems reviewed and are negative.       Historical Information   Past Medical History:   Diagnosis Date   • Back pain    • Bacterial vaginosis    • Bariatric surgery status    • Chlamydia    • Disease of thyroid gland     nodule   • Knee pain    • Morbidly obese (HCC)     gastric sleeve   today 10/4 2021   • MRSA carrier    • Obesity    • Postsurgical malabsorption    • Thyroid disease     benign    -right   gets scan yearly   • Urogenital trichomoniasis    • Wears glasses      Past Surgical History:   Procedure Laterality Date   • DE LAPS GSTR RSTCV PX W/BYP DRE-EN-Y LIMB <150 CM N/A 10/4/2021    Procedure: BYPASS GASTRIC  DRE-EN-Y LAPAROSCOPIC, AND INTRAOPERATIVE EGD;  Surgeon: Delaney Gallo MD;  Location: [facility];  Service: Bariatrics   • TONSILLECTOMY     • TOOTH EXTRACTION  10/01/2014   • WRIST SURGERY  05/25/2021     Social History   Social History     Substance and Sexual Activity   Alcohol Use Yes    Comment: 2-3 drinks monthly     Social History     Substance and Sexual Activity   Drug Use No     E-Cigarette/Vaping   • E-Cigarette Use Never User      E-Cigarette/Vaping Substances   • Nicotine No    • THC No    • CBD No    • Flavoring No      Social History     Tobacco Use   Smoking Status Never   • Passive exposure: Never   Smokeless Tobacco Never     Family History: non-contributory    Meds/Allergies   all current active meds have been reviewed  Allergies   Allergen Reactions   • Zyrtec [Cetirizine] Hives       Objective   First Vitals:   Blood Pressure: 104/76 (08/23/23 1337)  Pulse: 75 (08/23/23 1337)  Temperature: 98.7 °F (37.1 °C) (08/23/23 1337)  Temp Source: Temporal (08/23/23 1337)  Respirations: 18 (08/23/23 1337)  SpO2: 100 % (08/23/23 1337)    Current Vitals:   Blood Pressure: 116/61 (08/23/23 1949)  Pulse: 69 (08/23/23 1825)  Temperature: 98.3 °F (36.8 °C) (08/23/23 1949)  Temp Source: Temporal (08/23/23 1337)  Respirations: 18 (08/23/23 1949)  SpO2: 99 % (08/23/23 1825)      Intake/Output Summary (Last 24 hours) at 8/23/2023 2042  Last data filed at 8/23/2023 1828  Gross per 24 hour   Intake 1000 ml   Output --   Net 1000 ml       Invasive Devices     Peripheral Intravenous Line  Duration           Peripheral IV 08/23/23 Distal;Right;Upper;Ventral (anterior) Arm <1 day                Physical Exam:  General: No acute distress  Neuro: alert and oriented  HEENT: moist mucous membranes  CV: Well perfused, regular rate  Lungs: Normal work of breathing, no increased respiratory effort  Abdomen: Soft, non distended, mildly tender left more then right  Extremities: No edema, clubbing or cyanosis  Skin: Warm and dry    Lab Results: I have personally reviewed pertinent lab results. Imaging: I have personally reviewed pertinent reports. EKG, Pathology, and Other Studies: I have personally reviewed pertinent reports. Counseling / Coordination of Care  Total floor / unit time spent today 25 minutes. Greater than 50% of total time was spent with the patient and / or family counseling and / or coordination of care.        Louie Garnett MD  General Surgery PGY1

## 2023-08-25 PROBLEM — K81.0 ACUTE CHOLECYSTITIS: Status: ACTIVE | Noted: 2023-08-25

## 2023-08-25 LAB — BACTERIA UR CULT: NORMAL

## 2023-08-25 PROCEDURE — 99024 POSTOP FOLLOW-UP VISIT: CPT | Performed by: SURGERY

## 2023-08-25 PROCEDURE — NC001 PR NO CHARGE: Performed by: SURGERY

## 2023-08-25 PROCEDURE — 96366 THER/PROPH/DIAG IV INF ADDON: CPT

## 2023-08-25 RX ORDER — METHOCARBAMOL 500 MG/1
500 TABLET, FILM COATED ORAL EVERY 6 HOURS SCHEDULED
Status: DISCONTINUED | OUTPATIENT
Start: 2023-08-25 | End: 2023-08-26 | Stop reason: HOSPADM

## 2023-08-25 RX ORDER — GABAPENTIN 100 MG/1
100 CAPSULE ORAL 3 TIMES DAILY
Status: DISCONTINUED | OUTPATIENT
Start: 2023-08-25 | End: 2023-08-26 | Stop reason: HOSPADM

## 2023-08-25 RX ORDER — ACETAMINOPHEN 325 MG/1
650 TABLET ORAL EVERY 6 HOURS SCHEDULED
Status: DISCONTINUED | OUTPATIENT
Start: 2023-08-25 | End: 2023-08-26 | Stop reason: HOSPADM

## 2023-08-25 RX ADMIN — HEPARIN SODIUM 5000 UNITS: 5000 INJECTION INTRAVENOUS; SUBCUTANEOUS at 00:24

## 2023-08-25 RX ADMIN — ACETAMINOPHEN 650 MG: 325 TABLET, FILM COATED ORAL at 19:04

## 2023-08-25 RX ADMIN — HYDROMORPHONE HYDROCHLORIDE 0.2 MG: 0.2 INJECTION, SOLUTION INTRAMUSCULAR; INTRAVENOUS; SUBCUTANEOUS at 09:17

## 2023-08-25 RX ADMIN — ACETAMINOPHEN 650 MG: 325 TABLET, FILM COATED ORAL at 23:29

## 2023-08-25 RX ADMIN — OXYCODONE HYDROCHLORIDE 10 MG: 10 TABLET ORAL at 20:12

## 2023-08-25 RX ADMIN — HEPARIN SODIUM 5000 UNITS: 5000 INJECTION INTRAVENOUS; SUBCUTANEOUS at 11:17

## 2023-08-25 RX ADMIN — OXYCODONE HYDROCHLORIDE 10 MG: 10 TABLET ORAL at 12:00

## 2023-08-25 RX ADMIN — OXYCODONE HYDROCHLORIDE 10 MG: 10 TABLET ORAL at 07:56

## 2023-08-25 RX ADMIN — METHOCARBAMOL 500 MG: 500 TABLET ORAL at 21:34

## 2023-08-25 RX ADMIN — GABAPENTIN 100 MG: 100 CAPSULE ORAL at 16:11

## 2023-08-25 RX ADMIN — METHOCARBAMOL 500 MG: 500 TABLET ORAL at 16:11

## 2023-08-25 RX ADMIN — HYDROMORPHONE HYDROCHLORIDE 0.2 MG: 0.2 INJECTION, SOLUTION INTRAMUSCULAR; INTRAVENOUS; SUBCUTANEOUS at 13:24

## 2023-08-25 RX ADMIN — HEPARIN SODIUM 5000 UNITS: 5000 INJECTION INTRAVENOUS; SUBCUTANEOUS at 21:34

## 2023-08-25 RX ADMIN — OXYCODONE HYDROCHLORIDE 10 MG: 10 TABLET ORAL at 02:32

## 2023-08-25 RX ADMIN — GABAPENTIN 100 MG: 100 CAPSULE ORAL at 20:12

## 2023-08-25 RX ADMIN — OXYCODONE HYDROCHLORIDE 10 MG: 10 TABLET ORAL at 16:13

## 2023-08-25 RX ADMIN — HEPARIN SODIUM 5000 UNITS: 5000 INJECTION INTRAVENOUS; SUBCUTANEOUS at 16:11

## 2023-08-25 NOTE — UTILIZATION REVIEW
Initial Clinical Review    Admission: Date/Time/Statement:   No orders of the defined types were placed in this encounter. ED Arrival Information     Expected   -    Arrival   8/23/2023 13:33    Acuity   Urgent            Means of arrival   Walk-In    Escorted by   Self    Service   Surgery-General    Admission type   Emergency            Arrival complaint   abd pain           Chief Complaint   Patient presents with   • Abdominal Pain     Pt reports sharp right sided abdominal pain since lunch, no N/V pain radiates to low back, unable to find position of comfort, no urinary distress       Initial Presentation: 26 y.o. female  To ER w/c/o   symptomatic biliary colic, with right upper quadrant pain since noon today, now improving.  RUQ US showed cholelithiasis without acute cholecystitis and an 8 x 2 mm linear foci in the common bile duct read as artifact versus stone with concern for choledocholithiasis but no biliary duct dilation.      WBC 6     T. bili 0.42   Lipase 39       UA with 10-20 WBC     Admit OUTPATIENT  NO CHARGE BED  To Surgery service,  For treatment of symptomatic cholelithiasis. Will obtain MRCP (given question of linear foci at CBD and h/o RNYGB)  Keep NPO, provide IVF,  IV Pain/nausea control. Date:   8/24     Day 2:    OP NOTE:   CHOLECYSTECTOMY LAPAROSCOPIC. General anesthesia. Operative Findings:   Early acute cholecystitis. Cystic duct and cystic artery identified as the 2 and only 2 structures entering the gallbladder critical view of safety was identified. postoperative nausea that improved with Zofran. Postop IVAB  Ancef q8H and flagyl q8H.      8/25    POD 1     She is tolerating regular diet and voiding spontaneously. IVF dc'ed. Pain is well managed on analgesia. Regular diet as tolerated  Incentive spirometry hourly.      DVT prophylaxis: SQ Heparin          Monitor and replete electrolytes    ED Triage Vitals [08/23/23 1337]   Temperature Pulse Respirations Blood Pressure SpO2   98.7 °F (37.1 °C) 75 18 104/76 100 %      Temp Source Heart Rate Source Patient Position - Orthostatic VS BP Location FiO2 (%)   Temporal Monitor Lying Left arm --      Pain Score       9          Wt Readings from Last 1 Encounters:   08/23/23 89.8 kg (198 lb)     Additional Vital Signs:   08/25/23 02:20:33 98.8 °F (37.1 °C) 87 -- 111/69 83 map 97 % -- -- --   08/25/23 0150 -- 66 -- 117/71 86 97 % -- -- --   08/25/23 01:02:17 98.3 °F (36.8 °C) 71 -- 110/71 84 98 % -- -- --   08/25/23 01:02:10 98.3 °F (36.8 °C) 74 -- 110/71 84 98 % -- -- --   08/25/23 0045 -- 62 -- 107/72 84 97 % -- -- --   08/24/23 23:40:16 98 °F (36.7 °C) -- -- 116/67 83 -- -- -- --   08/24/23 2315 -- 74 19 112/63 85 98 % -- -- None (Room air)   08/24/23 2300 98.3 °F (36.8 °C) 70 13 109/64 78 98 % 28 2 L/min Nasal cannula   08/24/23 2245 -- 80 12 102/70 85 98 % 28 2 L/min Nasal cannula   08/24/23 2230 -- 66 17 115/73 90 98 % -- -- --   08/24/23 2215 -- 66 18 118/63 83 98 % -- -- None (Room air)   08/24/23 2205 98.3 °F (36.8 °C) 68 18 113/72 89 99 % -- -- None (Room air)     Pertinent Labs/Diagnostic Test Results:   8/23  EKG:  NSR      MRI abdomen wo contrast and mrcp   Final Result by Mercdees Mcrae MD (08/24 7880)   Cholelithiasis. No evidence of choledocholithiasis. US right upper quadrant   Final Result by Petey Nagy MD (08/23 1623)   1. Cholelithiasis without findings of acute cholecystitis. 2. Linear echogenic focus noted within the common bile duct measuring 8 x 2 mm. Unclear whether this could be artifactual or due to choledocholithiasis. No biliary ductal dilation. Suggest further evaluation with MRCP. 3. Mild hepatomegaly and hepatic steatosis.             Results from last 7 days   Lab Units 08/24/23  1215 08/23/23  1344 08/22/23  1034   WBC Thousand/uL 5.12 6.00 5.03   HEMOGLOBIN g/dL 13.0 12.7 14.1   HEMATOCRIT % 38.6 38.6 43.1   PLATELETS Thousands/uL 184 197 210   NEUTROS ABS Thousands/µL 2.24 2. 86 2.04         Results from last 7 days   Lab Units 08/24/23  1215 08/23/23  1344   SODIUM mmol/L 140 139   POTASSIUM mmol/L 4.0 3.9   CHLORIDE mmol/L 109* 107   CO2 mmol/L 22 28   ANION GAP mmol/L 9 4   BUN mg/dL 6 9   CREATININE mg/dL 0.55* 0.75   EGFR ml/min/1.73sq m 121 103   CALCIUM mg/dL 8.0* 8.6   MAGNESIUM mg/dL 1.5*  --    PHOSPHORUS mg/dL 3.6  --      Results from last 7 days   Lab Units 08/24/23  1215 08/23/23  1344 08/22/23  1034   AST U/L 12* 21 23   ALT U/L 22 36 39   ALK PHOS U/L 34 43 43   TOTAL PROTEIN g/dL 5.2* 6.5 7.0   ALBUMIN g/dL 3.2* 4.1 4.6   TOTAL BILIRUBIN mg/dL 0.37 0.42 0.51   BILIRUBIN DIRECT mg/dL  --   --  0.12         Results from last 7 days   Lab Units 08/24/23  1215 08/23/23  1344   GLUCOSE RANDOM mg/dL 75 88     Results from last 7 days   Lab Units 08/22/23  1034   FERRITIN ng/mL 37   IRON SATURATION % 47   IRON ug/dL 182   TIBC ug/dL 389     Results from last 7 days   Lab Units 08/23/23  1344   LIPASE u/L 39     Results from last 7 days   Lab Units 08/23/23  1352   CLARITY UA  Clear   COLOR UA  Light Yellow   SPEC GRAV UA  1.014   PH UA  6.0   GLUCOSE UA mg/dl Negative   KETONES UA mg/dl Negative   BLOOD UA  Negative   PROTEIN UA mg/dl Negative   NITRITE UA  Negative   BILIRUBIN UA  Negative   UROBILINOGEN UA (BE) mg/dl <2.0   LEUKOCYTES UA  Moderate*   WBC UA /hpf 10-20*   RBC UA /hpf 1-2   BACTERIA UA /hpf None Seen   EPITHELIAL CELLS WET PREP /hpf Occasional     Results from last 7 days   Lab Units 08/23/23  1352   URINE CULTURE  30,000-39,000 cfu/ml                   ED Treatment:   Medication Administration from 08/23/2023 1333 to 08/23/2023 1931       Date/Time Order Dose Route Action     08/23/2023 1549 EDT fentanyl citrate (PF) 100 MCG/2ML 50 mcg 50 mcg Intravenous Given     08/23/2023 1549 EDT ondansetron (ZOFRAN) injection 4 mg 4 mg Intravenous Given     08/23/2023 1545 EDT sodium chloride 0.9 % bolus 1,000 mL 1,000 mL Intravenous New Bag     08/23/2023 1916 EDT lactated ringers infusion 125 mL/hr Intravenous New Bag     08/23/2023 1916 EDT ceFAZolin (ANCEF) IVPB (premix in dextrose) 2,000 mg 50 mL 2,000 mg Intravenous New Bag        Past Medical History:   Diagnosis Date   • Back pain    • Bacterial vaginosis    • Bariatric surgery status    • Chlamydia    • Disease of thyroid gland     nodule   • Knee pain    • Morbidly obese (HCC)     gastric sleeve   today 10/4 2021   • MRSA carrier    • Obesity    • Postsurgical malabsorption    • Thyroid disease     benign    -right   gets scan yearly   • Urogenital trichomoniasis    • Wears glasses        Admitting Diagnosis: Biliary colic [F85.39]  Abdominal pain [R10.9]  Abnormal US (ultrasound) of abdomen [R93.5]  Age/Sex: 26 y.o. female  Admission Orders:  Postop care     Scheduled Medications:  acetaminophen, 650 mg, Oral, Q6H [address]  gabapentin, 100 mg, Oral, TID  heparin (porcine), 5,000 Units, Subcutaneous, Q8H RALPH  methocarbamol, 500 mg, Oral, Q6H [address]      Continuous IV Infusions:  lactated ringers, 100 mL/hr, Intravenous, Continuous      PRN Meds:  IV Dilaudid 0.2 mg . [place] ... 8/24 x2 and  8/25 x2,  Then dc'ed   oxyCODONE, 10 mg, Oral, Q4H PRN  . ... 8/24 x3 and 8/25 x4   oxyCODONE, 5 mg, Oral, Q4H PRN        IP CONSULT TO ACUTE CARE SURGERY    Network Utilization Review Department  ATTENTION: Please call with any questions or concerns to [phone] and carefully listen to the prompts so that you are directed to the right person. All voicemails are confidential.  Nik Hoffmann all requests for admission clinical reviews, approved or denied determinations and any other requests to dedicated fax number below belonging to the campus where the patient is receiving treatment. List of dedicated fax numbers for the Facilities:  [place] DENIALS (Administrative/Medical Necessity) [phone] [facility] [facility] [phone]   [address] [phone]   [facility] [address] [facility] [phone] [address] [address] [facility] [address] [place] [address] [address]  [phone]

## 2023-08-25 NOTE — ANESTHESIA POSTPROCEDURE EVALUATION
Post-Op Assessment Note    CV Status:  Stable    Pain management: adequate     Mental Status:  Arousable   Hydration Status:  Euvolemic   PONV Controlled:  Controlled   Airway Patency:  Patent      Post Op Vitals Reviewed: Yes      Staff: Anesthesiologist, CRNA         No notable events documented.     BP      Temp      Pulse     Resp      SpO2

## 2023-08-25 NOTE — OP NOTE
OPERATIVE REPORT  PATIENT NAME: Brenda Zhong    :  1990  MRN: [id number]  Pt Location: BE OR ROOM 04    SURGERY DATE: 2023    Surgeon(s) and Role:     * Reji Montes De Oca DO - Primary     * Teresita Flood MD - Assisting     * Beverly Coello MD - Assisting    Preop Diagnosis:  Other acute appendicitis [K35.890]    Post-Op Diagnosis Codes:     * Other acute appendicitis [K35.890]    Procedure(s):  CHOLECYSTECTOMY LAPAROSCOPIC    Specimen(s):  ID Type Source Tests Collected by Time Destination   1 :  Tissue Gallbladder TISSUE EXAM Reji Montes De Oca DO 2023        Estimated Blood Loss:   Minimal    Drains:  * No LDAs found *    Anesthesia Type:   General    Operative Indications: Other acute appendicitis [K35.890]    Operative Findings:  Early acute cholecystitis. Cystic duct and cystic artery identified as the 2 and only 2 structures entering the gallbladder critical view of safety was identified. Complications:   None    Procedure and Technique:  Patient was identified in the preoperative holding area taken to the operating room in stable condition. Upon arrival to the operating room identified verbally and via wristband. Laid supine on the operating table both arms were extended pressure points were padded patient was induced with general anesthesia airway was secured with endotracheal intubation per anesthesia colleagues. Abdomen was prepped and draped in regular sterile fashion timeout was called and all were in agreement. Attention was drawn just.  To the umbilicus a curvilinear incision was made after a generous amount of local anesthesia was injected subcutaneously and down to the fascia. Subcutaneous tissues were dissected down the fascia fascia was grasped retracted anteriorly, however did not enter the peritoneum and violated the preperitoneal plane upon insufflation therefore decision was made to perform varies needle in the left upper quadrant.   2 cm beneath left costal margin small stab incision was made with 11 blade scalpel varies needle was inserted into the abdomen and abdomen was insufflated 15 mmHg. Optiview technique was then performed at [facility]. We confirmed that there were no enterotomies or hollow viscus injuries. The supra umbilical incision was clear of any adhesions, and a 5 mm port was placed in the supraumbilical fashion. Now a 12 mm subxiphoid and 2 right upper quadrant 5 mm ports were placed under direct vision. Patient was placed in reverse Trendelenburg, right shoulder positioned up, gallbladder was retracted cephalad and medially, peritoneal attachments were taken off the lateral aspect of the gallbladder and infundibulum was dissected free of peritoneal attachments. Gallbladder was then retracted laterally, cystic duct and cystic artery were skeletonized, cystic plate was cleared critical view of safety was identified. Cystic duct and cystic artery were triply clipped with 12 mm Endo Clip. Excised with Endo scissors. Gallbladder was dissected free of the liver bed with Bovie electrocautery placed in a 10 mm Endobag and removed through the subxiphoid port. Gallbladder fossa and Morison's pouch were thoroughly irrigated with 1 L of normal saline solution. Cystic duct and cystic artery clips were in place. There was no bleeding from the gallbladder fossa. Abdomen was desufflated ports were removed under direct vision. Subxiphoid and supraumbilical port fascia were closed with 0 Vicryl figure-of-eight on a UR 6. Skin was closed with 4-0 Monocryl. Exofin skin glue was applied. Patient was awoken from anesthesia, extubated, taken to PACU in stable condition. At the end of the case sponge and instrument counts were performed all correct. Dr Ever Arango was present for the entire procedure. I was present for the entire procedure.     Patient Disposition:  PACU         SIGNATURE: Nick Drake MD  DATE: August 24, 2023  TIME: 9:46 PM

## 2023-08-25 NOTE — PROGRESS NOTES
Progress Note - General Surgery  : Red Surgery Resident on [place] 26 y.o. female MRN: [id number]  Unit/Bed#: [facility] 610-01 Encounter: [id number]    Assessment:  26 y.o. female with biliary colic presenting with 1 day of abdominal pain, found to have stones on right upper quadrant ultrasound, MRCP confirmed cholelithiasis without choledocholithiasis. She is s/p laparoscopic cholecystectomy on 8/24. She is tolerating regular diet and voiding spontaneously. AF. VSS on RA. U.O.: 2x voids  No A. M. labs     Plan:  Regular diet   100 cc/hr LR, discontinue with toleration of diet  Encourage OOB/ambulation   DVT prophylaxis: Lovenox  Disposition Plan-pending recovery from laparoscopic cholecystectomy, unlikely to require rehabilitation    Subjective/Objective     Subjective: AF, VSS on RA. She reports discomfort in the epigastric region, which is responding well to analgesia. She denies nausea, vomiting, fevers, chills, and shortness of breath overnight. She is voiding spontaneously. Patient has tolerated sips of water but has not yet eaten. Objective:     Physical Exam:  GEN: NAD  HEENT: MMM  CV: Well-perfused regular rate  Lung: Normal effort  Ab: Abdomen soft,  appropriately tender, nondistended. Laparoscopic incision sites are C/D/I with exophen   Extrem: No lower extremity edema bilaterally  Neuro: A+Ox3    Vitals: Temp:  [97.9 °F (36.6 °C)-98.8 °F (37.1 °C)] 98.8 °F (37.1 °C)  HR:  [62-87] 87  Resp:  [12-19] 19  BP: (102-118)/(63-73) 111/69  There is no height or weight on file to calculate BMI. I/O       08/22 0701  08/23 0700 08/23 0701  08/24 0700    IV Piggyback  1000    Total Intake(mL/kg)  1000 (11.1)    Urine (mL/kg/hr)  1    Total Output  1    Net  +999                  Lab, Imaging and other studies: I have personally reviewed pertinent reports.   , CBC with diff:   Lab Results   Component Value Date    WBC 5.12 08/24/2023    HGB 13.0 08/24/2023    HCT 38.6 08/24/2023    MCV 91 08/24/2023     08/24/2023    RBC 4.25 08/24/2023    MCH 30.6 08/24/2023    MCHC 33.7 08/24/2023    RDW 12.8 08/24/2023    MPV 10.5 08/24/2023    NRBC 0 08/24/2023   , BMP/CMP:   Lab Results   Component Value Date    SODIUM 140 08/24/2023    K 4.0 08/24/2023     (H) 08/24/2023    CO2 22 08/24/2023    BUN 6 08/24/2023    CREATININE 0.55 (L) 08/24/2023    CALCIUM 8.0 (L) 08/24/2023    AST 12 (L) 08/24/2023    ALT 22 08/24/2023    ALKPHOS 34 08/24/2023    EGFR 121 08/24/2023     VTE Pharmacologic Prophylaxis: Enoxaparin (Lovenox)  VTE Mechanical Prophylaxis: sequential compression device      Soha Robbins, MS-4  8/25/2023 6:26 AM

## 2023-08-25 NOTE — DISCHARGE SUMMARY
Discharge Summary    Bijal Thorne 26 y.o. female MRN: [id number]    Unit/Bed#: [facility] 610-01 Encounter: [id number]    Admission Date: 8/23/2023     Discharge Date: 08/26/23     Attending: Brit Pineda  Operative Surgeon: Sahra Barboza    Consultants: None    Admitting Diagnosis: Biliary colic [A74.76]  Abdominal pain [R10.9]  Abnormal US (ultrasound) of abdomen [R93.5]    Principle Diagnosis: Acute cholecystitis    Secondary Diagnosis:  Past Medical History:   Diagnosis Date   • Back pain    • Bacterial vaginosis    • Bariatric surgery status    • Chlamydia    • Disease of thyroid gland     nodule   • Knee pain    • Morbidly obese ([address])     gastric sleeve   today 10/4 2021   • MRSA carrier    • Obesity    • Postsurgical malabsorption    • Thyroid disease     benign    -right   gets scan yearly   • Urogenital trichomoniasis    • Wears glasses      Past Surgical History:   Procedure Laterality Date   • MA LAPS GSTR RSTCV PX W/BYP DRE-EN-Y LIMB <150 CM N/A 10/4/2021    Procedure: BYPASS GASTRIC  DRE-EN-Y LAPAROSCOPIC, AND INTRAOPERATIVE EGD;  Surgeon: Lova Kanner, MD;  Location: [facility];  Service: Bariatrics   • TONSILLECTOMY     • TOOTH EXTRACTION  10/01/2014   • WRIST SURGERY  05/25/2021        Procedures Performed: Procedure(s):  CHOLECYSTECTOMY LAPAROSCOPIC    Imaging:Procedure: US bedside procedure    Result Date: 8/24/2023  Narrative: [id number]. [id number]    Procedure: MRI abdomen wo contrast and mrcp    Result Date: 8/24/2023  Narrative: MRI OF THE ABDOMEN WITHOUT CONTRAST WITH MRCP INDICATION: 26 years / Female  Gee Joseph. COMPARISON:  none TECHNIQUE:  Multiplanar/multisequence MRI of the abdomen with 3D MRCP was performed without the administration of contrast. FINDINGS: LOWER CHEST:   Unremarkable. LIVER: Normal in size and configuration. No suspicious mass. Limited evaluation of hepatic veins and portal veins on this non-contrast MRI is unremarkable.  BILE DUCTS:  No intrahepatic or extrahepatic bile duct dilation. Common bile duct is normal in caliber. No choledocholithiasis, biliary stricture or suspicious mass. GALLBLADDER: Cholelithiasis. PANCREAS:  Unremarkable. ADRENAL GLANDS:  Unremarkable. SPLEEN:  Normal. KIDNEYS/PROXIMAL URETERS:  No hydroureteronephrosis. No suspicious renal mass. BOWEL:  No dilated loops of bowel. PERITONEAL CAVITY/RETROPERITONEUM:  No ascites. No mass. LYMPH NODES:  No abdominal lymphadenopathy. VASCULAR STRUCTURES:  Unremarkable. No aneurysm. ABDOMINAL WALL:  Unremarkable. OSSEOUS STRUCTURES:  No suspicious osseous lesion. Impression: Cholelithiasis. No evidence of choledocholithiasis. Workstation performed: [id number]     Procedure: US right upper quadrant    Result Date: 8/23/2023  Narrative: RIGHT UPPER QUADRANT ULTRASOUND INDICATION:     r/o acute cholecystitis. COMPARISON: Ultrasound abdomen 2/22/2016. CT abdomen pelvis 12/21/2022. TECHNIQUE:   Real-time ultrasound of the right upper quadrant was performed with a curvilinear transducer with both volumetric sweeps and still imaging techniques. FINDINGS: PANCREAS: Portions of the pancreas are obscured by bowel gas. Visualized portions of the pancreas are unremarkable. AORTA AND IVC:  Visualized portions are normal for patient age. LIVER: Size:  Mildly enlarged. The liver measures 19.6 cm in the midclavicular line. Contour:  Surface contour is smooth. Parenchyma: There is mild diffuse increased echogenicity with smooth echotexture, without significant beam attenuation or loss of periportal echogenicity. Most consistent with mild hepatic steatosis. No liver mass identified. Limited imaging of the main portal vein shows it to be patent and hepatopetal. BILIARY: The gallbladder is normal in caliber. No wall thickening or pericholecystic fluid. Shadowing gallstone(s) identified. No sonographic Love's sign. No intrahepatic biliary dilatation. CBD measures 4.0 mm.  Linear echogenic focus noted within the common bile duct measuring 8 x 2 mm. Unclear whether this could be artifactual or due to choledocholithiasis. KIDNEY: Right kidney measures 11.5 x 5.3 x 6.2 cm. Volume 197.1 mL Kidney within normal limits. ASCITES:   None. Impression: 1. Cholelithiasis without findings of acute cholecystitis. 2. Linear echogenic focus noted within the common bile duct measuring 8 x 2 mm. Unclear whether this could be artifactual or due to choledocholithiasis. No biliary ductal dilation. Suggest further evaluation with MRCP. 3. Mild hepatomegaly and hepatic steatosis. The study was marked in [facility] for immediate notification. Workstation performed: [id number]       Discharge Medications:  See after visit summary for reconciled discharge medications provided to patient and family. Brief HPI (per H/P): Ange Lamb a 26 y. o. female who presents with sharp right upper quadrant abdominal pain since noon today.  She reports this pain started 5 to 10 minutes after eating mac & cheese with chicken.  Following the onset of pain the patient had a bowel movement.  She describes this pain as nonradiating.  She had a similar but worse episode of pain back in December which required her to come into the emergency department.  At that time the pain did radiate into her right lower abdomen and up into her right chest.  Since his pain began at noon, it has improved but is still bothering her. Derrick Rodriguez denies nausea vomiting fevers chills and shortness of breath at this time.  She noticed new onset constipation beginning approximately 3 weeks ago. Angel Charlton patient does take omeprazole for GERD.  Additionally, the patient takes multivitamin and vitamin B.  Surgical history includes Fani-en-Y gastric bypass on 10/4/2021.     Hospital Course: Javi England is a 26 y.o. female who presented 8/23/2023 per HPI and was taken to the OR for above procedure.  Intraoperative findings included the following from operative report: Early acute cholecystitis, cystic duct and cystic artery identified as the 2 and only 2 structures entering the gallbladder. Critical view of safety was identified. Pt hospital course was uncomplicated. Patient was discharged on POD1. On the day of discharge, the patient was voiding spontaneously, ambulating at baseline, and pain was well controlled. The patient was sent home on home medication. She understood all instructions for discharge. She was also given the names and numbers of the providers as well as instructions for follow up appointments. Condition at Discharge: good     Provisions for Follow-Up Care:  See after visit summary for information related to follow-up care and any pertinent home health orders. Disposition: See After Visit Summary for discharge disposition information. Discharge instructions/Information to patient and family:   See after visit summary for information provided to patient and family. Planned Readmission: No    Discharge Statement   I spent 20 minutes discharging the patient. This time was spent on the day of discharge. I had direct contact with the patient on the day of discharge. Additional documentation is required if more than 30 minutes were spent on discharge.

## 2023-08-25 NOTE — UTILIZATION REVIEW
NOTIFICATION OF EMERGENT OUTPATIENT PROCEDURE   AUTHORIZATION REQUEST   SERVICING FACILITY:   [facility]  Address: [address] [facility], [address] [place] [address]  Tax ID: [id number]  NPI: [id number] ATTENDING PROVIDER:  Attending Name and NPI#: Babak Neely [[id number]]  Address: [address] [place] [address]  Phone: [phone]   ADMISSION INFORMATION:  Place of Service: On [facility] Code: 22 CPT Code:  & 61772  Patient presented to the ED and had an outpatient procedure     OUTPATIENT PROCEDURE INFORMATION  Surgery Date: 8/24/2023  Discharge Date/Time: No discharge date for patient encounter. Patient Preop Diagnosis:   Other acute appendicitis [K35.890] Post-Op Diagnosis Codes:     * Other acute appendicitis [K35.890]  Operative Indications: Other acute appendicitis [K35.890]  Procedure(s) (LRB):  CHOLECYSTECTOMY LAPAROSCOPIC (N/A)  CHOLECYSTECTOMY LAPAROSCOPIC:    Procedures:    * CHOLECYSTECTOMY LAPAROSCOPIC     UTILIZATION REVIEW CONTACT:  Mario Regalado Utilization   Network Utilization Review Department  Phone: [phone]  Fax: [phone]  Email: [email or web address]   Contact for approvals/pending authorizations, clinical reviews, and discharge. PHYSICIAN ADVISORY SERVICES:  Medical Necessity Denial & [id number] Review  Phone: [phone]  Fax: [phone]  Email: [email or web address]

## 2023-08-25 NOTE — PROGRESS NOTES
Postop Check    Procedure: CHOLECYSTECTOMY LAPAROSCOPIC    Subjective:  Afebrile, VSS. Patient is in no acute distress. Pain is well managed on analgesia. Patient reports postoperative nausea that improved with Zofran. She has not vomited. No UOP since procedure. Objective  Vitals:    08/24/23 2245 08/24/23 2300 08/24/23 2315 08/24/23 2340   BP: 102/70 109/64 112/63 116/67   Pulse: 80 70 74    Resp: 12 13 19    Temp:  98.3 °F (36.8 °C)  98 °F (36.7 °C)   TempSrc:       SpO2: 98% 98% 98%        GENERAL: No acute distress  CV: Regular rate and rhythm  LUNGS: Nonlabored respirations on RA  ABDOMEN: Abdomen soft,  appropriately tender, nondistended. Laparoscopic incision sites are C/D/I with exophen   SKIN: Warm, Dry. See incision details above.      Assessment and Plan:  Status post laparoscopic cholecystectomy. Patient is stable postoperatively.       LR @100cc/hr  Regular diet as tolerated  Incentive spirometry  DVT prophylaxis: SQH  Analgesia prn   Scheduled Zofran   Monitor and replete electrolytes    Pao Larios, MS-4

## 2023-08-25 NOTE — PLAN OF CARE
Problem: PAIN - ADULT  Goal: Verbalizes/displays adequate comfort level or baseline comfort level  Description: Interventions:  - Encourage patient to monitor pain and request assistance  - Assess pain using appropriate pain scale  - Administer analgesics based on type and severity of pain and evaluate response  - Implement non-pharmacological measures as appropriate and evaluate response  - Consider cultural and social influences on pain and pain management  - Notify physician/advanced practitioner if interventions unsuccessful or patient reports new pain  Outcome: Progressing     Problem: INFECTION - ADULT  Goal: Absence or prevention of progression during hospitalization  Description: INTERVENTIONS:  - Assess and monitor for signs and symptoms of infection  - Monitor lab/diagnostic results  - Monitor all insertion sites, i.e. indwelling lines, tubes, and drains  - Monitor endotracheal if appropriate and nasal secretions for changes in amount and color  - [place] appropriate cooling/warming therapies per order  - Administer medications as ordered  - Instruct and encourage patient and family to use good hand hygiene technique  - Identify and instruct in appropriate isolation precautions for identified infection/condition  Outcome: Progressing

## 2023-08-25 NOTE — DISCHARGE INSTR - AVS FIRST PAGE
Discharge instructions    1. General: You will feel pulling sensations around the wound and/or aches and pains around the incisions. This is normal. Even minor surgery is a change in your body and this is your body’s way of reaction to it. If you have had abdominal surgery, it may help to support the incision with a small pillow or blanket for comfort when moving or coughing. 2. Wound care: Glue - Leave glue alone, it will fall off on its own, no need for an additional dressings    3. Water: You may shower over the wound, unless there are drain tubes left in place. Do not bathe or use a pool or hot tub until cleared by the physician. You may shower right over the staples, glue or Steri-Strips and rinse wound with soapy water but do not scrub incision pat dry when you are done. 4. Activity: You may go up and down stairs, walk as much as you are comfortable, but walk at least 3 times each day. If you have had abdominal or hernia surgery, do not lift anything heavier than 15 pounds for at least 4 weeks. 5. Diet: You may resume a regular diet. If you had a same-day surgery or overnight stay surgery, you may wish to eat lightly for a few days: soups, crackers, and sandwiches. You may resume a regular diet when ready. 6. Medications: Resume all of your previous medications, unless told otherwise by the doctor. Tylenol and ibuoprofen is always fine, unless you are taking any narcotic pain medication containing Tylenol (such as Percocet, Darvocet, Vicodin, or anything containing acetaminophen). Do not take Tylenol if you're taking these medications. You do not need to take the narcotic pain medications unless you are having significant pain and discomfort. 7. Driving: He will need someone to drive you home on the day of surgery. Do not drive or make any important decisions while on narcotic pain medication or 24 hours and after anesthesia or sedation for surgery.  Generally, you may drive when your off all narcotic pain medications, and you can turn in your seat comfortably to check your blind spot. 8. Upset Stomach: You may take Maalox, Tums, or similar items for an upset stomach. If your narcotic pain medication causes an upset stomach, do not take it on an empty stomach. Try taking it with at least some crackers or toast.     9. Constipation: Patients often experienced constipation after surgery. You may take over-the-counter medication for this, such as Metamucil, Senokot, Dulcolax, milk of magnesia, etc. You may take a suppository unless you have had anorectal surgery such as a procedure on your hemorrhoids. If you experience significant nausea or vomiting after abdominal surgery, call the office before trying any of these medications. 10. Call the office: If you are experiencing any of the following, fevers above 101.5°, significant nausea or vomiting, if the wound develops drainage and/or is excessive redness around the wound, or if you have significant diarrhea or other worsening symptoms. 11. Pain: You may be given a prescription for pain. This will be given to the hospital, the day of surgery. 12. Sexual Activity: You may resume sexual activity when you feel ready and comfortable and your incision is sealed and healed without apparent infection risk.

## 2023-08-26 VITALS
DIASTOLIC BLOOD PRESSURE: 73 MMHG | TEMPERATURE: 98 F | RESPIRATION RATE: 18 BRPM | OXYGEN SATURATION: 98 % | SYSTOLIC BLOOD PRESSURE: 105 MMHG | HEART RATE: 85 BPM

## 2023-08-26 PROCEDURE — 96366 THER/PROPH/DIAG IV INF ADDON: CPT

## 2023-08-26 PROCEDURE — 99024 POSTOP FOLLOW-UP VISIT: CPT | Performed by: SURGERY

## 2023-08-26 RX ORDER — METHOCARBAMOL 500 MG/1
500 TABLET, FILM COATED ORAL 3 TIMES DAILY
Qty: 42 TABLET | Refills: 0 | Status: SHIPPED | OUTPATIENT
Start: 2023-08-26 | End: 2023-09-08

## 2023-08-26 RX ORDER — DOCUSATE SODIUM 100 MG/1
100 CAPSULE, LIQUID FILLED ORAL 2 TIMES DAILY
Qty: 14 CAPSULE | Refills: 0 | Status: SHIPPED | OUTPATIENT
Start: 2023-08-26 | End: 2023-09-02

## 2023-08-26 RX ORDER — METHOCARBAMOL 500 MG/1
500 TABLET, FILM COATED ORAL 3 TIMES DAILY
Qty: 42 TABLET | Refills: 0 | Status: CANCELLED | OUTPATIENT
Start: 2023-08-26 | End: 2023-09-09

## 2023-08-26 RX ORDER — OXYCODONE HYDROCHLORIDE 5 MG/1
5 TABLET ORAL EVERY 6 HOURS PRN
Qty: 6 TABLET | Refills: 0 | Status: SHIPPED | OUTPATIENT
Start: 2023-08-26

## 2023-08-26 RX ADMIN — OXYCODONE HYDROCHLORIDE 10 MG: 10 TABLET ORAL at 12:48

## 2023-08-26 RX ADMIN — ACETAMINOPHEN 650 MG: 325 TABLET, FILM COATED ORAL at 05:28

## 2023-08-26 RX ADMIN — METHOCARBAMOL 500 MG: 500 TABLET ORAL at 05:28

## 2023-08-26 RX ADMIN — GABAPENTIN 100 MG: 100 CAPSULE ORAL at 08:12

## 2023-08-26 RX ADMIN — OXYCODONE HYDROCHLORIDE 5 MG: 5 TABLET ORAL at 08:12

## 2023-08-26 RX ADMIN — HEPARIN SODIUM 5000 UNITS: 5000 INJECTION INTRAVENOUS; SUBCUTANEOUS at 12:48

## 2023-08-26 RX ADMIN — ACETAMINOPHEN 650 MG: 325 TABLET, FILM COATED ORAL at 11:25

## 2023-08-26 RX ADMIN — METHOCARBAMOL 500 MG: 500 TABLET ORAL at 11:25

## 2023-08-26 RX ADMIN — HEPARIN SODIUM 5000 UNITS: 5000 INJECTION INTRAVENOUS; SUBCUTANEOUS at 05:28

## 2023-08-26 NOTE — PLAN OF CARE
Problem: PAIN - ADULT  Goal: Verbalizes/displays adequate comfort level or baseline comfort level  Description: Interventions:  - Encourage patient to monitor pain and request assistance  - Assess pain using appropriate pain scale  - Administer analgesics based on type and severity of pain and evaluate response  - Implement non-pharmacological measures as appropriate and evaluate response  - Consider cultural and social influences on pain and pain management  - Notify physician/advanced practitioner if interventions unsuccessful or patient reports new pain  Outcome: Completed     Problem: INFECTION - ADULT  Goal: Absence or prevention of progression during hospitalization  Description: INTERVENTIONS:  - Assess and monitor for signs and symptoms of infection  - Monitor lab/diagnostic results  - Monitor all insertion sites, i.e. indwelling lines, tubes, and drains  - Monitor endotracheal if appropriate and nasal secretions for changes in amount and color  - [place] appropriate cooling/warming therapies per order  - Administer medications as ordered  - Instruct and encourage patient and family to use good hand hygiene technique  - Identify and instruct in appropriate isolation precautions for identified infection/condition  Outcome: Completed     Problem: SAFETY ADULT  Goal: Patient will remain free of falls  Description: INTERVENTIONS:  - Educate patient/family on patient safety including physical limitations  - Instruct patient to call for assistance with activity   - Consult OT/PT to assist with strengthening/mobility   - Keep Call bell within reach  - Keep bed low and locked with side rails adjusted as appropriate  - Keep care items and personal belongings within reach  - Initiate and maintain comfort rounds  - Make Fall Risk Sign visible to staff  - Apply yellow socks and bracelet for high fall risk patients  - Consider moving patient to room near nurses station  Outcome: Completed  Goal: Maintain or return to baseline ADL function  Description: INTERVENTIONS:  -  Assess patient's ability to carry out ADLs; assess patient's baseline for ADL function and identify physical deficits which impact ability to perform ADLs (bathing, care of mouth/teeth, toileting, grooming, dressing, etc.)  - Assess/evaluate cause of self-care deficits   - Assess range of motion  - Assess patient's mobility; develop plan if impaired  - Assess patient's need for assistive devices and provide as appropriate  - Encourage maximum independence but intervene and supervise when necessary  - Involve family in performance of ADLs  - Assess for home care needs following discharge   - Consider OT consult to assist with ADL evaluation and planning for discharge  - Provide patient education as appropriate  Outcome: Completed  Goal: Maintains/Returns to pre admission functional level  Description: INTERVENTIONS:  - Perform BMAT or MOVE assessment daily.   - Set and communicate daily mobility goal to care team and patient/family/caregiver. - Collaborate with rehabilitation services on mobility goals if consulted  - Perform Range of Motion 3 times a day. - Reposition patient every 2 hours.   - Dangle patient 3 times a day  - Stand patient 3 times a day  - Ambulate patient 3 times a day  - Out of bed to chair 3 times a day   - Out of bed for meals 3 times a day  - Out of bed for toileting  - Record patient progress and toleration of activity level   Outcome: Completed     Problem: DISCHARGE PLANNING  Goal: Discharge to home or other facility with appropriate resources  Description: INTERVENTIONS:  - Identify barriers to discharge w/patient and caregiver  - Arrange for needed discharge resources and transportation as appropriate  - Identify discharge learning needs (meds, wound care, etc.)  - Arrange for interpretive services to assist at discharge as needed  - Refer to Case Management Department for coordinating discharge planning if the patient needs post-hospital services based on physician/advanced practitioner order or complex needs related to functional status, cognitive ability, or social support system  Outcome: Completed     Problem: Knowledge Deficit  Goal: Patient/family/caregiver demonstrates understanding of disease process, treatment plan, medications, and discharge instructions  Description: Complete learning assessment and assess knowledge base.   Interventions:  - Provide teaching at level of understanding  - Provide teaching via preferred learning methods  Outcome: Completed

## 2023-08-26 NOTE — PROGRESS NOTES
Progress Note - Red Surgery  : Red Surgery Resident on [place] 26 y.o. female MRN: [id number]  Unit/Bed#: [facility] 610-01 Encounter: [id number]    Assessment:  26 y.o. female postop day 1 from a laparoscopic cholecystectomy. Doing well overall, pain well controlled, tolerating regular diet, and voiding spontaneously. Plan:  Dispo: Home  Regular diet  Encourage out of bed/ambulation  DVT prophylaxis    Subjective/Objective     Subjective: NAEO, patient seen and examined at bedside. Reports some tenderness in the right upper quadrant which is significantly improved relative to yesterday. She denies nausea, vomiting, fevers, chills, difficulty with urination or bowel movements. She is tolerating her diet well. Objective:     Physical Exam:  • General: No acute distress  • Head: Normocephalic, atraumatic  • Eyes: Conjunctivae unremarkable  • Nose: External appearance unremarkable  • Ears: External ears unremarkable  • Lung: Normal effort no signs of respiratory distress  • Ab: Soft, not distended, appropriately TTP, surgical incisions clean, dry, intact with no signs of erythema or induration. • Extrem: No cyanosis, clubbing, or edema  • Neuro: A+Ox3         Vitals: Temp:  [98.3 °F (36.8 °C)-98.5 °F (36.9 °C)] 98.4 °F (36.9 °C)  HR:  [81-92] 81  Resp:  [18] 18  BP: (100-113)/(64-75) 100/64  There is no height or weight on file to calculate BMI. I/O       08/24 0701 08/25 0700 08/25 0701  08/26 0700 08/26 0701  08/27 0700    P. O. 100 1136     I.V. (mL/kg) 1500 (16.7)      IV Piggyback 600      Total Intake(mL/kg) 2200 (24.5) 1136 (12.7)     Urine (mL/kg/hr)       Total Output       Net +2200 +1136            Unmeasured Urine Occurrence 2 x 1 x             Lab, Imaging and other studies: I have personally reviewed pertinent reports.   , CBC with diff: No results found for: "WBC", "HGB", "HCT", "MCV", "PLT", "ADJUSTEDWBC", "RBC", "MCH", "MCHC", "RDW", "MPV", "NRBC", BMP/CMP: No results found for: "SODIUM", "K", "CL", "CO2", "ANIONGAP", "BUN", "CREATININE", "GLUCOSE", "CALCIUM", "AST", "ALT", "ALKPHOS", "PROT", "BILITOT", "EGFR"  VTE Pharmacologic Prophylaxis: Enoxaparin (Lovenox)  VTE Mechanical Prophylaxis: sequential compression device      Jaki Novoa MD  8/26/2023 7:01 AM

## 2023-08-28 ENCOUNTER — TRANSITIONAL CARE MANAGEMENT (OUTPATIENT)
Dept: FAMILY MEDICINE CLINIC | Facility: CLINIC | Age: 33
End: 2023-08-28

## 2023-08-30 PROCEDURE — 88304 TISSUE EXAM BY PATHOLOGIST: CPT | Performed by: PATHOLOGY

## 2023-08-31 ENCOUNTER — OFFICE VISIT (OUTPATIENT)
Dept: FAMILY MEDICINE CLINIC | Facility: CLINIC | Age: 33
End: 2023-08-31
Payer: MEDICARE

## 2023-08-31 VITALS
HEART RATE: 70 BPM | HEIGHT: 66 IN | WEIGHT: 196 LBS | OXYGEN SATURATION: 98 % | DIASTOLIC BLOOD PRESSURE: 62 MMHG | SYSTOLIC BLOOD PRESSURE: 100 MMHG | TEMPERATURE: 97.5 F | BODY MASS INDEX: 31.5 KG/M2 | RESPIRATION RATE: 16 BRPM

## 2023-08-31 DIAGNOSIS — K81.0 ACUTE CHOLECYSTITIS: Primary | ICD-10-CM

## 2023-08-31 PROCEDURE — 99496 TRANSJ CARE MGMT HIGH F2F 7D: CPT | Performed by: PHYSICIAN ASSISTANT

## 2023-08-31 NOTE — PROGRESS NOTES
Name: Moe Crowley      : 1990      MRN: [id number]  Encounter Provider: Boom Benitez PA-C  Encounter Date: 2023   Encounter department: [address] [place] [address]     1. Acute cholecystitis  -     Ambulatory Referral to General Surgery; Future      Hospital records have been reviewed    There is no clarity with her  discharge instructions  upon review today. - I did advise her that she should be following up with general surgery. This is a person that should be giving her a work note. She will call their office today to make an appointment  She was never given a phone number upon discharge to make an appointment with general surgery. - She also has a follow-up with a GI specialist in the near future  There are no specific follow-up care instructions on the discharge summary explaining why she is seeing GI.  -I did recommend she change her X strength Tylenol to Tylenol arthritis to see if this more beneficial for her pain.  -Otherwise she will follow-up with me as scheduled in *Modal software was used to dictate this note. It may contain errors with dictating incorrect words/spelling. Please contact provider directly for any questions. Subjective      Patient presents today for transition of care visit posthospitalization after laparoscopic cholecystectomy. She states she is having a lot of pressure in her abdomen. She denies any fever, chills, cough. She states yesterday she had loose stools. Denies any nausea or vomiting. She states that one of the doctors gave her a note in the hospital for work indicating she can go back to work on . She states that the doctor never discussed with her about her work duties prior to giving that note. She was never given a phone number upon discharge to follow-up with the general surgeon. She is taking oxycodone and x-ray trying Tylenol for her pain.     Review of Systems   Constitutional: Negative for chills and fever. Gastrointestinal: Positive for abdominal pain and diarrhea. Negative for nausea and vomiting.        Current Outpatient Medications on File Prior to Visit   Medication Sig   • acetaminophen (TYLENOL) 500 mg tablet     • calcium citrate (CALCITRATE) 950 (200 Ca) MG tablet Take 1 tablet (950 mg total) by mouth 2 (two) times a day   • cyanocobalamin (VITAMIN B-12) 1000 MCG tablet Take 1,000 mcg by mouth daily   • docusate sodium (COLACE) 100 mg capsule Take 1 capsule (100 mg total) by mouth 2 (two) times a day for 7 days   • ferrous sulfate 325 (65 Fe) mg tablet Take 325 mg by mouth daily with breakfast   • methocarbamol (ROBAXIN) 500 mg tablet Take 1 tablet (500 mg total) by mouth 3 (three) times a day for 14 days   • Multiple Vitamin (multivitamin) tablet Take 1 tablet by mouth daily    • omeprazole (PriLOSEC) 20 mg delayed release capsule Take 2 capsules (40 mg total) by mouth daily   • oxyCODONE (Roxicodone) 5 immediate release tablet Take 1 tablet (5 mg total) by mouth every 6 (six) hours as needed for moderate pain or severe pain for up to 6 doses Max Daily Amount: 20 mg   • polyethylene glycol (MIRALAX) 17 g packet Take 17 g by mouth daily TAKEN AS NEEDED   • Biotin 2500 MCG CAPS Take by mouth (Patient not taking: Reported on 8/23/2023)   • Calcium Carbonate-Vit D-Min (CALCIUM 1200 PO) Take by mouth   (Patient not taking: Reported on 5/2/2023)   • Diclofenac Sodium (VOLTAREN) 1 %  (Patient not taking: Reported on 8/23/2023)   • dicyclomine (BENTYL) 20 mg tablet Take 1 tablet (20 mg total) by mouth 2 (two) times a day (Patient not taking: Reported on 8/22/2023)   • ergocalciferol (VITAMIN D2) 50,000 units Take 1 capsule (50,000 Units total) by mouth 2 (two) times a week   • famotidine (PEPCID) 20 mg tablet Take 1 tablet (20 mg total) by mouth 2 (two) times a day as needed for heartburn   • simethicone (MYLICON) 80 mg chewable tablet Chew 1 tablet (80 mg total) every 6 (six) hours as needed for flatulence (Patient not taking: Reported on 8/22/2023)       Objective     /62 (BP Location: Left arm, Patient Position: Sitting, Cuff Size: Large)   Pulse 70   Temp 97.5 °F (36.4 °C) (Tympanic)   Resp 16   Ht 5' 6" (1.676 m)   Wt 88.9 kg (196 lb)   LMP 08/09/2023   SpO2 98%   BMI 31.64 kg/m²     Physical Exam  Vitals reviewed. Constitutional:       General: She is not in acute distress. Appearance: Normal appearance. She is not ill-appearing, toxic-appearing or diaphoretic. HENT:      Head: Normocephalic and atraumatic. Cardiovascular:      Rate and Rhythm: Normal rate and regular rhythm. Heart sounds: Normal heart sounds. No murmur heard. Pulmonary:      Effort: Pulmonary effort is normal. No respiratory distress. Breath sounds: Normal breath sounds. No wheezing, rhonchi or rales. Abdominal:      General: Abdomen is flat. Bowel sounds are normal.      Tenderness: There is abdominal tenderness. Comments: Wound sites are healing well with no erythema or drainage   Musculoskeletal:      Cervical back: Neck supple. Neurological:      General: No focal deficit present. Mental Status: She is alert. Psychiatric:         Mood and Affect: Mood normal.         Behavior: Behavior normal.         Thought Content:  Thought content normal.         Judgment: Judgment normal.       Sara Cameron PA-C

## 2023-09-05 ENCOUNTER — OFFICE VISIT (OUTPATIENT)
Dept: GASTROENTEROLOGY | Facility: MEDICAL CENTER | Age: 33
End: 2023-09-05
Payer: MEDICARE

## 2023-09-05 VITALS
DIASTOLIC BLOOD PRESSURE: 65 MMHG | SYSTOLIC BLOOD PRESSURE: 100 MMHG | HEIGHT: 66 IN | TEMPERATURE: 98.5 F | WEIGHT: 196 LBS | BODY MASS INDEX: 31.5 KG/M2 | HEART RATE: 96 BPM

## 2023-09-05 DIAGNOSIS — R11.0 NAUSEA: Primary | ICD-10-CM

## 2023-09-05 DIAGNOSIS — R93.5 ABNORMAL US (ULTRASOUND) OF ABDOMEN: ICD-10-CM

## 2023-09-05 PROCEDURE — 99213 OFFICE O/P EST LOW 20 MIN: CPT | Performed by: NURSE PRACTITIONER

## 2023-09-05 NOTE — PROGRESS NOTES
[facility] - Outpatient Consultation  Cathy Reese 26 y.o. female MRN: [id number]  Encounter: [id number]          ASSESSMENT AND PLAN:    Cathy Reese is a 26 y.o. female who presents with complaint of epigastric pain. 1.  Epigastric pain  2. Status post lap cholecystectomy     She presented to the ED 8/23 with sharp right upper quadrant abdominal pain. Reports this pain worse after eating. Pain was nonradiating. Had similar but worse episodes of pain back in December which required her to come into the emergency department. At that time the pain did radiate into her right lower abdomen and up to her right chest. MRI/MRCP 8/24/2023 noted cholelithiasis. No evidence of choledocholithiasis. He was taken to the OR. Intraoperative findings included early acute cholecystitis, cystic duct and cystic artery identified is a 2 and only 2 structures entering the gallbladder. Laparoscopic cholecystectomy was performed. Since her surgery she has noted some epigastric pressure especially after eating. She does have a history of gastric bypass. Last EGD was 1/25/2023 which noted mild pouchitis with no evidence of marginal ulcerations, gastric fistula or anastomotic stricture. Otherwise normal gastric bypass anatomy. Biopsies H. pylori were negative. Currently she is taking omeprazole 40 mg in the morning and Pepcid 20 mg twice daily. Denies any nausea or vomiting. BMs are brown and formed 2-3 times per day since surgery. Denies any melena or hematochezia. Will rule out peptic ulcer disease, H. pylori, pouchitis. Could be secondary to recent lap jf. Drinks 1 cup of caffeine 2-3 times per week. No NSAID or alcohol use.       -Continue omeprazole 40 mg 1/2-hour before breakfast and Pepcid 20 mg in afternoon and nightly   -Antireflux diet  -Upper GI  -For H. Pylori  -Follow-up in 2 months    ______________________________________________________________________    HPI:    Calderon Zee is a 26 y.o. female who presents with complaint of epigastric pain. She presented to the ED 8/23 with sharp right upper quadrant abdominal pain. Reports this pain worse after eating. Pain was nonradiating. Had similar but worse episodes of pain back in December which required her to come into the emergency department. At that time the pain did radiate into her right lower abdomen and up to her right chest. MRI/MRCP 8/24/2023 noted cholelithiasis. No evidence of choledocholithiasis. He was taken to the OR. Intraoperative findings included early acute cholecystitis, cystic duct and cystic artery identified is a 2 and only 2 structures entering the gallbladder. Laparoscopic cholecystectomy was performed. Since her surgery she has noted some epigastric pressure especially after eating. She does have a history of gastric bypass. Last EGD was 1/25/2023 which noted mild pouchitis with no evidence of marginal ulcerations, gastric fistula or anastomotic stricture. Otherwise normal gastric bypass anatomy. Biopsies H. pylori were negative. Currently she is taking omeprazole 40 mg in the morning and Pepcid 20 mg twice daily. Denies any nausea or vomiting. BMs are brown and formed 2-3 times per day since surgery. Denies any melena or hematochezia. Prior EGD/colonoscopy  EGD 1/25/2023-mild pouchitis with no evidence of marginal ulcerations, gastric fistula or anastomotic stricture. Otherwise normal gastric bypass anatomy. Biopsies negative for H. pylori. (Done by Dr. Maral Bonilla)        REVIEW OF SYSTEMS:    CONSTITUTIONAL: Denies any fever, chills, rigors, and weight loss. HEENT: No earache or tinnitus. Denies hearing loss or visual disturbances. CARDIOVASCULAR: No chest pain or palpitations.    RESPIRATORY: Denies any cough, hemoptysis, shortness of breath or dyspnea on exertion. GASTROINTESTINAL: As noted in the History of Present Illness. GENITOURINARY: No problems with urination. Denies any hematuria or dysuria. NEUROLOGIC: No dizziness or vertigo, denies headaches. MUSCULOSKELETAL: Denies any muscle or joint pain. SKIN: Denies skin rashes or itching. ENDOCRINE: Denies excessive thirst. Denies intolerance to heat or cold. PSYCHOSOCIAL: Denies depression or anxiety. Denies any recent memory loss.        Historical Information   Past Medical History:   Diagnosis Date   • Back pain    • Bacterial vaginosis    • Bariatric surgery status    • Chlamydia    • Disease of thyroid gland     nodule   • Knee pain    • Morbidly obese ([address])     gastric sleeve   today 10/4 2021   • MRSA carrier    • Obesity    • Postsurgical malabsorption    • Thyroid disease     benign    -right   gets scan yearly   • Urogenital trichomoniasis    • Wears glasses      Past Surgical History:   Procedure Laterality Date   • CHOLECYSTECTOMY LAPAROSCOPIC N/A 8/24/2023    Procedure: CHOLECYSTECTOMY LAPAROSCOPIC;  Surgeon: Marcellus Benton DO;  Location:  MAIN OR;  Service: General   • CO LAPS GSTR RSTCV PX W/BYP DRE-EN-Y LIMB <150 CM N/A 10/4/2021    Procedure: BYPASS GASTRIC  DRE-EN-Y LAPAROSCOPIC, AND INTRAOPERATIVE EGD;  Surgeon: Tino Carvajal MD;  Location: [facility];  Service: Bariatrics   • TONSILLECTOMY     • TOOTH EXTRACTION  10/01/2014   • WRIST SURGERY  05/25/2021     Social History   Social History     Substance and Sexual Activity   Alcohol Use Yes    Comment: 2-3 drinks monthly     Social History     Substance and Sexual Activity   Drug Use No     Social History     Tobacco Use   Smoking Status Never   • Passive exposure: Never   Smokeless Tobacco Never     Family History   Problem Relation Age of Onset   • Brain cancer Mother    • Cancer Mother         glioblastoma   • Hypertension Father    • No Known Problems Paternal Grandfather    • No Known Problems Sister    • No Known Problems Daughter    • No Known Problems Maternal Grandmother    • Diabetes Paternal Grandmother    • Stroke Paternal Grandmother    • Heart disease Neg Hx    • Thyroid disease Neg Hx    • Breast cancer Neg Hx    • Colon cancer Neg Hx    • Ovarian cancer Neg Hx        Meds/Allergies       Current Outpatient Medications:   •  acetaminophen (TYLENOL) 500 mg tablet  •  calcium citrate (CALCITRATE) 950 (200 Ca) MG tablet  •  cyanocobalamin (VITAMIN B-12) 1000 MCG tablet  •  ferrous sulfate 325 (65 Fe) mg tablet  •  methocarbamol (ROBAXIN) 500 mg tablet  •  Multiple Vitamin (multivitamin) tablet  •  omeprazole (PriLOSEC) 20 mg delayed release capsule  •  oxyCODONE (Roxicodone) 5 immediate release tablet  •  polyethylene glycol (MIRALAX) 17 g packet  •  Biotin 2500 MCG CAPS  •  Calcium Carbonate-Vit D-Min (CALCIUM 1200 PO)  •  Diclofenac Sodium (VOLTAREN) 1 %  •  dicyclomine (BENTYL) 20 mg tablet  •  docusate sodium (COLACE) 100 mg capsule  •  ergocalciferol (VITAMIN D2) 50,000 units  •  famotidine (PEPCID) 20 mg tablet  •  simethicone (MYLICON) 80 mg chewable tablet    Allergies   Allergen Reactions   • Zyrtec [Cetirizine] Hives           Objective     Blood pressure 100/65, pulse 96, temperature 98.5 °F (36.9 °C), temperature source Tympanic, height 5' 6" (1.676 m), weight 88.9 kg (196 lb), last menstrual period 08/09/2023. Body mass index is 31.64 kg/m². PHYSICAL EXAM:      General Appearance:   Alert, cooperative, no distress   HEENT:   Normocephalic, atraumatic, anicteric. Neck:  Supple, symmetrical, trachea midline   Lungs:   Clear to auscultation bilaterally; no rales, rhonchi or wheezing; respirations unlabored    Heart[de-identified]   Regular rate and rhythm; no murmur, rub, or gallop.    Abdomen:   Soft, non-tender, non-distended; normal bowel sounds; no masses, no organomegaly    Genitalia:   Deferred    Rectal:   Deferred    Extremities:  No cyanosis, clubbing or edema    Pulses:  2+ and symmetric    Skin:  No jaundice, rashes, or lesions    Lymph nodes:  No palpable cervical lymphadenopathy        Lab Results:   No visits with results within 1 Day(s) from this visit.    Latest known visit with results is:   Admission on 08/23/2023, Discharged on 08/26/2023   Component Date Value   • WBC 08/23/2023 6.00    • RBC 08/23/2023 4.24    • Hemoglobin 08/23/2023 12.7    • Hematocrit 08/23/2023 38.6    • MCV 08/23/2023 91    • MCH 08/23/2023 30.0    • MCHC 08/23/2023 32.9    • RDW 08/23/2023 12.9    • MPV 08/23/2023 10.1    • Platelets 16/64/8619 197    • nRBC 08/23/2023 0    • Neutrophils Relative 08/23/2023 47    • Immat GRANS % 08/23/2023 0    • Lymphocytes Relative 08/23/2023 40    • Monocytes Relative 08/23/2023 10    • Eosinophils Relative 08/23/2023 2    • Basophils Relative 08/23/2023 1    • Neutrophils Absolute 08/23/2023 2.86    • Immature Grans Absolute 08/23/2023 0.01    • Lymphocytes Absolute 08/23/2023 2.41    • Monocytes Absolute 08/23/2023 0.57    • Eosinophils Absolute 08/23/2023 0.10    • Basophils Absolute 08/23/2023 0.05    • Sodium 08/23/2023 139    • Potassium 08/23/2023 3.9    • Chloride 08/23/2023 107    • CO2 08/23/2023 28    • ANION GAP 08/23/2023 4    • BUN 08/23/2023 9    • Creatinine 08/23/2023 0.75    • Glucose 08/23/2023 88    • Calcium 08/23/2023 8.6    • AST 08/23/2023 21    • ALT 08/23/2023 36    • Alkaline Phosphatase 08/23/2023 43    • Total Protein 08/23/2023 6.5    • Albumin 08/23/2023 4.1    • Total Bilirubin 08/23/2023 0.42    • eGFR 08/23/2023 103    • Lipase 08/23/2023 39    • Ventricular Rate 08/23/2023 72    • Atrial Rate 08/23/2023 72    • [place] Interval 08/23/2023 140    • QRSD Interval 08/23/2023 88    • QT Interval 08/23/2023 376    • QTC Interval 08/23/2023 411    • P Axis 08/23/2023 55    • QRS Axis 08/23/2023 70    • T Wave Axis 08/23/2023 57    • Color, UA 08/23/2023 Light Yellow    • Clarity, UA 08/23/2023 Clear    • Specific Gravity, UA 08/23/2023 1.014    • pH, UA 08/23/2023 6.0    • Leukocytes, UA 08/23/2023 Moderate (A)    • Nitrite, UA 08/23/2023 Negative    • Protein, UA 08/23/2023 Negative    • Glucose, UA 08/23/2023 Negative    • Ketones, UA 08/23/2023 Negative    • Urobilinogen, UA 08/23/2023 <2.0    • Bilirubin, UA 08/23/2023 Negative    • Occult Blood, UA 08/23/2023 Negative    • EXT Preg Test, Ur 08/23/2023 Negative    • Control 08/23/2023 Valid    • RBC, UA 08/23/2023 1-2    • WBC, UA 08/23/2023 10-20 (A)    • Epithelial Cells 08/23/2023 Occasional    • Bacteria, UA 08/23/2023 None Seen    • Urine Culture 08/23/2023 30,000-39,000 cfu/ml    • Sodium 08/24/2023 140    • Potassium 08/24/2023 4.0    • Chloride 08/24/2023 109 (H)    • CO2 08/24/2023 22    • ANION GAP 08/24/2023 9    • BUN 08/24/2023 6    • Creatinine 08/24/2023 0.55 (L)    • Glucose 08/24/2023 75    • Glucose, Fasting 08/24/2023 75    • Calcium 08/24/2023 8.0 (L)    • Corrected Calcium 08/24/2023 8.6    • AST 08/24/2023 12 (L)    • ALT 08/24/2023 22    • Alkaline Phosphatase 08/24/2023 34    • Total Protein 08/24/2023 5.2 (L)    • Albumin 08/24/2023 3.2 (L)    • Total Bilirubin 08/24/2023 0.37    • eGFR 08/24/2023 121    • WBC 08/24/2023 5.12    • RBC 08/24/2023 4.25    • Hemoglobin 08/24/2023 13.0    • Hematocrit 08/24/2023 38.6    • MCV 08/24/2023 91    • MCH 08/24/2023 30.6    • MCHC 08/24/2023 33.7    • RDW 08/24/2023 12.8    • MPV 08/24/2023 10.5    • Platelets 84/72/6754 184    • nRBC 08/24/2023 0    • Neutrophils Relative 08/24/2023 44    • Immat GRANS % 08/24/2023 0    • Lymphocytes Relative 08/24/2023 44    • Monocytes Relative 08/24/2023 10    • Eosinophils Relative 08/24/2023 1    • Basophils Relative 08/24/2023 1    • Neutrophils Absolute 08/24/2023 2.24    • Immature Grans Absolute 08/24/2023 0.01    • Lymphocytes Absolute 08/24/2023 2.26    • Monocytes Absolute 08/24/2023 0.49    • Eosinophils Absolute 08/24/2023 0.07    • Basophils Absolute 08/24/2023 0.05    • Magnesium 08/24/2023 1.5 (L)    • Phosphorus 08/24/2023 3.6    • ABO Grouping 08/24/2023 AB    • Rh Factor 08/24/2023 Negative    • Antibody Screen 08/24/2023 Negative    • Specimen Expiration Date 08/24/2023 [id number]    • Case Report 08/24/2023                      Value:Surgical Pathology Report                         Case: [id number]                                   Authorizing Provider:  Carlos Evans DO          Collected:           08/24/2023 2128              Ordering Location:     [facility]      Received:            08/25/2023 [facility]                                                      Pathologist:           Milton Pryor MD                                                                Specimen:    Gallbladder                                                                               • Final Diagnosis 08/24/2023                      Value: This result contains rich text formatting which cannot be displayed here. • Additional Information 08/24/2023                      Value: This result contains rich text formatting which cannot be displayed here. • Gross Description 08/24/2023                      Value: This result contains rich text formatting which cannot be displayed here.        Lab Results   Component Value Date    WBC 5.12 08/24/2023    HGB 13.0 08/24/2023    HCT 38.6 08/24/2023    MCV 91 08/24/2023     08/24/2023       Lab Results   Component Value Date    SODIUM 140 08/24/2023    K 4.0 08/24/2023     (H) 08/24/2023    CO2 22 08/24/2023    ANIONGAP 15 01/07/2014    AGAP 9 08/24/2023    BUN 6 08/24/2023    CREATININE 0.55 (L) 08/24/2023    GLUC 75 08/24/2023    GLUF 75 08/24/2023    CALCIUM 8.0 (L) 08/24/2023    AST 12 (L) 08/24/2023    ALT 22 08/24/2023    ALKPHOS 34 08/24/2023    TP 5.2 (L) 08/24/2023    TBILI 0.37 08/24/2023    EGFR 121 08/24/2023       Lab Results   Component Value Date    CRP <3.0 06/01/2022       Lab Results   Component Value Date XPV3GJPOSWDU 0.964 12/21/2022       Lab Results   Component Value Date    IRON 182 08/22/2023    TIBC 389 08/22/2023    FERRITIN 37 08/22/2023       Radiology Results:   US bedside procedure    Result Date: 8/24/2023  Narrative: [id number]. [id number]    MRI abdomen wo contrast and mrcp    Result Date: 8/24/2023  Narrative: MRI OF THE ABDOMEN WITHOUT CONTRAST WITH MRCP INDICATION: 26 years / Female  Alison Palma. COMPARISON:  none TECHNIQUE:  Multiplanar/multisequence MRI of the abdomen with 3D MRCP was performed without the administration of contrast. FINDINGS: LOWER CHEST:   Unremarkable. LIVER: Normal in size and configuration. No suspicious mass. Limited evaluation of hepatic veins and portal veins on this non-contrast MRI is unremarkable. BILE DUCTS:  No intrahepatic or extrahepatic bile duct dilation. Common bile duct is normal in caliber. No choledocholithiasis, biliary stricture or suspicious mass. GALLBLADDER: Cholelithiasis. PANCREAS:  Unremarkable. ADRENAL GLANDS:  Unremarkable. SPLEEN:  Normal. KIDNEYS/PROXIMAL URETERS:  No hydroureteronephrosis. No suspicious renal mass. BOWEL:  No dilated loops of bowel. PERITONEAL CAVITY/RETROPERITONEUM:  No ascites. No mass. LYMPH NODES:  No abdominal lymphadenopathy. VASCULAR STRUCTURES:  Unremarkable. No aneurysm. ABDOMINAL WALL:  Unremarkable. OSSEOUS STRUCTURES:  No suspicious osseous lesion. Impression: Cholelithiasis. No evidence of choledocholithiasis. Workstation performed: [id number]     US right upper quadrant    Result Date: 8/23/2023  Narrative: RIGHT UPPER QUADRANT ULTRASOUND INDICATION:     r/o acute cholecystitis. COMPARISON: Ultrasound abdomen 2/22/2016. CT abdomen pelvis 12/21/2022. TECHNIQUE:   Real-time ultrasound of the right upper quadrant was performed with a curvilinear transducer with both volumetric sweeps and still imaging techniques. FINDINGS: PANCREAS: Portions of the pancreas are obscured by bowel gas.  Visualized portions of the pancreas are unremarkable. AORTA AND IVC:  Visualized portions are normal for patient age. LIVER: Size:  Mildly enlarged. The liver measures 19.6 cm in the midclavicular line. Contour:  Surface contour is smooth. Parenchyma: There is mild diffuse increased echogenicity with smooth echotexture, without significant beam attenuation or loss of periportal echogenicity. Most consistent with mild hepatic steatosis. No liver mass identified. Limited imaging of the main portal vein shows it to be patent and hepatopetal. BILIARY: The gallbladder is normal in caliber. No wall thickening or pericholecystic fluid. Shadowing gallstone(s) identified. No sonographic Love's sign. No intrahepatic biliary dilatation. CBD measures 4.0 mm. Linear echogenic focus noted within the common bile duct measuring 8 x 2 mm. Unclear whether this could be artifactual or due to choledocholithiasis. KIDNEY: Right kidney measures 11.5 x 5.3 x 6.2 cm. Volume 197.1 mL Kidney within normal limits. ASCITES:   None. Impression: 1. Cholelithiasis without findings of acute cholecystitis. 2. Linear echogenic focus noted within the common bile duct measuring 8 x 2 mm. Unclear whether this could be artifactual or due to choledocholithiasis. No biliary ductal dilation. Suggest further evaluation with MRCP. 3. Mild hepatomegaly and hepatic steatosis. The study was marked in [facility] for immediate notification.  Workstation performed: [id number]

## 2023-09-08 ENCOUNTER — OFFICE VISIT (OUTPATIENT)
Dept: SURGERY | Facility: CLINIC | Age: 33
End: 2023-09-08

## 2023-09-08 VITALS — TEMPERATURE: 98.3 F | HEIGHT: 66 IN | BODY MASS INDEX: 31.47 KG/M2 | WEIGHT: 195.8 LBS

## 2023-09-08 DIAGNOSIS — K81.0 ACUTE CHOLECYSTITIS: Primary | ICD-10-CM

## 2023-09-08 PROCEDURE — 99024 POSTOP FOLLOW-UP VISIT: CPT | Performed by: SURGERY

## 2023-09-08 RX ORDER — CYCLOBENZAPRINE HCL 5 MG
5 TABLET ORAL 3 TIMES DAILY PRN
Qty: 21 TABLET | Refills: 0 | Status: SHIPPED | OUTPATIENT
Start: 2023-09-08

## 2023-09-08 NOTE — PROGRESS NOTES
Office Visit - General Surgery  Halle Polanco MRN: [id number]  Encounter: [id number]    Assessment and Plan  Problem List Items Addressed This Visit        Digestive    Acute cholecystitis - Primary     49-year-old female status post laparoscopic cholecystectomy by Dr. Deysi Weber on 8/24/2023, here for follow-up. Plan:  She is doing fairly well but is having some subcostal pain and pressure, which I believe is related to muscle spasms. I have switched her to Flexeril 3 times daily, which I would like her to take for a week to see if she has improvement in her symptomatology. She may eat or drink what ever she desires and should return to clinic in 2 weeks for next check. Relevant Medications    cyclobenzaprine (FLEXERIL) 5 mg tablet       Chief Complaint:  Halle Polanco is a 26 y.o. female who presents for Post-op (P/o lap jf.)    Subjective  49-year-old female status post laparoscopic cholecystectomy by Dr. Deysi Weber on 8/24/2023, here for follow-up. Overall, she is doing fairly well, but is complaining of some right epigastric and subcostal pressure and pain. She states that this improves with Robaxin. She denies any nausea, vomiting, fevers, or chills. She is tolerating a regular diet and is having some slightly looser bowel movements, but is otherwise doing well. Her pathology was consistent with cholelithiasis and mild chronic cholecystitis.     Past Medical History:   Diagnosis Date   • Back pain    • Bacterial vaginosis    • Bariatric surgery status    • Chlamydia    • Disease of thyroid gland     nodule   • Knee pain    • Morbidly obese ([address])     gastric sleeve   today 10/4 2021   • MRSA carrier    • Obesity    • Postsurgical malabsorption    • Thyroid disease     benign    -right   gets scan yearly   • Urogenital trichomoniasis    • Wears glasses        Past Surgical History:   Procedure Laterality Date   • CHOLECYSTECTOMY LAPAROSCOPIC N/A 8/24/2023    Procedure: CHOLECYSTECTOMY LAPAROSCOPIC;  Surgeon: Radha Balderrama DO;  Location: [facility];  Service: General   • MO LAPS GSTR RSTCV PX W/BYP DRE-EN-Y LIMB <150 CM N/A 10/4/2021    Procedure: BYPASS GASTRIC  DRE-EN-Y LAPAROSCOPIC, AND INTRAOPERATIVE EGD;  Surgeon: Wojciech Harris MD;  Location: [facility];  Service: Bariatrics   • TONSILLECTOMY     • TOOTH EXTRACTION  10/01/2014   • WRIST SURGERY  05/25/2021       Family History   Problem Relation Age of Onset   • Brain cancer Mother    • Cancer Mother         glioblastoma   • Hypertension Father    • No Known Problems Paternal Grandfather    • No Known Problems Sister    • No Known Problems Daughter    • No Known Problems Maternal Grandmother    • Diabetes Paternal Grandmother    • Stroke Paternal Grandmother    • Heart disease Neg Hx    • Thyroid disease Neg Hx    • Breast cancer Neg Hx    • Colon cancer Neg Hx    • Ovarian cancer Neg Hx        Social History     Tobacco Use   • Smoking status: Never     Passive exposure: Never   • Smokeless tobacco: Never   Vaping Use   • Vaping Use: Never used   Substance Use Topics   • Alcohol use: Yes     Comment: 2-3 drinks monthly   • Drug use: No        Medications  Current Outpatient Medications on File Prior to Visit   Medication Sig Dispense Refill   • acetaminophen (TYLENOL) 500 mg tablet       • Biotin 2500 MCG CAPS Take by mouth     • Calcium Carbonate-Vit D-Min (CALCIUM 1200 PO) Take by mouth     • calcium citrate (CALCITRATE) 950 (200 Ca) MG tablet Take 1 tablet (950 mg total) by mouth 2 (two) times a day 180 tablet 2   • cyanocobalamin (VITAMIN B-12) 1000 MCG tablet Take 1,000 mcg by mouth daily     • Diclofenac Sodium (VOLTAREN) 1 %      • ferrous sulfate 325 (65 Fe) mg tablet Take 325 mg by mouth daily with breakfast     • Multiple Vitamin (multivitamin) tablet Take 1 tablet by mouth daily      • omeprazole (PriLOSEC) 20 mg delayed release capsule Take 2 capsules (40 mg total) by mouth daily 10 capsule 0   • oxyCODONE (Roxicodone) 5 immediate release tablet Take 1 tablet (5 mg total) by mouth every 6 (six) hours as needed for moderate pain or severe pain for up to 6 doses Max Daily Amount: 20 mg 6 tablet 0   • polyethylene glycol (MIRALAX) 17 g packet Take 17 g by mouth daily TAKEN AS NEEDED     • [DISCONTINUED] methocarbamol (ROBAXIN) 500 mg tablet Take 1 tablet (500 mg total) by mouth 3 (three) times a day for 14 days 42 tablet 0   • dicyclomine (BENTYL) 20 mg tablet Take 1 tablet (20 mg total) by mouth 2 (two) times a day (Patient not taking: Reported on 8/22/2023) 20 tablet 0   • docusate sodium (COLACE) 100 mg capsule Take 1 capsule (100 mg total) by mouth 2 (two) times a day for 7 days 14 capsule 0   • ergocalciferol (VITAMIN D2) 50,000 units Take 1 capsule (50,000 Units total) by mouth 2 (two) times a week 24 capsule 0   • famotidine (PEPCID) 20 mg tablet Take 1 tablet (20 mg total) by mouth 2 (two) times a day as needed for heartburn 60 tablet 2   • simethicone (MYLICON) 80 mg chewable tablet Chew 1 tablet (80 mg total) every 6 (six) hours as needed for flatulence (Patient not taking: Reported on 8/22/2023) 30 tablet 0     No current facility-administered medications on file prior to visit. Allergies  Allergies   Allergen Reactions   • Zyrtec [Cetirizine] Hives       Review of Systems   Constitutional: Negative for chills, fatigue and fever. HENT: Negative for ear pain, facial swelling, sinus pressure and sinus pain. Eyes: Negative for pain. Respiratory: Negative for cough, shortness of breath and wheezing. Cardiovascular: Negative for chest pain. Gastrointestinal: Positive for abdominal pain. Negative for constipation, diarrhea, nausea and vomiting. Endocrine: Negative for cold intolerance and heat intolerance. Genitourinary: Negative for dysuria and flank pain. Musculoskeletal: Negative for back pain and neck pain. Skin: Negative for wound.    Neurological: Negative for syncope, facial asymmetry, light-headedness and numbness. Psychiatric/Behavioral: Negative for behavioral problems, confusion and suicidal ideas. Objective  Vitals:    09/08/23 0922   Temp: 98.3 °F (36.8 °C)       Physical Exam  Vitals and nursing note reviewed. Constitutional:       General: She is not in acute distress. Appearance: Normal appearance. She is not toxic-appearing. HENT:      Head: Normocephalic and atraumatic. Mouth/Throat:      Mouth: Mucous membranes are moist.   Eyes:      Extraocular Movements: Extraocular movements intact. Pupils: Pupils are equal, round, and reactive to light. Cardiovascular:      Rate and Rhythm: Normal rate and regular rhythm. Pulses: Normal pulses. Pulmonary:      Effort: Pulmonary effort is normal. No respiratory distress. Breath sounds: Normal breath sounds. No wheezing. Abdominal:      General: There is no distension. Palpations: Abdomen is soft. There is no mass. Tenderness: There is abdominal tenderness. There is no guarding or rebound. Hernia: No hernia is present. Comments: Mildly tender over the right subcostal area, no rebound or guarding. Musculoskeletal:         General: No swelling or deformity. Normal range of motion. Cervical back: Normal range of motion and neck supple. Right lower leg: No edema. Left lower leg: No edema. Skin:     General: Skin is warm and dry. Coloration: Skin is not jaundiced. Neurological:      General: No focal deficit present. Mental Status: She is alert and oriented to person, place, and time.    Psychiatric:         Mood and Affect: Mood normal.         Behavior: Behavior normal.

## 2023-09-08 NOTE — ASSESSMENT & PLAN NOTE
33-year-old female status post laparoscopic cholecystectomy by Dr. Terrie Love on 8/24/2023, here for follow-up. Plan:  She is doing fairly well but is having some subcostal pain and pressure, which I believe is related to muscle spasms. I have switched her to Flexeril 3 times daily, which I would like her to take for a week to see if she has improvement in her symptomatology. She may eat or drink what ever she desires and should return to clinic in 2 weeks for next check.

## 2023-09-08 NOTE — LETTER
September 8, 2023     Patient: Javi England  YOB: 1990  Date of Visit: 9/8/2023      To Whom it May Concern:    Dominga Slacedo is under my professional care. Tamiko Bueno was seen in my office on 9/8/2023. Tamiko Bueno may return to work on 10/2/23 . She will be without restrictions or limitations at that time. If you have any questions or concerns, please don't hesitate to call.          Sincerely,          Lb Pulliam MD        CC: No Recipients

## 2023-09-18 ENCOUNTER — HOSPITAL ENCOUNTER (OUTPATIENT)
Dept: RADIOLOGY | Facility: HOSPITAL | Age: 33
Discharge: HOME | End: 2023-09-18
Payer: MEDICARE

## 2023-09-18 DIAGNOSIS — R93.5 ABNORMAL US (ULTRASOUND) OF ABDOMEN: ICD-10-CM

## 2023-09-18 DIAGNOSIS — R11.0 NAUSEA: ICD-10-CM

## 2023-09-18 PROCEDURE — 74240 X-RAY XM UPR GI TRC 1CNTRST: CPT

## 2023-09-22 ENCOUNTER — OFFICE VISIT (OUTPATIENT)
Dept: SURGERY | Facility: CLINIC | Age: 33
End: 2023-09-22

## 2023-09-22 VITALS — TEMPERATURE: 98.3 F | BODY MASS INDEX: 32.02 KG/M2 | HEIGHT: 66 IN | WEIGHT: 199.2 LBS

## 2023-09-22 DIAGNOSIS — K81.0 ACUTE CHOLECYSTITIS: Primary | ICD-10-CM

## 2023-09-22 PROCEDURE — 99024 POSTOP FOLLOW-UP VISIT: CPT | Performed by: SURGERY

## 2023-09-22 NOTE — PROGRESS NOTES
Office Visit - General Surgery  Bijal Thorne MRN: [id number]  Encounter: [id number]    Assessment and Plan  Problem List Items Addressed This Visit        Digestive    Acute cholecystitis - Primary     68-year-old female status post laparoscopic cholecystectomy with Dr. Brit Pineda on 8/24/2023, here for follow-up. Plan:  - The patient's pathology was reviewed, and understanding was acknowledged. - The patient may return to all normal activities at this time. - I reiterated the lifting restriction of 20 lb until 4 weeks postoperatively, and advised that there are no restrictions from a dietary perspective   - The patient may return to clinic as needed, and can call with any questions should they arise. Chief Complaint:  Bijal Thorne is a 26 y.o. female who presents for Post-op (2nd p/o lap jf)    Subjective  68-year-old female known to me status post laparoscopic cholecystectomy with Dr. Brit Pineda on 8/24/2023, here for follow-up. Overall she is improved from her last visit. Her subcostal pain has dramatically improved and she is doing well. She denies any fevers, chills, chest pain, shortness of breath. She is tolerating regular diet, moving her bowels normally.     Past Medical History:   Diagnosis Date   • Back pain    • Bacterial vaginosis    • Bariatric surgery status    • Chlamydia    • Disease of thyroid gland     nodule   • Knee pain    • Morbidly obese ([address])     gastric sleeve   today 10/4 2021   • MRSA carrier    • Obesity    • Postsurgical malabsorption    • Thyroid disease     benign    -right   gets scan yearly   • Urogenital trichomoniasis    • Wears glasses        Past Surgical History:   Procedure Laterality Date   • CHOLECYSTECTOMY LAPAROSCOPIC N/A 8/24/2023    Procedure: CHOLECYSTECTOMY LAPAROSCOPIC;  Surgeon: Kim Espino DO;  Location[facility];  Service: General   • ME LAPS GSTR RSTCV [address] [place] [address] W/BYP DRE-EN-Y LIMB <150 CM N/A 10/4/2021    Procedure: Reese Wilson GASTRIC  DRE-EN-Y LAPAROSCOPIC, AND INTRAOPERATIVE EGD;  Surgeon: Frank Vicente MD;  Location[facility];  Service: Bariatrics   • TONSILLECTOMY     • TOOTH EXTRACTION  10/01/2014   • WRIST SURGERY  05/25/2021       Family History   Problem Relation Age of Onset   • Brain cancer Mother    • Cancer Mother         glioblastoma   • Hypertension Father    • No Known Problems Paternal Grandfather    • No Known Problems Sister    • No Known Problems Daughter    • No Known Problems Maternal Grandmother    • Diabetes Paternal Grandmother    • Stroke Paternal Grandmother    • Heart disease Neg Hx    • Thyroid disease Neg Hx    • Breast cancer Neg Hx    • Colon cancer Neg Hx    • Ovarian cancer Neg Hx        Social History     Tobacco Use   • Smoking status: Never     Passive exposure: Never   • Smokeless tobacco: Never   Vaping Use   • Vaping Use: Never used   Substance Use Topics   • Alcohol use: Yes     Comment: 2-3 drinks monthly   • Drug use: No        Medications  Current Outpatient Medications on File Prior to Visit   Medication Sig Dispense Refill   • acetaminophen (TYLENOL) 500 mg tablet       • Biotin 2500 MCG CAPS Take by mouth     • Calcium Carbonate-Vit D-Min (CALCIUM 1200 PO) Take by mouth     • calcium citrate (CALCITRATE) 950 (200 Ca) MG tablet Take 1 tablet (950 mg total) by mouth 2 (two) times a day 180 tablet 2   • cyanocobalamin (VITAMIN B-12) 1000 MCG tablet Take 1,000 mcg by mouth daily     • cyclobenzaprine (FLEXERIL) 5 mg tablet Take 1 tablet (5 mg total) by mouth 3 (three) times a day as needed for muscle spasms 21 tablet 0   • Diclofenac Sodium (VOLTAREN) 1 %      • ferrous sulfate 325 (65 Fe) mg tablet Take 325 mg by mouth daily with breakfast     • Multiple Vitamin (multivitamin) tablet Take 1 tablet by mouth daily      • omeprazole (PriLOSEC) 20 mg delayed release capsule Take 2 capsules (40 mg total) by mouth daily 10 capsule 0   • oxyCODONE (Roxicodone) 5 immediate release tablet Take 1 tablet (5 mg total) by mouth every 6 (six) hours as needed for moderate pain or severe pain for up to 6 doses Max Daily Amount: 20 mg 6 tablet 0   • polyethylene glycol (MIRALAX) 17 g packet Take 17 g by mouth daily TAKEN AS NEEDED     • simethicone (MYLICON) 80 mg chewable tablet Chew 1 tablet (80 mg total) every 6 (six) hours as needed for flatulence 30 tablet 0   • dicyclomine (BENTYL) 20 mg tablet Take 1 tablet (20 mg total) by mouth 2 (two) times a day (Patient not taking: Reported on 8/22/2023) 20 tablet 0   • docusate sodium (COLACE) 100 mg capsule Take 1 capsule (100 mg total) by mouth 2 (two) times a day for 7 days 14 capsule 0   • ergocalciferol (VITAMIN D2) 50,000 units Take 1 capsule (50,000 Units total) by mouth 2 (two) times a week 24 capsule 0   • famotidine (PEPCID) 20 mg tablet Take 1 tablet (20 mg total) by mouth 2 (two) times a day as needed for heartburn 60 tablet 2     No current facility-administered medications on file prior to visit. Allergies  Allergies   Allergen Reactions   • Zyrtec [Cetirizine] Hives       Review of Systems   Constitutional: Negative for chills, fatigue and fever. HENT: Negative for ear pain, facial swelling, sinus pressure and sinus pain. Eyes: Negative for pain. Respiratory: Negative for cough, shortness of breath and wheezing. Cardiovascular: Negative for chest pain. Gastrointestinal: Negative for abdominal pain, constipation, diarrhea, nausea and vomiting. Endocrine: Negative for cold intolerance and heat intolerance. Genitourinary: Negative for dysuria and flank pain. Musculoskeletal: Negative for back pain and neck pain. Skin: Negative for wound. Neurological: Negative for syncope, facial asymmetry, light-headedness and numbness. Psychiatric/Behavioral: Negative for behavioral problems, confusion and suicidal ideas. Objective  Vitals:    09/22/23 1356   Temp: 98.3 °F (36.8 °C)       Physical Exam  Vitals and nursing note reviewed. Constitutional:       General: She is not in acute distress. Appearance: Normal appearance. She is not toxic-appearing. HENT:      Head: Normocephalic and atraumatic. Mouth/Throat:      Mouth: Mucous membranes are moist.   Eyes:      Extraocular Movements: Extraocular movements intact. Pupils: Pupils are equal, round, and reactive to light. Cardiovascular:      Rate and Rhythm: Normal rate and regular rhythm. Pulses: Normal pulses. Pulmonary:      Effort: Pulmonary effort is normal. No respiratory distress. Breath sounds: Normal breath sounds. No wheezing. Abdominal:      General: There is no distension. Palpations: Abdomen is soft. There is no mass. Tenderness: There is no abdominal tenderness. There is no guarding or rebound. Hernia: No hernia is present. Comments: Well-healed laparoscopic port sites, no evidence of hernia. Musculoskeletal:         General: No swelling or deformity. Normal range of motion. Cervical back: Normal range of motion and neck supple. Right lower leg: No edema. Left lower leg: No edema. Skin:     General: Skin is warm and dry. Coloration: Skin is not jaundiced. Neurological:      General: No focal deficit present. Mental Status: She is alert and oriented to person, place, and time.    Psychiatric:         Mood and Affect: Mood normal.         Behavior: Behavior normal.

## 2023-09-22 NOTE — ASSESSMENT & PLAN NOTE
52-year-old female status post laparoscopic cholecystectomy with Dr. Gilmar Love on 8/24/2023, here for follow-up. Plan:  - The patient's pathology was reviewed, and understanding was acknowledged. - The patient may return to all normal activities at this time. - I reiterated the lifting restriction of 20 lb until 4 weeks postoperatively, and advised that there are no restrictions from a dietary perspective   - The patient may return to clinic as needed, and can call with any questions should they arise.

## 2023-10-20 ENCOUNTER — OFFICE VISIT (OUTPATIENT)
Dept: BARIATRICS | Facility: CLINIC | Age: 33
End: 2023-10-20
Payer: MEDICARE

## 2023-10-20 ENCOUNTER — APPOINTMENT (OUTPATIENT)
Dept: LAB | Facility: IMAGING CENTER | Age: 33
End: 2023-10-20
Payer: MEDICARE

## 2023-10-20 VITALS
HEART RATE: 73 BPM | WEIGHT: 197 LBS | BODY MASS INDEX: 30.92 KG/M2 | TEMPERATURE: 98.1 F | DIASTOLIC BLOOD PRESSURE: 70 MMHG | SYSTOLIC BLOOD PRESSURE: 108 MMHG | HEIGHT: 67 IN

## 2023-10-20 DIAGNOSIS — Z48.815 ENCOUNTER FOR SURGICAL AFTERCARE FOLLOWING SURGERY OF DIGESTIVE SYSTEM: Primary | ICD-10-CM

## 2023-10-20 DIAGNOSIS — Z98.84 BARIATRIC SURGERY STATUS: ICD-10-CM

## 2023-10-20 DIAGNOSIS — R93.5 ABNORMAL US (ULTRASOUND) OF ABDOMEN: ICD-10-CM

## 2023-10-20 DIAGNOSIS — K91.2 POSTSURGICAL MALABSORPTION: ICD-10-CM

## 2023-10-20 DIAGNOSIS — Z90.49 S/P CHOLECYSTECTOMY: ICD-10-CM

## 2023-10-20 DIAGNOSIS — E66.9 OBESITY, CLASS I, BMI 30-34.9: ICD-10-CM

## 2023-10-20 PROCEDURE — 87338 HPYLORI STOOL AG IA: CPT

## 2023-10-20 PROCEDURE — 99214 OFFICE O/P EST MOD 30 MIN: CPT | Performed by: NURSE PRACTITIONER

## 2023-10-20 NOTE — PROGRESS NOTES
Assessment/Plan:     Patient ID: Moose Worthy is a 26 y.o. female. Bariatric Surgery Status/ s/p cholecystectomy. -s/p Fani-En-Y Gastric Bypass with Dr. Rosie Spicer on 10/04/2021. Presents to the office today for annual visit. Overall doing well, tolerating a regular diet. Denies having any abdominal pain, N/V/D/C, regurgitation, reflux or dysphagia. Taking her MVI daily. Recently had a cholecystectomy in August - since then has been having cramps of RLQ. Some changes in stool. PLAN:     - stool changes and cramping likely normal after cholecystectomy. Would recommend to monitor for now. Recommended miralax to regulate bowel movements. - Routine follow up in 1 year for annual visit. - Continue with healthy lifestyle, adequate protein intake of 60 gm, fluid intake of at least 64 oz. - Continue with MVI daily. - Activity as tolerated. - Labs ordered and will adjust accordingly if any deficiency. - Follow up with RD and SW as needed. Continued/Maintain healthy weight loss with good nutrition intakes. Adequate hydration with at least 64oz. fluid intake. Follow diet as discussed. Follow vitamin and mineral recommendations as reviewed with you. Exercise as tolerated. Colonoscopy referral made: N/A  Mammogram N/A    Follow-up in 1 year for annual visit. We kindly ask that your arrive 15 minutes before your scheduled appointment time with your provider to allow our staff to room you, get your vital signs and update your chart. Get lab work done prior to annual visit. Please call the office if you need a script. It is recommended to check with your insurance BEFORE getting labs done to make sure they are covered by your policy. Call our office if you have any problems with abdominal pain especially associated with fever, chills, nausea, vomiting or any other concerns.     All  Post-bariatric surgery patients should be aware that very small quantities of any alcohol can cause impairment and it is very possible not to feel the effect. The effect can be in the system for several hours. It is also a stomach irritant. It is advised to AVOID alcohol, Nonsteroidal antiinflammatory drugs (NSAIDS) and nicotine of all forms . Any of these can cause stomach irritation/pain. Discussed the effects of alcohol on a bariatric patient and the increased impairment risk. Keep up the good work! Postsurgical Malabsorption   -At risk for malabsorption of vitamins/minerals secondary to malabsorption and restriction of intake from bariatric surgery  -Currently taking adequate postop bariatric surgery vitamin supplementation  -Last set of bariatric labs completed on 05/2023 and showed low vitamin D and vitamin B12 with repeat levels normalizing.   -Next set of bariatric labs ordered for approximately 3 months  -Patient received education about the importance of adhering to a lifelong supplementation regimen to avoid vitamin/mineral deficiencies      Diagnoses and all orders for this visit:    Encounter for surgical aftercare following surgery of digestive system  -     Zinc; Future  -     Vitamin D 25 hydroxy; Future  -     Vitamin B12; Future  -     Vitamin B1, whole blood; Future  -     TIBC Panel (incl. Iron, TIBC, % Iron Saturation); Future  -     Vitamin A; Future  -     CBC and Platelet; Future  -     Comprehensive metabolic panel; Future  -     Ferritin; Future  -     Folate; Future    Bariatric surgery status  -     Zinc; Future  -     Vitamin D 25 hydroxy; Future  -     Vitamin B12; Future  -     Vitamin B1, whole blood; Future  -     TIBC Panel (incl. Iron, TIBC, % Iron Saturation); Future  -     Vitamin A; Future  -     CBC and Platelet; Future  -     Comprehensive metabolic panel; Future  -     Ferritin; Future  -     Folate; Future    Postsurgical malabsorption  -     Zinc; Future  -     Vitamin D 25 hydroxy;  Future  -     Vitamin B12; Future  -     Vitamin B1, whole blood; Future  -     TIBC Panel (incl. Iron, TIBC, % Iron Saturation); Future  -     Vitamin A; Future  -     CBC and Platelet; Future  -     Comprehensive metabolic panel; Future  -     Ferritin; Future  -     Folate; Future    Obesity, Class I, BMI 30-34.9  -     Zinc; Future  -     Vitamin D 25 hydroxy; Future  -     Vitamin B12; Future  -     Vitamin B1, whole blood; Future  -     TIBC Panel (incl. Iron, TIBC, % Iron Saturation); Future  -     Vitamin A; Future  -     CBC and Platelet; Future  -     Comprehensive metabolic panel; Future  -     Ferritin; Future  -     Folate; Future    S/P cholecystectomy         Subjective:      Patient ID: Nicole García is a 26 y.o. female. -s/p Fani-En-Y Gastric Bypass with Dr. Chandler Mcnamara on 10/04/2021. Presents to the office today for annual visit. Overall doing well, tolerating a regular diet. Denies having any abdominal pain, N/V/D/C, regurgitation, reflux or dysphagia. Taking her MVI daily. Recently had a cholecystectomy in August - since then has been having cramps of RLQ. Some changes in stool. Initial:  363 lbs  Current: 197 lbs  EWL: (Weight loss is ahead of schedule at this post surgical period.)  Jorge: 187.5 lbs  Current BMI is Body mass index is 31.32 kg/m². Tolerating a regular diet-yes  Eating at least 60 grams of protein per day-yes  Following 30/60 minute rule with liquids-no  Drinking at least 64 ounces of fluid per day-yes  Drinking carbonated beverages-no  Sufficient exercise- no stopped going to the gym as she is not working full time. Using NSAIDs regularly-no  Using nicotine-no  Using alcohol-no  Supplements:   Multivitamins, Iron, B-12, Calcium  and tumeric, cranberry and vitamin C, Vitamin D 5000 IU QOD    EWL is 80%, which places the patient ahead of schedule for expected post surgical weight loss at this time.      The following portions of the patient's history were reviewed and updated as appropriate: allergies, current medications, past family history, past medical history, past social history, past surgical history and problem list.    Review of Systems   Constitutional: Negative. Respiratory: Negative. Cardiovascular: Negative. Gastrointestinal:  Positive for abdominal pain (cramping of RLQ - suggests miralax to help with bowel movements. ). Neurological: Negative. Psychiatric/Behavioral: Negative. Objective:    /70   Pulse 73   Temp 98.1 °F (36.7 °C) (Tympanic)   Ht 5' 6.5" (1.689 m)   Wt 89.4 kg (197 lb)   BMI 31.32 kg/m²      Physical Exam  Vitals and nursing note reviewed. Constitutional:       Appearance: Normal appearance. She is obese. Cardiovascular:      Rate and Rhythm: Normal rate and regular rhythm. Pulses: Normal pulses. Heart sounds: Normal heart sounds. Pulmonary:      Effort: Pulmonary effort is normal.      Breath sounds: Normal breath sounds. Abdominal:      General: Bowel sounds are normal.      Palpations: Abdomen is soft. Tenderness: There is no abdominal tenderness. Comments: All incisions healing well without any signs of infection     Musculoskeletal:         General: Normal range of motion. Skin:     General: Skin is warm and dry. Neurological:      General: No focal deficit present. Mental Status: She is alert and oriented to person, place, and time. Psychiatric:         Mood and Affect: Mood normal.         Behavior: Behavior normal.         Thought Content:  Thought content normal.         Judgment: Judgment normal.

## 2023-10-21 LAB — H PYLORI AG STL QL IA: NEGATIVE

## 2023-11-02 ENCOUNTER — OFFICE VISIT (OUTPATIENT)
Dept: ENDOCRINOLOGY | Facility: CLINIC | Age: 33
End: 2023-11-02
Payer: MEDICARE

## 2023-11-02 VITALS
HEIGHT: 67 IN | HEART RATE: 91 BPM | WEIGHT: 200.6 LBS | SYSTOLIC BLOOD PRESSURE: 116 MMHG | BODY MASS INDEX: 31.48 KG/M2 | DIASTOLIC BLOOD PRESSURE: 86 MMHG

## 2023-11-02 DIAGNOSIS — E04.1 THYROID NODULE: Primary | ICD-10-CM

## 2023-11-02 DIAGNOSIS — N25.81 SECONDARY HYPERPARATHYROIDISM (HCC): ICD-10-CM

## 2023-11-02 DIAGNOSIS — E55.9 VITAMIN D DEFICIENCY: ICD-10-CM

## 2023-11-02 PROBLEM — R79.89 ELEVATED PTHRP LEVEL: Status: RESOLVED | Noted: 2022-12-22 | Resolved: 2023-11-02

## 2023-11-02 PROCEDURE — 99214 OFFICE O/P EST MOD 30 MIN: CPT | Performed by: PHYSICIAN ASSISTANT

## 2023-11-02 NOTE — ASSESSMENT & PLAN NOTE
This has been small and stable for 5 years. No additional testing is necessary unless there is a change in symptoms/physical exam. She will continue to follow with her family physician for this.

## 2023-11-02 NOTE — PROGRESS NOTES
Established Patient Progress Note       Chief Complaint   Patient presents with   • Thyroid Problem   • Hyperparathyroidism   • Vitamin D Deficiency        Impression & Plan:    Problem List Items Addressed This Visit        Endocrine    Thyroid nodule - Primary     This has been small and stable for 5 years. No additional testing is necessary unless there is a change in symptoms/physical exam. She will continue to follow with her family physician for this. Secondary hyperparathyroidism ([address])     This has improved and is normal. Encouraged long term compliance with calcium/vitamin d supplements and regular bariatric labs/follow up visits. Other    Vitamin D deficiency     This has improved, continue supplements. No orders of the defined types were placed in this encounter. History of Present Illness:     Nicole García is a 26 y.o. female with a history of thyroid nodule, Vitamin D Deficiency, and secondary hyperparathyroidism. Since last visit, she underwent cholecystectomy and has been having some problems with diarrhea. She will be seeing GI next week for that. For the thyroid nodule, recent ultrasound showed stable subcentimeter nodule. No additional testing is required. This nodule has been present since 2017. She denies family history of thyroid cancer. She denies dysphagia. She denies history of radiation therapy to her head or neck. Secondary hyperparathyroidism, recent PTH level improved and was normal.  She is taking calcium and vitamin D supplements as directed by bariatrics but right now she is not sure on her dosing.       Patient Active Problem List   Diagnosis   • Right lumbar radiculopathy   • Chondromalacia   • Knee pain   • Derang of medial meniscus due to old tear/inj, left knee   • Well adult exam   • Thyroid nodule   • Weight gain   • Cervical cancer screening   • Lipid screening   • Encounter for screening for HIV   • Radial styloid tenosynovitis of right hand   • Right wrist pain   • Headache   • Gastritis without bleeding   • Group B streptococcal bacteriuria   • Chlamydia infection   • Belching   • Generalized abdominal pain   • Acute leg pain, right   • History of bariatric surgery   • COVID-19 virus infection   • Right thigh pain   • It band syndrome, right   • Loss of smell   • Loss of taste   • Perineum pain, female   • Enchondroma of femur, left   • BMI 30.0-30.9,adult   • Strain of sternocleidomastoid muscle   • Intractable migraine without aura and with status migrainosus   • Primary hypertriglyceridemia   • Uterine fibroid   • Vitamin D deficiency   • Iron deficiency anemia secondary to inadequate dietary iron intake   • Proteinuria   • Atelectasis   • Secondary hyperparathyroidism (HCC)   • Drug-induced constipation   • Acute cholecystitis      Past Medical History:   Diagnosis Date   • Back pain    • Bacterial vaginosis    • Bariatric surgery status    • Chlamydia    • Disease of thyroid gland     nodule   • Knee pain    • Morbidly obese (HCC)     gastric sleeve   today 10/4 2021   • MRSA carrier    • Obesity    • Postsurgical malabsorption    • Thyroid disease     benign    -right   gets scan yearly   • Urogenital trichomoniasis    • Wears glasses       Past Surgical History:   Procedure Laterality Date   • CHOLECYSTECTOMY LAPAROSCOPIC N/A 8/24/2023    Procedure: CHOLECYSTECTOMY LAPAROSCOPIC;  Surgeon: Justus Gallegos DO;  [facility];  Service: General   • TX LAPS GSTR RSTCV PX W/BYP DRE-EN-Y LIMB <150 CM N/A 10/4/2021    Procedure: BYPASS GASTRIC  DRE-EN-Y LAPAROSCOPIC, AND INTRAOPERATIVE EGD;  Surgeon: Corey Huff MD;  Location[facility];  Service: Bariatrics   • TONSILLECTOMY     • TOOTH EXTRACTION  10/01/2014   • WRIST SURGERY  05/25/2021      Family History   Problem Relation Age of Onset   • Brain cancer Mother    • Cancer Mother         glioblastoma   • Hypertension Father    • No Known Problems Paternal Grandfather    • No Known Problems Sister    • No Known Problems Daughter    • No Known Problems Maternal Grandmother    • Diabetes Paternal Grandmother    • Stroke Paternal Grandmother    • Heart disease Neg Hx    • Thyroid disease Neg Hx    • Breast cancer Neg Hx    • Colon cancer Neg Hx    • Ovarian cancer Neg Hx      Social History     Tobacco Use   • Smoking status: Never     Passive exposure: Never   • Smokeless tobacco: Never   Substance Use Topics   • Alcohol use: Yes     Comment: 2-3 drinks monthly     Allergies   Allergen Reactions   • Zyrtec [Cetirizine] Hives       Current Outpatient Medications:   •  acetaminophen (TYLENOL) 500 mg tablet,  , Disp: , Rfl:   •  Biotin 2500 MCG CAPS, Take by mouth, Disp: , Rfl:   •  Calcium Carbonate-Vit D-Min (CALCIUM 1200 PO), Take by mouth, Disp: , Rfl:   •  calcium citrate (CALCITRATE) 950 (200 Ca) MG tablet, Take 1 tablet (950 mg total) by mouth 2 (two) times a day, Disp: 180 tablet, Rfl: 2  •  Cholecalciferol (Vitamin D3) 125 MCG (5000 UT) TABS, Take 5,000 Units by mouth every other day, Disp: , Rfl:   •  cyanocobalamin (VITAMIN B-12) 1000 MCG tablet, Take 1,000 mcg by mouth daily, Disp: , Rfl:   •  cyclobenzaprine (FLEXERIL) 5 mg tablet, Take 1 tablet (5 mg total) by mouth 3 (three) times a day as needed for muscle spasms, Disp: 21 tablet, Rfl: 0  •  Diclofenac Sodium (VOLTAREN) 1 %, , Disp: , Rfl:   •  dicyclomine (BENTYL) 20 mg tablet, Take 1 tablet (20 mg total) by mouth 2 (two) times a day, Disp: 20 tablet, Rfl: 0  •  docusate sodium (COLACE) 100 mg capsule, Take 1 capsule (100 mg total) by mouth 2 (two) times a day for 7 days, Disp: 14 capsule, Rfl: 0  •  famotidine (PEPCID) 20 mg tablet, Take 1 tablet (20 mg total) by mouth 2 (two) times a day as needed for heartburn, Disp: 60 tablet, Rfl: 2  •  ferrous sulfate 325 (65 Fe) mg tablet, Take 325 mg by mouth daily with breakfast, Disp: , Rfl:   •  Multiple Vitamin (multivitamin) tablet, Take 1 tablet by mouth daily , Disp: , Rfl:   •  polyethylene glycol (MIRALAX) 17 g packet, Take 17 g by mouth daily TAKEN AS NEEDED, Disp: , Rfl:   •  simethicone (MYLICON) 80 mg chewable tablet, Chew 1 tablet (80 mg total) every 6 (six) hours as needed for flatulence, Disp: 30 tablet, Rfl: 0  •  oxyCODONE (Roxicodone) 5 immediate release tablet, Take 1 tablet (5 mg total) by mouth every 6 (six) hours as needed for moderate pain or severe pain for up to 6 doses Max Daily Amount: 20 mg (Patient not taking: Reported on 10/20/2023), Disp: 6 tablet, Rfl: 0    Review of Systems   Constitutional:  Negative for activity change, appetite change, chills, diaphoresis, fatigue, fever and unexpected weight change. HENT:  Negative for trouble swallowing and voice change. Eyes:  Negative for visual disturbance. Respiratory:  Negative for shortness of breath. Cardiovascular:  Negative for chest pain and palpitations. Gastrointestinal:  Positive for constipation. Negative for abdominal pain and diarrhea. Endocrine: Negative for cold intolerance, heat intolerance, polydipsia, polyphagia and polyuria. Genitourinary:  Negative for frequency and menstrual problem. Musculoskeletal:  Negative for arthralgias and myalgias. Skin:  Negative for rash. Allergic/Immunologic: Negative for food allergies. Neurological:  Negative for dizziness and tremors. Hematological:  Negative for adenopathy. Psychiatric/Behavioral:  Negative for sleep disturbance. All other systems reviewed and are negative. Physical Exam:  Body mass index is 31.89 kg/m². /86   Pulse 91   Ht 5' 6.5" (1.689 m)   Wt 91 kg (200 lb 9.6 oz)   BMI 31.89 kg/m²    Wt Readings from Last 3 Encounters:   11/02/23 91 kg (200 lb 9.6 oz)   10/20/23 89.4 kg (197 lb)   09/22/23 90.4 kg (199 lb 3.2 oz)       Physical Exam  Vitals reviewed. Constitutional:       General: She is not in acute distress. Appearance: Normal appearance. She is well-developed.  She is not toxic-appearing. HENT:      Head: Normocephalic and atraumatic. Eyes:      Conjunctiva/sclera: Conjunctivae normal.      Pupils: Pupils are equal, round, and reactive to light. Neck:      Thyroid: No thyromegaly. Cardiovascular:      Rate and Rhythm: Normal rate and regular rhythm. Heart sounds: Normal heart sounds. Pulmonary:      Effort: Pulmonary effort is normal. No respiratory distress. Breath sounds: Normal breath sounds. No wheezing or rales. Abdominal:      General: Bowel sounds are normal. There is no distension. Palpations: Abdomen is soft. Tenderness: There is no abdominal tenderness. Musculoskeletal:         General: Normal range of motion. Cervical back: Normal range of motion and neck supple. Skin:     General: Skin is warm and dry. Neurological:      Mental Status: She is alert and oriented to person, place, and time.    Psychiatric:         Mood and Affect: Mood normal.         Behavior: Behavior normal.         Labs:     Component      Latest Ref Rng 12/21/2022 5/8/2023 8/3/2023   Sodium      135 - 147 mmol/L  138     Potassium      3.5 - 5.3 mmol/L  3.9     Chloride      96 - 108 mmol/L  108     CO2      21 - 32 mmol/L  28     Anion Gap      mmol/L  2 (L)     BUN      5 - 25 mg/dL  11     Creatinine      0.60 - 1.30 mg/dL  0.75     Glucose, Random      65 - 140 mg/dL      GLUCOSE FASTING      65 - 99 mg/dL  49 (L)     Calcium      8.4 - 10.2 mg/dL  9.0     CORRECTED CALCIUM      8.3 - 10.1 mg/dL      AST      13 - 39 U/L  16     ALT      7 - 52 U/L  32     Alkaline Phosphatase      34 - 104 U/L  52     Total Protein      6.4 - 8.4 g/dL  7.0     Albumin      3.5 - 5.0 g/dL  3.6     TOTAL BILIRUBIN      0.20 - 1.00 mg/dL  0.46     eGFR      ml/min/1.73sq m  103     TSH 3RD GENERATON      0.450 - 4.500 uIU/mL 0.964      PARATHYROID HORMONE      18.4 - 80.1 pg/mL  49.9     Vit D, 25-Hydroxy      30.0 - 100.0 ng/mL   38.1    Phosphorus      2.7 - 4.5 mg/dL Component      Latest Ref Rng 8/24/2023   Sodium      135 - 147 mmol/L 140    Potassium      3.5 - 5.3 mmol/L 4.0    Chloride      96 - 108 mmol/L 109 (H)    CO2      21 - 32 mmol/L 22    Anion Gap      mmol/L 9    BUN      5 - 25 mg/dL 6    Creatinine      0.60 - 1.30 mg/dL 0.55 (L)    Glucose, Random      65 - 140 mg/dL 75    GLUCOSE FASTING      65 - 99 mg/dL 75    Calcium      8.4 - 10.2 mg/dL 8.0 (L)    CORRECTED CALCIUM      8.3 - 10.1 mg/dL 8.6    AST      13 - 39 U/L 12 (L)    ALT      7 - 52 U/L 22    Alkaline Phosphatase      34 - 104 U/L 34    Total Protein      6.4 - 8.4 g/dL 5.2 (L)    Albumin      3.5 - 5.0 g/dL 3.2 (L)    TOTAL BILIRUBIN      0.20 - 1.00 mg/dL 0.37    eGFR      ml/min/1.73sq m 121    TSH 3RD GENERATON      0.450 - 4.500 uIU/mL    PARATHYROID HORMONE      18.4 - 80.1 pg/mL    Vit D, 25-Hydroxy      30.0 - 100.0 ng/mL    Phosphorus      2.7 - 4.5 mg/dL 3.6       Legend:  (L) Low  (H) High    Patient Instructions   Continue to follow bariatric instructions for diet and supplements  Vitamin D supplement dose should be 5,000 units every other day according to bariatric notes    Discussed with the patient and all questioned fully answered. She will call me if any problems arise.     Follow-up appointment if needed    Counseled patient on diagnostic results, prognosis, risk and benefit of treatment options, instruction for management, importance of treatment compliance, Risk  factor reduction and impressions    Fernando Acuna PA-C

## 2023-11-02 NOTE — ASSESSMENT & PLAN NOTE
This has improved and is normal. Encouraged long term compliance with calcium/vitamin d supplements and regular bariatric labs/follow up visits.

## 2023-11-02 NOTE — PATIENT INSTRUCTIONS
Continue to follow bariatric instructions for diet and supplements  Vitamin D supplement dose should be 5,000 units every other day according to bariatric notes

## 2023-11-06 ENCOUNTER — TELEPHONE (OUTPATIENT)
Dept: FAMILY MEDICINE CLINIC | Facility: CLINIC | Age: 33
End: 2023-11-06

## 2023-11-07 ENCOUNTER — OFFICE VISIT (OUTPATIENT)
Dept: GASTROENTEROLOGY | Facility: MEDICAL CENTER | Age: 33
End: 2023-11-07
Payer: MEDICARE

## 2023-11-07 ENCOUNTER — TELEPHONE (OUTPATIENT)
Age: 33
End: 2023-11-07

## 2023-11-07 VITALS
TEMPERATURE: 98.1 F | HEART RATE: 86 BPM | SYSTOLIC BLOOD PRESSURE: 102 MMHG | BODY MASS INDEX: 31.45 KG/M2 | DIASTOLIC BLOOD PRESSURE: 69 MMHG | WEIGHT: 197.8 LBS

## 2023-11-07 DIAGNOSIS — K91.2 POSTSURGICAL MALABSORPTION: ICD-10-CM

## 2023-11-07 DIAGNOSIS — K21.9 GERD (GASTROESOPHAGEAL REFLUX DISEASE): ICD-10-CM

## 2023-11-07 DIAGNOSIS — R10.11 RUQ PAIN: Primary | ICD-10-CM

## 2023-11-07 DIAGNOSIS — R19.7 DIARRHEA, UNSPECIFIED TYPE: ICD-10-CM

## 2023-11-07 DIAGNOSIS — Z48.815 ENCOUNTER FOR SURGICAL AFTERCARE FOLLOWING SURGERY OF DIGESTIVE SYSTEM: ICD-10-CM

## 2023-11-07 DIAGNOSIS — Z98.84 BARIATRIC SURGERY STATUS: ICD-10-CM

## 2023-11-07 DIAGNOSIS — R10.9 ABDOMINAL PAIN: ICD-10-CM

## 2023-11-07 DIAGNOSIS — R10.11 RUQ PAIN: ICD-10-CM

## 2023-11-07 PROCEDURE — 99213 OFFICE O/P EST LOW 20 MIN: CPT | Performed by: NURSE PRACTITIONER

## 2023-11-07 RX ORDER — FAMOTIDINE 20 MG/1
40 TABLET, FILM COATED ORAL 2 TIMES DAILY
Qty: 60 TABLET | Refills: 3 | Status: SHIPPED | OUTPATIENT
Start: 2023-11-07 | End: 2024-01-06

## 2023-11-07 NOTE — PROGRESS NOTES
[facility] - Outpatient Follow-up Note  Windy Haas 26 y.o. female MRN: [id number]  Encounter: [id number]          ASSESSMENT AND PLAN:      1. Right upper quadrant/epigastric pain  2. Status post lap cholecystectomy  3. Diarrhea    EGD 1/25/2023 noted mild pouchitis with no evidence of marginal ulcerations, gastric fistula or anastomotic stricture. Recent upper GI was normal.  Stool for H. pylori was negative. Her omeprazole was stopped a few weeks ago but she is currently taking famotidine 20 mg twice daily with no help. Denies any nausea or vomiting. Reports she is having intermittent primarily right upper quadrant pain that is a pressure since she had her gallbladder removed. She did see the surgeon and was told that it could be muscle spasms. She was given Flexeril which did help somewhat. Pain is not related to eating or BM. Denies any heartburn or reflux. Started with intermittent bouts of loose stools since her gallbladder was removed. This could be secondary to to her CCY. She was on antibiotics around that time. We will obtain stool studies just to rule out C. difficile or any infectious process. Denies any fevers or chills. Never had a colonoscopy. -Stool for C. difficile, enteric pathogens fecal calprotectin and O&P and Giardia  -Levsin 0.125 mg sublingual every 6 hour as needed  -Increase Pepcid to 40 mg twice daily  -Antireflux diet  -Contact office in 2 to 3 weeks with update  -Follow-up in office in 3 months or sooner if needed      ______________________________________________________________________    SUBJECTIVE: 59-year-old female here for follow-up. She was last seen by myself 9/5/2023 for epigastric pain. EGD 1/25/2023 noted mild pouchitis with no evidence of marginal ulcerations, gastric fistula or anastomotic stricture. Recent upper GI was normal.  Stool for H. pylori was negative.   Her omeprazole was stopped a few weeks ago but she is currently taking famotidine 20 mg twice daily with no help. Denies any nausea or vomiting. Reports she is having intermittent primarily right upper quadrant pain that is a pressure since she had her gallbladder removed. She did see the surgeon and was told that it could be muscle spasms. She was given Flexeril which did help somewhat. Pain is not related to eating or BM. Denies any heartburn or reflux. Started with intermittent bouts of loose stools since her gallbladder was removed. This could be secondary to to her CCY. She was on antibiotics around that time. We will obtain stool studies just to rule out C. difficile or any infectious process. Denies any fevers or chills. Never had a colonoscopy. Prior EGD/colonoscopy     EGD 1/25/2023-mild pouchitis with no evidence of marginal ulcerations, gastric fistula or anastomotic stricture. Otherwise normal gastric bypass anatomy. Biopsies negative for H. pylori. (Done by Dr. Ignacio Matthews)       REVIEW OF SYSTEMS IS OTHERWISE NEGATIVE.   10 point review of systems negative other than per HPI      Historical Information   Past Medical History:   Diagnosis Date   • Back pain    • Bacterial vaginosis    • Bariatric surgery status    • Chlamydia    • Disease of thyroid gland     nodule   • Knee pain    • Morbidly obese ([address])     gastric sleeve   today 10/4 2021   • MRSA carrier    • Obesity    • Postsurgical malabsorption    • Thyroid disease     benign    -right   gets scan yearly   • Urogenital trichomoniasis    • Wears glasses      Past Surgical History:   Procedure Laterality Date   • ABDOMINAL SURGERY  August 24th    Oct 4th 2021   • BARIATRIC SURGERY  Oct 4th, 2021   • CHOLECYSTECTOMY  August 24th   • CHOLECYSTECTOMY LAPAROSCOPIC N/A 08/24/2023    Procedure: CHOLECYSTECTOMY LAPAROSCOPIC;  Surgeon: Windy Azul DO;  [facility];  Service: General   • DC LAPS GSTR RSTCV [address] [place] [address] W/BYP DRE-EN-Y LIMB <150 CM N/A 10/04/2021    Procedure: [facility] GASTRIC  DRE-EN-Y LAPAROSCOPIC, AND INTRAOPERATIVE EGD;  Surgeon: Hayde Hwang MD;  [facility];  Service: Bariatrics   • TONSILLECTOMY     • TOOTH EXTRACTION  10/01/2014   • WRIST SURGERY  05/25/2021     Social History   Social History     Substance and Sexual Activity   Alcohol Use Yes    Comment: 2-3 drinks monthly     Social History     Substance and Sexual Activity   Drug Use No     Social History     Tobacco Use   Smoking Status Never   • Passive exposure: Never   Smokeless Tobacco Never     Family History   Problem Relation Age of Onset   • Brain cancer Mother    • Cancer Mother         glioblastoma   • Hypertension Father    • Diabetes Paternal Grandfather    • Stroke Paternal Grandfather    • No Known Problems Sister    • No Known Problems Daughter    • No Known Problems Maternal Grandmother    • Diabetes Paternal Grandmother    • Stroke Paternal Grandmother    • Heart disease Neg Hx    • Thyroid disease Neg Hx    • Breast cancer Neg Hx    • Colon cancer Neg Hx    • Ovarian cancer Neg Hx        Meds/Allergies       Current Outpatient Medications:   •  acetaminophen (TYLENOL) 500 mg tablet  •  Biotin 2500 MCG CAPS  •  Calcium Carbonate-Vit D-Min (CALCIUM 1200 PO)  •  calcium citrate (CALCITRATE) 950 (200 Ca) MG tablet  •  Cholecalciferol (Vitamin D3) 125 MCG (5000 UT) TABS  •  cyanocobalamin (VITAMIN B-12) 1000 MCG tablet  •  cyclobenzaprine (FLEXERIL) 5 mg tablet  •  Diclofenac Sodium (VOLTAREN) 1 %  •  famotidine (PEPCID) 20 mg tablet  •  ferrous sulfate 325 (65 Fe) mg tablet  •  hyoscyamine (LEVSIN/SL) 0.125 mg SL tablet  •  Multiple Vitamin (multivitamin) tablet  •  polyethylene glycol (MIRALAX) 17 g packet  •  dicyclomine (BENTYL) 20 mg tablet  •  docusate sodium (COLACE) 100 mg capsule  •  oxyCODONE (Roxicodone) 5 immediate release tablet  •  simethicone (MYLICON) 80 mg chewable tablet    Allergies   Allergen Reactions   • Zyrtec [Cetirizine] Hives           Objective     Blood pressure 102/69, pulse 86, temperature 98.1 °F (36.7 °C), temperature source Tympanic, weight 89.7 kg (197 lb 12.8 oz). Body mass index is 31.45 kg/m². PHYSICAL EXAM:      General Appearance:   Alert, cooperative, no distress   HEENT:   Normocephalic, atraumatic, anicteric. Neck:  Supple, symmetrical, trachea midline   Lungs:   Clear to auscultation bilaterally; no rales, rhonchi or wheezing; respirations unlabored    Heart[de-identified]   Regular rate and rhythm; no murmur, rub, or gallop. Abdomen:   Soft, non-tender, non-distended; normal bowel sounds; no masses, no organomegaly    Genitalia:   Deferred    Rectal:   Deferred    Extremities:  No cyanosis, clubbing or edema    Pulses:  2+ and symmetric    Skin:  No jaundice, rashes, or lesions    Lymph nodes:  No palpable cervical lymphadenopathy        Lab Results:   No visits with results within 1 Day(s) from this visit. Latest known visit with results is:   Appointment on 10/20/2023   Component Date Value   • H pylori Ag, Stl 10/20/2023 Negative          Radiology Results:   No results found.

## 2023-11-07 NOTE — TELEPHONE ENCOUNTER
Reason for call:   [] Refill   [x] Prior Auth  [] Other:     Office:   [] PCP/Provider -   [x] Specialty/Provider - ALOK RUGGIERO    Medication: HYOSCYAMINE 0.125

## 2023-11-08 NOTE — TELEPHONE ENCOUNTER
PA for hyoscyamine sent through [facility]  Key: [id number]  Waiting for next steps/questions to complete pa no

## 2023-11-10 RX ORDER — DICYCLOMINE HCL 20 MG
20 TABLET ORAL 3 TIMES DAILY PRN
Qty: 20 TABLET | Refills: 0 | Status: SHIPPED | OUTPATIENT
Start: 2023-11-10

## 2023-11-10 NOTE — TELEPHONE ENCOUNTER
Can you please let patient know that this has not covered under her insurance. I would recommend that she retry the dicyclomine which is covered. It does the same thing. Just double check and see if she had any problems with it. Thanks.

## 2023-11-10 NOTE — TELEPHONE ENCOUNTER
Called and spoke to Pt to inform of PA results and that NP, Radha Ho, recommends retrying dicyclomine which is covered. Also confirmed that Pt had no problems w/ dicyclomine. Pt stated experiencing no problems w/ it and stated understanding. Pt requested that the dicyclomine prescription order be placed at the [facility] ([address] [facility], [address] [place] [id number]). Pt had no further questions, thank you! Winston Sandoval-- Could you please an order for the dicyclomine at the above mentioned pharmacy? Thank you!

## 2023-11-14 ENCOUNTER — OFFICE VISIT (OUTPATIENT)
Dept: FAMILY MEDICINE CLINIC | Facility: CLINIC | Age: 33
End: 2023-11-14
Payer: MEDICARE

## 2023-11-14 VITALS
WEIGHT: 202 LBS | DIASTOLIC BLOOD PRESSURE: 70 MMHG | SYSTOLIC BLOOD PRESSURE: 102 MMHG | BODY MASS INDEX: 31.71 KG/M2 | HEIGHT: 67 IN | RESPIRATION RATE: 18 BRPM | TEMPERATURE: 97.8 F | HEART RATE: 72 BPM | OXYGEN SATURATION: 98 %

## 2023-11-14 DIAGNOSIS — E78.1 PRIMARY HYPERTRIGLYCERIDEMIA: ICD-10-CM

## 2023-11-14 DIAGNOSIS — D50.8 IRON DEFICIENCY ANEMIA SECONDARY TO INADEQUATE DIETARY IRON INTAKE: ICD-10-CM

## 2023-11-14 DIAGNOSIS — J06.9 ACUTE UPPER RESPIRATORY INFECTION: Primary | ICD-10-CM

## 2023-11-14 PROCEDURE — 99214 OFFICE O/P EST MOD 30 MIN: CPT | Performed by: PHYSICIAN ASSISTANT

## 2023-11-14 RX ORDER — FLUTICASONE PROPIONATE 50 MCG
2 SPRAY, SUSPENSION (ML) NASAL DAILY
Qty: 48 G | Refills: 1 | Status: SHIPPED | OUTPATIENT
Start: 2023-11-14

## 2023-11-14 NOTE — PROGRESS NOTES
Name: Windy Haas      : 1990      MRN: [id number]  Encounter Provider: Sachin Newman PA-C  Encounter Date: 2023   Encounter department: [address] [place] [address]     1. Acute upper respiratory infection  -     fluticasone (FLONASE) 50 mcg/act nasal spray; 2 sprays into each nostril daily  -     CBC and differential  -     Comprehensive metabolic panel  -     Lipid panel    2. Iron deficiency anemia secondary to inadequate dietary iron intake  -     CBC and differential  -     Comprehensive metabolic panel  -     Lipid panel    3. Primary hypertriglyceridemia  -     CBC and differential  -     Comprehensive metabolic panel  -     Lipid panel      -I did prescribe Flonase for her upper respiratory symptoms. Use 2 sprays once daily each nostril  - Increase clear liquids  - Over-the-counter generic Sudafed 30 mg 2 tablets every 6 hours as needed  - Over-the-counter generic Mucinex DM as needed as package directs  - Follow-up if there is no improvement over the next several days or if symptoms increase. - She does continue follow-up with weight management. She is scheduled to have blood work done and the orders are in the system in January  I did add a CBC, CMP and lipid panel  She has lost approximately 118 pounds since her gastric bypass surgery. She still has some tenderness to the incision site in the epigastric region  - She will get her flu vaccine when she is feeling better  - Continue on a low-fat low-cholesterol diet  - Continue on the iron supplements and vitamin C as directed  - I did recommend follow-up in 4 months    M*Modal software was used to dictate this note. It may contain errors with dictating incorrect words/spelling. Please contact provider directly for any questions. Subjective      Patient presents today for routine follow-up.   She states over the last 3 days she also has upper respiratory symptoms including sore throat, intermittent right ear pain, mild cough due to postnasal drip, hoarse voice. Taking Tylenol, Robitussin, honey and tea with some improvement. Denies any fevers or chills or shortness of breath. She did not check her cell for COVID. She has not gotten the flu vaccine. She states that she does have a follow-up with weight management in January. She states that she has lost approximately 118 pounds since the gastric bypass surgery. She has been doing really well. Review of Systems   Constitutional:  Negative for chills and fever. HENT:  Positive for congestion, ear pain, postnasal drip and sore throat. Negative for ear discharge. Respiratory:  Positive for cough. Negative for shortness of breath.         Current Outpatient Medications on File Prior to Visit   Medication Sig    acetaminophen (TYLENOL) 500 mg tablet      Biotin 2500 MCG CAPS Take by mouth    Calcium Carbonate-Vit D-Min (CALCIUM 1200 PO) Take by mouth    Cholecalciferol (Vitamin D3) 125 MCG (5000 UT) TABS Take 5,000 Units by mouth every other day    cyanocobalamin (VITAMIN B-12) 1000 MCG tablet Take 1,000 mcg by mouth daily    cyclobenzaprine (FLEXERIL) 5 mg tablet Take 1 tablet (5 mg total) by mouth 3 (three) times a day as needed for muscle spasms    dicyclomine (BENTYL) 20 mg tablet Take 1 tablet (20 mg total) by mouth 3 (three) times a day as needed (3 times a day as needed)    famotidine (PEPCID) 20 mg tablet Take 2 tablets (40 mg total) by mouth 2 (two) times a day    ferrous sulfate 325 (65 Fe) mg tablet Take 325 mg by mouth daily with breakfast    hyoscyamine (LEVSIN/SL) 0.125 mg SL tablet Take 1 tablet (0.125 mg total) by mouth every 6 (six) hours as needed for cramping    Multiple Vitamin (multivitamin) tablet Take 1 tablet by mouth daily     polyethylene glycol (MIRALAX) 17 g packet Take 17 g by mouth daily TAKEN AS NEEDED    [DISCONTINUED] Diclofenac Sodium (VOLTAREN) 1 %     docusate sodium (COLACE) 100 mg capsule Take 1 capsule (100 mg total) by mouth 2 (two) times a day for 7 days    [DISCONTINUED] calcium citrate (CALCITRATE) 950 (200 Ca) MG tablet Take 1 tablet (950 mg total) by mouth 2 (two) times a day    [DISCONTINUED] oxyCODONE (Roxicodone) 5 immediate release tablet Take 1 tablet (5 mg total) by mouth every 6 (six) hours as needed for moderate pain or severe pain for up to 6 doses Max Daily Amount: 20 mg (Patient not taking: Reported on 10/20/2023)    [DISCONTINUED] simethicone (MYLICON) 80 mg chewable tablet Chew 1 tablet (80 mg total) every 6 (six) hours as needed for flatulence (Patient not taking: Reported on 11/7/2023)       Objective     /70   Pulse 72   Temp 97.8 °F (36.6 °C) (Tympanic)   Resp 18   Ht 5' 6.5" (1.689 m)   Wt 91.6 kg (202 lb)   SpO2 98%   BMI 32.12 kg/m²     Physical Exam  Vitals reviewed. Constitutional:       General: She is not in acute distress. Appearance: Normal appearance. She is well-developed. She is not ill-appearing, toxic-appearing or diaphoretic. Comments: Hoarse voice   HENT:      Head: Normocephalic and atraumatic. Right Ear: Tympanic membrane normal. Tympanic membrane is not erythematous. Left Ear: Tympanic membrane normal. Tympanic membrane is not erythematous. Mouth/Throat:      Mouth: Mucous membranes are moist.      Pharynx: No oropharyngeal exudate. Tonsils: No tonsillar exudate or tonsillar abscesses. Neck:      Thyroid: No thyromegaly. Cardiovascular:      Rate and Rhythm: Normal rate and regular rhythm. Heart sounds: Normal heart sounds. No murmur heard. No friction rub. No gallop. Pulmonary:      Effort: Pulmonary effort is normal. No respiratory distress. Breath sounds: Normal breath sounds. No wheezing, rhonchi or rales. Abdominal:      General: Bowel sounds are normal.      Palpations: Abdomen is soft. There is no mass. Tenderness:  There is abdominal tenderness (Mild tenderness in the epigastric region in area of incision site). Musculoskeletal:         General: No deformity. Cervical back: Neck supple. Right lower leg: No edema. Left lower leg: No edema. Lymphadenopathy:      Cervical: No cervical adenopathy. Skin:     General: Skin is warm. Neurological:      General: No focal deficit present. Mental Status: She is alert. Psychiatric:         Mood and Affect: Mood normal.         Behavior: Behavior normal.         Thought Content:  Thought content normal.         Judgment: Judgment normal.       Sara Cameron PA-C

## 2024-01-11 ENCOUNTER — APPOINTMENT (OUTPATIENT)
Dept: LAB | Facility: IMAGING CENTER | Age: 34
End: 2024-01-11
Payer: MEDICARE

## 2024-01-11 DIAGNOSIS — K91.2 POSTSURGICAL MALABSORPTION: ICD-10-CM

## 2024-01-11 DIAGNOSIS — Z48.815 ENCOUNTER FOR SURGICAL AFTERCARE FOLLOWING SURGERY OF DIGESTIVE SYSTEM: ICD-10-CM

## 2024-01-11 DIAGNOSIS — E66.9 OBESITY, CLASS I, BMI 30-34.9: ICD-10-CM

## 2024-01-11 DIAGNOSIS — Z98.84 BARIATRIC SURGERY STATUS: ICD-10-CM

## 2024-01-11 LAB
25(OH)D3 SERPL-MCNC: 83 NG/ML (ref 30–100)
ALBUMIN SERPL BCP-MCNC: 4.5 G/DL (ref 3.5–5)
ALP SERPL-CCNC: 50 U/L (ref 34–104)
ALT SERPL W P-5'-P-CCNC: 33 U/L (ref 7–52)
ANION GAP SERPL CALCULATED.3IONS-SCNC: 10 MMOL/L
AST SERPL W P-5'-P-CCNC: 21 U/L (ref 13–39)
BASOPHILS # BLD AUTO: 0.09 THOUSANDS/ÂΜL (ref 0–0.1)
BASOPHILS NFR BLD AUTO: 2 % (ref 0–1)
BILIRUB SERPL-MCNC: 0.65 MG/DL (ref 0.2–1)
BUN SERPL-MCNC: 11 MG/DL (ref 5–25)
CALCIUM SERPL-MCNC: 9 MG/DL (ref 8.4–10.2)
CHLORIDE SERPL-SCNC: 108 MMOL/L (ref 96–108)
CHOLEST SERPL-MCNC: 161 MG/DL
CO2 SERPL-SCNC: 25 MMOL/L (ref 21–32)
CREAT SERPL-MCNC: 0.66 MG/DL (ref 0.6–1.3)
EOSINOPHIL # BLD AUTO: 0.2 THOUSAND/ÂΜL (ref 0–0.61)
EOSINOPHIL NFR BLD AUTO: 4 % (ref 0–6)
ERYTHROCYTE [DISTWIDTH] IN BLOOD BY AUTOMATED COUNT: 12.3 % (ref 11.6–15.1)
FERRITIN SERPL-MCNC: 85 NG/ML (ref 11–307)
FOLATE SERPL-MCNC: >22.3 NG/ML
GFR SERPL CREATININE-BSD FRML MDRD: 114 ML/MIN/1.73SQ M
GLUCOSE P FAST SERPL-MCNC: 83 MG/DL (ref 65–99)
HCT VFR BLD AUTO: 43.5 % (ref 34.8–46.1)
HDLC SERPL-MCNC: 55 MG/DL
HGB BLD-MCNC: 14.1 G/DL (ref 11.5–15.4)
IMM GRANULOCYTES # BLD AUTO: 0.01 THOUSAND/UL (ref 0–0.2)
IMM GRANULOCYTES NFR BLD AUTO: 0 % (ref 0–2)
IRON SATN MFR SERPL: 24 % (ref 15–50)
IRON SERPL-MCNC: 89 UG/DL (ref 50–212)
LDLC SERPL CALC-MCNC: 89 MG/DL (ref 0–100)
LYMPHOCYTES # BLD AUTO: 2.13 THOUSANDS/ÂΜL (ref 0.6–4.47)
LYMPHOCYTES NFR BLD AUTO: 38 % (ref 14–44)
MCH RBC QN AUTO: 30.6 PG (ref 26.8–34.3)
MCHC RBC AUTO-ENTMCNC: 32.4 G/DL (ref 31.4–37.4)
MCV RBC AUTO: 94 FL (ref 82–98)
MONOCYTES # BLD AUTO: 0.39 THOUSAND/ÂΜL (ref 0.17–1.22)
MONOCYTES NFR BLD AUTO: 7 % (ref 4–12)
NEUTROPHILS # BLD AUTO: 2.78 THOUSANDS/ÂΜL (ref 1.85–7.62)
NEUTS SEG NFR BLD AUTO: 49 % (ref 43–75)
NONHDLC SERPL-MCNC: 106 MG/DL
NRBC BLD AUTO-RTO: 0 /100 WBCS
PLATELET # BLD AUTO: 253 THOUSANDS/UL (ref 149–390)
PMV BLD AUTO: 11.3 FL (ref 8.9–12.7)
POTASSIUM SERPL-SCNC: 4.3 MMOL/L (ref 3.5–5.3)
PROT SERPL-MCNC: 6.8 G/DL (ref 6.4–8.4)
RBC # BLD AUTO: 4.61 MILLION/UL (ref 3.81–5.12)
SODIUM SERPL-SCNC: 143 MMOL/L (ref 135–147)
TIBC SERPL-MCNC: 364 UG/DL (ref 250–450)
TRIGL SERPL-MCNC: 87 MG/DL
UIBC SERPL-MCNC: 275 UG/DL (ref 155–355)
VIT B12 SERPL-MCNC: 2247 PG/ML (ref 180–914)
WBC # BLD AUTO: 5.6 THOUSAND/UL (ref 4.31–10.16)

## 2024-01-11 PROCEDURE — 84425 ASSAY OF VITAMIN B-1: CPT

## 2024-01-11 PROCEDURE — 82746 ASSAY OF FOLIC ACID SERUM: CPT

## 2024-01-11 PROCEDURE — 82728 ASSAY OF FERRITIN: CPT

## 2024-01-11 PROCEDURE — 83540 ASSAY OF IRON: CPT

## 2024-01-11 PROCEDURE — 82607 VITAMIN B-12: CPT

## 2024-01-11 PROCEDURE — 36415 COLL VENOUS BLD VENIPUNCTURE: CPT

## 2024-01-11 PROCEDURE — 82306 VITAMIN D 25 HYDROXY: CPT

## 2024-01-11 PROCEDURE — 84630 ASSAY OF ZINC: CPT

## 2024-01-11 PROCEDURE — 84590 ASSAY OF VITAMIN A: CPT

## 2024-01-11 PROCEDURE — 83550 IRON BINDING TEST: CPT

## 2024-01-12 ENCOUNTER — TELEPHONE (OUTPATIENT)
Dept: FAMILY MEDICINE CLINIC | Facility: CLINIC | Age: 34
End: 2024-01-12

## 2024-01-12 ENCOUNTER — TELEMEDICINE (OUTPATIENT)
Dept: FAMILY MEDICINE CLINIC | Facility: CLINIC | Age: 34
End: 2024-01-12
Payer: MEDICARE

## 2024-01-12 ENCOUNTER — TELEPHONE (OUTPATIENT)
Age: 34
End: 2024-01-12

## 2024-01-12 DIAGNOSIS — R74.8 ELEVATED VITAMIN B12 LEVEL: Primary | ICD-10-CM

## 2024-01-12 DIAGNOSIS — F41.8 SITUATIONAL ANXIETY: ICD-10-CM

## 2024-01-12 PROCEDURE — 99213 OFFICE O/P EST LOW 20 MIN: CPT | Performed by: PHYSICIAN ASSISTANT

## 2024-01-12 RX ORDER — B-COMPLEX WITH VITAMIN C
2 TABLET ORAL
COMMUNITY

## 2024-01-12 NOTE — PROGRESS NOTES
Virtual Brief Visit    This Visit is being completed by telephone. The Patient is located at Home and in the following state in which I hold an active license [place]    The patient was identified by name and date of birth. Brianna Myrick was informed that this is a telemedicine visit and that the visit is being conducted through the Epic Embedded platform. She agrees to proceed..  My office door was closed. No one else was in the room.  She acknowledged consent and understanding of privacy and security of the video platform. The patient has agreed to participate and understands they can discontinue the visit at any time.    Patient is aware this is a billable service.     It was my intent to perform this visit via video technology but the patient was not able to do a video connection so the visit was completed via audio telephone only.     HPI: Patient was sending messages regarding her lab results.  She noticed that her B12 level was significantly elevated.  This was ordered by the bariatric specialist.  She is currently on B12 5000 mcg.  She thinks that she started this medication on her own and thought that the higher dosage would be better.  She was also concerned about the slight elevation of her basophils but the remaining CBC was normal.    Objective:  She is able to talk in full sentences without coughing or appearing short of breath    Assessment/Plan:    Problem List Items Addressed This Visit    None  Visit Diagnoses       Elevated vitamin B12 level    -  Primary    Relevant Orders    Vitamin B12    Situational anxiety              Her lab results from yesterday have been reviewed.  Some results are still pending including the vitamin B1 and zinc levels.  I ordered the lipid CMP and CBC.  Her basophils are at 2, normal range is 1 and below.  The remaining CBC is normal.  I did advise her there are no concerns of her CBC at this time.  - I did review the lab results from the bariatric specialist.  Her B12  level is significantly elevated at 2247.  Normal is 180- 914.  She is currently taking an over-the-counter supplement vitamin B12 5000 mcg daily.  I did advise her to hold on this supplement.  She will continue on the multivitamin which has a lower dose of B12.  I did recommend she recheck a level for her B12 in 2 to 3 weeks.  This will continue to be monitored.  - Her anxiety did decrease after our visit  -I did give her the phone number for IT find out why she was not able to connect for a face-to-face virtual visit through [facility].  -I did advise her to reach out if she does have any further questions.  Otherwise she will follow-up with me as scheduled on March 15    M*[employer] software was used to dictate this note. It may contain errors with dictating incorrect words/spelling. Please contact provider directly for any questions.       Recent Visits  No visits were found meeting these conditions.  Showing recent visits within past 7 days and meeting all other requirements  Today's Visits  Date Type Provider Dept   01/12/24 Telephone Yannick Oreilly [facility]  [facility]   01/12/24 Telemedicine Sara Cameron PA-C [facility]  [facility]   Showing today's visits and meeting all other requirements  Future Appointments  No visits were found meeting these conditions.  Showing future appointments within next 150 days and meeting all other requirements         Visit Time  Total Visit Duration: 19 minutes

## 2024-01-13 LAB — ZINC SERPL-MCNC: 92 UG/DL (ref 44–115)

## 2024-01-15 LAB — VIT A SERPL-MCNC: 47.9 UG/DL (ref 18.9–57.3)

## 2024-01-16 ENCOUNTER — TELEPHONE (OUTPATIENT)
Dept: BARIATRICS | Facility: CLINIC | Age: 34
End: 2024-01-16

## 2024-01-16 LAB — VIT B1 BLD-SCNC: 85.9 NMOL/L (ref 66.5–200)

## 2024-01-16 NOTE — TELEPHONE ENCOUNTER
----- Message from MAURI Prater sent at 1/16/2024 10:14 AM EST -----  Please call pt to let her know all her vitamin levels are back. As stated, her b12 level is elevated and this is not harmful and nontoxic. Excess will be excreted through the urine. Please stop any extra supplements of vitamin B12 and continue with her current bariatric multivitamins.     All other labs are within limits. No concerns at this time.     Ken

## 2024-01-30 DIAGNOSIS — Z48.815 ENCOUNTER FOR SURGICAL AFTERCARE FOLLOWING SURGERY OF DIGESTIVE SYSTEM: ICD-10-CM

## 2024-01-30 DIAGNOSIS — K91.2 POSTSURGICAL MALABSORPTION: ICD-10-CM

## 2024-01-30 DIAGNOSIS — K21.9 GERD (GASTROESOPHAGEAL REFLUX DISEASE): ICD-10-CM

## 2024-01-30 DIAGNOSIS — Z98.84 BARIATRIC SURGERY STATUS: ICD-10-CM

## 2024-01-30 RX ORDER — FAMOTIDINE 20 MG/1
40 TABLET, FILM COATED ORAL 2 TIMES DAILY
Qty: 60 TABLET | Refills: 5 | Status: SHIPPED | OUTPATIENT
Start: 2024-01-30 | End: 2024-03-30

## 2024-02-12 ENCOUNTER — TELEPHONE (OUTPATIENT)
Dept: GASTROENTEROLOGY | Facility: MEDICAL CENTER | Age: 34
End: 2024-02-12

## 2024-02-12 NOTE — TELEPHONE ENCOUNTER
Due to inclement weather reached out to patient to switch to Virtual. Pt preferred in person. Rescheduled with Bahman for 02/16/2024 at [facility]. Pt aware of location and address was provided.

## 2024-02-16 ENCOUNTER — OFFICE VISIT (OUTPATIENT)
Dept: GASTROENTEROLOGY | Facility: CLINIC | Age: 34
End: 2024-02-16
Payer: MEDICARE

## 2024-02-16 ENCOUNTER — APPOINTMENT (OUTPATIENT)
Dept: LAB | Facility: IMAGING CENTER | Age: 34
End: 2024-02-16
Payer: MEDICARE

## 2024-02-16 VITALS
HEART RATE: 80 BPM | HEIGHT: 66 IN | BODY MASS INDEX: 32.3 KG/M2 | OXYGEN SATURATION: 99 % | WEIGHT: 201 LBS | SYSTOLIC BLOOD PRESSURE: 110 MMHG | TEMPERATURE: 96.6 F | DIASTOLIC BLOOD PRESSURE: 82 MMHG

## 2024-02-16 DIAGNOSIS — K21.9 GASTROESOPHAGEAL REFLUX DISEASE, UNSPECIFIED WHETHER ESOPHAGITIS PRESENT: ICD-10-CM

## 2024-02-16 DIAGNOSIS — Z90.49 HISTORY OF CHOLECYSTECTOMY: ICD-10-CM

## 2024-02-16 DIAGNOSIS — Z98.84 HISTORY OF ROUX-EN-Y GASTRIC BYPASS: ICD-10-CM

## 2024-02-16 DIAGNOSIS — R10.11 RUQ PAIN: Primary | ICD-10-CM

## 2024-02-16 DIAGNOSIS — R74.8 ELEVATED VITAMIN B12 LEVEL: ICD-10-CM

## 2024-02-16 DIAGNOSIS — K90.89 BILE SALT-INDUCED DIARRHEA: ICD-10-CM

## 2024-02-16 LAB — VIT B12 SERPL-MCNC: 806 PG/ML (ref 180–914)

## 2024-02-16 PROCEDURE — 82607 VITAMIN B-12: CPT

## 2024-02-16 PROCEDURE — 99214 OFFICE O/P EST MOD 30 MIN: CPT | Performed by: DIETITIAN, REGISTERED

## 2024-02-16 PROCEDURE — 36415 COLL VENOUS BLD VENIPUNCTURE: CPT

## 2024-02-16 RX ORDER — DICYCLOMINE HCL 20 MG
20 TABLET ORAL 2 TIMES DAILY PRN
Qty: 60 TABLET | Refills: 3 | Status: SHIPPED | OUTPATIENT
Start: 2024-02-16

## 2024-02-16 RX ORDER — CHOLESTYRAMINE 4 G/9G
1 POWDER, FOR SUSPENSION ORAL
Qty: 30 EACH | Refills: 3 | Status: SHIPPED | OUTPATIENT
Start: 2024-02-16

## 2024-02-16 NOTE — PATIENT INSTRUCTIONS
Take the Questran powder at least 1 hour AFTER other medicine, and at least 4 hours BEFORE other medicine (for example, around 3-4 PM)    Take 1/2 packet of Questran for at least a week or so.  Depending how you do with it, you can take more or less to prevent both diarrhea and constipation.    At your earliest convenience, turn in your stool samples.

## 2024-02-16 NOTE — PROGRESS NOTES
[facility] - Outpatient Follow-up Note  Brianna Myrick 33 y.o. female MRN: [id number]  Encounter: [id number]          ASSESSMENT AND PLAN:    1.  Bile salt induced diarrhea   2.  History of cholecystectomy  3.  History of RYGB  4.  RUQ pain  Patient reports every single day she has right-sided abdominal pain, sometimes sharp, and sometimes described as gurgling/gas pains.  She also reports no matter what kinds of food she eats that she has urgent postprandial soft or watery bowel movements.  Has 3-4 BMs per day on average.  Denies any incontinence.  Denies any bloody stools.  She states the symptoms have been going on since her RYGB in 2021 but have gotten significantly worse since her CCY in 2023.  At times her stools are black or green but she thinks this is due to her iron supplement.  Weight is stable/uptrending slightly.  She is exercising and eating normally.  Denies any nausea or vomiting.  She states she was using Bentyl as prescribed which was somewhat helpful for her abdominal pain but this has run out.  She notes for the last 2 days she is having some left-sided abdominal pain but she is unsure if this is muscular in nature as she was shoveling snow on Tuesday and this is the first time she is ever had pain on that side.    -Instructed patient to submit stool tests as previously ordered (fecal calprotectin, stool enteric pathogen PCR, C Diff, giardia/crypto, and O&P)  -Given symptom pattern as well as timing with symptoms starting after RYGB and worsening after CCY, highly suspect this is bile salt induced diarrhea.  Will start cholestyramine 1/2 packet once daily.  Advised patient to use this at least 1 hour after other medications or at least 4 hours before other medications.  Discussed potential side effects including interactions with other medications and constipation.  Instructed patient to call the office or send a [facility] message pending how she responds to the  medication and we can adjust the dosing as needed.  -If stool tests are normal and the above measures do not improve symptoms, consider colonoscopy for further evaluation.  -Check RUQ US given persistent RUQ pain after cholecystectomy.  -Provided refills for dicyclomine 20 mg twice daily as needed.      5.  GERD without esophagitis  Last EGD 1/25/2023 with mild generalized erythematous mucosa in the stomach, otherwise normal gastric bypass anatomy.  States her heartburn/acid reflux is well-controlled and infrequent.  She prefers to use chewable over-the-counter Pepcid to manage her symptoms rather than her prescription famotidine tablet.     -Continue OTC Pepcid up to twice daily as needed.      Follow up after colonoscopy.    __________________________________________________________    SUBJECTIVE:  Brianna Myrick is a 33-year-old female with history of RYGB in 2021, history of lap jf in 2023 who presents for follow-up evaluation.  Last office visit November 2023.  UGI series 9/2023 with normal RYGB anatomy.  H. pylori stool antigen negative 10/2023.    Patient reports every single day she has right-sided abdominal pain, sometimes sharp, and sometimes described as gurgling/gas pains.  She also reports no matter what kinds of food she eats that she has urgent postprandial soft or watery bowel movements.  Has 3-4 BMs per day on average.  Denies any incontinence.  Denies any bloody stools.  At times her stools are black or green but she thinks this is due to her iron supplement.  Weight is stable/uptrending slightly.  She is exercising and eating normally.  Denies any nausea or vomiting.  States her heartburn/acid reflux is well-controlled and infrequent.  She prefers to use chewable over-the-counter Pepcid to manage her symptoms rather than her prescription famotidine tablet.  She states she was using Bentyl as prescribed which was somewhat helpful for her abdominal pain but this has run out.  She notes for  the last 2 days she is having some left-sided abdominal pain but she is unsure if this is muscular in nature as she was shoveling snow on Tuesday and this is the first time she is ever had pain on that side.    Answers submitted by the patient for this visit:  Abdominal Pain Questionnaire (Submitted on 2/16/2024)  Chief Complaint: Abdominal pain  Chronicity: recurrent  Frequency: intermittently  constipation: Yes  diarrhea: Yes  fever: Yes  flatus: Yes  headaches: Yes        REVIEW OF SYSTEMS:  10 point ROS reviewed and negative, except as above      Historical Information   Past Medical History:   Diagnosis Date    Back pain     Bacterial vaginosis     Bariatric surgery status     Chlamydia     Disease of thyroid gland     nodule    Knee pain     Morbidly obese (HCC)     gastric sleeve   today 10/4 2021    MRSA carrier     Obesity     Postsurgical malabsorption     Thyroid disease     benign    -right   gets scan yearly    Urogenital trichomoniasis     Wears glasses      Past Surgical History:   Procedure Laterality Date    ABDOMINAL SURGERY  August 24th    Oct 4th 2021    BARIATRIC SURGERY  Oct 4th, 2021    CHOLECYSTECTOMY  August 24th    CHOLECYSTECTOMY LAPAROSCOPIC N/A 08/24/2023    Procedure: CHOLECYSTECTOMY LAPAROSCOPIC;  Surgeon: Adebayo Thorpe DO;  Location: [facility];  Service: General    PA LAPS GSTR RSTCV PX W/BYP DRE-EN-Y LIMB <150 CM N/A 10/04/2021    Procedure: BYPASS GASTRIC  DRE-EN-Y LAPAROSCOPIC, AND INTRAOPERATIVE EGD;  Surgeon: Trevor Grullon MD;  Location: [facility];  Service: Bariatrics    TONSILLECTOMY      TOOTH EXTRACTION  10/01/2014    WRIST SURGERY  05/25/2021     Social History   Social History     Substance and Sexual Activity   Alcohol Use Yes    Comment: 2-3 drinks monthly     Social History     Substance and Sexual Activity   Drug Use No     Social History     Tobacco Use   Smoking Status Never    Passive exposure: Never   Smokeless Tobacco Never     Family History   Problem  Relation Age of Onset    Brain cancer Mother     Cancer Mother         glioblastoma    Hypertension Father     Diabetes Paternal Grandfather     Stroke Paternal Grandfather     No Known Problems Sister     No Known Problems Daughter     No Known Problems Maternal Grandmother     Diabetes Paternal Grandmother     Stroke Paternal Grandmother     Heart disease Neg Hx     Thyroid disease Neg Hx     Breast cancer Neg Hx     Colon cancer Neg Hx     Ovarian cancer Neg Hx        Meds/Allergies       Current Outpatient Medications:     acetaminophen (TYLENOL) 500 mg tablet    calcium carbonate-vitamin D 500 mg-5 mcg per tablet    Cholecalciferol (Vitamin D3) 125 MCG (5000 UT) TABS    cyanocobalamin (VITAMIN B-12) 1000 MCG tablet    famotidine (PEPCID) 20 mg tablet    ferrous sulfate 325 (65 Fe) mg tablet    fluticasone (FLONASE) 50 mcg/act nasal spray    Multiple Vitamin (multivitamin) tablet    polyethylene glycol (MIRALAX) 17 g packet    Allergies   Allergen Reactions    Zyrtec [Cetirizine] Hives           Objective     Wt Readings from Last 3 Encounters:   11/14/23 91.6 kg (202 lb)   11/07/23 89.7 kg (197 lb 12.8 oz)   11/02/23 91 kg (200 lb 9.6 oz)     Temp Readings from Last 3 Encounters:   11/14/23 97.8 °F (36.6 °C) (Tympanic)   11/07/23 98.1 °F (36.7 °C) (Tympanic)   10/20/23 98.1 °F (36.7 °C) (Tympanic)     BP Readings from Last 3 Encounters:   11/14/23 102/70   11/07/23 102/69   11/02/23 116/86     Pulse Readings from Last 3 Encounters:   11/14/23 72   11/07/23 86   11/02/23 91          PHYSICAL EXAM:      Physical Exam  Vitals reviewed.   Constitutional:       General: She is not in acute distress.     Appearance: She is well-developed.   HENT:      Head: Normocephalic and atraumatic.   Eyes:      Conjunctiva/sclera: Conjunctivae normal.   Cardiovascular:      Rate and Rhythm: Normal rate and regular rhythm.      Heart sounds: No murmur heard.  Pulmonary:      Effort: Pulmonary effort is normal. No respiratory  distress.      Breath sounds: Normal breath sounds.   Abdominal:      General: Bowel sounds are normal. There is no distension.      Palpations: Abdomen is soft.      Tenderness: There is abdominal tenderness in the right upper quadrant. There is no guarding.      Comments: Laparoscopic incision scars noted, with some tenderness to palpation.   Musculoskeletal:         General: No swelling.      Cervical back: Neck supple.   Skin:     General: Skin is warm and dry.   Neurological:      Mental Status: She is alert.   Psychiatric:         Mood and Affect: Mood normal.          Lab Results:   No visits with results within 1 Day(s) from this visit.   Latest known visit with results is:   Appointment on 01/11/2024   Component Date Value    Zinc 01/11/2024 92     Vit D, 25-Hydroxy 01/11/2024 83.0     Vitamin B-12 01/11/2024 2,247 (H)     Vitamin B1, Whole Blood 01/11/2024 85.9     Iron Saturation 01/11/2024 24     TIBC 01/11/2024 364     Iron 01/11/2024 89     UIBC 01/11/2024 275     Vitamin A 01/11/2024 47.9     Ferritin 01/11/2024 85     Folate 01/11/2024 >22.3        Lab Results   Component Value Date    WBC 5.60 01/11/2024    HGB 14.1 01/11/2024    HCT 43.5 01/11/2024    MCV 94 01/11/2024     01/11/2024       Lab Results   Component Value Date    SODIUM 143 01/11/2024    K 4.3 01/11/2024     01/11/2024    CO2 25 01/11/2024    ANIONGAP 15 01/07/2014    AGAP 10 01/11/2024    BUN 11 01/11/2024    CREATININE 0.66 01/11/2024    GLUC 75 08/24/2023    GLUF 83 01/11/2024    CALCIUM 9.0 01/11/2024    AST 21 01/11/2024    ALT 33 01/11/2024    ALKPHOS 50 01/11/2024    TP 6.8 01/11/2024    TBILI 0.65 01/11/2024    EGFR 114 01/11/2024         Radiology Results:   No results found.      Bahman Parra PA-C  Available on [place]

## 2024-02-19 NOTE — RESULT ENCOUNTER NOTE
Good morning Brianna,  I did notice that you did review your updated vitamin B12 level.  It has returned to normal.  Please let me know if you are taking any vitamin B12 supplements at this time.  Thanks and have a great day,  Sara

## 2024-02-21 PROBLEM — Z12.4 CERVICAL CANCER SCREENING: Status: RESOLVED | Noted: 2020-10-12 | Resolved: 2024-02-21

## 2024-02-21 PROBLEM — Z13.220 LIPID SCREENING: Status: RESOLVED | Noted: 2020-10-12 | Resolved: 2024-02-21

## 2024-02-21 PROBLEM — Z00.00 WELL ADULT EXAM: Status: RESOLVED | Noted: 2020-10-12 | Resolved: 2024-02-21

## 2024-03-04 ENCOUNTER — TELEPHONE (OUTPATIENT)
Dept: GASTROENTEROLOGY | Facility: CLINIC | Age: 34
End: 2024-03-04

## 2024-03-04 NOTE — TELEPHONE ENCOUNTER
LVM due to provider schedule change, pt appt was change to 04/26 at 2:30, left call back number if appt needs to be change

## 2024-03-15 ENCOUNTER — APPOINTMENT (OUTPATIENT)
Dept: LAB | Facility: IMAGING CENTER | Age: 34
End: 2024-03-15
Payer: MEDICARE

## 2024-03-15 DIAGNOSIS — K91.2 POSTSURGICAL MALABSORPTION: ICD-10-CM

## 2024-03-15 DIAGNOSIS — R19.7 DIARRHEA, UNSPECIFIED TYPE: ICD-10-CM

## 2024-03-15 PROCEDURE — 83993 ASSAY FOR CALPROTECTIN FECAL: CPT

## 2024-03-15 PROCEDURE — 87493 C DIFF AMPLIFIED PROBE: CPT

## 2024-03-15 PROCEDURE — 87329 GIARDIA AG IA: CPT

## 2024-03-15 PROCEDURE — 87206 SMEAR FLUORESCENT/ACID STAI: CPT

## 2024-03-15 PROCEDURE — 87015 SPECIMEN INFECT AGNT CONCNTJ: CPT

## 2024-03-15 PROCEDURE — 87177 OVA AND PARASITES SMEARS: CPT

## 2024-03-15 PROCEDURE — 87209 SMEAR COMPLEX STAIN: CPT

## 2024-03-15 PROCEDURE — 87505 NFCT AGENT DETECTION GI: CPT

## 2024-03-16 LAB — C DIFF TOX B TCDB STL QL NAA+PROBE: NEGATIVE

## 2024-03-17 LAB
C COLI+JEJUNI TUF STL QL NAA+PROBE: NEGATIVE
EC STX1+STX2 GENES STL QL NAA+PROBE: NEGATIVE
SALMONELLA SP SPAO STL QL NAA+PROBE: NEGATIVE
SHIGELLA SP+EIEC IPAH STL QL NAA+PROBE: NEGATIVE

## 2024-03-18 LAB — G LAMBLIA AG STL QL IA: NEGATIVE

## 2024-03-19 ENCOUNTER — OFFICE VISIT (OUTPATIENT)
Dept: FAMILY MEDICINE CLINIC | Facility: CLINIC | Age: 34
End: 2024-03-19
Payer: MEDICARE

## 2024-03-19 VITALS
OXYGEN SATURATION: 99 % | SYSTOLIC BLOOD PRESSURE: 110 MMHG | HEART RATE: 82 BPM | DIASTOLIC BLOOD PRESSURE: 80 MMHG | BODY MASS INDEX: 34.07 KG/M2 | HEIGHT: 66 IN | WEIGHT: 212 LBS | TEMPERATURE: 98.1 F | RESPIRATION RATE: 18 BRPM

## 2024-03-19 DIAGNOSIS — N25.81 SECONDARY HYPERPARATHYROIDISM (HCC): ICD-10-CM

## 2024-03-19 DIAGNOSIS — E04.9 ENLARGED THYROID: ICD-10-CM

## 2024-03-19 DIAGNOSIS — E04.1 THYROID NODULE: ICD-10-CM

## 2024-03-19 DIAGNOSIS — E55.9 VITAMIN D DEFICIENCY: ICD-10-CM

## 2024-03-19 DIAGNOSIS — D50.8 IRON DEFICIENCY ANEMIA SECONDARY TO INADEQUATE DIETARY IRON INTAKE: ICD-10-CM

## 2024-03-19 DIAGNOSIS — E53.8 LOW SERUM VITAMIN B12: ICD-10-CM

## 2024-03-19 DIAGNOSIS — K29.70 GASTRITIS WITHOUT BLEEDING, UNSPECIFIED CHRONICITY, UNSPECIFIED GASTRITIS TYPE: Primary | ICD-10-CM

## 2024-03-19 DIAGNOSIS — Z98.84 HISTORY OF BARIATRIC SURGERY: ICD-10-CM

## 2024-03-19 DIAGNOSIS — E78.1 PRIMARY HYPERTRIGLYCERIDEMIA: ICD-10-CM

## 2024-03-19 LAB — CALPROTECTIN STL-MCNT: 100 UG/G (ref 0–120)

## 2024-03-19 PROCEDURE — 99214 OFFICE O/P EST MOD 30 MIN: CPT | Performed by: PHYSICIAN ASSISTANT

## 2024-03-19 NOTE — PROGRESS NOTES
Name: Brianna Myrick      : 1990      MRN: [id number]  Encounter Provider: Sara Cameron PA-C  Encounter Date: 3/19/2024   Encounter department: [facility]    Assessment & Plan     1. Gastritis without bleeding, unspecified chronicity, unspecified gastritis type  -     Vitamin B12  -     TSH, 3rd generation with Free T4 reflex  -     PTH, intact    2. Secondary hyperparathyroidism (HCC)  -     Vitamin B12  -     TSH, 3rd generation with Free T4 reflex  -     PTH, intact    3. Iron deficiency anemia secondary to inadequate dietary iron intake  -     Vitamin B12  -     TSH, 3rd generation with Free T4 reflex  -     PTH, intact    4. Vitamin D deficiency  -     Vitamin B12  -     TSH, 3rd generation with Free T4 reflex  -     PTH, intact    5. Primary hypertriglyceridemia  -     Vitamin B12  -     TSH, 3rd generation with Free T4 reflex  -     PTH, intact    6. BMI 34.0-34.9,adult  -     Vitamin B12  -     TSH, 3rd generation with Free T4 reflex  -     PTH, intact    7. Thyroid nodule  -     US thyroid; Future; Expected date: 2024  -     Vitamin B12  -     TSH, 3rd generation with Free T4 reflex  -     PTH, intact    8. Enlarged thyroid  -     US thyroid; Future; Expected date: 2024  -     Vitamin B12  -     TSH, 3rd generation with Free T4 reflex  -     PTH, intact    9. Low serum vitamin B12  -     Vitamin B12  -     TSH, 3rd generation with Free T4 reflex  -     PTH, intact    10. History of bariatric surgery  -     Vitamin B12  -     TSH, 3rd generation with Free T4 reflex  -     PTH, intact    -Gastritis is doing well with famotidine  - Lab results from January have been reviewed  - Her B12 level did return to normal in 2024 when she discontinued the vitamin B12 supplement.  She is not currently taking it.  I did recommend checking another vitamin B12 level  - Endo note from 2003 has been reviewed.  Patient's PTH did return to normal.  There  was no need for further follow-up.  She was advised to continue calcium and vitamin D supplements.  Recheck PTH level  - I did order a thyroid ultrasound for enlargement on right side.  She does have a right thyroid nodule it has been stable for 5 years.  I did rather check another thyroid ultrasound since there is palpable enlargement on the right side today.  TSH also ordered with reflex to T4  - Continue multivitamin daily  - She has gained around 8 pounds.  She is status post bariatric surgery from 10/4/2021.  She will start exercising again  I did encourage her to start counting her calories again  Weigh self daily  - I did recommend follow-up in 3 months    M*"InvierteMe,SL" software was used to dictate this note. It may contain errors with dictating incorrect words/spelling. Please contact provider directly for any questions.          Subjective      Patient presents today for routine follow-up.  She states that she did notice that she gained some weight.  She has not been monitoring her calories.  She has been eating more protein rich foods.  She just got a car so she is going to start going back to the gym to exercise.  No other concerns at this time.      Review of Systems   Constitutional: Negative.    Respiratory:  Negative for cough and shortness of breath.    Cardiovascular:  Negative for chest pain and leg swelling.   Gastrointestinal:  Negative for abdominal pain.       Current Outpatient Medications on File Prior to Visit   Medication Sig    acetaminophen (TYLENOL) 500 mg tablet      calcium carbonate-vitamin D 500 mg-5 mcg per tablet Take 2 tablets by mouth daily with breakfast    Cholecalciferol (Vitamin D3) 125 MCG (5000 UT) TABS Take 5,000 Units by mouth every other day    cholestyramine (QUESTRAN) 4 g packet Take 1 packet (4 g total) by mouth daily before dinner    dicyclomine (BENTYL) 20 mg tablet Take 1 tablet (20 mg total) by mouth 2 (two) times a day as needed (abdominal pain)    famotidine (PEPCID)  "20 mg tablet Take 2 tablets (40 mg total) by mouth 2 (two) times a day    ferrous sulfate 325 (65 Fe) mg tablet Take 325 mg by mouth daily with breakfast    fluticasone (FLONASE) 50 mcg/act nasal spray 2 sprays into each nostril daily    Multiple Vitamin (multivitamin) tablet Take 1 tablet by mouth daily     polyethylene glycol (MIRALAX) 17 g packet Take 17 g by mouth daily TAKEN AS NEEDED    cyanocobalamin (VITAMIN B-12) 1000 MCG tablet Take 5,000 mcg by mouth daily (Patient not taking: Reported on 3/19/2024)       Objective     /80   Pulse 82   Temp 98.1 °F (36.7 °C)   Resp 18   Ht 5' 6\" (1.676 m)   Wt 96.2 kg (212 lb)   SpO2 99%   BMI 34.22 kg/m²     Physical Exam  Vitals reviewed.   Constitutional:       General: She is not in acute distress.     Appearance: Normal appearance. She is well-developed. She is not ill-appearing, toxic-appearing or diaphoretic.   HENT:      Head: Normocephalic and atraumatic.   Neck:      Thyroid: No thyromegaly.      Comments: Palpable thyroid enlargement on right side.  Cardiovascular:      Rate and Rhythm: Normal rate and regular rhythm.      Heart sounds: Normal heart sounds. No murmur heard.     No friction rub. No gallop.   Pulmonary:      Effort: Pulmonary effort is normal. No respiratory distress.      Breath sounds: Normal breath sounds. No wheezing, rhonchi or rales.   Abdominal:      General: Bowel sounds are normal.      Palpations: Abdomen is soft. There is no mass.      Tenderness: There is no abdominal tenderness.   Musculoskeletal:         General: No deformity.      Cervical back: Neck supple.      Right lower leg: No edema.      Left lower leg: No edema.   Lymphadenopathy:      Cervical: No cervical adenopathy.   Skin:     General: Skin is warm.   Neurological:      General: No focal deficit present.      Mental Status: She is alert.   Psychiatric:         Mood and Affect: Mood normal.         Behavior: Behavior normal.         Thought Content: Thought " content normal.         Judgment: Judgment normal.       Sara Cameron PA-C

## 2024-03-20 ENCOUNTER — APPOINTMENT (OUTPATIENT)
Dept: LAB | Facility: IMAGING CENTER | Age: 34
End: 2024-03-20
Payer: MEDICARE

## 2024-03-20 LAB
CRYPTOSP STL QL ACID FAST STN: NORMAL
I BELLI SPEC QL ACID FAST MOD KINY STN: NORMAL
PTH-INTACT SERPL-MCNC: 72.4 PG/ML (ref 12–88)
TSH SERPL DL<=0.05 MIU/L-ACNC: 1.76 UIU/ML (ref 0.45–4.5)
VIT B12 SERPL-MCNC: 449 PG/ML (ref 180–914)

## 2024-03-20 PROCEDURE — 84443 ASSAY THYROID STIM HORMONE: CPT | Performed by: PHYSICIAN ASSISTANT

## 2024-03-20 PROCEDURE — 36415 COLL VENOUS BLD VENIPUNCTURE: CPT | Performed by: PHYSICIAN ASSISTANT

## 2024-03-20 PROCEDURE — 82607 VITAMIN B-12: CPT | Performed by: PHYSICIAN ASSISTANT

## 2024-03-20 PROCEDURE — 83970 ASSAY OF PARATHORMONE: CPT | Performed by: PHYSICIAN ASSISTANT

## 2024-03-21 ENCOUNTER — TELEPHONE (OUTPATIENT)
Dept: FAMILY MEDICINE CLINIC | Facility: CLINIC | Age: 34
End: 2024-03-21

## 2024-03-21 NOTE — RESULT ENCOUNTER NOTE
Please let her know that her vitamin B12 level is currently 449 which is still within range.  Previously 806 and previous to that it was 2247 because she was on a high dose of the supplement.  Thyroid level is normal.  PTH is normal.  Also wish her a happy birthday because today is her birthday.  Thank you

## 2024-03-21 NOTE — TELEPHONE ENCOUNTER
----- Message from Sara Cameron PA-C sent at 3/21/2024  9:09 AM EDT -----  Please let her know that her vitamin B12 level is currently 449 which is still within range.  Previously 806 and previous to that it was 2247 because she was on a high dose of the supplement.  Thyroid level is normal.  PTH is normal.  Also wish her a happy birthday because today is her birthday.  Thank you

## 2024-03-29 ENCOUNTER — HOSPITAL ENCOUNTER (OUTPATIENT)
Dept: RADIOLOGY | Facility: IMAGING CENTER | Age: 34
End: 2024-03-29
Payer: MEDICARE

## 2024-03-29 DIAGNOSIS — E04.1 THYROID NODULE: ICD-10-CM

## 2024-03-29 DIAGNOSIS — E04.9 ENLARGED THYROID: ICD-10-CM

## 2024-03-29 PROCEDURE — 76536 US EXAM OF HEAD AND NECK: CPT

## 2024-03-29 NOTE — TELEPHONE ENCOUNTER
Deneen Fix, no answer  Left v/m to call back  Lvm for the patient with her colonoscopy results from Dr. Daisy Caballero MD and informed her to repeat her colonoscopy in 10 years or 5 years if she has a family history of colon cancer in a 1st degree family member per Dr. Caballero. I mailed the result letter to her home and sent it over to her pcp Dr. Anita Preston MD. Patient was advised to call the office if she has any questions or concerns.

## 2024-04-08 ENCOUNTER — TELEPHONE (OUTPATIENT)
Dept: FAMILY MEDICINE CLINIC | Facility: CLINIC | Age: 34
End: 2024-04-08

## 2024-04-08 NOTE — RESULT ENCOUNTER NOTE
Good morning Brianna,  Your thyroid nodule remained stable based on the ultrasound results.  No further follow-up needed at this time.  Please let me know if you have any questions.  Have a good day,  Sara

## 2024-04-08 NOTE — TELEPHONE ENCOUNTER
----- Message from Sara Cameron PA-C sent at 4/8/2024  8:36 AM EDT -----  Good morning Brianna,  Your thyroid nodule remained stable based on the ultrasound results.  No further follow-up needed at this time.  Please let me know if you have any questions.  Have a good day,  Sara

## 2024-04-26 ENCOUNTER — OFFICE VISIT (OUTPATIENT)
Dept: GASTROENTEROLOGY | Facility: CLINIC | Age: 34
End: 2024-04-26
Payer: MEDICARE

## 2024-04-26 VITALS
OXYGEN SATURATION: 99 % | TEMPERATURE: 96 F | BODY MASS INDEX: 34.78 KG/M2 | WEIGHT: 216.4 LBS | SYSTOLIC BLOOD PRESSURE: 128 MMHG | HEIGHT: 66 IN | DIASTOLIC BLOOD PRESSURE: 80 MMHG | HEART RATE: 82 BPM

## 2024-04-26 DIAGNOSIS — K90.89 BILE SALT-INDUCED DIARRHEA: ICD-10-CM

## 2024-04-26 DIAGNOSIS — R19.5 ELEVATED FECAL CALPROTECTIN: ICD-10-CM

## 2024-04-26 DIAGNOSIS — R10.11 RUQ PAIN: Primary | ICD-10-CM

## 2024-04-26 DIAGNOSIS — Z90.49 HISTORY OF CHOLECYSTECTOMY: ICD-10-CM

## 2024-04-26 DIAGNOSIS — Z98.84 HISTORY OF ROUX-EN-Y GASTRIC BYPASS: ICD-10-CM

## 2024-04-26 PROCEDURE — 99214 OFFICE O/P EST MOD 30 MIN: CPT | Performed by: DIETITIAN, REGISTERED

## 2024-04-26 NOTE — PROGRESS NOTES
[facility] - Outpatient Follow-up Note  Brianna Myrick 34 y.o. female MRN: [id number]  Encounter: [id number]          ASSESSMENT AND PLAN:    1.  RUQ pain  2.  Bile salt induced diarrhea   3.  History of cholecystectomy  4.  History of RYGB  5.  Fecal calprotectin  Patient with history of abdominal pain and loose stools ever since RYGB in 2021 but have gotten significantly worse since her CCY in 2023.  At last office visit, we started cholestyramine 1 packet daily and this is working very well for her bowel habits, now having 1-2 formed BMs/day with less urgency.  Fecal calprotectin 100 and C. Difficile, enteric pathogen PCR, Giardia, Cryptosporidium, and O&P negative 3/15/2024.    -Continue cholestyramine 1 packet daily.   -Continue dicyclomine 20 mg twice daily as needed.  -Check breath test to rule out SIBO.  -Check RUQ US.  -Recheck fecal calprotectin to ensure not persistently borderline elevated.  If still with mild elevation, would recommend colonoscopy for further evaluation.        Follow up 3 months.    __________________________________________________________    SUBJECTIVE:  Brianna Myrick is a 34 y.o. female with history of RYGB in 2021, history of lap jf in 2023 who presents for follow up of RUQ pain.  Last office visit 2/16/2024.    Fecal calprotectin 100 and C. Difficile, enteric pathogen PCR, Giardia, Cryptosporidium, and O&P negative 3/15/2024.    Patient reports in the time since her last office visit, she has had an increase in stress at work, as well as her car breaking down and needing to buy a new one, and not being able to go to the gym as much as she likes to.  As result of this, she is not feeling great overall.  She plans to start going to the gym and going for more walks with the weather getting warmer.  She still has ongoing generalized abdominal discomfort and bloating and intermittent sharp pains sometimes in the RUQ but sometimes around her  umbilicus or in the lower abdomen.  She started cholestyramine from her last office visit, and this works very well to manage her bowel habits.  She is now having 1-2 formed BMs per day on average with less urgency.  She also uses Bentyl and Tylenol for her abdominal pains, and these work well for her.      Answers submitted by the patient for this visit:  Abdominal Pain Questionnaire (Submitted on 4/25/2024)  Chief Complaint: Abdominal pain  Chronicity: recurrent  Onset: more than 1 month ago  Frequency: daily  Pain location: RUQ, suprapubic region  Pain quality: aching, cramping, a sensation of fullness, sharp    REVIEW OF SYSTEMS:  10 point ROS reviewed and negative, except as above      Historical Information   Past Medical History:   Diagnosis Date    Allergic     Zrytec    Back pain     Bacterial vaginosis     Bariatric surgery status     Chlamydia     Disease of thyroid gland     nodule    GERD (gastroesophageal reflux disease)     Headache(784.0) 9-26    Few days    Knee pain     Morbidly obese (HCC)     gastric sleeve   today 10/4 2021    MRSA carrier     Obesity     Postsurgical malabsorption     Thyroid disease     benign    -right   gets scan yearly    Urogenital trichomoniasis     Wears glasses      Past Surgical History:   Procedure Laterality Date    ABDOMINAL SURGERY  August 24th    Oct 4th 2021    BARIATRIC SURGERY  Oct 4th, 2021    CHOLECYSTECTOMY  August 24th    CHOLECYSTECTOMY LAPAROSCOPIC N/A 08/24/2023    Procedure: CHOLECYSTECTOMY LAPAROSCOPIC;  Surgeon: Adebayo Thorpe DO;  [facility];  Service: General    WA LAPS GSTR RSTCV PX W/BYP DRE-EN-Y LIMB <150 CM N/A 10/04/2021    Procedure: BYPASS GASTRIC  DRE-EN-Y LAPAROSCOPIC, AND INTRAOPERATIVE EGD;  Surgeon: Trevor Grullon MD;  Location[facility];  Service: Bariatrics    TONSILLECTOMY      TOOTH EXTRACTION  10/01/2014    WRIST SURGERY  05/25/2021     Social History   Social History     Substance and Sexual Activity   Alcohol Use  Yes    Comment: 2-3 drinks monthly     Social History     Substance and Sexual Activity   Drug Use No     Social History     Tobacco Use   Smoking Status Never    Passive exposure: Never   Smokeless Tobacco Never     Family History   Problem Relation Age of Onset    Brain cancer Mother     Cancer Mother         glioblastoma    Hypertension Father     Diabetes Paternal Grandfather     Stroke Paternal Grandfather     No Known Problems Sister     No Known Problems Daughter     No Known Problems Maternal Grandmother     Diabetes Paternal Grandmother     Stroke Paternal Grandmother     Heart disease Neg Hx     Thyroid disease Neg Hx     Breast cancer Neg Hx     Colon cancer Neg Hx     Ovarian cancer Neg Hx        Meds/Allergies       Current Outpatient Medications:     acetaminophen (TYLENOL) 500 mg tablet    calcium carbonate-vitamin D 500 mg-5 mcg per tablet    Cholecalciferol (Vitamin D3) 125 MCG (5000 UT) TABS    cholestyramine (QUESTRAN) 4 g packet    dicyclomine (BENTYL) 20 mg tablet    famotidine (PEPCID) 20 mg tablet    ferrous sulfate 325 (65 Fe) mg tablet    fluticasone (FLONASE) 50 mcg/act nasal spray    Multiple Vitamin (multivitamin) tablet    polyethylene glycol (MIRALAX) 17 g packet    cyanocobalamin (VITAMIN B-12) 1000 MCG tablet    Allergies   Allergen Reactions    Zyrtec [Cetirizine] Hives           Objective     Wt Readings from Last 3 Encounters:   04/26/24 98.2 kg (216 lb 6.4 oz)   03/19/24 96.2 kg (212 lb)   02/16/24 91.2 kg (201 lb)     Temp Readings from Last 3 Encounters:   04/26/24 (!) 96 °F (35.6 °C) (Tympanic)   03/19/24 98.1 °F (36.7 °C)   02/16/24 (!) 96.6 °F (35.9 °C) (Tympanic)     BP Readings from Last 3 Encounters:   04/26/24 128/80   03/19/24 110/80   02/16/24 110/82     Pulse Readings from Last 3 Encounters:   04/26/24 82   03/19/24 82   02/16/24 80          PHYSICAL EXAM:      Physical Exam  Vitals reviewed.   Constitutional:       General: She is not in acute distress.      Appearance: She is well-developed.   HENT:      Head: Normocephalic and atraumatic.   Eyes:      Conjunctiva/sclera: Conjunctivae normal.   Cardiovascular:      Rate and Rhythm: Normal rate and regular rhythm.      Heart sounds: No murmur heard.  Pulmonary:      Effort: Pulmonary effort is normal. No respiratory distress.      Breath sounds: Normal breath sounds.   Abdominal:      General: Bowel sounds are normal. There is no distension.      Palpations: Abdomen is soft.      Tenderness: There is no abdominal tenderness. There is no guarding.   Musculoskeletal:         General: No swelling.      Cervical back: Neck supple.   Skin:     General: Skin is warm and dry.   Neurological:      Mental Status: She is alert.   Psychiatric:         Mood and Affect: Mood normal.          Lab Results:   No visits with results within 1 Day(s) from this visit.   Latest known visit with results is:   Office Visit on 03/19/2024   Component Date Value    Vitamin B-12 03/20/2024 449     TSH 3RD GENERATON 03/20/2024 1.762     PTH 03/20/2024 72.4        Lab Results   Component Value Date    WBC 5.60 01/11/2024    HGB 14.1 01/11/2024    HCT 43.5 01/11/2024    MCV 94 01/11/2024     01/11/2024       Lab Results   Component Value Date    SODIUM 143 01/11/2024    K 4.3 01/11/2024     01/11/2024    CO2 25 01/11/2024    ANIONGAP 15 01/07/2014    AGAP 10 01/11/2024    BUN 11 01/11/2024    CREATININE 0.66 01/11/2024    GLUC 75 08/24/2023    GLUF 83 01/11/2024    CALCIUM 9.0 01/11/2024    AST 21 01/11/2024    ALT 33 01/11/2024    ALKPHOS 50 01/11/2024    TP 6.8 01/11/2024    TBILI 0.65 01/11/2024    EGFR 114 01/11/2024         Radiology Results:   US thyroid    Result Date: 4/6/2024  Narrative: THYROID ULTRASOUND INDICATION: E04.1: Nontoxic single thyroid nodule E04.9: Nontoxic goiter, unspecified. COMPARISON: Thyroid ultrasound 7/12/2023, 7/29/2022, 10/13/2020 and other ultrasounds dating back to 2015. TECHNIQUE: Ultrasound of the  thyroid was performed with a high frequency linear transducer in transverse and sagittal planes including volumetric imaging sweeps as well as traditional still imaging technique. FINDINGS: Thyroid texture: Normal homogeneous smooth echotexture. Right lobe: 5.1 x 1.5 x 1.6 cm. Volume 5.8 mL Left lobe: 5.0 x 1.1 x 1.8 cm. Volume 4.7 mL Isthmus: 0.2 cm. Nodule #1. Image 8. RIGHT midgland nodule measuring 0.5 x 0.4 x 0.5 cm. No change COMPOSITION: 2 points, solid or almost completely solid. ECHOGENICITY: 2 points, hypoechoic. SHAPE: 0 points, wider-than-tall. MARGIN: 0 points, smooth. ECHOGENIC FOCI: 0 points, none or large comet-tail artifacts. TI-RADS Classification: TR 4 (4-6 points), FNA if > 1.5 cm. Follow if > 1 cm.     Impression: Essentially unchanged normal-appearing thyroid gland over 9 years with stable subcentimeter right mid gland nodule which does not necessitate further ultrasound follow-up based on current ACR criteria. Reference: ACR Thyroid Imaging, Reporting and Data System (TI-RADS): White Paper of the ACR TI-RADS Committee. J AM Denise Radiol 2017;14:587-595. Additional recommendations based on American Thyroid Association 2015 guidelines. Workstation performed: [id number]

## 2024-05-13 ENCOUNTER — HOSPITAL ENCOUNTER (OUTPATIENT)
Dept: RADIOLOGY | Facility: IMAGING CENTER | Age: 34
Discharge: HOME | End: 2024-05-13
Payer: MEDICARE

## 2024-05-13 DIAGNOSIS — Z90.49 HISTORY OF CHOLECYSTECTOMY: ICD-10-CM

## 2024-05-13 DIAGNOSIS — R10.11 RUQ PAIN: ICD-10-CM

## 2024-05-13 PROCEDURE — 76705 ECHO EXAM OF ABDOMEN: CPT

## 2024-05-30 ENCOUNTER — TELEPHONE (OUTPATIENT)
Age: 34
End: 2024-05-30

## 2024-05-30 NOTE — TELEPHONE ENCOUNTER
Patient called to schedule Yearly appt. Clarified if pt wants to establish with [facility] or continue care with [facility]. Pt requests to continue care with existing provider, [facility]. Transferred pt to [facility] ph# [phone].

## 2024-06-17 ENCOUNTER — ANNUAL EXAM (OUTPATIENT)
Dept: OBGYN CLINIC | Facility: CLINIC | Age: 34
End: 2024-06-17

## 2024-06-17 VITALS
WEIGHT: 211.2 LBS | SYSTOLIC BLOOD PRESSURE: 105 MMHG | DIASTOLIC BLOOD PRESSURE: 74 MMHG | HEIGHT: 66 IN | BODY MASS INDEX: 33.94 KG/M2 | HEART RATE: 77 BPM

## 2024-06-17 DIAGNOSIS — Z11.3 SCREEN FOR STD (SEXUALLY TRANSMITTED DISEASE): Primary | ICD-10-CM

## 2024-06-17 DIAGNOSIS — Z01.419 WOMEN'S ANNUAL ROUTINE GYNECOLOGICAL EXAMINATION: ICD-10-CM

## 2024-06-17 DIAGNOSIS — Z12.39 ENCOUNTER FOR BREAST CANCER SCREENING USING NON-MAMMOGRAM MODALITY: ICD-10-CM

## 2024-06-17 DIAGNOSIS — Z12.4 CERVICAL CANCER SCREENING: ICD-10-CM

## 2024-06-17 PROCEDURE — 99395 PREV VISIT EST AGE 18-39: CPT | Performed by: NURSE PRACTITIONER

## 2024-06-17 PROCEDURE — 87491 CHLMYD TRACH DNA AMP PROBE: CPT | Performed by: NURSE PRACTITIONER

## 2024-06-17 PROCEDURE — 87591 N.GONORRHOEAE DNA AMP PROB: CPT | Performed by: NURSE PRACTITIONER

## 2024-06-17 NOTE — PROGRESS NOTES
ANNUAL GYNECOLOGICAL EXAMINATION    Brianna Myrick is a 34 y.o. female who presents today for annual GYN exam.  Her last pap smear was performed 2021 and result was NILM, HR-HPV negative.  She reports history of one abnormal pap smear in her past, followed by a normal pap one year later. She has received the HPV vaccine series. She had HIV screening performed  and it was negative.  She reports menses as normal.  Patient's last menstrual period was 06/10/2024 (exact date).  Her general medical history has been reviewed and she reports it as follows:    Past Medical History:   Diagnosis Date    Allergic     Zrytec    Back pain     Bacterial vaginosis     Bariatric surgery status     Chlamydia     Disease of thyroid gland     nodule    GERD (gastroesophageal reflux disease)     Headache(784.0) 9-26    Few days    Knee pain     Morbidly obese (HCC)     gastric sleeve   today 10/4 2021    MRSA carrier     Obesity     Postsurgical malabsorption     Thyroid disease     benign    -right   gets scan yearly    Urogenital trichomoniasis     Wears glasses      Past Surgical History:   Procedure Laterality Date    ABDOMINAL SURGERY  August 24th    Oct 4th 2021    BARIATRIC SURGERY  Oct 4th, 2021    CHOLECYSTECTOMY      CHOLECYSTECTOMY LAPAROSCOPIC N/A 2023    Procedure: CHOLECYSTECTOMY LAPAROSCOPIC;  Surgeon: Adebayo Thorpe DO;  [facility];  Service: General    AR LAPS GSTR RSTCV PX W/BYP DRE-EN-Y LIMB <150 CM N/A 10/04/2021    Procedure: BYPASS GASTRIC  DRE-EN-Y LAPAROSCOPIC, AND INTRAOPERATIVE EGD;  Surgeon: Trevor Grullon MD;  [facility];  Service: Bariatrics    TONSILLECTOMY      TOOTH EXTRACTION  10/01/2014    WRIST SURGERY  2021     OB History          3    Para   1    Term   1            AB   2    Living   1         SAB        IAB   2    Ectopic        Multiple        Live Births               Obstetric Comments   Child birth 12          "    Social History     Tobacco Use    Smoking status: Never     Passive exposure: Never    Smokeless tobacco: Never   Vaping Use    Vaping status: Never Used   Substance Use Topics    Alcohol use: Yes     Comment: 2-3 drinks monthly    Drug use: No     Social History     Substance and Sexual Activity   Sexual Activity Yes    Partners: Male    Birth control/protection: Condom Male     Cancer-related family history includes Brain cancer in her mother; Cancer in her mother. There is no history of Breast cancer, Colon cancer, or Ovarian cancer.    Current Outpatient Medications   Medication Instructions    acetaminophen (TYLENOL) 500 mg tablet      calcium carbonate-vitamin D 500 mg-5 mcg per tablet 2 tablets, Oral, Daily with breakfast    cholestyramine (QUESTRAN) 4 g, Oral, Daily before dinner    cyanocobalamin (VITAMIN B-12) 5,000 mcg, Oral, Daily    dicyclomine (BENTYL) 20 mg, Oral, 2 times daily PRN    famotidine (PEPCID) 40 mg, Oral, 2 times daily    ferrous sulfate 325 mg, Oral, Daily with breakfast    fluticasone (FLONASE) 50 mcg/act nasal spray 2 sprays, Nasal, Daily    Multiple Vitamin (multivitamin) tablet 1 tablet, Oral, Daily    polyethylene glycol (MIRALAX) 17 g, Oral, Daily, TAKEN AS NEEDED    Vitamin D3 5,000 Units, Every other day       Review of Systems:  Review of Systems    Physical Exam:  /74 (BP Location: Right arm, Patient Position: Sitting)   Pulse 77   Ht 5' 6\" (1.676 m)   Wt 95.8 kg (211 lb 3.2 oz)   LMP 06/10/2024 (Exact Date)   BMI 34.09 kg/m²   OBGyn Exam        Assessment/Plan:   1. Normal well-woman GYN exam.  2. Cervical cancer screening:  Normal cervical exam.  Pap smear up to date.  Has received HPV vaccine in the past.    3. STD screening: Orders placed for vaginal GC/CT cultures.  Orders placed for serum anti-HIV, anti-HCV, HbsAg, syphilis panel.   4. Breast cancer screening:  Normal breast exam. Reviewed breast self-awareness.   5. Depression Screening: Patient's " depression screening was assessed with a PHQ-2 score of 1. Their PHQ-9 score was 6. Patient assessed for underlying major depression. They have no active suicidal ideations. Brief counseling provided and recommend additional follow-up/re-evaluation next office visit.    6. BMI Counseling: Body mass index is 34.09 kg/m². Discussed the patient's BMI with her. The BMI is above normal. Exercise recommendations include exercising 3-5 times per week.   7. Contraception:  Currently not sexually active, reports partner will be away for a little while. Typically uses condoms/withdraw method. She is not planning for pregnancy currently but may desire pregnancy in the future.     8. Return to office in one year for annual exam or sooner if needed.    Reviewed with patient that test results are available in [facility] immediately, but that they will not necessarily be reviewed by me immediately.  Explained that I will review results at my earliest opportunity and contact patient appropriately.

## 2024-06-18 LAB
C TRACH DNA SPEC QL NAA+PROBE: NEGATIVE
N GONORRHOEA DNA SPEC QL NAA+PROBE: NEGATIVE

## 2024-06-19 ENCOUNTER — TELEPHONE (OUTPATIENT)
Dept: OBGYN CLINIC | Facility: CLINIC | Age: 34
End: 2024-06-19

## 2024-06-19 NOTE — TELEPHONE ENCOUNTER
----- Message from MAURI Andrade sent at 6/19/2024  8:27 AM EDT -----  Please let her know the sti testing is negative. Thank you!

## 2024-06-19 NOTE — TELEPHONE ENCOUNTER
Left a detailed vm informing pt of her neg sti results, c/b# was provided if further questions needed.

## 2024-06-20 DIAGNOSIS — R10.11 RUQ PAIN: ICD-10-CM

## 2024-06-20 DIAGNOSIS — K90.89 BILE SALT-INDUCED DIARRHEA: ICD-10-CM

## 2024-06-20 DIAGNOSIS — Z90.49 HISTORY OF CHOLECYSTECTOMY: ICD-10-CM

## 2024-06-20 RX ORDER — DICYCLOMINE HCL 20 MG
TABLET ORAL
Qty: 60 TABLET | Refills: 5 | Status: SHIPPED | OUTPATIENT
Start: 2024-06-20

## 2024-06-20 RX ORDER — CHOLESTYRAMINE 4 G/9G
1 POWDER, FOR SUSPENSION ORAL
Qty: 30 PACKET | Refills: 5 | Status: SHIPPED | OUTPATIENT
Start: 2024-06-20

## 2024-06-27 ENCOUNTER — OFFICE VISIT (OUTPATIENT)
Dept: FAMILY MEDICINE CLINIC | Facility: CLINIC | Age: 34
End: 2024-06-27
Payer: MEDICARE

## 2024-06-27 VITALS
OXYGEN SATURATION: 99 % | HEIGHT: 66 IN | BODY MASS INDEX: 35.17 KG/M2 | HEART RATE: 68 BPM | TEMPERATURE: 99.1 F | SYSTOLIC BLOOD PRESSURE: 100 MMHG | DIASTOLIC BLOOD PRESSURE: 78 MMHG | WEIGHT: 218.8 LBS | RESPIRATION RATE: 16 BRPM

## 2024-06-27 DIAGNOSIS — D50.8 IRON DEFICIENCY ANEMIA SECONDARY TO INADEQUATE DIETARY IRON INTAKE: ICD-10-CM

## 2024-06-27 DIAGNOSIS — M76.31 IT BAND SYNDROME, RIGHT: ICD-10-CM

## 2024-06-27 DIAGNOSIS — E53.8 LOW SERUM VITAMIN B12: Primary | ICD-10-CM

## 2024-06-27 DIAGNOSIS — G43.011 INTRACTABLE MIGRAINE WITHOUT AURA AND WITH STATUS MIGRAINOSUS: ICD-10-CM

## 2024-06-27 DIAGNOSIS — E55.9 VITAMIN D DEFICIENCY: ICD-10-CM

## 2024-06-27 DIAGNOSIS — Z98.84 HISTORY OF BARIATRIC SURGERY: ICD-10-CM

## 2024-06-27 PROBLEM — J98.11 ATELECTASIS: Status: RESOLVED | Noted: 2022-12-22 | Resolved: 2024-06-27

## 2024-06-27 PROBLEM — R82.71 GROUP B STREPTOCOCCAL BACTERIURIA: Status: RESOLVED | Noted: 2021-07-20 | Resolved: 2024-06-27

## 2024-06-27 PROBLEM — K81.0 ACUTE CHOLECYSTITIS: Status: RESOLVED | Noted: 2023-08-25 | Resolved: 2024-06-27

## 2024-06-27 PROBLEM — Z11.4 ENCOUNTER FOR SCREENING FOR HIV: Status: RESOLVED | Noted: 2020-10-12 | Resolved: 2024-06-27

## 2024-06-27 PROBLEM — N25.81 SECONDARY HYPERPARATHYROIDISM (HCC): Status: RESOLVED | Noted: 2022-12-30 | Resolved: 2024-06-27

## 2024-06-27 PROBLEM — R14.2 BELCHING: Status: RESOLVED | Noted: 2021-10-08 | Resolved: 2024-06-27

## 2024-06-27 PROBLEM — A74.9 CHLAMYDIA INFECTION: Status: RESOLVED | Noted: 2021-07-20 | Resolved: 2024-06-27

## 2024-06-27 PROBLEM — E78.1 PRIMARY HYPERTRIGLYCERIDEMIA: Status: RESOLVED | Noted: 2022-08-18 | Resolved: 2024-06-27

## 2024-06-27 PROBLEM — U07.1 COVID-19 VIRUS INFECTION: Status: RESOLVED | Noted: 2021-11-24 | Resolved: 2024-06-27

## 2024-06-27 PROBLEM — R63.5 WEIGHT GAIN: Status: RESOLVED | Noted: 2020-10-12 | Resolved: 2024-06-27

## 2024-06-27 PROBLEM — R43.0 LOSS OF SMELL: Status: RESOLVED | Noted: 2021-12-08 | Resolved: 2024-06-27

## 2024-06-27 PROBLEM — R43.2 LOSS OF TASTE: Status: RESOLVED | Noted: 2021-12-08 | Resolved: 2024-06-27

## 2024-06-27 PROCEDURE — 99214 OFFICE O/P EST MOD 30 MIN: CPT | Performed by: PHYSICIAN ASSISTANT

## 2024-06-27 NOTE — PROGRESS NOTES
"Ambulatory Visit  Name: Brianna Myrick      : 1990      MRN: [id number]  Encounter Provider: Sara Cameron PA-C  Encounter Date: 2024   Encounter department: [facility]    Assessment & Plan   1. Low serum vitamin B12  -     cyanocobalamin (VITAMIN B-12) 1000 MCG tablet; Take 1 tablet (1,000 mcg total) by mouth daily  2. Intractable migraine without aura and with status migrainosus  3. It band syndrome, right  4. Iron deficiency anemia secondary to inadequate dietary iron intake  5. History of bariatric surgery  6. Vitamin D deficiency    Lab results from  have been reviewed  - Her vitamin B12 level is normal since she reduced the dose of her vitamin B12 over-the-counter  - She only gets migraines occasionally.  No intervention needed at this time  - She still gets occasional right IT band pain.  She does continue with stretches  - Her CBC is good.  She will continue on the multivitamin and iron supplement over-the-counter  - She will continue follow-up with the bariatric specialist as advised  - She has been encouraged to restart her vitamin D3.  She does take calcium at this time.  - I did recommend follow-up in 4 months    M*Modal software was used to dictate this note. It may contain errors with dictating incorrect words/spelling. Please contact provider directly for any questions.        History of Present Illness     Patient presents today for routine follow-up.  She states that she has been trying to change her diet again and go to the gym regularly since she is at a standstill with her weight since her gastric bypass surgery.        Review of Systems   Constitutional: Negative.    Gastrointestinal:  Negative for abdominal pain.       Objective     /78 (BP Location: Left arm, Patient Position: Sitting, Cuff Size: Large)   Pulse 68   Temp 99.1 °F (37.3 °C) (Tympanic)   Resp 16   Ht 5' 6\" (1.676 m)   Wt 99.2 kg (218 lb 12.8 oz)   " LMP 06/10/2024 (Exact Date)   SpO2 99%   BMI 35.32 kg/m²     Physical Exam  Vitals reviewed.   Constitutional:       General: She is not in acute distress.     Appearance: Normal appearance. She is well-developed. She is not ill-appearing, toxic-appearing or diaphoretic.   HENT:      Head: Normocephalic and atraumatic.   Neck:      Thyroid: No thyromegaly.   Cardiovascular:      Rate and Rhythm: Normal rate and regular rhythm.      Heart sounds: Normal heart sounds. No murmur heard.  Pulmonary:      Effort: Pulmonary effort is normal. No respiratory distress.      Breath sounds: Normal breath sounds. No wheezing, rhonchi or rales.   Abdominal:      General: Bowel sounds are normal.      Palpations: Abdomen is soft. There is no mass.      Tenderness: There is no abdominal tenderness.   Musculoskeletal:         General: No deformity.      Cervical back: Neck supple.      Right lower leg: No edema.      Left lower leg: No edema.   Lymphadenopathy:      Cervical: No cervical adenopathy.   Skin:     General: Skin is warm.   Neurological:      General: No focal deficit present.      Mental Status: She is alert.   Psychiatric:         Mood and Affect: Mood normal.         Behavior: Behavior normal.         Thought Content: Thought content normal.         Judgment: Judgment normal.       Administrative Statements

## 2024-07-10 ENCOUNTER — APPOINTMENT (OUTPATIENT)
Dept: LAB | Facility: IMAGING CENTER | Age: 34
End: 2024-07-10
Payer: MEDICARE

## 2024-07-10 ENCOUNTER — TELEPHONE (OUTPATIENT)
Age: 34
End: 2024-07-10

## 2024-07-10 DIAGNOSIS — K91.2 POSTSURGICAL MALABSORPTION: ICD-10-CM

## 2024-07-10 DIAGNOSIS — Z98.84 BARIATRIC SURGERY STATUS: ICD-10-CM

## 2024-07-10 DIAGNOSIS — Z11.3 SCREEN FOR STD (SEXUALLY TRANSMITTED DISEASE): ICD-10-CM

## 2024-07-10 DIAGNOSIS — Z48.815 ENCOUNTER FOR SURGICAL AFTERCARE FOLLOWING SURGERY OF DIGESTIVE SYSTEM: ICD-10-CM

## 2024-07-10 DIAGNOSIS — K21.9 GERD (GASTROESOPHAGEAL REFLUX DISEASE): ICD-10-CM

## 2024-07-10 LAB
HBV SURFACE AG SER QL: NORMAL
HIV 1+2 AB+HIV1 P24 AG SERPL QL IA: NORMAL
HIV 2 AB SERPL QL IA: NORMAL
HIV1 AB SERPL QL IA: NORMAL
HIV1 P24 AG SERPL QL IA: NORMAL
TREPONEMA PALLIDUM IGG+IGM AB [PRESENCE] IN SERUM OR PLASMA BY IMMUNOASSAY: NORMAL

## 2024-07-10 PROCEDURE — 36415 COLL VENOUS BLD VENIPUNCTURE: CPT

## 2024-07-10 PROCEDURE — 86780 TREPONEMA PALLIDUM: CPT

## 2024-07-10 PROCEDURE — 87340 HEPATITIS B SURFACE AG IA: CPT

## 2024-07-10 PROCEDURE — 87522 HEPATITIS C REVRS TRNSCRPJ: CPT

## 2024-07-10 PROCEDURE — 87389 HIV-1 AG W/HIV-1&-2 AB AG IA: CPT

## 2024-07-10 NOTE — TELEPHONE ENCOUNTER
Patient called stating she needs a lab requisition for a Lipid Panel. She thought her PCP wanted to check her cholesterol. Please call patient to advise.

## 2024-07-11 RX ORDER — FAMOTIDINE 20 MG/1
40 TABLET, FILM COATED ORAL 2 TIMES DAILY
Qty: 60 TABLET | Refills: 5 | Status: SHIPPED | OUTPATIENT
Start: 2024-07-11

## 2024-07-12 ENCOUNTER — TELEPHONE (OUTPATIENT)
Dept: OBGYN CLINIC | Facility: CLINIC | Age: 34
End: 2024-07-12

## 2024-07-12 LAB
HCV RNA SERPL NAA+PROBE-ACNC: NORMAL IU/ML
TEST INFORMATION: NORMAL

## 2024-07-12 NOTE — TELEPHONE ENCOUNTER
Left a detailed vm informing the pt of her normal std screening results, c/b# was provided if further questions needed

## 2024-07-12 NOTE — TELEPHONE ENCOUNTER
----- Message from MAURI Andrade sent at 7/12/2024 12:25 PM EDT -----  Please let her know the HIV, hep B and syphilis screenings are negative. Thank you!

## 2024-07-31 ENCOUNTER — APPOINTMENT (OUTPATIENT)
Dept: LAB | Facility: IMAGING CENTER | Age: 34
End: 2024-07-31
Payer: MEDICARE

## 2024-07-31 DIAGNOSIS — R10.11 RUQ PAIN: ICD-10-CM

## 2024-07-31 DIAGNOSIS — R19.5 ELEVATED FECAL CALPROTECTIN: ICD-10-CM

## 2024-07-31 PROCEDURE — 83993 ASSAY FOR CALPROTECTIN FECAL: CPT

## 2024-08-02 LAB — CALPROTECTIN STL-MCNC: 123 ÂΜG/G

## 2024-08-26 ENCOUNTER — TELEPHONE (OUTPATIENT)
Dept: GASTROENTEROLOGY | Facility: CLINIC | Age: 34
End: 2024-08-26

## 2024-08-26 NOTE — TELEPHONE ENCOUNTER
Left voicemail and requested call back     Pt has appt tomorrow - Informed we are at   [address] [place], [place] [address] [place] [address] - [address] door on the left in the lobby

## 2024-08-27 ENCOUNTER — TELEPHONE (OUTPATIENT)
Dept: GASTROENTEROLOGY | Facility: CLINIC | Age: 34
End: 2024-08-27

## 2024-08-27 ENCOUNTER — OFFICE VISIT (OUTPATIENT)
Dept: GASTROENTEROLOGY | Facility: CLINIC | Age: 34
End: 2024-08-27
Payer: MEDICARE

## 2024-08-27 VITALS
BODY MASS INDEX: 35.36 KG/M2 | TEMPERATURE: 98.2 F | HEART RATE: 82 BPM | HEIGHT: 66 IN | SYSTOLIC BLOOD PRESSURE: 106 MMHG | WEIGHT: 220 LBS | DIASTOLIC BLOOD PRESSURE: 70 MMHG | OXYGEN SATURATION: 98 %

## 2024-08-27 DIAGNOSIS — R10.11 RUQ PAIN: ICD-10-CM

## 2024-08-27 DIAGNOSIS — Z90.49 HISTORY OF CHOLECYSTECTOMY: ICD-10-CM

## 2024-08-27 DIAGNOSIS — R19.7 DIARRHEA, UNSPECIFIED TYPE: Primary | ICD-10-CM

## 2024-08-27 DIAGNOSIS — R19.5 ELEVATED FECAL CALPROTECTIN: ICD-10-CM

## 2024-08-27 PROCEDURE — 99214 OFFICE O/P EST MOD 30 MIN: CPT | Performed by: DIETITIAN, REGISTERED

## 2024-08-27 NOTE — PROGRESS NOTES
[facility] - Outpatient Follow-up Note  Brianna Myrick 34 y.o. female MRN: [id number]  Encounter: [id number]          ASSESSMENT AND PLAN:    1.  RUQ pain  2.  History of cholecystectomy  3.  Diarrhea  4.  Elevated fecal calprotectin  Patient with history of abdominal pain and loose stools ever since RYGB in 2021 but have gotten significantly worse since her CCY in 2023.  Patient previously had significant symptom improvement with cholestyramine 1 packet daily.  Since her last office visit, she has not needed to use cholestyramine much at all.  She is now having 1 BM daily or every other day.  She continues to use dicyclomine as needed for abdominal pain and famotidine as needed for GERD.  Fecal calprotectin 100 and C. Difficile, enteric pathogen PCR, Giardia, Cryptosporidium, and O&P negative 3/15/2024.  Repeat fecal calprotectin 123 on 7/31/2024.  RUQ US with no evidence of choledocholithiasis, CBD measures 2.0 mm.     -Continue cholestyramine 1 packet daily.   -Continue dicyclomine 20 mg twice daily as needed.  -Given patient has had mild persistent elevation of fecal calprotectin, schedule colonoscopy for further evaluation, with MiraLAX/Dulcolax bowel prep.  -We discussed weight management during today's office visit, including small/frequent meals high in protein to help with satiety, utilizing protein shakes, such as Fair Life, and managing mental health as well.  -Advised patient to call the office or send a [employer] message with any questions/concerns or any new/worsening symptoms.    I discussed informed consent with the patient. The risks/benefits/alternatives of the procedure were discussed with the patient. Risks included, but not limited to, infection, bleeding, perforation, injury to organs in the abdomen, missed lesion and incomplete procedure were discussed. Patient was agreeable.      Follow up in office after  colonoscopy.    __________________________________________________________    SUBJECTIVE:  Brianna Myrick is a 34 y.o. female with history of RYGB in 2021, history of lap jf in 2023 who presents for follow up of abdominal pain and diarrhea.  Last office visit 4/2024.    Since last office visit, patient endorses increased stress with working full-time, and now working an additional [employer] job, taking care of her daughter, and other life stressors.  She has gained weight recently, which has been causing some abdominal discomfort/pressure.  She has been trying to limit high sugar foods and avoid late night snacking.  Bentyl works well to help with abdominal pain/discomfort, so she continues to use this as needed.  She also takes famotidine as needed for GERD symptoms.  Her bowel habits have significantly improved and she is no longer using cholestyramine regularly.  She is now having 1 BM daily or every other day.  She denies any blood in the stool.        REVIEW OF SYSTEMS:  10 point ROS reviewed and negative, except as above      Historical Information   Past Medical History:   Diagnosis Date   • Allergic     Zrytec   • Back pain    • Bacterial vaginosis    • Bariatric surgery status    • Chlamydia    • Disease of thyroid gland     nodule   • GERD (gastroesophageal reflux disease)    • Headache(784.0) 9-26    Few days   • Knee pain    • Morbidly obese (HCC)     gastric sleeve   today 10/4 2021   • MRSA carrier    • Obesity    • Postsurgical malabsorption    • Thyroid disease     benign    -right   gets scan yearly   • Urogenital trichomoniasis    • Wears glasses      Past Surgical History:   Procedure Laterality Date   • ABDOMINAL SURGERY  August 24th    Oct 4th 2021   • BARIATRIC SURGERY  Oct 4th, 2021   • CHOLECYSTECTOMY  August 24th   • CHOLECYSTECTOMY LAPAROSCOPIC N/A 08/24/2023    Procedure: CHOLECYSTECTOMY LAPAROSCOPIC;  Surgeon: Adebayo Thorpe DO;  [facility];  Service: General   • AL LAPS  GSTR RSTCV PX W/BYP DRE-EN-Y LIMB <150 CM N/A 10/04/2021    Procedure: BYPASS GASTRIC  DRE-EN-Y LAPAROSCOPIC, AND INTRAOPERATIVE EGD;  Surgeon: Trevor Grullon MD;  Location[facility];  Service: Bariatrics   • TONSILLECTOMY     • TOOTH EXTRACTION  10/01/2014   • WRIST SURGERY  05/25/2021     Social History   Social History     Substance and Sexual Activity   Alcohol Use Yes    Comment: 2-3 drinks monthly     Social History     Substance and Sexual Activity   Drug Use No     Social History     Tobacco Use   Smoking Status Never   • Passive exposure: Never   Smokeless Tobacco Never     Family History   Problem Relation Age of Onset   • Brain cancer Mother    • Cancer Mother         glioblastoma   • Hypertension Father    • Diabetes Paternal Grandfather    • Stroke Paternal Grandfather    • No Known Problems Sister    • No Known Problems Daughter    • No Known Problems Maternal Grandmother    • Diabetes Paternal Grandmother    • Stroke Paternal Grandmother    • Heart disease Neg Hx    • Thyroid disease Neg Hx    • Breast cancer Neg Hx    • Colon cancer Neg Hx    • Ovarian cancer Neg Hx        Meds/Allergies       Current Outpatient Medications:   •  acetaminophen (TYLENOL) 500 mg tablet  •  calcium carbonate-vitamin D 500 mg-5 mcg per tablet  •  Cholecalciferol (Vitamin D3) 125 MCG (5000 UT) TABS  •  cyanocobalamin (VITAMIN B-12) 1000 MCG tablet  •  dicyclomine (BENTYL) 20 mg tablet  •  famotidine (PEPCID) 20 mg tablet  •  ferrous sulfate 325 (65 Fe) mg tablet  •  fluticasone (FLONASE) 50 mcg/act nasal spray  •  Multiple Vitamin (multivitamin) tablet  •  polyethylene glycol (MIRALAX) 17 g packet  •  cholestyramine (QUESTRAN) 4 g packet    Allergies   Allergen Reactions   • Zyrtec [Cetirizine] Hives           Objective     Wt Readings from Last 3 Encounters:   08/27/24 99.8 kg (220 lb)   06/27/24 99.2 kg (218 lb 12.8 oz)   06/17/24 95.8 kg (211 lb 3.2 oz)     Temp Readings from Last 3 Encounters:   08/27/24 98.2 °F  (36.8 °C) (Tympanic)   06/27/24 99.1 °F (37.3 °C) (Tympanic)   04/26/24 (!) 96 °F (35.6 °C) (Tympanic)     BP Readings from Last 3 Encounters:   08/27/24 106/70   06/27/24 100/78   06/17/24 105/74     Pulse Readings from Last 3 Encounters:   08/27/24 82   06/27/24 68   06/17/24 77          PHYSICAL EXAM:      Physical Exam  Vitals reviewed.   Constitutional:       General: She is not in acute distress.     Appearance: She is well-developed.   HENT:      Head: Normocephalic and atraumatic.   Eyes:      Conjunctiva/sclera: Conjunctivae normal.   Cardiovascular:      Rate and Rhythm: Normal rate and regular rhythm.      Heart sounds: No murmur heard.  Pulmonary:      Effort: Pulmonary effort is normal. No respiratory distress.      Breath sounds: Normal breath sounds.   Abdominal:      General: Bowel sounds are normal. There is no distension.      Palpations: Abdomen is soft.      Tenderness: There is no abdominal tenderness. There is no guarding.   Musculoskeletal:         General: No swelling.      Cervical back: Neck supple.   Skin:     General: Skin is warm and dry.   Neurological:      Mental Status: She is alert.   Psychiatric:         Mood and Affect: Mood normal.          Lab Results:   No visits with results within 1 Day(s) from this visit.   Latest known visit with results is:   Appointment on 07/31/2024   Component Date Value   • Calprotectin, Fecal 07/31/2024 123 (H)        Lab Results   Component Value Date    WBC 5.60 01/11/2024    HGB 14.1 01/11/2024    HCT 43.5 01/11/2024    MCV 94 01/11/2024     01/11/2024       Lab Results   Component Value Date    SODIUM 143 01/11/2024    K 4.3 01/11/2024     01/11/2024    CO2 25 01/11/2024    ANIONGAP 15 01/07/2014    AGAP 10 01/11/2024    BUN 11 01/11/2024    CREATININE 0.66 01/11/2024    GLUC 75 08/24/2023    GLUF 83 01/11/2024    CALCIUM 9.0 01/11/2024    AST 21 01/11/2024    ALT 33 01/11/2024    ALKPHOS 50 01/11/2024    TP 6.8 01/11/2024    TBILI  0.65 01/11/2024    EGFR 114 01/11/2024         Radiology Results:   No results found.

## 2024-08-27 NOTE — TELEPHONE ENCOUNTER
Procedure: Colonoscopy  Date: 09/05/2024  Physician performing: Dr. Alfonso  Location of procedure:  [facility]  Instructions given to patient: Miralax  Diabetic: No  Clearances: N/A

## 2024-08-28 ENCOUNTER — ANESTHESIA (OUTPATIENT)
Dept: ANESTHESIOLOGY | Facility: HOSPITAL | Age: 34
End: 2024-08-28

## 2024-08-28 ENCOUNTER — ANESTHESIA EVENT (OUTPATIENT)
Dept: ANESTHESIOLOGY | Facility: HOSPITAL | Age: 34
End: 2024-08-28

## 2024-08-29 ENCOUNTER — TELEPHONE (OUTPATIENT)
Age: 34
End: 2024-08-29

## 2024-08-29 NOTE — TELEPHONE ENCOUNTER
Scheduled date of colonoscopy (as of today): 09/03/2024  Physician performing colonoscopy: DR VELIZ  Location of colonoscopy: [facility]  Bowel prep reviewed with patient: MIRALAX/DULCOLAX  Instructions reviewed with patient by: Previously reviewed with pt. Verified pt has all procedure directions.  Clearances:     Patient rescheduled as she states she was told 9/3 was date scheduled in office. Date of 9/3 was preferred.

## 2024-09-03 ENCOUNTER — ANESTHESIA EVENT (OUTPATIENT)
Dept: GASTROENTEROLOGY | Facility: MEDICAL CENTER | Age: 34
End: 2024-09-03

## 2024-09-03 ENCOUNTER — ANESTHESIA (OUTPATIENT)
Dept: GASTROENTEROLOGY | Facility: MEDICAL CENTER | Age: 34
End: 2024-09-03

## 2024-09-03 ENCOUNTER — HOSPITAL ENCOUNTER (OUTPATIENT)
Dept: GASTROENTEROLOGY | Facility: MEDICAL CENTER | Age: 34
Setting detail: OUTPATIENT SURGERY
Discharge: HOME | End: 2024-09-03
Attending: INTERNAL MEDICINE
Payer: MEDICARE

## 2024-09-03 VITALS
RESPIRATION RATE: 19 BRPM | OXYGEN SATURATION: 99 % | TEMPERATURE: 98.7 F | BODY MASS INDEX: 35.2 KG/M2 | HEIGHT: 66 IN | SYSTOLIC BLOOD PRESSURE: 100 MMHG | HEART RATE: 73 BPM | WEIGHT: 219 LBS | DIASTOLIC BLOOD PRESSURE: 54 MMHG

## 2024-09-03 DIAGNOSIS — R19.7 DIARRHEA, UNSPECIFIED TYPE: ICD-10-CM

## 2024-09-03 DIAGNOSIS — R19.5 ELEVATED FECAL CALPROTECTIN: ICD-10-CM

## 2024-09-03 LAB
EXT PREGNANCY TEST URINE: NEGATIVE
EXT. CONTROL: NORMAL

## 2024-09-03 PROCEDURE — 81025 URINE PREGNANCY TEST: CPT | Performed by: ANESTHESIOLOGY

## 2024-09-03 PROCEDURE — 45380 COLONOSCOPY AND BIOPSY: CPT | Performed by: INTERNAL MEDICINE

## 2024-09-03 PROCEDURE — 88305 TISSUE EXAM BY PATHOLOGIST: CPT | Performed by: PATHOLOGY

## 2024-09-03 RX ORDER — ACETAMINOPHEN 325 MG/1
TABLET ORAL
Status: COMPLETED
Start: 2024-09-03 | End: 2024-09-03

## 2024-09-03 RX ORDER — ACETAMINOPHEN 325 MG/1
650 TABLET ORAL ONCE
Status: COMPLETED | OUTPATIENT
Start: 2024-09-03 | End: 2024-09-03

## 2024-09-03 RX ORDER — SODIUM CHLORIDE 9 MG/ML
125 INJECTION, SOLUTION INTRAVENOUS CONTINUOUS
Status: DISCONTINUED | OUTPATIENT
Start: 2024-09-03 | End: 2024-09-07 | Stop reason: HOSPADM

## 2024-09-03 RX ORDER — SODIUM CHLORIDE 9 MG/ML
INJECTION, SOLUTION INTRAVENOUS CONTINUOUS PRN
Status: DISCONTINUED | OUTPATIENT
Start: 2024-09-03 | End: 2024-09-03

## 2024-09-03 RX ORDER — LIDOCAINE HCL/PF 100 MG/5ML
SYRINGE (ML) INJECTION AS NEEDED
Status: DISCONTINUED | OUTPATIENT
Start: 2024-09-03 | End: 2024-09-03

## 2024-09-03 RX ORDER — EPHEDRINE SULFATE 50 MG/ML
INJECTION INTRAVENOUS AS NEEDED
Status: DISCONTINUED | OUTPATIENT
Start: 2024-09-03 | End: 2024-09-03

## 2024-09-03 RX ORDER — PROPOFOL 10 MG/ML
INJECTION, EMULSION INTRAVENOUS AS NEEDED
Status: DISCONTINUED | OUTPATIENT
Start: 2024-09-03 | End: 2024-09-03

## 2024-09-03 RX ORDER — ONDANSETRON 2 MG/ML
4 INJECTION INTRAMUSCULAR; INTRAVENOUS ONCE AS NEEDED
Status: DISCONTINUED | OUTPATIENT
Start: 2024-09-03 | End: 2024-09-07 | Stop reason: HOSPADM

## 2024-09-03 RX ADMIN — Medication 40 MG: at 13:11

## 2024-09-03 RX ADMIN — SODIUM CHLORIDE: 0.9 INJECTION, SOLUTION INTRAVENOUS at 13:04

## 2024-09-03 RX ADMIN — PROPOFOL 40 MG: 10 INJECTION, EMULSION INTRAVENOUS at 13:14

## 2024-09-03 RX ADMIN — ACETAMINOPHEN 325MG 325 MG: 325 TABLET ORAL at 14:05

## 2024-09-03 RX ADMIN — EPHEDRINE SULFATE 10 MG: 50 INJECTION INTRAVENOUS at 13:25

## 2024-09-03 RX ADMIN — PROPOFOL 130 MCG/KG/MIN: 10 INJECTION, EMULSION INTRAVENOUS at 13:09

## 2024-09-03 RX ADMIN — PROPOFOL 130 MG: 10 INJECTION, EMULSION INTRAVENOUS at 13:08

## 2024-09-03 RX ADMIN — ACETAMINOPHEN 325 MG: 325 TABLET ORAL at 14:05

## 2024-09-03 RX ADMIN — SODIUM CHLORIDE 125 ML/HR: 0.9 INJECTION, SOLUTION INTRAVENOUS at 12:52

## 2024-09-03 RX ADMIN — LIDOCAINE HYDROCHLORIDE 100 MG: 20 INJECTION INTRAVENOUS at 13:08

## 2024-09-03 NOTE — ANESTHESIA POSTPROCEDURE EVALUATION
Post-Op Assessment Note    CV Status:  Stable    Pain management: adequate       Mental Status:  Sleepy   Hydration Status:  Euvolemic   PONV Controlled:  Controlled   Airway Patency:  Patent     Post Op Vitals Reviewed: Yes    No anethesia notable event occurred.    Staff: CRNA               /57 (09/03/24 1342)    Temp      Pulse 72 (09/03/24 1342)   Resp 16 (09/03/24 1342)    SpO2 100 % (09/03/24 1342)

## 2024-09-03 NOTE — ANESTHESIA PREPROCEDURE EVALUATION
Procedure:  COLONOSCOPY    Relevant Problems   CARDIO   (+) Intractable migraine without aura and with status migrainosus      HEMATOLOGY   (+) Iron deficiency anemia secondary to inadequate dietary iron intake      MUSCULOSKELETAL   (+) Chondromalacia   (+) Strain of sternocleidomastoid muscle      NEURO/PSYCH   (+) Headache   (+) Intractable migraine without aura and with status migrainosus        Physical Exam    Airway    Mallampati score: II  TM Distance: >3 FB  Neck ROM: full     Dental   No notable dental hx     Cardiovascular  Rhythm: regular, Rate: normal, No weak pulses    Pulmonary   No stridor    Other Findings  post-pubertal.      Anesthesia Plan  ASA Score- 2     Anesthesia Type- IV sedation with anesthesia with ASA Monitors.         Additional Monitors:     Airway Plan:            Plan Factors-    Chart reviewed.   Existing labs reviewed. Patient summary reviewed.                  Induction- intravenous.    Postoperative Plan-         Informed Consent- Anesthetic plan and risks discussed with patient.  I personally reviewed this patient with the CRNA. Discussed and agreed on the Anesthesia Plan with the CRNA..

## 2024-09-03 NOTE — H&P
History and Physical -  [facility]  Brianna Myrick 34 y.o. female MRN: [id number]                  HPI: Brianna Myrick is a 34 y.o. year old female who presents for elevated stool todd protectin.       REVIEW OF SYSTEMS: Per the HPI, and otherwise unremarkable.    Historical Information   Past Medical History:   Diagnosis Date    Allergic     Zrytec    Back pain     Bacterial vaginosis     Bariatric surgery status     Chlamydia     Disease of thyroid gland     nodule    GERD (gastroesophageal reflux disease)     Headache(784.0) 9-26    Few days    Knee pain     Morbidly obese (HCC)     gastric sleeve   today 10/4 2021    MRSA carrier     Obesity     Postsurgical malabsorption     Thyroid disease     benign    -right   gets scan yearly    Urogenital trichomoniasis     Wears glasses      Past Surgical History:   Procedure Laterality Date    ABDOMINAL SURGERY  August 24th    Oct 4th 2021    BARIATRIC SURGERY  Oct 4th, 2021    CHOLECYSTECTOMY  August 24th    CHOLECYSTECTOMY LAPAROSCOPIC N/A 08/24/2023    Procedure: CHOLECYSTECTOMY LAPAROSCOPIC;  Surgeon: Adebayo Thorpe DO;  [facility];  Service: General    [facility] LAPS GSTR RSTCV PX W/BYP DRE-EN-Y LIMB <150 CM N/A 10/04/2021    Procedure: BYPASS GASTRIC  DRE-EN-Y LAPAROSCOPIC, AND INTRAOPERATIVE EGD;  Surgeon: Trevor Grullon MD;  [facility];  Service: Bariatrics    TONSILLECTOMY      TOOTH EXTRACTION  10/01/2014    WRIST SURGERY  05/25/2021     Social History   Social History     Substance and Sexual Activity   Alcohol Use Yes    Comment: 2-3 drinks monthly     Social History     Substance and Sexual Activity   Drug Use No     Social History     Tobacco Use   Smoking Status Never    Passive exposure: Never   Smokeless Tobacco Never     Family History   Problem Relation Age of Onset    Brain cancer Mother     Cancer Mother         glioblastoma    Hypertension Father     Diabetes Paternal Grandfather     Stroke Paternal  Grandfather     No Known Problems Sister     No Known Problems Daughter     No Known Problems Maternal Grandmother     Diabetes Paternal Grandmother     Stroke Paternal Grandmother     Heart disease Neg Hx     Thyroid disease Neg Hx     Breast cancer Neg Hx     Colon cancer Neg Hx     Ovarian cancer Neg Hx        Meds/Allergies       Current Outpatient Medications:     acetaminophen (TYLENOL) 500 mg tablet    calcium carbonate-vitamin D 500 mg-5 mcg per tablet    Cholecalciferol (Vitamin D3) 125 MCG (5000 UT) TABS    cholestyramine (QUESTRAN) 4 g packet    cyanocobalamin (VITAMIN B-12) 1000 MCG tablet    dicyclomine (BENTYL) 20 mg tablet    famotidine (PEPCID) 20 mg tablet    ferrous sulfate 325 (65 Fe) mg tablet    fluticasone (FLONASE) 50 mcg/act nasal spray    Multiple Vitamin (multivitamin) tablet    polyethylene glycol (MIRALAX) 17 g packet    Allergies   Allergen Reactions    Zyrtec [Cetirizine] Hives       Objective     There were no vitals taken for this visit.      PHYSICAL EXAM    Gen: NAD  Head: NCAT  CV: RRR  CHEST: Clear  ABD: soft, NT/ND  EXT: no edema      ASSESSMENT/PLAN:  This is a 34 y.o. year old female here for mildly elevated stool calprotectin, and she is stable and optimized for her procedure.

## 2024-09-03 NOTE — PERIOPERATIVE NURSING NOTE
Pt drinking and eating after procedure.Stated headache feels better but not gone. Able to get dressed and ready for discharge.

## 2024-09-06 PROCEDURE — 88305 TISSUE EXAM BY PATHOLOGIST: CPT | Performed by: PATHOLOGY

## 2024-09-24 ENCOUNTER — CLINICAL SUPPORT (OUTPATIENT)
Age: 34
End: 2024-09-24

## 2024-09-24 DIAGNOSIS — Z11.1 ENCOUNTER FOR PPD TEST: Primary | ICD-10-CM

## 2024-09-24 PROCEDURE — 86580 TB INTRADERMAL TEST: CPT

## 2024-09-26 ENCOUNTER — CLINICAL SUPPORT (OUTPATIENT)
Age: 34
End: 2024-09-26
Payer: MEDICARE

## 2024-09-26 DIAGNOSIS — Z11.1 ENCOUNTER FOR PPD SKIN TEST READING: Primary | ICD-10-CM

## 2024-09-26 LAB
INDURATION: 0 MM
TB SKIN TEST: NEGATIVE

## 2024-09-26 PROCEDURE — 90471 IMMUNIZATION ADMIN: CPT

## 2024-10-15 ENCOUNTER — TELEPHONE (OUTPATIENT)
Age: 34
End: 2024-10-15

## 2024-10-15 NOTE — TELEPHONE ENCOUNTER
Patient called has question regarding Augmentin medication. Warm transferred to CTS for further assistance.

## 2024-10-15 NOTE — TELEPHONE ENCOUNTER
Patient of . S/P Fani-En-Y Gastric Bypass on 10/04/2021. Evaluated at Urgent Care today for sinus infection and UTI. To start Augmentin 875 mg. Asking if this is appropriate due to bariatric history. Please advise and call patient back.

## 2024-10-21 ENCOUNTER — OFFICE VISIT (OUTPATIENT)
Dept: BARIATRICS | Facility: CLINIC | Age: 34
End: 2024-10-21
Payer: MEDICARE

## 2024-10-21 VITALS
BODY MASS INDEX: 35.03 KG/M2 | WEIGHT: 218 LBS | HEIGHT: 66 IN | SYSTOLIC BLOOD PRESSURE: 110 MMHG | RESPIRATION RATE: 19 BRPM | OXYGEN SATURATION: 97 % | HEART RATE: 89 BPM | TEMPERATURE: 98.5 F | DIASTOLIC BLOOD PRESSURE: 62 MMHG

## 2024-10-21 DIAGNOSIS — Z48.815 ENCOUNTER FOR SURGICAL AFTERCARE FOLLOWING SURGERY OF DIGESTIVE SYSTEM: Primary | ICD-10-CM

## 2024-10-21 DIAGNOSIS — Z98.84 BARIATRIC SURGERY STATUS: ICD-10-CM

## 2024-10-21 DIAGNOSIS — K91.2 POSTSURGICAL MALABSORPTION: ICD-10-CM

## 2024-10-21 DIAGNOSIS — E66.812 OBESITY, CLASS II, BMI 35-39.9: ICD-10-CM

## 2024-10-21 PROCEDURE — 99214 OFFICE O/P EST MOD 30 MIN: CPT | Performed by: NURSE PRACTITIONER

## 2024-10-21 NOTE — PROGRESS NOTES
Date of surgery: 10/4/2021  Procedure: RNY  Performing surgeon: Dr. Grullon     Initial Weight - 356.0 lb   Current Weight -218.0 lb   Jorge Weight - 187.0 lb   Total Body Weight Loss (EWL)- 138.0   EWL% - 69%  TWB % - 39%

## 2024-10-21 NOTE — PROGRESS NOTES
Assessment/Plan:     Patient ID: Brianna Myrick is a 34 y.o. female.     Bariatric Surgery Status/BMI 35    -s/p Fani-En-Y Gastric Bypass with Dr. Grullon on 10/04/2021. Presents to the office today for annual visit. Overall doing fair - has gained weight since last seen. Expresses stress from working full time and taking care of her daughter with other activities. Feels overwhelm - currently not planning her meals and is snacking often. Tolerating a regular diet. Denies having any abdominal pain, N/V/D/C, regurgitation, reflux or dysphagia. Taking her MVI daily. Has seen GI for her diarrhea s/p cholecystectomy - much improved since she had started on questran and now has not needed to take this.     PLAN:     - discussed to meal prep and plan due to busy work-life schedule. Provided patient with caloric RX - of 1400 calories per day. List of protein examples and menu sample provided.   - she would like to hold off on any AOMs use  - Routine follow up in 1 year for annual visit. Follow up in 6 months for post op support.   - Continue with healthy lifestyle, adequate protein intake of 60 gm, fluid intake of at least 64 oz.   - Continue with MVI daily.   - Activity as tolerated.   - Labs ordered and will adjust accordingly if any deficiency.   - Follow up with RD and SW as needed.       Continued/Maintain healthy weight loss with good nutrition intakes.  Adequate hydration with at least 64oz. fluid intake.  Follow diet as discussed.  Follow vitamin and mineral recommendations as reviewed with you.  Exercise as tolerated.    Colonoscopy referral made: had a colonoscopy due to diarrhea and mildly elevated calprotectin - negative findings.     Follow-up in 1 year for annual visit; follow up in 6 months for post op support. We kindly ask that your arrive 15 minutes before your scheduled appointment time with your provider to allow our staff to room you, get your vital signs and update your chart.    Get lab work done  prior to annual visit. Please call the office if you need a script.  It is recommended to check with your insurance BEFORE getting labs done to make sure they are covered by your policy.      Call our office if you have any problems with abdominal pain especially associated with fever, chills, nausea, vomiting or any other concerns.    All  Post-bariatric surgery patients should be aware that very small quantities of any alcohol can cause impairment and it is very possible not to feel the effect. The effect can be in the system for several hours.  It is also a stomach irritant.     It is advised to AVOID alcohol, Nonsteroidal antiinflammatory drugs (NSAIDS) and nicotine of all forms . Any of these can cause stomach irritation/pain.    Discussed the effects of alcohol on a bariatric patient and the increased impairment risk.     Keep up the good work!     Postsurgical Malabsorption   -At risk for malabsorption of vitamins/minerals secondary to malabsorption and restriction of intake from bariatric surgery  -Currently taking adequate postop bariatric surgery vitamin supplementation  -Last set of bariatric labs completed on 01/11/2024 and showed high B12  -Next set of bariatric labs ordered for approximately 2 weeks  -Patient received education about the importance of adhering to a lifelong supplementation regimen to avoid vitamin/mineral deficiencies      Diagnoses and all orders for this visit:    Encounter for surgical aftercare following surgery of digestive system  -     CBC; Future  -     Comprehensive metabolic panel; Future  -     Folate; Future  -     Iron Panel (Includes Ferritin, Iron Sat%, Iron, and TIBC); Future  -     PTH, intact; Future  -     Vitamin A; Future  -     Vitamin B1, whole blood; Future  -     Vitamin B12; Future  -     Vitamin D 25 hydroxy; Future  -     Zinc; Future    Bariatric surgery status  -     CBC; Future  -     Comprehensive metabolic panel; Future  -     Folate; Future  -     Iron  Panel (Includes Ferritin, Iron Sat%, Iron, and TIBC); Future  -     PTH, intact; Future  -     Vitamin A; Future  -     Vitamin B1, whole blood; Future  -     Vitamin B12; Future  -     Vitamin D 25 hydroxy; Future  -     Zinc; Future    Postsurgical malabsorption  -     CBC; Future  -     Comprehensive metabolic panel; Future  -     Folate; Future  -     Iron Panel (Includes Ferritin, Iron Sat%, Iron, and TIBC); Future  -     PTH, intact; Future  -     Vitamin A; Future  -     Vitamin B1, whole blood; Future  -     Vitamin B12; Future  -     Vitamin D 25 hydroxy; Future  -     Zinc; Future    Obesity, Class II, BMI 35-39.9  -     CBC; Future  -     Comprehensive metabolic panel; Future  -     Folate; Future  -     Iron Panel (Includes Ferritin, Iron Sat%, Iron, and TIBC); Future  -     PTH, intact; Future  -     Vitamin A; Future  -     Vitamin B1, whole blood; Future  -     Vitamin B12; Future  -     Vitamin D 25 hydroxy; Future  -     Zinc; Future    BMI 35.0-35.9,adult  -     CBC; Future  -     Comprehensive metabolic panel; Future  -     Folate; Future  -     Iron Panel (Includes Ferritin, Iron Sat%, Iron, and TIBC); Future  -     PTH, intact; Future  -     Vitamin A; Future  -     Vitamin B1, whole blood; Future  -     Vitamin B12; Future  -     Vitamin D 25 hydroxy; Future  -     Zinc; Future    Other orders  -     amoxicillin-clavulanate (AUGMENTIN) 875-125 mg per tablet; Take 1 tablet by mouth 2 (two) times a day  -     Multiple Vitamins-Minerals (HAIR SKIN & NAILS PO); Take by mouth         Subjective:      Patient ID: Brianna Myrick is a 34 y.o. female.    -s/p Fani-En-Y Gastric Bypass with Dr. Grullon on 10/04/2021. Presents to the office today for annual visit. Overall doing fair - has gained weight since last seen. Expresses stress from working full time and taking care of her daughter with other activities. Feels overwhelm - currently not planning her meals and is snacking often. Tolerating a  "regular diet. Denies having any abdominal pain, N/V/D/C, regurgitation, reflux or dysphagia. Taking her MVI daily. Has seen GI for her diarrhea s/p cholecystectomy - much improved since she had started on questran and now has not needed to take this.     Initial: 363 lbs   Current: 218 lbs  EWL: (Weight loss is ahead of schedule at this post surgical period.)  Jorge: 187.5 lbs  Current BMI is Body mass index is 35.19 kg/m².    Tolerating a regular diet-yes  Eating at least 60 grams of protein per day-yes  Following 30/60 minute rule with liquids-yes  Drinking at least 64 ounces of fluid per day-yes  Drinking carbonated beverages-no  Sufficient exercise-no  Using NSAIDs regularly-no  Using nicotine-no  Using alcohol-no  Supplements: Bariatric Multivitamins, Iron 65 mg, B-12 1000 mcg, Calcium  and vitamin C, Vitamin D 5000 IU     EWL is 69%, which places the patient ahead of schedule for expected post surgical weight loss at this time.     The following portions of the patient's history were reviewed and updated as appropriate: allergies, current medications, past family history, past medical history, past social history, past surgical history and problem list.    Review of Systems   Constitutional:  Positive for activity change, appetite change and unexpected weight change.   Respiratory: Negative.     Cardiovascular: Negative.    Gastrointestinal: Negative.    Musculoskeletal: Negative.    Neurological: Negative.    Psychiatric/Behavioral: Negative.           Objective:    /62 (BP Location: Left arm, Patient Position: Sitting, Cuff Size: Adult)   Pulse 89   Temp 98.5 °F (36.9 °C) (Tympanic)   Resp 19   Ht 5' 6\" (1.676 m)   Wt 98.9 kg (218 lb)   SpO2 97%   BMI 35.19 kg/m²      Physical Exam  Vitals and nursing note reviewed.   Constitutional:       Appearance: Normal appearance. She is obese.   Cardiovascular:      Rate and Rhythm: Normal rate and regular rhythm.      Pulses: Normal pulses.      Heart " sounds: Normal heart sounds.   Pulmonary:      Effort: Pulmonary effort is normal.      Breath sounds: Normal breath sounds.   Abdominal:      General: Bowel sounds are normal.      Palpations: Abdomen is soft.      Tenderness: There is no abdominal tenderness.   Musculoskeletal:         General: Normal range of motion.   Skin:     General: Skin is warm and dry.   Neurological:      General: No focal deficit present.      Mental Status: She is alert and oriented to person, place, and time.   Psychiatric:         Mood and Affect: Mood normal.         Behavior: Behavior normal.         Thought Content: Thought content normal.         Judgment: Judgment normal.

## 2024-10-29 ENCOUNTER — OFFICE VISIT (OUTPATIENT)
Age: 34
End: 2024-10-29
Payer: MEDICARE

## 2024-10-29 VITALS
TEMPERATURE: 96.7 F | RESPIRATION RATE: 16 BRPM | HEART RATE: 72 BPM | OXYGEN SATURATION: 98 % | HEIGHT: 66 IN | DIASTOLIC BLOOD PRESSURE: 74 MMHG | BODY MASS INDEX: 35.03 KG/M2 | SYSTOLIC BLOOD PRESSURE: 110 MMHG | WEIGHT: 218 LBS

## 2024-10-29 DIAGNOSIS — D50.8 IRON DEFICIENCY ANEMIA SECONDARY TO INADEQUATE DIETARY IRON INTAKE: ICD-10-CM

## 2024-10-29 DIAGNOSIS — Z00.00 WELL ADULT EXAM: Primary | ICD-10-CM

## 2024-10-29 DIAGNOSIS — K29.70 GASTRITIS WITHOUT BLEEDING, UNSPECIFIED CHRONICITY, UNSPECIFIED GASTRITIS TYPE: ICD-10-CM

## 2024-10-29 PROCEDURE — 99395 PREV VISIT EST AGE 18-39: CPT | Performed by: PHYSICIAN ASSISTANT

## 2024-10-29 PROCEDURE — 99213 OFFICE O/P EST LOW 20 MIN: CPT | Performed by: PHYSICIAN ASSISTANT

## 2024-10-29 NOTE — PROGRESS NOTES
Ambulatory Visit  Name: Brianna Myrick      : 1990      MRN: [id number]  Encounter Provider: Sara Cameron PA-C  Encounter Date: 10/29/2024   Encounter department: [facility]    Assessment & Plan  Well adult exam  -Pap smear currently up-to-date and done in   - Flu vaccine was done elsewhere  - Routine physical in 1 year, routine follow-up in 6 months       Iron deficiency anemia secondary to inadequate dietary iron intake  -Continue on the iron supplement  - There are lab orders in the system for her to complete to check her levels which was ordered by the bariatric specialist.  She states that she will proceed with the lab orders today       Gastritis without bleeding, unspecified chronicity, unspecified gastritis type  -Symptoms are under good control with famotidine daily which is ordered by the specialist    [employer] software was used to dictate this note. It may contain errors with dictating incorrect words/spelling. Please contact provider directly for any questions.             History of Present Illness     Patient presents today for her annual physical along with a routine follow-up.  She does not have any concerns at this time.    Wellness:  - She does see the dentist every 6 months  - She does see the eye doctor yearly and she is due.  She does wear glasses  - She does eat a healthy diet  - She states that she has not been exercising due to her busy schedule but she plans to restart  - She does drink alcohol socially but otherwise she does not use any tobacco products vape or drug use  -Her last Pap smear was in 2021 although she does see the gynecologist yearly  -She already had her flu vaccine elsewhere    Routine:  - She continues to follow with the bariatric specialist.  She states that there are lab orders in the system for her to complete which she may do today since she is fasting  -Her gastritis has been under good control with the  "famotidine  -She does continue on her iron supplements and vitamins as directed.          Review of Systems   Constitutional: Negative.    Respiratory:  Negative for cough and shortness of breath.    Cardiovascular:  Negative for chest pain and leg swelling.   Gastrointestinal:  Negative for abdominal pain.           Objective     /74 (BP Location: Left arm, Patient Position: Sitting, Cuff Size: Standard)   Pulse 72   Temp (!) 96.7 °F (35.9 °C) (Tympanic)   Resp 16   Ht 5' 6\" (1.676 m)   Wt 98.9 kg (218 lb)   SpO2 98%   BMI 35.19 kg/m²     Physical Exam  Vitals reviewed.   Constitutional:       General: She is not in acute distress.     Appearance: Normal appearance. She is well-developed. She is not ill-appearing, toxic-appearing or diaphoretic.   HENT:      Head: Normocephalic and atraumatic.   Neck:      Thyroid: No thyromegaly.   Cardiovascular:      Rate and Rhythm: Normal rate and regular rhythm.      Heart sounds: Normal heart sounds. No murmur heard.  Pulmonary:      Effort: Pulmonary effort is normal. No respiratory distress.      Breath sounds: Normal breath sounds. No wheezing, rhonchi or rales.   Abdominal:      General: Bowel sounds are normal.      Palpations: Abdomen is soft. There is no mass.      Tenderness: There is no abdominal tenderness.   Musculoskeletal:         General: No deformity.      Cervical back: Neck supple.      Right lower leg: No edema.      Left lower leg: No edema.   Lymphadenopathy:      Cervical: No cervical adenopathy.   Skin:     General: Skin is warm.   Neurological:      General: No focal deficit present.      Mental Status: She is alert.   Psychiatric:         Mood and Affect: Mood normal.         Behavior: Behavior normal.         Thought Content: Thought content normal.         Judgment: Judgment normal.         "

## 2024-10-29 NOTE — ASSESSMENT & PLAN NOTE
-Symptoms are under good control with famotidine daily which is ordered by the specialist    [employer] software was used to dictate this note. It may contain errors with dictating incorrect words/spelling. Please contact provider directly for any questions.

## 2024-10-29 NOTE — ASSESSMENT & PLAN NOTE
-Continue on the iron supplement  - There are lab orders in the system for her to complete to check her levels which was ordered by the bariatric specialist.  She states that she will proceed with the lab orders today

## 2024-11-01 ENCOUNTER — APPOINTMENT (OUTPATIENT)
Dept: LAB | Facility: IMAGING CENTER | Age: 34
End: 2024-11-01
Payer: MEDICARE

## 2024-11-01 DIAGNOSIS — K91.2 POSTSURGICAL MALABSORPTION: ICD-10-CM

## 2024-11-01 DIAGNOSIS — Z48.815 ENCOUNTER FOR SURGICAL AFTERCARE FOLLOWING SURGERY OF DIGESTIVE SYSTEM: ICD-10-CM

## 2024-11-01 DIAGNOSIS — Z98.84 BARIATRIC SURGERY STATUS: ICD-10-CM

## 2024-11-01 DIAGNOSIS — E66.812 OBESITY, CLASS II, BMI 35-39.9: ICD-10-CM

## 2024-11-01 LAB
25(OH)D3 SERPL-MCNC: 34.1 NG/ML (ref 30–100)
ALBUMIN SERPL BCG-MCNC: 4.1 G/DL (ref 3.5–5)
ALP SERPL-CCNC: 44 U/L (ref 34–104)
ALT SERPL W P-5'-P-CCNC: 29 U/L (ref 7–52)
ANION GAP SERPL CALCULATED.3IONS-SCNC: 8 MMOL/L (ref 4–13)
AST SERPL W P-5'-P-CCNC: 21 U/L (ref 13–39)
BILIRUB SERPL-MCNC: 0.53 MG/DL (ref 0.2–1)
BUN SERPL-MCNC: 10 MG/DL (ref 5–25)
CALCIUM SERPL-MCNC: 8.8 MG/DL (ref 8.4–10.2)
CHLORIDE SERPL-SCNC: 107 MMOL/L (ref 96–108)
CO2 SERPL-SCNC: 27 MMOL/L (ref 21–32)
CREAT SERPL-MCNC: 0.64 MG/DL (ref 0.6–1.3)
ERYTHROCYTE [DISTWIDTH] IN BLOOD BY AUTOMATED COUNT: 12.2 % (ref 11.6–15.1)
FERRITIN SERPL-MCNC: 64 NG/ML (ref 11–307)
FOLATE SERPL-MCNC: >22.3 NG/ML
GFR SERPL CREATININE-BSD FRML MDRD: 115 ML/MIN/1.73SQ M
GLUCOSE P FAST SERPL-MCNC: 81 MG/DL (ref 65–99)
HCT VFR BLD AUTO: 40 % (ref 34.8–46.1)
HGB BLD-MCNC: 13 G/DL (ref 11.5–15.4)
IRON SATN MFR SERPL: 21 % (ref 15–50)
IRON SERPL-MCNC: 76 UG/DL (ref 50–212)
MCH RBC QN AUTO: 30.3 PG (ref 26.8–34.3)
MCHC RBC AUTO-ENTMCNC: 32.5 G/DL (ref 31.4–37.4)
MCV RBC AUTO: 93 FL (ref 82–98)
PLATELET # BLD AUTO: 236 THOUSANDS/UL (ref 149–390)
PMV BLD AUTO: 11.3 FL (ref 8.9–12.7)
POTASSIUM SERPL-SCNC: 3.9 MMOL/L (ref 3.5–5.3)
PROT SERPL-MCNC: 6.7 G/DL (ref 6.4–8.4)
PTH-INTACT SERPL-MCNC: 76 PG/ML (ref 12–88)
RBC # BLD AUTO: 4.29 MILLION/UL (ref 3.81–5.12)
SODIUM SERPL-SCNC: 142 MMOL/L (ref 135–147)
TIBC SERPL-MCNC: 354 UG/DL (ref 250–450)
UIBC SERPL-MCNC: 278 UG/DL (ref 155–355)
VIT B12 SERPL-MCNC: 585 PG/ML (ref 180–914)
WBC # BLD AUTO: 4.55 THOUSAND/UL (ref 4.31–10.16)

## 2024-11-01 PROCEDURE — 82728 ASSAY OF FERRITIN: CPT

## 2024-11-01 PROCEDURE — 83540 ASSAY OF IRON: CPT

## 2024-11-01 PROCEDURE — 83550 IRON BINDING TEST: CPT

## 2024-11-01 PROCEDURE — 84590 ASSAY OF VITAMIN A: CPT

## 2024-11-01 PROCEDURE — 83970 ASSAY OF PARATHORMONE: CPT

## 2024-11-01 PROCEDURE — 85027 COMPLETE CBC AUTOMATED: CPT

## 2024-11-01 PROCEDURE — 82306 VITAMIN D 25 HYDROXY: CPT

## 2024-11-01 PROCEDURE — 82746 ASSAY OF FOLIC ACID SERUM: CPT

## 2024-11-01 PROCEDURE — 84425 ASSAY OF VITAMIN B-1: CPT

## 2024-11-01 PROCEDURE — 84630 ASSAY OF ZINC: CPT

## 2024-11-01 PROCEDURE — 82607 VITAMIN B-12: CPT

## 2024-11-01 PROCEDURE — 80053 COMPREHEN METABOLIC PANEL: CPT

## 2024-11-01 PROCEDURE — 36415 COLL VENOUS BLD VENIPUNCTURE: CPT

## 2024-11-05 LAB
VIT A SERPL-MCNC: 44.3 UG/DL (ref 18.9–57.3)
ZINC SERPL-MCNC: 83 UG/DL (ref 44–115)

## 2024-11-06 LAB — VIT B1 BLD-SCNC: 116.7 NMOL/L (ref 66.5–200)

## 2024-12-17 ENCOUNTER — HOSPITAL ENCOUNTER (EMERGENCY)
Facility: HOSPITAL | Age: 34
Discharge: HOME | End: 2024-12-17
Attending: EMERGENCY MEDICINE | Admitting: EMERGENCY MEDICINE
Payer: MEDICARE

## 2024-12-17 ENCOUNTER — APPOINTMENT (EMERGENCY)
Dept: RADIOLOGY | Facility: HOSPITAL | Age: 34
End: 2024-12-17
Payer: MEDICARE

## 2024-12-17 VITALS
HEART RATE: 72 BPM | OXYGEN SATURATION: 99 % | SYSTOLIC BLOOD PRESSURE: 109 MMHG | DIASTOLIC BLOOD PRESSURE: 70 MMHG | TEMPERATURE: 97.6 F | RESPIRATION RATE: 20 BRPM

## 2024-12-17 DIAGNOSIS — R10.9 RIGHT FLANK PAIN: Primary | ICD-10-CM

## 2024-12-17 LAB
ALBUMIN SERPL BCG-MCNC: 4.1 G/DL (ref 3.5–5)
ALP SERPL-CCNC: 44 U/L (ref 34–104)
ALT SERPL W P-5'-P-CCNC: 22 U/L (ref 7–52)
ANION GAP SERPL CALCULATED.3IONS-SCNC: 7 MMOL/L (ref 4–13)
AST SERPL W P-5'-P-CCNC: 18 U/L (ref 13–39)
ATRIAL RATE: 74 BPM
ATRIAL RATE: 76 BPM
BACTERIA UR QL AUTO: ABNORMAL /HPF
BACTERIA UR QL AUTO: ABNORMAL /HPF
BASOPHILS # BLD MANUAL: 0 THOUSAND/UL (ref 0–0.1)
BASOPHILS NFR MAR MANUAL: 0 % (ref 0–1)
BILIRUB SERPL-MCNC: 0.46 MG/DL (ref 0.2–1)
BILIRUB UR QL STRIP: ABNORMAL
BILIRUB UR QL STRIP: NEGATIVE
BUN SERPL-MCNC: 12 MG/DL (ref 5–25)
CALCIUM SERPL-MCNC: 9.1 MG/DL (ref 8.4–10.2)
CHLORIDE SERPL-SCNC: 106 MMOL/L (ref 96–108)
CLARITY UR: ABNORMAL
CLARITY UR: CLEAR
CO2 SERPL-SCNC: 27 MMOL/L (ref 21–32)
COLOR UR: ABNORMAL
COLOR UR: YELLOW
CREAT SERPL-MCNC: 0.65 MG/DL (ref 0.6–1.3)
DIFFERENTIAL COMMENT: ABNORMAL
EOSINOPHIL # BLD MANUAL: 0.39 THOUSAND/UL (ref 0–0.4)
EOSINOPHIL NFR BLD MANUAL: 8 % (ref 0–6)
ERYTHROCYTE [DISTWIDTH] IN BLOOD BY AUTOMATED COUNT: 12.4 % (ref 11.6–15.1)
EXT PREGNANCY TEST URINE: NEGATIVE
EXT. CONTROL: NORMAL
GFR SERPL CREATININE-BSD FRML MDRD: 114 ML/MIN/1.73SQ M
GLUCOSE SERPL-MCNC: 77 MG/DL (ref 65–140)
GLUCOSE UR STRIP-MCNC: ABNORMAL MG/DL
GLUCOSE UR STRIP-MCNC: NEGATIVE MG/DL
HCT VFR BLD AUTO: 41.6 % (ref 34.8–46.1)
HGB BLD-MCNC: 14.1 G/DL (ref 11.5–15.4)
HGB UR QL STRIP.AUTO: ABNORMAL
HGB UR QL STRIP.AUTO: ABNORMAL
KETONES UR STRIP-MCNC: ABNORMAL MG/DL
KETONES UR STRIP-MCNC: NEGATIVE MG/DL
LEUKOCYTE ESTERASE UR QL STRIP: ABNORMAL
LEUKOCYTE ESTERASE UR QL STRIP: NEGATIVE
LIPASE SERPL-CCNC: 30 U/L (ref 11–82)
LYMPHOCYTES # BLD AUTO: 1.41 THOUSAND/UL (ref 0.6–4.47)
LYMPHOCYTES # BLD AUTO: 9 % (ref 14–44)
MCH RBC QN AUTO: 30.6 PG (ref 26.8–34.3)
MCHC RBC AUTO-ENTMCNC: 33.9 G/DL (ref 31.4–37.4)
MCV RBC AUTO: 90 FL (ref 82–98)
MONOCYTES # BLD AUTO: 0.29 THOUSAND/UL (ref 0–1.22)
MONOCYTES NFR BLD: 6 % (ref 4–12)
MUCOUS THREADS UR QL AUTO: ABNORMAL
NEUTROPHILS # BLD MANUAL: 2.76 THOUSAND/UL (ref 1.85–7.62)
NEUTS BAND NFR BLD MANUAL: 6 % (ref 0–8)
NEUTS SEG NFR BLD AUTO: 51 % (ref 43–75)
NITRITE UR QL STRIP: ABNORMAL
NITRITE UR QL STRIP: NEGATIVE
NON-SQ EPI CELLS URNS QL MICRO: ABNORMAL /HPF
NON-SQ EPI CELLS URNS QL MICRO: ABNORMAL /HPF
P AXIS: 52 DEGREES
P AXIS: 67 DEGREES
PATHOLOGY REVIEW: YES
PH UR STRIP.AUTO: 6 [PH]
PH UR STRIP.AUTO: ABNORMAL [PH]
PLATELET # BLD AUTO: 150 THOUSANDS/UL (ref 149–390)
PLATELET BLD QL SMEAR: ADEQUATE
PMV BLD AUTO: 11 FL (ref 8.9–12.7)
POTASSIUM SERPL-SCNC: 3.8 MMOL/L (ref 3.5–5.3)
PR INTERVAL: 130 MS
PR INTERVAL: 132 MS
PROT SERPL-MCNC: 6.6 G/DL (ref 6.4–8.4)
PROT UR STRIP-MCNC: ABNORMAL MG/DL
PROT UR STRIP-MCNC: ABNORMAL MG/DL
QRS AXIS: 39 DEGREES
QRS AXIS: 46 DEGREES
QRSD INTERVAL: 84 MS
QRSD INTERVAL: 86 MS
QT INTERVAL: 374 MS
QT INTERVAL: 406 MS
QTC INTERVAL: 415 MS
QTC INTERVAL: 456 MS
RBC # BLD AUTO: 4.61 MILLION/UL (ref 3.81–5.12)
RBC #/AREA URNS AUTO: ABNORMAL /HPF
RBC #/AREA URNS AUTO: ABNORMAL /HPF
RBC MORPH BLD: NORMAL
SODIUM SERPL-SCNC: 140 MMOL/L (ref 135–147)
SP GR UR STRIP.AUTO: 1.02 (ref 1–1.03)
SP GR UR STRIP.AUTO: 1.03 (ref 1–1.03)
T WAVE AXIS: 35 DEGREES
T WAVE AXIS: 49 DEGREES
UROBILINOGEN UR QL STRIP.AUTO: ABNORMAL E.U./DL
UROBILINOGEN UR STRIP-ACNC: 2 MG/DL
VARIANT LYMPHS # BLD AUTO: 20 %
VENTRICULAR RATE: 74 BPM
VENTRICULAR RATE: 76 BPM
WBC # BLD AUTO: 4.85 THOUSAND/UL (ref 4.31–10.16)
WBC #/AREA URNS AUTO: ABNORMAL /HPF
WBC #/AREA URNS AUTO: ABNORMAL /HPF

## 2024-12-17 PROCEDURE — 85027 COMPLETE CBC AUTOMATED: CPT

## 2024-12-17 PROCEDURE — 99285 EMERGENCY DEPT VISIT HI MDM: CPT | Performed by: EMERGENCY MEDICINE

## 2024-12-17 PROCEDURE — 74176 CT ABD & PELVIS W/O CONTRAST: CPT

## 2024-12-17 PROCEDURE — 81025 URINE PREGNANCY TEST: CPT

## 2024-12-17 PROCEDURE — 80053 COMPREHEN METABOLIC PANEL: CPT

## 2024-12-17 PROCEDURE — 36415 COLL VENOUS BLD VENIPUNCTURE: CPT

## 2024-12-17 PROCEDURE — 93005 ELECTROCARDIOGRAM TRACING: CPT

## 2024-12-17 PROCEDURE — 87086 URINE CULTURE/COLONY COUNT: CPT

## 2024-12-17 PROCEDURE — 85060 BLOOD SMEAR INTERPRETATION: CPT | Performed by: PATHOLOGY

## 2024-12-17 PROCEDURE — 81001 URINALYSIS AUTO W/SCOPE: CPT

## 2024-12-17 PROCEDURE — 96361 HYDRATE IV INFUSION ADD-ON: CPT

## 2024-12-17 PROCEDURE — 83690 ASSAY OF LIPASE: CPT

## 2024-12-17 PROCEDURE — 93010 ELECTROCARDIOGRAM REPORT: CPT | Performed by: INTERNAL MEDICINE

## 2024-12-17 PROCEDURE — 99284 EMERGENCY DEPT VISIT MOD MDM: CPT

## 2024-12-17 PROCEDURE — 96374 THER/PROPH/DIAG INJ IV PUSH: CPT

## 2024-12-17 PROCEDURE — 85007 BL SMEAR W/DIFF WBC COUNT: CPT

## 2024-12-17 RX ORDER — FAMOTIDINE 20 MG/1
20 TABLET, FILM COATED ORAL ONCE
Status: COMPLETED | OUTPATIENT
Start: 2024-12-17 | End: 2024-12-17

## 2024-12-17 RX ORDER — ACETAMINOPHEN 10 MG/ML
1000 INJECTION, SOLUTION INTRAVENOUS ONCE
Status: COMPLETED | OUTPATIENT
Start: 2024-12-17 | End: 2024-12-17

## 2024-12-17 RX ORDER — LIDOCAINE 50 MG/G
1 PATCH TOPICAL ONCE
Status: DISCONTINUED | OUTPATIENT
Start: 2024-12-17 | End: 2024-12-17 | Stop reason: HOSPADM

## 2024-12-17 RX ORDER — LIDOCAINE HYDROCHLORIDE 20 MG/ML
10 SOLUTION OROPHARYNGEAL ONCE
Status: COMPLETED | OUTPATIENT
Start: 2024-12-17 | End: 2024-12-17

## 2024-12-17 RX ORDER — KETOROLAC TROMETHAMINE 30 MG/ML
30 INJECTION, SOLUTION INTRAMUSCULAR; INTRAVENOUS ONCE
Status: DISCONTINUED | OUTPATIENT
Start: 2024-12-17 | End: 2024-12-17

## 2024-12-17 RX ORDER — MAGNESIUM HYDROXIDE/ALUMINUM HYDROXICE/SIMETHICONE 120; 1200; 1200 MG/30ML; MG/30ML; MG/30ML
30 SUSPENSION ORAL ONCE
Status: COMPLETED | OUTPATIENT
Start: 2024-12-17 | End: 2024-12-17

## 2024-12-17 RX ORDER — ACETAMINOPHEN 325 MG/1
650 TABLET ORAL ONCE
Status: DISCONTINUED | OUTPATIENT
Start: 2024-12-17 | End: 2024-12-17

## 2024-12-17 RX ADMIN — ACETAMINOPHEN 1000 MG: 1000 INJECTION, SOLUTION INTRAVENOUS at 11:09

## 2024-12-17 RX ADMIN — SODIUM CHLORIDE 1000 ML: 0.9 INJECTION, SOLUTION INTRAVENOUS at 11:10

## 2024-12-17 RX ADMIN — LIDOCAINE 1 PATCH: 50 PATCH TOPICAL at 14:20

## 2024-12-17 RX ADMIN — ALUMINUM HYDROXIDE, MAGNESIUM HYDROXIDE, AND SIMETHICONE 30 ML: 200; 200; 20 SUSPENSION ORAL at 13:01

## 2024-12-17 RX ADMIN — LIDOCAINE HYDROCHLORIDE 10 ML: 20 SOLUTION ORAL at 13:01

## 2024-12-17 RX ADMIN — FAMOTIDINE 20 MG: 20 TABLET, FILM COATED ORAL at 13:01

## 2024-12-17 NOTE — ED ATTENDING ATTESTATION
12/17/2024  I, Benny Mullins MD, saw and evaluated the patient. I have discussed the patient with the resident/non-physician practitioner and agree with the resident's/non-physician practitioner's findings, Plan of Care, and MDM as documented in the resident's/non-physician practitioner's note, except where noted. All available labs and Radiology studies were reviewed.  I was present for key portions of any procedure(s) performed by the resident/non-physician practitioner and I was immediately available to provide assistance.       At this point I agree with the current assessment done in the Emergency Department.  I have conducted an independent evaluation of this patient a history and physical is as follows:  Sore throat with fever  for several days    Now with right flank pain    GB has been removed   No vomiting   no dysuria    Currently menstrating   also with  red rash  inside of arms and trunk   started in ED not pruritic no known allergens  No SOB      Gastric bypass and cholecystectomy 3 years ago       No prior kidney stone   Exam  looks well  HEENT  throat no exudates  uvula midline  no cervical nodes  Heart rrr no m abd soft pos bs nt  cva  tender  on right   No rebound no guarding  Extremities are normal  Pulses are equal and symmetric  Impression flank pain  Consideration for kidney stone  Patient also complained of chest pain to the resident that started while she was in the emergency department   No cardiac risk factors  No PE risk factors  PERC negative  Will obtain EKG  Will treat symptomatically      ED Course         Critical Care Time  Procedures

## 2024-12-17 NOTE — ED PROVIDER NOTES
Time reflects when diagnosis was documented in both MDM as applicable and the Disposition within this note       Time User Action Codes Description Comment    12/17/2024  1:57 PM Khushboo Christian Add [R10.9] Right flank pain           ED Disposition       ED Disposition   Discharge    Condition   Stable    Date/Time   Tue Dec 17, 2024  1:57 PM    Comment   Brianna Myrick discharge to home/self care.                   Assessment & Plan       Medical Decision Making  Patient is a 34 y.o. female  who presents to the ED with right sided flank pain.    Vital signs stable. Exam as listed above.    Differential diagnosis includes but is not limited to nephrolithiasis, UTI, pyelonephritis, muscle strain     Plan   CBC to evaluate for anemia, evaluate for leukocytosis as sign of infectious source of symptoms.  CMP to evaluate for electrolyte derangement, assess renal and hepatic function.  Lipase to evaluate for pancreatic pathology as source of pain.  Troponin to evaluate for cardiac ischemia, rule out NSTEMI.  UA to evaluate for UTI. Urine pregnancy.  ECG to evaluate for arrhythmia, heart block, ST changes to rule out STEMI, pericarditis.  CT stone study to evaluate for possible nephrolithiasis    View ED course below for further discussion on patient workup.     All labs reviewed and utilized in the medical decision making process  All radiology studies independently viewed by me and interpreted by the radiologist.  I reviewed all testing with the patient.     Upon re-evaluation patient is feeling better following medications. She is reassured by benign labs and CT. Discussed return precautions and patient expresses understanding.       Amount and/or Complexity of Data Reviewed  Labs: ordered. Decision-making details documented in ED Course.  Radiology: ordered. Decision-making details documented in ED Course.    Risk  OTC drugs.  Prescription drug management.        ED Course as of 12/17/24 1954   Tue Dec 17, 2024    1342 CT renal stone study abdomen pelvis without contrast  IMPRESSION:  No acute pathology. Specifically, no urinary tract calculi or obstructive uropathy.   1344 Patient continues to complain of pain but is reassured by EKG findings and negative CT. Will wait on 2nd urine to rule out UTI. Patient is comfortable with discharge.   1414 Nitrite, UA: Negative   1417 Leukocytes, UA: Negative       Medications   sodium chloride 0.9 % bolus 1,000 mL (0 mL Intravenous Stopped 12/17/24 1210)   acetaminophen (Ofirmev) injection 1,000 mg (0 mg Intravenous Stopped 12/17/24 1124)   aluminum-magnesium hydroxide-simethicone (MAALOX) oral suspension 30 mL (30 mL Oral Given 12/17/24 1301)   Lidocaine Viscous HCl (XYLOCAINE) 2 % mucosal solution 10 mL (10 mL Swish & Swallow Given 12/17/24 1301)   famotidine (PEPCID) tablet 20 mg (20 mg Oral Given 12/17/24 1301)       ED Risk Strat Scores                                              History of Present Illness       Chief Complaint   Patient presents with    Flank Pain     Pt c/o fever, chills, headache, sore throat starting Friday afternoon, Saturday pt started having r flank pain now radiating to lower r abdomen, no relief with tylenol       Past Medical History:   Diagnosis Date    Allergic     Zrytec    Anxiety     Back pain     Bacterial vaginosis     Bariatric surgery status     Chlamydia     Disease of thyroid gland     nodule    GERD (gastroesophageal reflux disease)     Headache(784.0) 9-26    Few days    Knee pain     Morbidly obese (HCC)     gastric sleeve   today 10/4 2021    MRSA carrier     Obesity     Postsurgical malabsorption     Thyroid disease     benign    -right   gets scan yearly    Urogenital trichomoniasis     Wears glasses       Past Surgical History:   Procedure Laterality Date    ABDOMINAL SURGERY  August 24th    Oct 4th 2021    BARIATRIC SURGERY  Oct 4th, 2021    CHOLECYSTECTOMY  August 24th    CHOLECYSTECTOMY LAPAROSCOPIC N/A 08/24/2023    Procedure:  CHOLECYSTECTOMY LAPAROSCOPIC;  Surgeon: Adebayo Thorpe DO;  Location: [facility];  Service: General    AZ LAPS GSTR RSTCV PX W/BYP DRE-EN-Y LIMB <150 CM N/A 10/04/2021    Procedure: BYPASS GASTRIC  DRE-EN-Y LAPAROSCOPIC, AND INTRAOPERATIVE EGD;  Surgeon: Trevor Grullon MD;  Location: [facility];  Service: Bariatrics    TONSILLECTOMY      TOOTH EXTRACTION  10/01/2014    WRIST SURGERY  05/25/2021      Family History   Problem Relation Age of Onset    Brain cancer Mother     Cancer Mother         glioblastoma    Hypertension Father     Diabetes Paternal Grandfather     Stroke Paternal Grandfather     No Known Problems Sister     No Known Problems Daughter     No Known Problems Maternal Grandmother     Diabetes Paternal Grandmother     Stroke Paternal Grandmother     Heart disease Neg Hx     Thyroid disease Neg Hx     Breast cancer Neg Hx     Colon cancer Neg Hx     Ovarian cancer Neg Hx       Social History     Tobacco Use    Smoking status: Never     Passive exposure: Never    Smokeless tobacco: Never   Vaping Use    Vaping status: Never Used   Substance Use Topics    Alcohol use: Yes     Comment: 2-3 drinks monthly    Drug use: No      E-Cigarette/Vaping    E-Cigarette Use Never User       E-Cigarette/Vaping Substances    Nicotine No     THC No     CBD No     Flavoring No       I have reviewed and agree with the history as documented.     Patient is a 34 year old female, [facility] bariatric surgery 2021, presenting today with right flank pain and new onset of chest pain and rash in the ED. Patient has had a sore throat and intermittent fevers up to 102 x 5 days, muscle aches. Yesterday she noticed right flank pain. She had a cholecystectomy, no history of nephrolithiasis. Denies dysuria, currently on her menstrual cycle so unclear whether or not she has hematuria. Patient recently started going to the gym again, thinks she could have pulled a muscle in the area but states the pain is sharp and severe, radiating from  back to front.     Rash is new, not pruritic, she has not had a similar rash before. She tells me she does not have a history of anxiety and does not normally feel anxious during ED visits. New chest pain is diffuse pressure.    Patient denies nausea, vomiting, diarrhea, constipation, lightheadedness, shortness of breath.        History provided by:  Patient      Review of Systems        Objective       ED Triage Vitals [12/17/24 0948]   Temperature Pulse Blood Pressure Respirations SpO2 Patient Position - Orthostatic VS   97.6 °F (36.4 °C) 101 115/76 20 99 % Sitting      Temp Source Heart Rate Source BP Location FiO2 (%) Pain Score    Tympanic Monitor Left arm -- 8      Vitals      Date and Time Temp Pulse SpO2 Resp BP Pain Score FACES Pain Rating User   12/17/24 1300 -- 72 99 % 20 -- -- --    12/17/24 1045 -- 90 98 % -- 109/70 -- --    12/17/24 0948 97.6 °F (36.4 °C) 101 99 % 20 115/76 8 -- MV            Physical Exam  Vitals and nursing note reviewed.   Constitutional:       General: She is not in acute distress.     Appearance: Normal appearance. She is well-developed. She is not ill-appearing or diaphoretic.   HENT:      Head: Normocephalic and atraumatic.      Nose: Nose normal. No congestion or rhinorrhea.      Mouth/Throat:      Mouth: Mucous membranes are moist.   Eyes:      Conjunctiva/sclera: Conjunctivae normal.   Cardiovascular:      Rate and Rhythm: Normal rate and regular rhythm.      Heart sounds: No murmur heard.  Pulmonary:      Effort: Pulmonary effort is normal. No respiratory distress.      Breath sounds: Normal breath sounds. No wheezing, rhonchi or rales.   Abdominal:      General: There is no distension.      Palpations: Abdomen is soft.      Tenderness: There is no abdominal tenderness. There is right CVA tenderness. There is no left CVA tenderness.   Musculoskeletal:         General: No swelling.      Cervical back: Neck supple.      Right lower leg: No edema.      Left lower leg: No  edema.   Skin:     General: Skin is warm and dry.      Capillary Refill: Capillary refill takes less than 2 seconds.      Coloration: Skin is not jaundiced.      Findings: Rash present.      Comments: Maculopapular rash on anterior surface of arms and chest.   Neurological:      Mental Status: She is alert.   Psychiatric:         Mood and Affect: Mood normal.         Results Reviewed       Procedure Component Value Units Date/Time    Urine Microscopic [[id number]]  (Abnormal) Collected: 12/17/24 1303    Lab Status: Final result Specimen: Urine, Clean Catch Updated: 12/17/24 1445     RBC, UA Innumerable /hpf      WBC, UA 2-4 /hpf      Epithelial Cells Occasional /hpf      Bacteria, UA Occasional /hpf      MUCUS THREADS Moderate    UA w Reflex to Microscopic w Reflex to Culture [[id number]]  (Abnormal) Collected: 12/17/24 1303    Lab Status: Final result Specimen: Urine, Clean Catch Updated: 12/17/24 1414     Color, UA Yellow     Clarity, UA Clear     Specific Gravity, UA 1.034     pH, UA 6.0     Leukocytes, UA Negative     Nitrite, UA Negative     Protein, UA 30 (1+) mg/dl      Glucose, UA Negative mg/dl      Ketones, UA Negative mg/dl      Urobilinogen, UA 2.0 mg/dl      Bilirubin, UA Negative     Occult Blood, UA Large    CBC and differential [[id number]]  (Normal) Collected: 12/17/24 1106    Lab Status: Final result Specimen: Blood from Arm, Left Updated: 12/17/24 1331     WBC 4.85 Thousand/uL      RBC 4.61 Million/uL      Hemoglobin 14.1 g/dL      Hematocrit 41.6 %      MCV 90 fL      MCH 30.6 pg      MCHC 33.9 g/dL      RDW 12.4 %      MPV 11.0 fL      Platelets 150 Thousands/uL     Manual Differential(PHLEBS Do Not Order) [[id number]]  (Abnormal) Collected: 12/17/24 1106    Lab Status: Final result Specimen: Blood from Arm, Left Updated: 12/17/24 1331     Segmented % 51 %      Bands % 6 %      Lymphocytes % 9 %      Monocytes % 6 %      Eosinophils % 8 %      Basophils % 0 %      Atypical Lymphocytes % 20 %       Absolute Neutrophils 2.76 Thousand/uL      Absolute Lymphocytes 1.41 Thousand/uL      Absolute Monocytes 0.29 Thousand/uL      Absolute Eosinophils 0.39 Thousand/uL      Absolute Basophils 0.00 Thousand/uL      Total Counted --     RBC Morphology Normal     Platelet Estimate Adequate     Pathology Review Yes     Differential Comment see note    Urine Microscopic [[id number]]  (Abnormal) Collected: 12/17/24 1101    Lab Status: Final result Specimen: Urine, Clean Catch Updated: 12/17/24 1146     RBC, UA Innumerable /hpf      WBC, UA       Field obscured, unable to enumerate     /hpf     Epithelial Cells       Field obscured, unable to enumerate     /hpf     Bacteria, UA       Field obscured, unable to enumerate     /hpf    Urine culture [[id number]] Collected: 12/17/24 1101    Lab Status: In process Specimen: Urine, Clean Catch Updated: 12/17/24 1146    UA w Reflex to Microscopic w Reflex to Culture [[id number]]  (Abnormal) Collected: 12/17/24 1101    Lab Status: Final result Specimen: Urine, Clean Catch Updated: 12/17/24 1144     Color, UA Red     Clarity, UA Turbid     Specific Gravity, UA 1.024     pH, UA Interference-unable to analyze     Leukocytes, UA Interference- unable to analyze     Nitrite, UA Interference- unable to analyze     Protein, UA       Interference- unable to analyze     mg/dl     Glucose, UA       Interference- unable to analyze     mg/dl     Ketones, UA       Interference- unable to analyze     mg/dl     Urobilinogen, UA       Interference-unable to analyze     E.U./dl     Bilirubin, UA Interference- unable to analyze     Occult Blood, UA Interference- unable to analyze    Comprehensive metabolic panel [[id number]] Collected: 12/17/24 1106    Lab Status: Final result Specimen: Blood from Arm, Left Updated: 12/17/24 1136     Sodium 140 mmol/L      Potassium 3.8 mmol/L      Chloride 106 mmol/L      CO2 27 mmol/L      ANION GAP 7 mmol/L      BUN 12 mg/dL      Creatinine 0.65 mg/dL      Glucose 77  mg/dL      Calcium 9.1 mg/dL      AST 18 U/L      ALT 22 U/L      Alkaline Phosphatase 44 U/L      Total Protein 6.6 g/dL      Albumin 4.1 g/dL      Total Bilirubin 0.46 mg/dL      eGFR 114 ml/min/1.73sq m     Narrative:      National Kid[employer] Disease Foundation guidelines for Chronic Kidney Disease (CKD):     Stage 1 with normal or high GFR (GFR > 90 mL/min/1.73 square meters)    Stage 2 Mild CKD (GFR = 60-89 mL/min/1.73 square meters)    Stage 3A Moderate CKD (GFR = 45-59 mL/min/1.73 square meters)    Stage 3B Moderate CKD (GFR = 30-44 mL/min/1.73 square meters)    Stage 4 Severe CKD (GFR = 15-29 mL/min/1.73 square meters)    Stage 5 End Stage CKD (GFR <15 mL/min/1.73 square meters)  Note: GFR calculation is accurate only with a steady state creatinine    Lipase [[id number]]  (Normal) Collected: 12/17/24 1106    Lab Status: Final result Specimen: Blood from Arm, Left Updated: 12/17/24 1136     Lipase 30 u/L     POCT pregnancy, urine [[id number]]  (Normal) Collected: 12/17/24 1108    Lab Status: Final result Updated: 12/17/24 1108     EXT Preg Test, Ur Negative     Control Valid            CT renal stone study abdomen pelvis without contrast   Final Interpretation by Naif Mar MD (12/17 1321)      No acute pathology. Specifically, no urinary tract calculi or obstructive uropathy.         Workstation performed: [id number]             ECG 12 Lead Documentation Only    Date/Time: 12/17/2024 11:17 PM    Performed by: Khushboo Christian DO  Authorized by: Khushboo Christian DO    ECG reviewed by me, the ED Provider: yes    Patient location:  ED  Previous ECG:     Previous ECG:  Compared to current    Comparison ECG info:  8/23/23    Similarity:  No change  Interpretation:     Interpretation: normal    Rate:     ECG rate:  74    ECG rate assessment: normal    Rhythm:     Rhythm: sinus rhythm    Ectopy:     Ectopy: none    QRS:     QRS axis:  Normal    QRS intervals:  Normal  Conduction:     Conduction: normal    ST  segments:     ST segments:  Normal  T waves:     T waves: normal        ED Medication and Procedure Management   Prior to Admission Medications   Prescriptions Last Dose Informant Patient Reported? Taking?   Cholecalciferol (Vitamin D3) 125 MCG (5000 UT) TABS  Self Yes No   Sig: Take 5,000 Units by mouth every other day   Multiple Vitamin (multivitamin) tablet  Self Yes No   Sig: Take 1 tablet by mouth daily    Multiple Vitamins-Minerals (HAIR SKIN & NAILS PO)   Yes No   Sig: Take by mouth   acetaminophen (TYLENOL) 500 mg tablet  Self Yes No   Sig:     calcium carbonate-vitamin D 500 mg-5 mcg per tablet  Self Yes No   Sig: Take 2 tablets by mouth daily with breakfast   cyanocobalamin (VITAMIN B-12) 1000 MCG tablet  Self No No   Sig: Take 1 tablet (1,000 mcg total) by mouth daily   dicyclomine (BENTYL) 20 mg tablet  Self No No   Sig: TAKE 1 TABLET (20 MG TOTAL) BY MOUTH TWICE A DAY AS NEEDED FOR ABDOMINAL PAIN   famotidine (PEPCID) 20 mg tablet  Self No No   Sig: TAKE 2 TABLETS BY MOUTH 2 TIMES A DAY.   ferrous sulfate 325 (65 Fe) mg tablet  Self Yes No   Sig: Take 325 mg by mouth daily with breakfast   fluticasone (FLONASE) 50 mcg/act nasal spray  Self No No   Si sprays into each nostril daily   polyethylene glycol (MIRALAX) 17 g packet  Self Yes No   Sig: Take 17 g by mouth daily TAKEN AS NEEDED      Facility-Administered Medications: None     Discharge Medication List as of 2024  2:18 PM        CONTINUE these medications which have NOT CHANGED    Details   acetaminophen (TYLENOL) 500 mg tablet  , Starting Wed 10/6/2021, Historical Med      calcium carbonate-vitamin D 500 mg-5 mcg per tablet Take 2 tablets by mouth daily with breakfast, Historical Med      Cholecalciferol (Vitamin D3) 125 MCG (5000 UT) TABS Take 5,000 Units by mouth every other day, Historical Med      cyanocobalamin (VITAMIN B-12) 1000 MCG tablet Take 1 tablet (1,000 mcg total) by mouth daily, Starting Thu 2024, No Print       dicyclomine (BENTYL) 20 mg tablet TAKE 1 TABLET (20 MG TOTAL) BY MOUTH TWICE A DAY AS NEEDED FOR ABDOMINAL PAIN, Normal      famotidine (PEPCID) 20 mg tablet TAKE 2 TABLETS BY MOUTH 2 TIMES A DAY., Starting Thu 7/11/2024, Normal      ferrous sulfate 325 (65 Fe) mg tablet Take 325 mg by mouth daily with breakfast, Historical Med      fluticasone (FLONASE) 50 mcg/act nasal spray 2 sprays into each nostril daily, Starting Tue 11/14/2023, Normal      Multiple Vitamin (multivitamin) tablet Take 1 tablet by mouth daily , Historical Med      Multiple Vitamins-Minerals (HAIR SKIN & NAILS PO) Take by mouth, Historical Med      polyethylene glycol (MIRALAX) 17 g packet Take 17 g by mouth daily TAKEN AS NEEDED, Historical Med           No discharge procedures on file.  ED SEPSIS DOCUMENTATION   Time reflects when diagnosis was documented in both MDM as applicable and the Disposition within this note       Time User Action Codes Description Comment    12/17/2024  1:57 PM Khushboo Christian Add [R10.9] Right flank pain                  Khushboo Christian,   12/17/24 1954

## 2024-12-17 NOTE — DISCHARGE INSTRUCTIONS
You were seen in the emergency department today for right-sided back pain.    We did not see signs of a kidney stone on your CT. Your urine did not show signs of infection.    You should return to the emergency department if you experience worsening back pain, worsening chest pain, shortness of breath, experience heart palpitations or feels like your heart is racing, if you pass out.    Thank you for choosing [facility]!

## 2024-12-17 NOTE — Clinical Note
Brianna Myrick was seen and treated in our emergency department on 12/17/2024.                Diagnosis:     Brianna  may return to work on return date.    She may return on this date: 12/19/2024         If you have any questions or concerns, please don't hesitate to call.      Benny Mullins MD    ______________________________           _______________          _______________  Hospital Representative                              Date                                Time

## 2024-12-18 ENCOUNTER — VBI (OUTPATIENT)
Dept: ADMINISTRATIVE | Facility: OTHER | Age: 34
End: 2024-12-18

## 2024-12-18 LAB — BACTERIA UR CULT: NORMAL

## 2024-12-18 NOTE — TELEPHONE ENCOUNTER
12/18/24 2:43 PM    Patient contacted post ED visit, [facility] phone outreaches documented. Patient called practice and scheduled a follow-up ED visit. Follow up as needed    Thank you.  Rivka Carl MA  [facility]

## 2025-03-10 ENCOUNTER — TELEPHONE (OUTPATIENT)
Dept: BARIATRICS | Facility: CLINIC | Age: 35
End: 2025-03-10

## 2025-04-29 DIAGNOSIS — Z48.815 ENCOUNTER FOR SURGICAL AFTERCARE FOLLOWING SURGERY OF DIGESTIVE SYSTEM: ICD-10-CM

## 2025-04-29 DIAGNOSIS — K91.2 POSTSURGICAL MALABSORPTION: ICD-10-CM

## 2025-04-29 DIAGNOSIS — K21.9 GERD (GASTROESOPHAGEAL REFLUX DISEASE): ICD-10-CM

## 2025-04-29 DIAGNOSIS — Z98.84 BARIATRIC SURGERY STATUS: ICD-10-CM

## 2025-04-29 RX ORDER — FAMOTIDINE 20 MG/1
40 TABLET, FILM COATED ORAL 2 TIMES DAILY
Qty: 60 TABLET | Refills: 5 | Status: SHIPPED | OUTPATIENT
Start: 2025-04-29

## 2025-06-18 ENCOUNTER — ANNUAL EXAM (OUTPATIENT)
Dept: OBGYN CLINIC | Facility: CLINIC | Age: 35
End: 2025-06-18

## 2025-06-18 VITALS
WEIGHT: 214.2 LBS | HEIGHT: 66 IN | DIASTOLIC BLOOD PRESSURE: 70 MMHG | SYSTOLIC BLOOD PRESSURE: 104 MMHG | BODY MASS INDEX: 34.42 KG/M2 | HEART RATE: 71 BPM

## 2025-06-18 DIAGNOSIS — Z01.419 WOMEN'S ANNUAL ROUTINE GYNECOLOGICAL EXAMINATION: ICD-10-CM

## 2025-06-18 DIAGNOSIS — Z12.4 CERVICAL CANCER SCREENING: ICD-10-CM

## 2025-06-18 DIAGNOSIS — Z11.3 SCREEN FOR STD (SEXUALLY TRANSMITTED DISEASE): Primary | ICD-10-CM

## 2025-06-18 DIAGNOSIS — Z12.39 ENCOUNTER FOR BREAST CANCER SCREENING USING NON-MAMMOGRAM MODALITY: ICD-10-CM

## 2025-06-18 PROCEDURE — 87491 CHLMYD TRACH DNA AMP PROBE: CPT | Performed by: NURSE PRACTITIONER

## 2025-06-18 PROCEDURE — 99395 PREV VISIT EST AGE 18-39: CPT | Performed by: NURSE PRACTITIONER

## 2025-06-18 PROCEDURE — 87591 N.GONORRHOEAE DNA AMP PROB: CPT | Performed by: NURSE PRACTITIONER

## 2025-06-18 NOTE — PROGRESS NOTES
ANNUAL GYNECOLOGICAL EXAMINATION    Brianna Myrick is a 35 y.o. female who presents today for annual GYN exam.  Her last pap smear was performed  and result was NILM, HPV negative. She reports history of one abnormal pap smear in her past, followed by a normal pap one year later. She has received the HPV vaccine series. She had HIV screening performed  and it was negative.  She reports menses as normal.  Patient's last menstrual period was 2025 (exact date).  Her general medical history has been reviewed and she reports it as follows:    Past Medical History[1]  Past Surgical History[2]  OB History          3    Para   1    Term   1            AB   2    Living   1         SAB        IAB   2    Ectopic        Multiple        Live Births               Obstetric Comments   Child birth 12             Social History[3]  Social History     Substance and Sexual Activity   Sexual Activity Yes    Partners: Male    Birth control/protection: Condom Male     Cancer-related family history includes Brain cancer in her mother; Cancer in her mother. There is no history of Breast cancer, Colon cancer, or Ovarian cancer.    Current Outpatient Medications   Medication Instructions    acetaminophen (TYLENOL) 500 mg tablet No dose, route, or frequency recorded.    calcium carbonate-vitamin D 500 mg-5 mcg per tablet 2 tablets, Daily with breakfast    cyanocobalamin (VITAMIN B-12) 1,000 mcg, Oral, Daily    dicyclomine (BENTYL) 20 mg tablet TAKE 1 TABLET (20 MG TOTAL) BY MOUTH TWICE A DAY AS NEEDED FOR ABDOMINAL PAIN    famotidine (PEPCID) 40 mg, Oral, 2 times daily    ferrous sulfate 325 mg, Daily with breakfast    fluticasone (FLONASE) 50 mcg/act nasal spray 2 sprays, Nasal, Daily    Multiple Vitamin (multivitamin) tablet 1 tablet, Daily    Multiple Vitamins-Minerals (HAIR SKIN & NAILS PO) Take by mouth    polyethylene glycol (MIRALAX) 17 g, Daily    Vitamin D3 5,000 Units, Every other day  "      Review of Systems:  Review of Systems   Constitutional: Negative.    Gastrointestinal: Negative.    Genitourinary: Negative.    Skin: Negative.        Physical Exam:  /70 (BP Location: Right arm, Patient Position: Sitting)   Pulse 71   Ht 5' 6\" (1.676 m)   Wt 97.2 kg (214 lb 3.2 oz)   LMP 05/22/2025 (Exact Date)   BMI 34.57 kg/m²   Physical Exam  Constitutional:       General: She is not in acute distress.     Appearance: Normal appearance.   Genitourinary:      Vulva and bladder normal.      No lesions in the vagina.      No vaginal erythema or ulceration.        Right Adnexa: not tender and no mass present.     Left Adnexa: not tender and no mass present.     No cervical motion tenderness or lesion.      Uterus is not enlarged or tender.      No uterine mass detected.  Breasts:     Right: No mass, nipple discharge or skin change.      Left: No mass, nipple discharge or skin change.     Cardiovascular:      Rate and Rhythm: Normal rate and regular rhythm.   Pulmonary:      Effort: Pulmonary effort is normal.      Breath sounds: Normal breath sounds.   Abdominal:      General: Abdomen is flat.      Palpations: Abdomen is soft.     Musculoskeletal:      Cervical back: Neck supple.     Neurological:      Mental Status: She is alert.     Skin:     General: Skin is warm and dry.     Psychiatric:         Mood and Affect: Mood normal.         Behavior: Behavior normal.   Vitals reviewed.         Assessment/Plan:   1. Normal well-woman GYN exam.  2. Cervical cancer screening:  Normal cervical exam.  Pap smear due next year.  Has received HPV vaccine in the past.     3. STD screening:  Orders placed for vaginal GC/CT cultures.  Orders placed for serum anti-HIV, anti-HCV, syphilis panel.   4. Breast cancer screening:  Normal breast exam.  Reviewed breast self-awareness.   5. Depression Screening: Patient's depression screening was assessed with a PHQ-2 score of 0. Clinically patient does not have depression. " No treatment is required.     6. BMI Counseling: Body mass index is 34.57 kg/m². Discussed the patient's BMI with her. The BMI is above normal. Nutrition recommendations include 3-5 servings of fruits/vegetables daily, moderation in carbohydrate intake, and increasing intake of lean protein. Exercise recommendations include moderate aerobic physical activity for 150 minutes/week.   7. Contraception:  Patient is planning to try to conceive in the near future. We discussed cycle tracking and timing of intercourse. Recommended daily folic acid.    8. Return to office in one year for annual exam or sooner if needed.    Reviewed with patient that test results are available in [facility] immediately, but that they will not necessarily be reviewed by me immediately.  Explained that I will review results at my earliest opportunity and contact patient appropriately.         [1]   Past Medical History:  Diagnosis Date    Allergic     Zrytec    Anxiety     Back pain     Bacterial vaginosis     Bariatric surgery status     Chlamydia     Disease of thyroid gland     nodule    GERD (gastroesophageal reflux disease)     Headache(784.0) 9-26    Few days    Knee pain     Morbidly obese (HCC)     gastric sleeve   today 10/4 2021    MRSA carrier     Obesity     Postsurgical malabsorption     Thyroid disease     benign    -right   gets scan yearly    Urogenital trichomoniasis     Wears glasses    [2]   Past Surgical History:  Procedure Laterality Date    ABDOMINAL SURGERY  August 24th    Oct 4th 2021    BARIATRIC SURGERY  Oct 4th, 2021    CHOLECYSTECTOMY  August 24th    CHOLECYSTECTOMY LAPAROSCOPIC N/A 08/24/2023    Procedure: CHOLECYSTECTOMY LAPAROSCOPIC;  Surgeon: Adebayo Thorpe DO;  Location:  MAIN OR;  Service: General    ND LAPS GSTR RSTCV PX W/BYP DRE-EN-Y LIMB <150 CM N/A 10/04/2021    Procedure: BYPASS GASTRIC  DRE-EN-Y LAPAROSCOPIC, AND INTRAOPERATIVE EGD;  Surgeon: Trevor Grullon MD;  Location: [facility];  Service:  Bariatrics    TONSILLECTOMY      TOOTH EXTRACTION  10/01/2014    WRIST SURGERY  05/25/2021   [3]   Social History  Tobacco Use    Smoking status: Never     Passive exposure: Never    Smokeless tobacco: Never   Vaping Use    Vaping status: Never Used   Substance Use Topics    Alcohol use: Yes     Alcohol/week: 1.0 standard drink of alcohol     Types: 1 Standard drinks or equivalent per week     Comment: 2-3 drinks monthly    Drug use: No

## 2025-06-19 ENCOUNTER — RESULTS FOLLOW-UP (OUTPATIENT)
Dept: OBGYN CLINIC | Facility: CLINIC | Age: 35
End: 2025-06-19

## 2025-06-19 LAB
C TRACH DNA SPEC QL NAA+PROBE: NEGATIVE
N GONORRHOEA DNA SPEC QL NAA+PROBE: NEGATIVE

## 2025-06-19 NOTE — TELEPHONE ENCOUNTER
Patient informed of negative screening and verbalized understanding.    ----- Message from MAURI Andrade sent at 6/19/2025  1:46 PM EDT -----  Please let her know the sti screening is negative. Thank you!  ----- Message -----  From: Lab, Background User  Sent: 6/19/2025   1:46 PM EDT  To: MAURI Andrade

## 2025-06-30 ENCOUNTER — TELEPHONE (OUTPATIENT)
Dept: OBGYN CLINIC | Facility: CLINIC | Age: 35
End: 2025-06-30

## 2025-07-01 ENCOUNTER — OFFICE VISIT (OUTPATIENT)
Dept: OBGYN CLINIC | Facility: CLINIC | Age: 35
End: 2025-07-01

## 2025-07-01 VITALS
HEART RATE: 77 BPM | DIASTOLIC BLOOD PRESSURE: 69 MMHG | BODY MASS INDEX: 34.57 KG/M2 | HEIGHT: 66 IN | SYSTOLIC BLOOD PRESSURE: 102 MMHG

## 2025-07-01 DIAGNOSIS — B96.89 BACTERIAL VAGINOSIS: Primary | ICD-10-CM

## 2025-07-01 DIAGNOSIS — N76.0 BACTERIAL VAGINOSIS: Primary | ICD-10-CM

## 2025-07-01 PROCEDURE — 99213 OFFICE O/P EST LOW 20 MIN: CPT | Performed by: OBSTETRICS & GYNECOLOGY

## 2025-07-01 RX ORDER — METRONIDAZOLE 500 MG/1
500 TABLET ORAL EVERY 12 HOURS SCHEDULED
Qty: 14 TABLET | Refills: 0 | Status: SHIPPED | OUTPATIENT
Start: 2025-07-01 | End: 2025-07-08

## 2025-07-01 RX ORDER — AMOXICILLIN 500 MG/1
500 CAPSULE ORAL EVERY 8 HOURS
COMMUNITY
Start: 2025-06-18

## 2025-07-01 NOTE — ASSESSMENT & PLAN NOTE
2 days burning, redness, swelling, thin white discharge.  3 of 4 Amsel's criteria for positive whiff test, homogenous discharge, and copious clue cells on microscopy.  Provided education on diagnosis, cause, etiology, and treatment    Plan  -Metronidazole 500mg bid for 7 days  -Tylenol 650 tid otc as needed for pain control  -RTC if symptoms fail to resolve    Orders:    metroNIDAZOLE (FLAGYL) 500 mg tablet; Take 1 tablet (500 mg total) by mouth every 12 (twelve) hours for 7 days

## 2025-07-01 NOTE — PROGRESS NOTES
"Name: Brianna Myrick      : 1990      MRN: [id number]  Encounter Provider: [facility] [place] Resident  Encounter Date: 2025   [facility] [place]  :  Assessment & Plan  Bacterial vaginosis  2 days burning, redness, swelling, thin white discharge.  3 of 4 Amsel's criteria for positive whiff test, homogenous discharge, and copious clue cells on microscopy.  Provided education on diagnosis, cause, etiology, and treatment    Plan  -Metronidazole 500mg bid for 7 days  -Tylenol 650 tid otc as needed for pain control  -RTC if symptoms fail to resolve    Orders:    metroNIDAZOLE (FLAGYL) 500 mg tablet; Take 1 tablet (500 mg total) by mouth every 12 (twelve) hours for 7 days        History of Present Illness   HPI  Brianna Myrick is a 35 y.o. female who presents  vaginal pain aftter her period and swimming [place] a [place] over the weekend. She developed on the R lip which has spread ot the inside. Never had anything like this. Has white mucousy discharge with fishy odor. Denies any fevers or chills, no new sexual partners.  History obtained from: patient    Review of Systems   Constitutional:  Negative for fever.   Respiratory:  Negative for shortness of breath.    Cardiovascular:  Negative for chest pain.   Gastrointestinal:  Negative for abdominal pain.   Genitourinary:  Positive for vaginal discharge and vaginal pain. Negative for difficulty urinating, dysuria, flank pain, frequency and hematuria.     Medications Ordered Prior to Encounter[1]      Objective   /69 (BP Location: Right arm, Patient Position: Sitting, Cuff Size: Large)   Pulse 77   Ht 5' 6\" (1.676 m)   LMP 2025 (Approximate)   BMI 34.57 kg/m²      Physical Exam  Vitals reviewed.   Genitourinary:     General: Normal vulva.      Vagina: Vaginal discharge present.      Comments: Copious thin white discharge. Fishy odor.    Neurological:      Mental Status: She is " alert.                [1]   Current Outpatient Medications on File Prior to Visit   Medication Sig Dispense Refill    amoxicillin (AMOXIL) 500 mg capsule Take 500 mg by mouth every 8 (eight) hours      acetaminophen (TYLENOL) 500 mg tablet       calcium carbonate-vitamin D 500 mg-5 mcg per tablet Take 2 tablets by mouth daily with breakfast      Cholecalciferol (Vitamin D3) 125 MCG (5000 UT) TABS Take 5,000 Units by mouth every other day      cyanocobalamin (VITAMIN B-12) 1000 MCG tablet Take 1 tablet (1,000 mcg total) by mouth daily      dicyclomine (BENTYL) 20 mg tablet TAKE 1 TABLET (20 MG TOTAL) BY MOUTH TWICE A DAY AS NEEDED FOR ABDOMINAL PAIN 60 tablet 5    famotidine (PEPCID) 20 mg tablet TAKE 2 TABLETS BY MOUTH 2 TIMES A DAY. 60 tablet 5    ferrous sulfate 325 (65 Fe) mg tablet Take 325 mg by mouth daily with breakfast      fluticasone (FLONASE) 50 mcg/act nasal spray 2 sprays into each nostril daily 48 g 1    Multiple Vitamin (multivitamin) tablet Take 1 tablet by mouth in the morning.      Multiple Vitamins-Minerals (HAIR SKIN & NAILS PO) Take by mouth      polyethylene glycol (MIRALAX) 17 g packet Take 17 g by mouth in the morning. TAKEN AS NEEDED.       No current facility-administered medications on file prior to visit.

## 2025-07-01 NOTE — LETTER
July 1, 2025     Patient: Brianna Myrick  YOB: 1990  Date of Visit: 7/1/2025      To Whom it May Concern:    Brianna Myrick is under my professional care. Brianna was seen in my office on 7/1/2025. Brianna may return to work on 7/2/2025.    If you have any questions or concerns, please don't hesitate to call.         Sincerely,          [facility] [place] Resident        CC: No Recipients

## 2025-07-16 ENCOUNTER — APPOINTMENT (OUTPATIENT)
Dept: LAB | Facility: IMAGING CENTER | Age: 35
End: 2025-07-16
Payer: MEDICARE

## 2025-07-16 DIAGNOSIS — Z11.3 SCREEN FOR STD (SEXUALLY TRANSMITTED DISEASE): ICD-10-CM

## 2025-07-16 LAB
HCV AB SER QL: NORMAL
HIV 1+2 AB+HIV1 P24 AG SERPL QL IA: NORMAL
TREPONEMA PALLIDUM IGG+IGM AB [PRESENCE] IN SERUM OR PLASMA BY IMMUNOASSAY: NORMAL

## 2025-07-16 PROCEDURE — 87389 HIV-1 AG W/HIV-1&-2 AB AG IA: CPT

## 2025-07-16 PROCEDURE — 36415 COLL VENOUS BLD VENIPUNCTURE: CPT

## 2025-07-16 PROCEDURE — 86803 HEPATITIS C AB TEST: CPT

## 2025-07-16 PROCEDURE — 86780 TREPONEMA PALLIDUM: CPT

## 2025-07-17 ENCOUNTER — TELEPHONE (OUTPATIENT)
Dept: OBGYN CLINIC | Facility: CLINIC | Age: 35
End: 2025-07-17

## 2025-08-11 ENCOUNTER — CLINICAL SUPPORT (OUTPATIENT)
Age: 35
End: 2025-08-11
Payer: MEDICARE

## 2025-08-13 ENCOUNTER — OFFICE VISIT (OUTPATIENT)
Age: 35
End: 2025-08-13
Payer: MEDICARE

## (undated) DEVICE — SYRINGE 10ML LL

## (undated) DEVICE — VIOLET BRAIDED (POLYGLACTIN 910), SYNTHETIC ABSORBABLE SUTURE: Brand: COATED VICRYL

## (undated) DEVICE — Device: Brand: OMNICLOSE TROCAR SITE CLOSURE DEVICE

## (undated) DEVICE — STAPLER ENDO GIA ROTICULATOR 60-2.5

## (undated) DEVICE — ENDOPATH 5MM CURVED SCISSORS WITH MONOPOLAR CAUTERY: Brand: ENDOPATH

## (undated) DEVICE — GLOVE SRG BIOGEL 7

## (undated) DEVICE — ENDOPATH PNEUMONEEDLE INSUFFLATION NEEDLES WITH LUER LOCK CONNECTORS 120MM: Brand: ENDOPATH

## (undated) DEVICE — ANTIBACTERIAL UNDYED BRAIDED (POLYGLACTIN 910), SYNTHETIC ABSORBABLE SUTURE: Brand: COATED VICRYL

## (undated) DEVICE — NEEDLE 25G X 1 1/2

## (undated) DEVICE — PENCIL ELECTROSURG E-Z CLEAN -0035H

## (undated) DEVICE — TROCAR VISIPORT

## (undated) DEVICE — ENDO STITCH 2-0 VICRYL

## (undated) DEVICE — SYRINGE 20ML LL

## (undated) DEVICE — ASTOUND STANDARD SURGICAL GOWN, XL: Brand: CONVERTORS

## (undated) DEVICE — TISSUE RETRIEVAL SYSTEM: Brand: INZII RETRIEVAL SYSTEM

## (undated) DEVICE — SCD SEQUENTIAL COMPRESSION COMFORT SLEEVE MEDIUM KNEE LENGTH: Brand: KENDALL SCD

## (undated) DEVICE — GLOVE SRG BIOGEL 8

## (undated) DEVICE — TUBING SMOKE EVAC W/FILTRATION DEVICE PLUMEPORT ACTIV

## (undated) DEVICE — TIBURON LAPAROSCOPIC ABDOMINAL DRAPE: Brand: CONVERTORS

## (undated) DEVICE — PACK PBDS LAP CHOLE RF

## (undated) DEVICE — TROCAR: Brand: KII FIOS FIRST ENTRY

## (undated) DEVICE — NEEDLE SPINAL18G X 3.5 IN QUINCKE

## (undated) DEVICE — STAPLER EEA 25 MM COVIDIEN

## (undated) DEVICE — BAG DECANTER

## (undated) DEVICE — TUBING SUCTION 5MM X 12 FT

## (undated) DEVICE — PMI DISPOSABLE PUNCTURE CLOSURE DEVICE / SUTURE GRASPER: Brand: PMI

## (undated) DEVICE — CHLORAPREP HI-LITE 26ML ORANGE

## (undated) DEVICE — ADHESIVE SKIN CLSR DERMABOND NX

## (undated) DEVICE — 2000CC GUARDIAN II: Brand: GUARDIAN

## (undated) DEVICE — ENDO STITCH 2-0 SURGIDAC 48 IN

## (undated) DEVICE — Device

## (undated) DEVICE — SUT VICRYL PLUS 0 UR-6 27IN VCP603H

## (undated) DEVICE — SURGICEL 4 X 8

## (undated) DEVICE — [HIGH FLOW INSUFFLATOR,  DO NOT USE IF PACKAGE IS DAMAGED,  KEEP DRY,  KEEP AWAY FROM SUNLIGHT,  PROTECT FROM HEAT AND RADIOACTIVE SOURCES.]: Brand: PNEUMOSURE

## (undated) DEVICE — ELECTRODE LAP SPATULA STR E-Z CLEAN 33CM -0018

## (undated) DEVICE — ADHESIVE SKIN HIGH VISCOSITY EXOFIN 1ML

## (undated) DEVICE — TRAVELKIT CONTAINS FIRST STEP KIT (200ML EP-4 KIT) AND SOILED SCOPE BAG - 1 KIT: Brand: TRAVELKIT CONTAINS FIRST STEP KIT AND SOILED SCOPE BAG

## (undated) DEVICE — ENDO STITCH DEVICE 10 MM

## (undated) DEVICE — VISUALIZATION SYSTEM: Brand: CLEARIFY

## (undated) DEVICE — GLOVE INDICATOR PI UNDERGLOVE SZ 7.5 BLUE

## (undated) DEVICE — INTENDED FOR TISSUE SEPARATION, AND OTHER PROCEDURES THAT REQUIRE A SHARP SURGICAL BLADE TO PUNCTURE OR CUT.: Brand: BARD-PARKER SAFETY BLADES SIZE 15, STERILE

## (undated) DEVICE — 10 MM BABCOCKS WITH RATCHET HANDLES: Brand: ENDOPATH

## (undated) DEVICE — GLOVE INDICATOR PI UNDERGLOVE SZ 7 BLUE

## (undated) DEVICE — IRRIG ENDO FLO TUBING

## (undated) DEVICE — INSUFFLATION NEEDLE TO ESTABLISH PNEUMOPERITONEUM.: Brand: INSUFFLATION NEEDLE

## (undated) DEVICE — 3000CC GUARDIAN II: Brand: GUARDIAN

## (undated) DEVICE — SYRINGE 30ML LL

## (undated) DEVICE — DRAPE EQUIPMENT RF WAND

## (undated) DEVICE — TROCAR: Brand: KII® SLEEVE

## (undated) DEVICE — ELECTRODE LAP L WIRE E-Z CLEAN 33CM -0100

## (undated) DEVICE — COVIDIEN ENDO GIA PURPLE (MED) RELOAD 60MM

## (undated) DEVICE — URETERAL CATHETER ADAPTOR TIP

## (undated) DEVICE — PLUMEPEN PRO 10FT

## (undated) DEVICE — POWER SHELL SIGNIA

## (undated) DEVICE — SUT MONOCRYL 4-0 PS-2 27 IN Y426H

## (undated) DEVICE — WEBRIL 6 IN UNSTERILE

## (undated) DEVICE — SUT MONOCRYL 4-0 PS-2 18 IN Y496G

## (undated) DEVICE — HARMONIC 1100 SHEARS, 36CM SHAFT LENGTH: Brand: HARMONIC

## (undated) DEVICE — LIGACLIP 10-M/L, 10MM ENDOSCOPIC ROTATING MULTIPLE CLIP APPLIERS: Brand: LIGACLIP

## (undated) DEVICE — ALLENTOWN LAP CHOLE APP PACK: Brand: CARDINAL HEALTH

## (undated) DEVICE — TROCARS: Brand: KII® BALLOON BLUNT TIP SYSTEM

## (undated) DEVICE — LAPAROSCOPIC TROCAR SLEEVE/SINGLE USE: Brand: KII® SLEEVE